# Patient Record
Sex: MALE | Race: WHITE | HISPANIC OR LATINO | Employment: FULL TIME | URBAN - METROPOLITAN AREA
[De-identification: names, ages, dates, MRNs, and addresses within clinical notes are randomized per-mention and may not be internally consistent; named-entity substitution may affect disease eponyms.]

---

## 2018-08-30 ENCOUNTER — PATIENT MESSAGE (OUTPATIENT)
Dept: INTERNAL MEDICINE | Facility: CLINIC | Age: 63
End: 2018-08-30

## 2018-08-30 ENCOUNTER — OFFICE VISIT (OUTPATIENT)
Dept: INTERNAL MEDICINE | Facility: CLINIC | Age: 63
End: 2018-08-30
Payer: MEDICARE

## 2018-08-30 VITALS
DIASTOLIC BLOOD PRESSURE: 72 MMHG | HEART RATE: 81 BPM | RESPIRATION RATE: 16 BRPM | SYSTOLIC BLOOD PRESSURE: 118 MMHG | BODY MASS INDEX: 28.61 KG/M2 | HEIGHT: 67 IN | TEMPERATURE: 98 F | WEIGHT: 182.31 LBS

## 2018-08-30 DIAGNOSIS — Z11.59 NEED FOR HEPATITIS C SCREENING TEST: ICD-10-CM

## 2018-08-30 DIAGNOSIS — Z76.89 ENCOUNTER TO ESTABLISH CARE: Primary | ICD-10-CM

## 2018-08-30 DIAGNOSIS — I10 ESSENTIAL HYPERTENSION: ICD-10-CM

## 2018-08-30 DIAGNOSIS — Z23 NEED FOR VACCINATION WITH 13-POLYVALENT PNEUMOCOCCAL CONJUGATE VACCINE: ICD-10-CM

## 2018-08-30 DIAGNOSIS — N40.0 BENIGN PROSTATIC HYPERPLASIA, UNSPECIFIED WHETHER LOWER URINARY TRACT SYMPTOMS PRESENT: ICD-10-CM

## 2018-08-30 DIAGNOSIS — Z13.6 SCREENING FOR CARDIOVASCULAR CONDITION: ICD-10-CM

## 2018-08-30 DIAGNOSIS — A60.01 HERPES SIMPLEX INFECTION OF PENIS: ICD-10-CM

## 2018-08-30 DIAGNOSIS — Z79.899 OTHER LONG TERM (CURRENT) DRUG THERAPY: ICD-10-CM

## 2018-08-30 DIAGNOSIS — Z23 NEED FOR SHINGLES VACCINE: ICD-10-CM

## 2018-08-30 DIAGNOSIS — Z12.5 SCREENING FOR MALIGNANT NEOPLASM OF PROSTATE: ICD-10-CM

## 2018-08-30 PROCEDURE — 90670 PCV13 VACCINE IM: CPT | Mod: S$GLB,,, | Performed by: INTERNAL MEDICINE

## 2018-08-30 PROCEDURE — 99205 OFFICE O/P NEW HI 60 MIN: CPT | Mod: 25,S$GLB,, | Performed by: INTERNAL MEDICINE

## 2018-08-30 PROCEDURE — G0009 ADMIN PNEUMOCOCCAL VACCINE: HCPCS | Mod: S$GLB,,, | Performed by: INTERNAL MEDICINE

## 2018-08-30 PROCEDURE — 3008F BODY MASS INDEX DOCD: CPT | Mod: CPTII,S$GLB,, | Performed by: INTERNAL MEDICINE

## 2018-08-30 PROCEDURE — 99999 PR PBB SHADOW E&M-NEW PATIENT-LVL III: CPT | Mod: PBBFAC,,, | Performed by: INTERNAL MEDICINE

## 2018-08-30 RX ORDER — BISOPROLOL FUMARATE AND HYDROCHLOROTHIAZIDE 5; 6.25 MG/1; MG/1
TABLET ORAL
COMMUNITY
Start: 2018-08-22 | End: 2018-08-30 | Stop reason: SDUPTHER

## 2018-08-30 RX ORDER — TAMSULOSIN HYDROCHLORIDE 0.4 MG/1
0.4 CAPSULE ORAL DAILY
COMMUNITY
End: 2018-08-30 | Stop reason: DRUGHIGH

## 2018-08-30 RX ORDER — TAMSULOSIN HYDROCHLORIDE 0.4 MG/1
0.8 CAPSULE ORAL DAILY
Qty: 180 CAPSULE | Refills: 3 | Status: SHIPPED | OUTPATIENT
Start: 2018-08-30 | End: 2019-10-29 | Stop reason: SDUPTHER

## 2018-08-30 RX ORDER — BISOPROLOL FUMARATE AND HYDROCHLOROTHIAZIDE 5; 6.25 MG/1; MG/1
1 TABLET ORAL DAILY
Qty: 90 TABLET | Refills: 3 | Status: SHIPPED | OUTPATIENT
Start: 2018-08-30 | End: 2019-10-29 | Stop reason: SDUPTHER

## 2018-08-30 RX ORDER — AMLODIPINE BESYLATE 5 MG/1
5 TABLET ORAL DAILY
COMMUNITY
End: 2018-08-31 | Stop reason: DRUGHIGH

## 2018-08-30 NOTE — PATIENT INSTRUCTIONS
Shingrix is a two part vaccines series to prevent shingles. You should receive the second part of the vaccine anytime between 2 to 6 months after the initial dose.   Https://www.cdc.gov/vaccines/vpd/shingles/public/shingrix/index.html

## 2018-08-30 NOTE — PROGRESS NOTES
Subjective:       Patient ID: Babatunde Oconnor is a 63 y.o. male.    Chief Complaint: Annual Exam (new patient)    Hypertension   This is a chronic problem. The current episode started more than 1 year ago. The problem has been resolved since onset. The problem is controlled. Pertinent negatives include no anxiety, blurred vision, chest pain, headaches, malaise/fatigue, neck pain, orthopnea, palpitations, peripheral edema, PND, shortness of breath or sweats. There are no associated agents to hypertension. There are no known risk factors for coronary artery disease. Past treatments include ACE inhibitors, alpha 1 blockers and beta blockers. The current treatment provides significant improvement. There are no compliance problems.      63 y.o. male here to discuss healthcare maintenance and f/u of chronic medical conditions. Previous PCP was Dr. German Angeles with [facility].       Health Maintenance:   Lipid disorders/ASCVD risk: due    DM: FBG due  Hepatitis C (9317-2575): due   Colorectal Cancer (50-75yr): Colonoscopy 3 months ago, repeat 5 years   Prostate (per discussion with patient starting at 50y or 45y if high risk): PSA due      Vaccines:   Influenza (yearly)    Tetanus (every 10 yrs - 1st tdap) defer, review records    PPSV23(>64yo or <65 w/ lung dz, smoking, DM) next year   PCV13 (> 65 or <65 w/ immunocompromised) due   Zoster (>61yo) due       Medical Problems:  1. HTN:  He is taking bisoprolol hydrochlorothiazide 5-6.25 mg and amlodipine but he is unsure of the dose  2.  BPH:  Flomax 0.4 mg daily but he reports that he still having some symptoms    Back pain: He is seeing Dr. Eb Dietrich- [facility]. He is currently participating in physical therapy.     Past Medical History:   Diagnosis Date    BPH (benign prostatic hyperplasia)     Hypertension      Past Surgical History:   Procedure Laterality Date    CERVICAL SPINE SURGERY       No family history on  file.  Social History     Socioeconomic History    Marital status:      Spouse name: Not on file    Number of children: Not on file    Years of education: Not on file    Highest education level: Not on file   Social Needs    Financial resource strain: Not on file    Food insecurity - worry: Not on file    Food insecurity - inability: Not on file    Transportation needs - medical: Not on file    Transportation needs - non-medical: Not on file   Occupational History    Not on file   Tobacco Use    Smoking status: Never Smoker    Smokeless tobacco: Never Used   Substance and Sexual Activity    Alcohol use: Yes     Frequency: 4 or more times a week     Drinks per session: 1 or 2     Binge frequency: Less than monthly    Drug use: No    Sexual activity: Yes     Partners: Female   Other Topics Concern    Not on file   Social History Narrative    Not on file     Review of patient's allergies indicates:  No Known Allergies    Current Outpatient Medications:     amLODIPine (NORVASC) 5 MG tablet, Take 5 mg by mouth once daily., Disp: , Rfl:     bisoprolol-hydrochlorothiazide 5-6.25 mg (ZIAC) 5-6.25 mg Tab, Take 1 tablet by mouth once daily., Disp: 90 tablet, Rfl: 3    tamsulosin (FLOMAX) 0.4 mg Cap, Take 2 capsules (0.8 mg total) by mouth once daily., Disp: 180 capsule, Rfl: 3    varicella-zoster gE-AS01B, PF, (SHINGRIX, PF,) 50 mcg/0.5 mL injection, Inject 0.5 mLs into the muscle once. for 1 dose, Disp: 0.5 mL, Rfl: 1      Review of Systems   Constitutional: Negative for appetite change, fever and malaise/fatigue.   HENT: Negative for dental problem, sinus pain and trouble swallowing.    Eyes: Negative for blurred vision, discharge and redness.   Respiratory: Negative for cough and shortness of breath.    Cardiovascular: Negative for chest pain, palpitations, orthopnea, leg swelling and PND.   Gastrointestinal: Negative for abdominal pain, blood in stool, constipation and diarrhea.  "  Genitourinary: Positive for difficulty urinating and frequency. Negative for dysuria.   Musculoskeletal: Positive for back pain. Negative for gait problem, joint swelling and neck pain.   Skin: Negative for pallor, rash and wound.   Neurological: Negative for numbness and headaches.       Objective:        Vitals:    08/30/18 1505   BP: 118/72   BP Location: Right arm   Patient Position: Sitting   BP Method: Large (Manual)   Pulse: 81   Resp: 16   Temp: 98.3 °F (36.8 °C)   TempSrc: Oral   Weight: 82.7 kg (182 lb 5.1 oz)   Height: 5' 6.5" (1.689 m)       Body mass index is 28.99 kg/m².    Physical Exam   Constitutional: He is oriented to person, place, and time. He appears well-developed. No distress.   HENT:   Head: Normocephalic and atraumatic.   Right Ear: Tympanic membrane normal.   Left Ear: Tympanic membrane normal.   Nose: Nose normal.   Mouth/Throat: Oropharynx is clear and moist.   Eyes: Conjunctivae and EOM are normal. Pupils are equal, round, and reactive to light.   Neck: Normal range of motion. Neck supple. No tracheal deviation present. No thyromegaly present.   Cardiovascular: Normal rate, regular rhythm, normal heart sounds and intact distal pulses.   Pulmonary/Chest: Effort normal and breath sounds normal.   Abdominal: Soft. Bowel sounds are normal. He exhibits no distension and no mass. There is no tenderness.   Musculoskeletal: Normal range of motion. He exhibits no edema.   Lymphadenopathy:     He has no cervical adenopathy.   Neurological: He is alert and oriented to person, place, and time. No sensory deficit.   Skin: Skin is warm and dry. He is not diaphoretic. No cyanosis. Nails show no clubbing.   Psychiatric: He has a normal mood and affect. His behavior is normal. Judgment normal.       Assessment:     1. Encounter to establish care    2. Essential hypertension    3. Benign prostatic hyperplasia, unspecified whether lower urinary tract symptoms present    4. Need for hepatitis C screening " test    5. Screening for cardiovascular condition    6. Screening for malignant neoplasm of prostate    7. Other long term (current) drug therapy     8. Need for vaccination with 13-polyvalent pneumococcal conjugate vaccine    9. Need for shingles vaccine           Plan:         1. Encounter to establish care  - reviewed healthcare maintenance and pt's chronic medical problems.   - CBC auto differential; Future  - Comprehensive metabolic panel; Future  - Hemoglobin A1c; Future  - Lipid panel; Future  - TSH; Future  - Urinalysis; Future  - Hepatitis C antibody; Future  - Ambulatory Referral to Optometry    2. Essential hypertension  He will notify me of the dose of amlodipine he is currently taking and I will send refills so his pharmacy  - Comprehensive metabolic panel; Future  - TSH; Future  - Urinalysis; Future  - Ambulatory Referral to Optometry  - bisoprolol-hydrochlorothiazide 5-6.25 mg (ZIAC) 5-6.25 mg Tab; Take 1 tablet by mouth once daily.  Dispense: 90 tablet; Refill: 3    3. Benign prostatic hyperplasia, unspecified whether lower urinary tract symptoms present  - PSA, Screening; Future  - tamsulosin (FLOMAX) 0.4 mg Cap; Take 2 capsules (0.8 mg total) by mouth once daily.  Dispense: 180 capsule; Refill: 3    4. Need for hepatitis C screening test  - Hepatitis C antibody; Future    5. Screening for cardiovascular condition  - Lipid panel; Future    6. Screening for malignant neoplasm of prostate  - PSA, Screening; Future    7. Other long term (current) drug therapy   - Hemoglobin A1c; Future    8. Need for vaccination with 13-polyvalent pneumococcal conjugate vaccine  - Pneumococcal Conjugate Vaccine (13 Valent) (IM)    9. Need for shingles vaccine  - varicella-zoster gE-AS01B, PF, (SHINGRIX, PF,) 50 mcg/0.5 mL injection; Inject 0.5 mLs into the muscle once. for 1 dose  Dispense: 0.5 mL; Refill: 1    Records requested from previous physicians

## 2018-08-31 PROBLEM — A60.01 HERPES SIMPLEX INFECTION OF PENIS: Status: ACTIVE | Noted: 2018-08-31

## 2018-08-31 RX ORDER — ASPIRIN 81 MG/1
81 TABLET ORAL DAILY
COMMUNITY
End: 2022-06-28 | Stop reason: HOSPADM

## 2018-08-31 RX ORDER — ATORVASTATIN CALCIUM 10 MG/1
10 TABLET, FILM COATED ORAL DAILY
COMMUNITY
End: 2018-08-31 | Stop reason: SDUPTHER

## 2018-08-31 RX ORDER — VALACYCLOVIR HYDROCHLORIDE 500 MG/1
500 TABLET, FILM COATED ORAL DAILY
Qty: 90 TABLET | Refills: 3 | Status: SHIPPED | OUTPATIENT
Start: 2018-08-31 | End: 2018-10-15 | Stop reason: SDUPTHER

## 2018-08-31 RX ORDER — ACYCLOVIR 400 MG/1
1 TABLET ORAL 3 TIMES DAILY PRN
COMMUNITY
End: 2018-08-31 | Stop reason: DRUGHIGH

## 2018-08-31 RX ORDER — AMLODIPINE BESYLATE 10 MG/1
10 TABLET ORAL DAILY
Qty: 90 TABLET | Refills: 3 | Status: SHIPPED | OUTPATIENT
Start: 2018-08-31 | End: 2018-10-15 | Stop reason: SDUPTHER

## 2018-08-31 RX ORDER — ATORVASTATIN CALCIUM 10 MG/1
10 TABLET, FILM COATED ORAL DAILY
Qty: 90 TABLET | Refills: 3 | Status: SHIPPED | OUTPATIENT
Start: 2018-08-31 | End: 2018-10-15 | Stop reason: SDUPTHER

## 2018-09-04 NOTE — TELEPHONE ENCOUNTER
Patient wanting you to recommend an optometrist at  [facility]. Please advise when you return to clinic.

## 2018-09-07 ENCOUNTER — LAB VISIT (OUTPATIENT)
Dept: LAB | Facility: HOSPITAL | Age: 63
End: 2018-09-07
Attending: INTERNAL MEDICINE
Payer: MEDICARE

## 2018-09-07 DIAGNOSIS — N40.0 BENIGN PROSTATIC HYPERPLASIA, UNSPECIFIED WHETHER LOWER URINARY TRACT SYMPTOMS PRESENT: ICD-10-CM

## 2018-09-07 DIAGNOSIS — I10 ESSENTIAL HYPERTENSION: ICD-10-CM

## 2018-09-07 DIAGNOSIS — Z12.5 SCREENING FOR MALIGNANT NEOPLASM OF PROSTATE: ICD-10-CM

## 2018-09-07 DIAGNOSIS — Z76.89 ENCOUNTER TO ESTABLISH CARE: ICD-10-CM

## 2018-09-07 DIAGNOSIS — Z11.59 NEED FOR HEPATITIS C SCREENING TEST: ICD-10-CM

## 2018-09-07 DIAGNOSIS — Z13.6 SCREENING FOR CARDIOVASCULAR CONDITION: ICD-10-CM

## 2018-09-07 DIAGNOSIS — Z79.899 OTHER LONG TERM (CURRENT) DRUG THERAPY: ICD-10-CM

## 2018-09-07 LAB
ALBUMIN SERPL BCP-MCNC: 4.5 G/DL
ALP SERPL-CCNC: 53 U/L
ALT SERPL W/O P-5'-P-CCNC: 43 U/L
ANION GAP SERPL CALC-SCNC: 9 MMOL/L
AST SERPL-CCNC: 32 U/L
BASOPHILS # BLD AUTO: 0.02 K/UL
BASOPHILS NFR BLD: 0.3 %
BILIRUB SERPL-MCNC: 0.6 MG/DL
BUN SERPL-MCNC: 10 MG/DL
CALCIUM SERPL-MCNC: 9.7 MG/DL
CHLORIDE SERPL-SCNC: 102 MMOL/L
CHOLEST SERPL-MCNC: 212 MG/DL
CHOLEST/HDLC SERPL: 3.1 {RATIO}
CO2 SERPL-SCNC: 27 MMOL/L
COMPLEXED PSA SERPL-MCNC: 0.53 NG/ML
CREAT SERPL-MCNC: 0.9 MG/DL
DIFFERENTIAL METHOD: ABNORMAL
EOSINOPHIL # BLD AUTO: 0.1 K/UL
EOSINOPHIL NFR BLD: 0.9 %
ERYTHROCYTE [DISTWIDTH] IN BLOOD BY AUTOMATED COUNT: 13.2 %
EST. GFR  (AFRICAN AMERICAN): >60 ML/MIN/1.73 M^2
EST. GFR  (NON AFRICAN AMERICAN): >60 ML/MIN/1.73 M^2
ESTIMATED AVG GLUCOSE: 111 MG/DL
GLUCOSE SERPL-MCNC: 103 MG/DL
HBA1C MFR BLD HPLC: 5.5 %
HCT VFR BLD AUTO: 40.9 %
HDLC SERPL-MCNC: 68 MG/DL
HDLC SERPL: 32.1 %
HGB BLD-MCNC: 13.2 G/DL
LDLC SERPL CALC-MCNC: 116.4 MG/DL
LYMPHOCYTES # BLD AUTO: 1.5 K/UL
LYMPHOCYTES NFR BLD: 25.2 %
MCH RBC QN AUTO: 29 PG
MCHC RBC AUTO-ENTMCNC: 32.3 G/DL
MCV RBC AUTO: 90 FL
MONOCYTES # BLD AUTO: 0.6 K/UL
MONOCYTES NFR BLD: 10.2 %
NEUTROPHILS # BLD AUTO: 3.7 K/UL
NEUTROPHILS NFR BLD: 63.4 %
NONHDLC SERPL-MCNC: 144 MG/DL
PLATELET # BLD AUTO: 241 K/UL
PMV BLD AUTO: 10 FL
POTASSIUM SERPL-SCNC: 4 MMOL/L
PROT SERPL-MCNC: 7.5 G/DL
RBC # BLD AUTO: 4.55 M/UL
SODIUM SERPL-SCNC: 138 MMOL/L
TRIGL SERPL-MCNC: 138 MG/DL
TSH SERPL DL<=0.005 MIU/L-ACNC: 1.55 UIU/ML
WBC # BLD AUTO: 5.76 K/UL

## 2018-09-07 PROCEDURE — 80061 LIPID PANEL: CPT

## 2018-09-07 PROCEDURE — 84443 ASSAY THYROID STIM HORMONE: CPT

## 2018-09-07 PROCEDURE — 86803 HEPATITIS C AB TEST: CPT

## 2018-09-07 PROCEDURE — 80053 COMPREHEN METABOLIC PANEL: CPT

## 2018-09-07 PROCEDURE — 85025 COMPLETE CBC W/AUTO DIFF WBC: CPT

## 2018-09-07 PROCEDURE — 36415 COLL VENOUS BLD VENIPUNCTURE: CPT

## 2018-09-07 PROCEDURE — 84153 ASSAY OF PSA TOTAL: CPT

## 2018-09-07 PROCEDURE — 83036 HEMOGLOBIN GLYCOSYLATED A1C: CPT

## 2018-09-09 ENCOUNTER — PATIENT MESSAGE (OUTPATIENT)
Dept: INTERNAL MEDICINE | Facility: CLINIC | Age: 63
End: 2018-09-09

## 2018-09-10 LAB — HCV AB SERPL QL IA: NEGATIVE

## 2018-09-13 ENCOUNTER — PATIENT MESSAGE (OUTPATIENT)
Dept: OPTOMETRY | Facility: CLINIC | Age: 63
End: 2018-09-13

## 2018-09-14 DIAGNOSIS — Z12.11 COLON CANCER SCREENING: ICD-10-CM

## 2018-10-11 ENCOUNTER — PATIENT MESSAGE (OUTPATIENT)
Dept: INTERNAL MEDICINE | Facility: CLINIC | Age: 63
End: 2018-10-11

## 2018-10-15 DIAGNOSIS — A60.01 HERPES SIMPLEX INFECTION OF PENIS: ICD-10-CM

## 2018-10-15 RX ORDER — ATORVASTATIN CALCIUM 10 MG/1
10 TABLET, FILM COATED ORAL DAILY
Qty: 90 TABLET | Refills: 3 | Status: SHIPPED | OUTPATIENT
Start: 2018-10-15 | End: 2019-12-03

## 2018-10-15 RX ORDER — VALACYCLOVIR HYDROCHLORIDE 500 MG/1
500 TABLET, FILM COATED ORAL DAILY
Qty: 90 TABLET | Refills: 3 | Status: SHIPPED | OUTPATIENT
Start: 2018-10-15 | End: 2020-11-24

## 2018-10-15 RX ORDER — AMLODIPINE BESYLATE 10 MG/1
10 TABLET ORAL DAILY
Qty: 90 TABLET | Refills: 3 | Status: SHIPPED | OUTPATIENT
Start: 2018-10-15 | End: 2019-12-03

## 2018-10-30 ENCOUNTER — DOCUMENTATION ONLY (OUTPATIENT)
Dept: INTERNAL MEDICINE | Facility: CLINIC | Age: 63
End: 2018-10-30

## 2018-10-30 ENCOUNTER — LAB VISIT (OUTPATIENT)
Dept: LAB | Facility: HOSPITAL | Age: 63
End: 2018-10-30
Attending: INTERNAL MEDICINE
Payer: MEDICARE

## 2018-10-30 ENCOUNTER — OFFICE VISIT (OUTPATIENT)
Dept: OPTOMETRY | Facility: CLINIC | Age: 63
End: 2018-10-30
Payer: MEDICARE

## 2018-10-30 DIAGNOSIS — H04.123 BILATERAL DRY EYES: ICD-10-CM

## 2018-10-30 DIAGNOSIS — H25.13 NUCLEAR SCLEROSIS OF BOTH EYES: Primary | ICD-10-CM

## 2018-10-30 DIAGNOSIS — Z12.11 COLON CANCER SCREENING: ICD-10-CM

## 2018-10-30 DIAGNOSIS — H52.7 REFRACTIVE ERROR: ICD-10-CM

## 2018-10-30 DIAGNOSIS — B58.00 OCULAR TOXOPLASMOSIS: ICD-10-CM

## 2018-10-30 PROCEDURE — 92004 COMPRE OPH EXAM NEW PT 1/>: CPT | Mod: S$PBB,,, | Performed by: OPTOMETRIST

## 2018-10-30 PROCEDURE — 99212 OFFICE O/P EST SF 10 MIN: CPT | Mod: PBBFAC,PO | Performed by: OPTOMETRIST

## 2018-10-30 PROCEDURE — 82274 ASSAY TEST FOR BLOOD FECAL: CPT

## 2018-10-30 PROCEDURE — 99999 PR PBB SHADOW E&M-EST. PATIENT-LVL II: CPT | Mod: PBBFAC,,, | Performed by: OPTOMETRIST

## 2018-10-30 PROCEDURE — 92015 DETERMINE REFRACTIVE STATE: CPT | Mod: ,,, | Performed by: OPTOMETRIST

## 2018-10-30 RX ORDER — IBUPROFEN 800 MG/1
800 TABLET ORAL EVERY 6 HOURS PRN
Refills: 0 | COMMUNITY
Start: 2018-08-06 | End: 2020-11-24

## 2018-10-30 RX ORDER — TADALAFIL 20 MG/1
TABLET, FILM COATED ORAL
Refills: 12 | COMMUNITY
Start: 2018-08-01 | End: 2019-12-03

## 2018-10-30 RX ORDER — AMLODIPINE BESYLATE AND ATORVASTATIN CALCIUM 10; 10 MG/1; MG/1
1 TABLET, FILM COATED ORAL DAILY
Refills: 1 | COMMUNITY
Start: 2018-10-17 | End: 2019-10-29 | Stop reason: SDUPTHER

## 2018-10-30 RX ORDER — GABAPENTIN 100 MG/1
CAPSULE ORAL
Refills: 3 | COMMUNITY
Start: 2018-07-25 | End: 2019-12-03

## 2018-10-30 NOTE — LETTER
October 31, 2018      Negar Pandey MD  [address] [facility]  [place] [address]           [place] [facility]  [address] [facility] [facility]  [place] [address]  Phone: [phone]  Fax: [phone]          Patient: Babatunde Oconnor   MR Number: [id number]   YOB: 1955   Date of Visit: 10/30/2018       Dear Dr. Negar Pandey:    Thank you for referring Babatunde Oconnor to me for evaluation. Attached you will find relevant portions of my assessment and plan of care.    If you have questions, please do not hesitate to call me. I look forward to following Babatunde Oconnor along with you.    Sincerely,    Perfecto Wheat, OD    Enclosure  CC:  No Recipients    If you would like to receive this communication electronically, please contact [email or web address] or [phone] to request more information on [employer] access.    For providers and/or their staff who would like to refer a patient to [facility], please contact us through our one-stop-shop provider referral line, [facility] , at [phone].    If you feel you have received this communication in error or would no longer like to receive these types of communications, please e-mail [email or web address]

## 2018-10-31 LAB — HEMOCCULT STL QL IA: NEGATIVE

## 2018-10-31 NOTE — PROGRESS NOTES
CROW SEPULVEDA about 3 yrs. Ago.  Glasses about 3 yrs. Old, distance and near not as   clear.  Eyes red and water a lot, using visine up to 6 times a day.    Patient is spending more time driving and is very bothered by the sun,   would like prescription sunglasses. 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    Last edited by Montse Castaneda on 10/30/2018  3:50 PM. (History)            Assessment /Plan     For exam results, see Encounter Report.    Nuclear sclerosis of both eyes    Bilateral dry eyes    Ocular toxoplasmosis    Refractive error      1. Educated pt on presence of cataracts and effects on vision. No surgery at this time. Recheck in one year.  2. Recommend artificial tears. 1 drop 3-4x per day. Chronicity of disease and treatment discussed. Drives a lot, air vents in car, blink rate decreased.  3. Inactive. Monitor condition. Patient to report any changes. RTC 1 year recheck.  4. Spec Rx given. Different lens options discussed with patient. RTC 1 year full exam.

## 2019-02-21 ENCOUNTER — OFFICE VISIT (OUTPATIENT)
Dept: URGENT CARE | Facility: CLINIC | Age: 64
End: 2019-02-21
Payer: MEDICARE

## 2019-02-21 VITALS
OXYGEN SATURATION: 97 % | BODY MASS INDEX: 28.56 KG/M2 | SYSTOLIC BLOOD PRESSURE: 129 MMHG | HEIGHT: 67 IN | TEMPERATURE: 98 F | WEIGHT: 182 LBS | HEART RATE: 85 BPM | RESPIRATION RATE: 16 BRPM | DIASTOLIC BLOOD PRESSURE: 82 MMHG

## 2019-02-21 DIAGNOSIS — M62.830 BACK MUSCLE SPASM: ICD-10-CM

## 2019-02-21 DIAGNOSIS — S39.012A STRAIN OF LUMBAR REGION, INITIAL ENCOUNTER: Primary | ICD-10-CM

## 2019-02-21 PROCEDURE — 3079F DIAST BP 80-89 MM HG: CPT | Mod: CPTII,S$GLB,, | Performed by: NURSE PRACTITIONER

## 2019-02-21 PROCEDURE — 99214 PR OFFICE/OUTPT VISIT, EST, LEVL IV, 30-39 MIN: ICD-10-PCS | Mod: 25,S$GLB,, | Performed by: NURSE PRACTITIONER

## 2019-02-21 PROCEDURE — 3079F PR MOST RECENT DIASTOLIC BLOOD PRESSURE 80-89 MM HG: ICD-10-PCS | Mod: CPTII,S$GLB,, | Performed by: NURSE PRACTITIONER

## 2019-02-21 PROCEDURE — 1101F PT FALLS ASSESS-DOCD LE1/YR: CPT | Mod: CPTII,S$GLB,, | Performed by: NURSE PRACTITIONER

## 2019-02-21 PROCEDURE — 3008F BODY MASS INDEX DOCD: CPT | Mod: CPTII,S$GLB,, | Performed by: NURSE PRACTITIONER

## 2019-02-21 PROCEDURE — 3074F SYST BP LT 130 MM HG: CPT | Mod: CPTII,S$GLB,, | Performed by: NURSE PRACTITIONER

## 2019-02-21 PROCEDURE — 96372 THER/PROPH/DIAG INJ SC/IM: CPT | Mod: S$GLB,,, | Performed by: NURSE PRACTITIONER

## 2019-02-21 PROCEDURE — 96372 PR INJECTION,THERAP/PROPH/DIAG2ST, IM OR SUBCUT: ICD-10-PCS | Mod: S$GLB,,, | Performed by: NURSE PRACTITIONER

## 2019-02-21 PROCEDURE — 3074F PR MOST RECENT SYSTOLIC BLOOD PRESSURE < 130 MM HG: ICD-10-PCS | Mod: CPTII,S$GLB,, | Performed by: NURSE PRACTITIONER

## 2019-02-21 PROCEDURE — 3008F PR BODY MASS INDEX (BMI) DOCUMENTED: ICD-10-PCS | Mod: CPTII,S$GLB,, | Performed by: NURSE PRACTITIONER

## 2019-02-21 PROCEDURE — 99214 OFFICE O/P EST MOD 30 MIN: CPT | Mod: 25,S$GLB,, | Performed by: NURSE PRACTITIONER

## 2019-02-21 PROCEDURE — 1101F PR PT FALLS ASSESS DOC 0-1 FALLS W/OUT INJ PAST YR: ICD-10-PCS | Mod: CPTII,S$GLB,, | Performed by: NURSE PRACTITIONER

## 2019-02-21 RX ORDER — NAPROXEN 500 MG/1
500 TABLET ORAL 2 TIMES DAILY WITH MEALS
Qty: 20 TABLET | Refills: 0 | Status: SHIPPED | OUTPATIENT
Start: 2019-02-21 | End: 2019-03-03

## 2019-02-21 RX ORDER — KETOROLAC TROMETHAMINE 30 MG/ML
30 INJECTION, SOLUTION INTRAMUSCULAR; INTRAVENOUS
Status: COMPLETED | OUTPATIENT
Start: 2019-02-21 | End: 2019-02-21

## 2019-02-21 RX ORDER — CYCLOBENZAPRINE HCL 5 MG
5 TABLET ORAL NIGHTLY
Qty: 7 TABLET | Refills: 0 | Status: SHIPPED | OUTPATIENT
Start: 2019-02-21 | End: 2019-02-28

## 2019-02-21 RX ADMIN — KETOROLAC TROMETHAMINE 30 MG: 30 INJECTION, SOLUTION INTRAMUSCULAR; INTRAVENOUS at 10:02

## 2019-02-21 NOTE — PATIENT INSTRUCTIONS
Start anti-inflammatory prescription tomorrow.  Muscle relaxer may make you drowsy.      Alternate heat and ice for first 48 hours then  apply heat. You may do gently stretching if tolerable.    Moist warm compresss to area several times daily.  May use a heating pad on LOW to provide heat over a towel which was dampended with warm water. DO NOT FALL ASLEEP WITH HEATING PAD ON.  Do not stay in one position to long.  When sleeping on your back place a pillow under knees to reduce tension on back.  If sleeping on your side, place pillow between knees to keep spine in better alinement.  Wear supportive shoes such as tennis shoes for support of the lower back.  Take any medication as directed.    If you were not prescribed an anti-inflammatory medication, and if you do not have any history of stomach/intestinal ulcers, or kidney disease, or are not taking a blood thinner such as Coumadin, Plavix, Pradaxa, Eloquis, or Xaralta for example, it is OK to take over the counter Ibuprofen or Advil or Motrin or Aleve as directed.  Do not take these medications on an empty stomach.    If you lose control of your bowel and/or bladder, please go to the nearest Emergency Department immediately.  If you lose sensation in between your legs by your genitalia and/or rectum, please go to the nearest Emergency Department immediately.  If you lose control or sensation of any extremity, please go to the nearest Emergency Department immediately.            Understanding Lumbosacral Strain    Lumbosacral strain is a medical term for an injury that causes low back pain. The lumbosacral area (low back) is between the bottom of the ribcage and the top of the buttocks. A strain is tearing of muscles and tendons. These tears can be very small but still cause pain.  How a lumbosacral strain happens  Muscles and tendons connected to the spine can be strained in a number of ways:  · Sitting or standing in the same position for long periods of time.  This can harm the low back over time. Poor posture can make low back pain more likely.  · Moving the muscles and tendons past their usual range of motion. This can cause a sudden injury. This can happen when you twist, bend over, or lift something heavy. Not using correct technique for sports or tasks like lifting can make back injury more likely.  · Accidents or falls  Lumbosacral strain can be caused by other problems, but these are less common.  Symptoms of lumbosacral strain  Symptoms may include:  · Pain in the back, often on one side  · Pain that gets worse with movement and gets better with rest  · Inability to move as freely as usual  · Swelling, slight redness, and skin warmth in the painful area  Treatment for lumbosacral strain  Low back pain often goes away by itself within several weeks. But it often comes back. Treatment focuses on reducing pain and avoiding further injury. Bed rest is usually not recommended for low back pain. Treatments may include:  · Avoiding or changing the action that caused the problem. This helps prevent injuring the tissues again.  · Prescription or over-the-counter pain medicines. These help reduce inflammation, swelling, and pain.  · Cold or heat packs. These help reduce pain and swelling.  · Stretching and other exercises. These improve flexibility and strength.  · Physical therapy. This usually includes exercises and other treatments.  · Injections of medicine. This may relieve symptoms.  If these treatments do not relieve symptoms, your healthcare provider may order imaging tests to learn more about the problem. Sometimes you may need surgery.  Possible complications of lumbosacral strain  If the cause of the pain is not addressed, symptoms may return or get worse. Follow your healthcare providers instructions on lifestyle changes and treating your back.     When to call your healthcare provider  Call your healthcare provider right away if you have any of these:  · Fever  of 100.4°F (38°C) or higher, or as directed  · Numbness, tingling, or weakness  · Problems with bowel or bladder control, or problems having sex  · Pain that does not go away, or gets worse  · New symptoms   Date Last Reviewed: 3/10/2016  © 5516-1793 [employer]. [address] [place] [address], [place] [address]. All rights reserved. This information is not intended as a substitute for professional medical care. Always follow your healthcare professional's instructions.        Muscle Spasm  A muscle spasm (also called a cramp) is an involuntary muscle contraction. The muscle tightens quickly and strongly. A hard lump may form in the muscle. Muscle spasms are very painful. Read on to learn more about muscle spasms and how to treat and prevent them.    What causes muscles to spasm?  Often, the cause of a muscle spasm is not known. Muscle spasm is due to irritation of muscle fibers. Some things can make a muscle spasm more likely. These include:  · Injury  · Heavy exercise  · Overtired muscles  · A muscle held in one position for a long time  · Dehydration  · Low levels of certain minerals in the body  · Taking certain medications, such as diuretics or water pills  · Certain medical conditions, such as kidney failure or diabetes  · Being pregnant  Stopping a muscle spasm  Muscle spasms often come and go quickly. When a muscle goes into spasm, very gently stretch and massage the muscle. This may help calm the muscle fibers. Then rest the muscle.  Preventing muscle spasms  Although there is little or no evidence that staying hydrated, taking certain vitamins or minerals or stretching works to prevent cramps, these measures may help and have other benefits. Talk to your health care provider about steps to take to avoid muscle spasms. These may include:  · Drinking enough fluids to avoid dehydration, especially when you exercise.  · Taking vitamin or mineral supplements.  · Getting regular exercise.  · Stretching  regularly, especially before exercise.  · Limit caffeine and smoking.  · Taking a prescription muscle relaxant.  When to call your doctor  Call your doctor if you have any of the following:  · Severe cramping  · Cramping that lasts a long time, does not go away with stretching, or keeps coming back  · Pain, tingling, or weakness in the arms or legs  · Pain that wakes you up at night   Date Last Reviewed: 9/1/2015 © 2000-2017 [employer]. [address] [place] [address]. All rights reserved. This information is not intended as a substitute for professional medical care. Always follow your healthcare professional's instructions.        Muscle Spasm  A muscle spasm is a sudden tightening of the muscle you cant control. This may be caused by strain, overworking the muscle, or injury. It can also be caused by dehydration, electrolyte imbalance, diabetes, alcohol use, and certain medicines. If it goes on long enough the muscle spasm causes pain. Common areas for muscle spasm are the legs, neck, and back.  Home care  · Heat, massage, and stretching will help relax muscle spasm.  · When the spasm is in your arm or leg, stretch the muscle passively. To do this, have someone bend or straighten the joint above or below the muscle until you feel the stretch on the sore muscle. You can stretch the muscle actively by moving the affected body part. This will stretch the muscle that is in spasm. For example, if the spasm is in your calf, bend the ankle so your toes point upward toward your knee. This will stretch your calf muscle.  · You may use over-the-counter pain medicine to control pain, unless another medicine was prescribed. If you have chronic liver or kidney disease or ever had a stomach ulcer or GI bleeding, talk with your healthcare provider before using these medicines.  Follow-up care  Follow up with your healthcare provider, or as advised.    When to seek medical advice  Call your healthcare  provider right away if any of the following occur:  · Fingers or toes become swollen, cold, blue, numb, or tingly  · You develop weakness in the affected arm or leg  · Pain increases and is not controlled by the above measures  Date Last Reviewed: 11/21/2015  © 6620-7243 [employer]. [address] [place] [address]. All rights reserved. This information is not intended as a substitute for professional medical care. Always follow your healthcare professional's instructions.

## 2019-02-21 NOTE — PROGRESS NOTES
"Subjective:       Patient ID: Babatunde Oconnor is a 63 y.o. male.    Vitals:  height is 5' 6.5" (1.689 m) and weight is 82.6 kg (182 lb). His temperature is 98.2 °F (36.8 °C). His blood pressure is 129/82 and his pulse is 85. His respiration is 16 and oxygen saturation is 97%.     Chief Complaint: Muscle Pain    Pt thinks he may he pulled a muscle in his lower left back while at the gym on Saturday, pt also does [employer] and may have done something while lifting a heavy luggage. He states this morning he developed a throbbing pain at 0200. He denies any numbness, tingling or weakness.       Muscle Pain   This is a new problem. The current episode started in the past 7 days. The problem occurs constantly. The problem has been unchanged. Pertinent negatives include no arthralgias, chest pain, chills, congestion, coughing, fatigue, fever, headaches, joint swelling, myalgias, nausea, rash, sore throat, vertigo or vomiting. The symptoms are aggravated by twisting. He has tried acetaminophen for the symptoms. The treatment provided mild relief.       Constitution: Negative for chills, fatigue and fever.   HENT: Negative for congestion and sore throat.    Neck: Negative for painful lymph nodes.   Cardiovascular: Negative for chest pain and leg swelling.   Eyes: Negative for double vision and blurred vision.   Respiratory: Negative for cough and shortness of breath.    Gastrointestinal: Negative for nausea, vomiting and diarrhea.   Genitourinary: Negative for dysuria, frequency and urgency.   Musculoskeletal: Negative for joint pain, joint swelling, muscle cramps and muscle ache.   Skin: Negative for color change, pale and rash.   Allergic/Immunologic: Negative for seasonal allergies.   Neurological: Negative for dizziness, history of vertigo, light-headedness, passing out and headaches.   Hematologic/Lymphatic: Negative for swollen lymph nodes, easy bruising/bleeding and history of blood clots. Does not bruise/bleed " easily.   Psychiatric/Behavioral: Negative for nervous/anxious, sleep disturbance and depression. The patient is not nervous/anxious.        Objective:      Physical Exam   Constitutional: He is oriented to person, place, and time. Vital signs are normal. He appears well-developed and well-nourished. He is active and cooperative. No distress.   HENT:   Head: Normocephalic and atraumatic.   Nose: Nose normal.   Mouth/Throat: Oropharynx is clear and moist and mucous membranes are normal.   Eyes: Conjunctivae and lids are normal.   Neck: Trachea normal, normal range of motion, full passive range of motion without pain and phonation normal. Neck supple.   Cardiovascular: Normal rate, regular rhythm, normal heart sounds, intact distal pulses and normal pulses.   Pulmonary/Chest: Effort normal and breath sounds normal.   Abdominal: Soft. Normal appearance and bowel sounds are normal. He exhibits no abdominal bruit, no pulsatile midline mass and no mass.   Musculoskeletal: He exhibits no edema or deformity.        Lumbar back: He exhibits tenderness, pain and spasm. He exhibits normal range of motion, no bony tenderness, no swelling, no edema, no deformity, no laceration and normal pulse.   NEG ST LEG RAISE  FULL ROM B LE WITH 5/5 STRENGTH  2+DTR PATELLA AND ACHILLES  NVIT DISTALLY WITH SILT AND 2+BCR  ABLE TO AMBULATE WITH SMOOTH RHYTHMIC GAIT   Neurological: He is alert and oriented to person, place, and time. He has normal strength and normal reflexes. No sensory deficit.   Skin: Skin is warm, dry and intact. He is not diaphoretic.   Psychiatric: He has a normal mood and affect. His speech is normal and behavior is normal. Judgment and thought content normal. Cognition and memory are normal.   Nursing note and vitals reviewed.      Assessment:       1. Strain of lumbar region, initial encounter    2. Back muscle spasm        Plan:         Strain of lumbar region, initial encounter  -     ketorolac injection 30 mg  -      naproxen (NAPROSYN) 500 MG tablet; Take 1 tablet (500 mg total) by mouth 2 (two) times daily with meals. for 10 days  Dispense: 20 tablet; Refill: 0    Back muscle spasm  -     cyclobenzaprine (FLEXERIL) 5 MG tablet; Take 1 tablet (5 mg total) by mouth nightly. for 7 doses  Dispense: 7 tablet; Refill: 0            Patient Instructions       Start anti-inflammatory prescription tomorrow.  Muscle relaxer may make you drowsy.      Alternate heat and ice for first 48 hours then  apply heat. You may do gently stretching if tolerable.    Moist warm compresss to area several times daily.  May use a heating pad on LOW to provide heat over a towel which was dampended with warm water. DO NOT FALL ASLEEP WITH HEATING PAD ON.  Do not stay in one position to long.  When sleeping on your back place a pillow under knees to reduce tension on back.  If sleeping on your side, place pillow between knees to keep spine in better alinement.  Wear supportive shoes such as tennis shoes for support of the lower back.  Take any medication as directed.    If you were not prescribed an anti-inflammatory medication, and if you do not have any history of stomach/intestinal ulcers, or kidney disease, or are not taking a blood thinner such as Coumadin, Plavix, Pradaxa, Eloquis, or Xaralta for example, it is OK to take over the counter Ibuprofen or Advil or Motrin or Aleve as directed.  Do not take these medications on an empty stomach.    If you lose control of your bowel and/or bladder, please go to the nearest Emergency Department immediately.  If you lose sensation in between your legs by your genitalia and/or rectum, please go to the nearest Emergency Department immediately.  If you lose control or sensation of any extremity, please go to the nearest Emergency Department immediately.            Understanding Lumbosacral Strain    Lumbosacral strain is a medical term for an injury that causes low back pain. The lumbosacral area (low back) is  between the bottom of the ribcage and the top of the buttocks. A strain is tearing of muscles and tendons. These tears can be very small but still cause pain.  How a lumbosacral strain happens  Muscles and tendons connected to the spine can be strained in a number of ways:  · Sitting or standing in the same position for long periods of time. This can harm the low back over time. Poor posture can make low back pain more likely.  · Moving the muscles and tendons past their usual range of motion. This can cause a sudden injury. This can happen when you twist, bend over, or lift something heavy. Not using correct technique for sports or tasks like lifting can make back injury more likely.  · Accidents or falls  Lumbosacral strain can be caused by other problems, but these are less common.  Symptoms of lumbosacral strain  Symptoms may include:  · Pain in the back, often on one side  · Pain that gets worse with movement and gets better with rest  · Inability to move as freely as usual  · Swelling, slight redness, and skin warmth in the painful area  Treatment for lumbosacral strain  Low back pain often goes away by itself within several weeks. But it often comes back. Treatment focuses on reducing pain and avoiding further injury. Bed rest is usually not recommended for low back pain. Treatments may include:  · Avoiding or changing the action that caused the problem. This helps prevent injuring the tissues again.  · Prescription or over-the-counter pain medicines. These help reduce inflammation, swelling, and pain.  · Cold or heat packs. These help reduce pain and swelling.  · Stretching and other exercises. These improve flexibility and strength.  · Physical therapy. This usually includes exercises and other treatments.  · Injections of medicine. This may relieve symptoms.  If these treatments do not relieve symptoms, your healthcare provider may order imaging tests to learn more about the problem. Sometimes you may need  surgery.  Possible complications of lumbosacral strain  If the cause of the pain is not addressed, symptoms may return or get worse. Follow your healthcare providers instructions on lifestyle changes and treating your back.     When to call your healthcare provider  Call your healthcare provider right away if you have any of these:  · Fever of 100.4°F (38°C) or higher, or as directed  · Numbness, tingling, or weakness  · Problems with bowel or bladder control, or problems having sex  · Pain that does not go away, or gets worse  · New symptoms   Date Last Reviewed: 3/10/2016  © 8525-0478 [employer]. [address] [place] [address], [place] [address]. All rights reserved. This information is not intended as a substitute for professional medical care. Always follow your healthcare professional's instructions.        Muscle Spasm  A muscle spasm (also called a cramp) is an involuntary muscle contraction. The muscle tightens quickly and strongly. A hard lump may form in the muscle. Muscle spasms are very painful. Read on to learn more about muscle spasms and how to treat and prevent them.    What causes muscles to spasm?  Often, the cause of a muscle spasm is not known. Muscle spasm is due to irritation of muscle fibers. Some things can make a muscle spasm more likely. These include:  · Injury  · Heavy exercise  · Overtired muscles  · A muscle held in one position for a long time  · Dehydration  · Low levels of certain minerals in the body  · Taking certain medications, such as diuretics or water pills  · Certain medical conditions, such as kidney failure or diabetes  · Being pregnant  Stopping a muscle spasm  Muscle spasms often come and go quickly. When a muscle goes into spasm, very gently stretch and massage the muscle. This may help calm the muscle fibers. Then rest the muscle.  Preventing muscle spasms  Although there is little or no evidence that staying hydrated, taking certain vitamins or minerals or  stretching works to prevent cramps, these measures may help and have other benefits. Talk to your health care provider about steps to take to avoid muscle spasms. These may include:  · Drinking enough fluids to avoid dehydration, especially when you exercise.  · Taking vitamin or mineral supplements.  · Getting regular exercise.  · Stretching regularly, especially before exercise.  · Limit caffeine and smoking.  · Taking a prescription muscle relaxant.  When to call your doctor  Call your doctor if you have any of the following:  · Severe cramping  · Cramping that lasts a long time, does not go away with stretching, or keeps coming back  · Pain, tingling, or weakness in the arms or legs  · Pain that wakes you up at night   Date Last Reviewed: 9/1/2015  © 0152-1363 [employer]. [address] [place] [address]. All rights reserved. This information is not intended as a substitute for professional medical care. Always follow your healthcare professional's instructions.        Muscle Spasm  A muscle spasm is a sudden tightening of the muscle you cant control. This may be caused by strain, overworking the muscle, or injury. It can also be caused by dehydration, electrolyte imbalance, diabetes, alcohol use, and certain medicines. If it goes on long enough the muscle spasm causes pain. Common areas for muscle spasm are the legs, neck, and back.  Home care  · Heat, massage, and stretching will help relax muscle spasm.  · When the spasm is in your arm or leg, stretch the muscle passively. To do this, have someone bend or straighten the joint above or below the muscle until you feel the stretch on the sore muscle. You can stretch the muscle actively by moving the affected body part. This will stretch the muscle that is in spasm. For example, if the spasm is in your calf, bend the ankle so your toes point upward toward your knee. This will stretch your calf muscle.  · You may use over-the-counter pain  medicine to control pain, unless another medicine was prescribed. If you have chronic liver or kidney disease or ever had a stomach ulcer or GI bleeding, talk with your healthcare provider before using these medicines.  Follow-up care  Follow up with your healthcare provider, or as advised.    When to seek medical advice  Call your healthcare provider right away if any of the following occur:  · Fingers or toes become swollen, cold, blue, numb, or tingly  · You develop weakness in the affected arm or leg  · Pain increases and is not controlled by the above measures  Date Last Reviewed: 11/21/2015 © 2000-2017 [employer]. [address] [place] [address], [place] [address]. All rights reserved. This information is not intended as a substitute for professional medical care. Always follow your healthcare professional's instructions.

## 2019-10-08 ENCOUNTER — PATIENT MESSAGE (OUTPATIENT)
Dept: SURGERY | Facility: HOSPITAL | Age: 64
End: 2019-10-08

## 2019-10-29 DIAGNOSIS — N40.0 BENIGN PROSTATIC HYPERPLASIA, UNSPECIFIED WHETHER LOWER URINARY TRACT SYMPTOMS PRESENT: ICD-10-CM

## 2019-10-29 DIAGNOSIS — I10 ESSENTIAL HYPERTENSION: ICD-10-CM

## 2019-10-30 RX ORDER — TAMSULOSIN HYDROCHLORIDE 0.4 MG/1
CAPSULE ORAL
Qty: 90 CAPSULE | Refills: 0 | Status: SHIPPED | OUTPATIENT
Start: 2019-10-30 | End: 2019-12-03 | Stop reason: SDUPTHER

## 2019-10-30 RX ORDER — AMLODIPINE BESYLATE AND ATORVASTATIN CALCIUM 10; 10 MG/1; MG/1
TABLET, FILM COATED ORAL
Qty: 90 TABLET | Refills: 0 | Status: SHIPPED | OUTPATIENT
Start: 2019-10-30 | End: 2020-01-19

## 2019-10-30 RX ORDER — BISOPROLOL FUMARATE AND HYDROCHLOROTHIAZIDE 5; 6.25 MG/1; MG/1
TABLET ORAL
Qty: 90 TABLET | Refills: 0 | Status: SHIPPED | OUTPATIENT
Start: 2019-10-30 | End: 2020-01-19

## 2019-12-03 ENCOUNTER — DOCUMENTATION ONLY (OUTPATIENT)
Dept: INTERNAL MEDICINE | Facility: CLINIC | Age: 64
End: 2019-12-03

## 2019-12-03 ENCOUNTER — OFFICE VISIT (OUTPATIENT)
Dept: INTERNAL MEDICINE | Facility: CLINIC | Age: 64
End: 2019-12-03
Payer: MEDICARE

## 2019-12-03 VITALS
BODY MASS INDEX: 29.41 KG/M2 | OXYGEN SATURATION: 98 % | WEIGHT: 187.38 LBS | HEART RATE: 79 BPM | RESPIRATION RATE: 18 BRPM | DIASTOLIC BLOOD PRESSURE: 76 MMHG | TEMPERATURE: 99 F | HEIGHT: 67 IN | SYSTOLIC BLOOD PRESSURE: 128 MMHG

## 2019-12-03 DIAGNOSIS — H91.90 DECREASED HEARING, UNSPECIFIED LATERALITY: ICD-10-CM

## 2019-12-03 DIAGNOSIS — Z86.39 HISTORY OF ELEVATED GLUCOSE: ICD-10-CM

## 2019-12-03 DIAGNOSIS — G89.28 CHRONIC NECK PAIN WITH HISTORY OF CERVICAL SPINAL SURGERY: ICD-10-CM

## 2019-12-03 DIAGNOSIS — N40.0 BENIGN PROSTATIC HYPERPLASIA, UNSPECIFIED WHETHER LOWER URINARY TRACT SYMPTOMS PRESENT: ICD-10-CM

## 2019-12-03 DIAGNOSIS — M54.50 CHRONIC BILATERAL LOW BACK PAIN WITHOUT SCIATICA: ICD-10-CM

## 2019-12-03 DIAGNOSIS — Z91.89 RISK FACTORS FOR OBSTRUCTIVE SLEEP APNEA: ICD-10-CM

## 2019-12-03 DIAGNOSIS — M48.00 CENTRAL STENOSIS OF SPINAL CANAL: ICD-10-CM

## 2019-12-03 DIAGNOSIS — G47.8 OTHER SLEEP DISORDERS: ICD-10-CM

## 2019-12-03 DIAGNOSIS — R06.83 SNORING: ICD-10-CM

## 2019-12-03 DIAGNOSIS — Z12.11 COLON CANCER SCREENING: ICD-10-CM

## 2019-12-03 DIAGNOSIS — Z23 NEED FOR 23-POLYVALENT PNEUMOCOCCAL POLYSACCHARIDE VACCINE: ICD-10-CM

## 2019-12-03 DIAGNOSIS — Z12.5 SCREENING FOR MALIGNANT NEOPLASM OF PROSTATE: ICD-10-CM

## 2019-12-03 DIAGNOSIS — Z98.890 CHRONIC NECK PAIN WITH HISTORY OF CERVICAL SPINAL SURGERY: ICD-10-CM

## 2019-12-03 DIAGNOSIS — G89.29 CHRONIC BILATERAL LOW BACK PAIN WITHOUT SCIATICA: ICD-10-CM

## 2019-12-03 DIAGNOSIS — I10 ESSENTIAL HYPERTENSION: ICD-10-CM

## 2019-12-03 DIAGNOSIS — M54.2 CHRONIC NECK PAIN WITH HISTORY OF CERVICAL SPINAL SURGERY: ICD-10-CM

## 2019-12-03 DIAGNOSIS — Z13.6 SCREENING FOR CARDIOVASCULAR CONDITION: ICD-10-CM

## 2019-12-03 DIAGNOSIS — Z00.00 ANNUAL PHYSICAL EXAM: Primary | ICD-10-CM

## 2019-12-03 PROCEDURE — 1125F AMNT PAIN NOTED PAIN PRSNT: CPT | Mod: S$GLB,,, | Performed by: INTERNAL MEDICINE

## 2019-12-03 PROCEDURE — 1101F PR PT FALLS ASSESS DOC 0-1 FALLS W/OUT INJ PAST YR: ICD-10-PCS | Mod: CPTII,S$GLB,, | Performed by: INTERNAL MEDICINE

## 2019-12-03 PROCEDURE — 1159F PR MEDICATION LIST DOCUMENTED IN MEDICAL RECORD: ICD-10-PCS | Mod: S$GLB,,, | Performed by: INTERNAL MEDICINE

## 2019-12-03 PROCEDURE — 90732 PNEUMOCOCCAL POLYSACCHARIDE VACCINE 23-VALENT =>2YO SQ IM: ICD-10-PCS | Mod: S$GLB,,, | Performed by: INTERNAL MEDICINE

## 2019-12-03 PROCEDURE — 90732 PPSV23 VACC 2 YRS+ SUBQ/IM: CPT | Mod: S$GLB,,, | Performed by: INTERNAL MEDICINE

## 2019-12-03 PROCEDURE — 99999 PR PBB SHADOW E&M-EST. PATIENT-LVL IV: CPT | Mod: PBBFAC,,, | Performed by: INTERNAL MEDICINE

## 2019-12-03 PROCEDURE — 3074F PR MOST RECENT SYSTOLIC BLOOD PRESSURE < 130 MM HG: ICD-10-PCS | Mod: CPTII,S$GLB,, | Performed by: INTERNAL MEDICINE

## 2019-12-03 PROCEDURE — 99215 OFFICE O/P EST HI 40 MIN: CPT | Mod: 25,S$GLB,, | Performed by: INTERNAL MEDICINE

## 2019-12-03 PROCEDURE — 1101F PT FALLS ASSESS-DOCD LE1/YR: CPT | Mod: CPTII,S$GLB,, | Performed by: INTERNAL MEDICINE

## 2019-12-03 PROCEDURE — G0009 ADMIN PNEUMOCOCCAL VACCINE: HCPCS | Mod: S$GLB,,, | Performed by: INTERNAL MEDICINE

## 2019-12-03 PROCEDURE — 3074F SYST BP LT 130 MM HG: CPT | Mod: CPTII,S$GLB,, | Performed by: INTERNAL MEDICINE

## 2019-12-03 PROCEDURE — 1125F PR PAIN SEVERITY QUANTIFIED, PAIN PRESENT: ICD-10-PCS | Mod: S$GLB,,, | Performed by: INTERNAL MEDICINE

## 2019-12-03 PROCEDURE — 99499 RISK ADDL DX/OHS AUDIT: ICD-10-PCS | Mod: S$GLB,,, | Performed by: INTERNAL MEDICINE

## 2019-12-03 PROCEDURE — 3078F PR MOST RECENT DIASTOLIC BLOOD PRESSURE < 80 MM HG: ICD-10-PCS | Mod: CPTII,S$GLB,, | Performed by: INTERNAL MEDICINE

## 2019-12-03 PROCEDURE — G0009 PNEUMOCOCCAL POLYSACCHARIDE VACCINE 23-VALENT =>2YO SQ IM: ICD-10-PCS | Mod: S$GLB,,, | Performed by: INTERNAL MEDICINE

## 2019-12-03 PROCEDURE — 99499 UNLISTED E&M SERVICE: CPT | Mod: S$GLB,,, | Performed by: INTERNAL MEDICINE

## 2019-12-03 PROCEDURE — 99999 PR PBB SHADOW E&M-EST. PATIENT-LVL IV: ICD-10-PCS | Mod: PBBFAC,,, | Performed by: INTERNAL MEDICINE

## 2019-12-03 PROCEDURE — 1159F MED LIST DOCD IN RCRD: CPT | Mod: S$GLB,,, | Performed by: INTERNAL MEDICINE

## 2019-12-03 PROCEDURE — 99215 PR OFFICE/OUTPT VISIT, EST, LEVL V, 40-54 MIN: ICD-10-PCS | Mod: 25,S$GLB,, | Performed by: INTERNAL MEDICINE

## 2019-12-03 PROCEDURE — 3078F DIAST BP <80 MM HG: CPT | Mod: CPTII,S$GLB,, | Performed by: INTERNAL MEDICINE

## 2019-12-03 RX ORDER — TAMSULOSIN HYDROCHLORIDE 0.4 MG/1
0.8 CAPSULE ORAL DAILY
Qty: 180 CAPSULE | Refills: 1 | Status: SHIPPED | OUTPATIENT
Start: 2019-12-03 | End: 2020-01-21 | Stop reason: SDUPTHER

## 2019-12-03 NOTE — PROGRESS NOTES
Subjective:       Patient ID: Babatunde Oconnor is a 64 y.o. male.    Chief Complaint: Annual Exam; Back Pain; and Neck Pain    HPI    64 y.o. male here to discuss healthcare maintenance and f/u of chronic medical conditions.      Health Maintenance:   Lipid disorders/ASCVD risk: due    DM: FBG due  Hepatitis C (3934-6337): completed   Colorectal Cancer (50-75yr): iFOBT due   Prostate: PSA due        Vaccines:   Influenza: utd   Tetanus: defer    PPSV23: due   PCV13: 2018   Shingrix: completed       Medical Problems:  1. HTN: amlodipine 10mg, bisoprolol-hctz 5-6.25mg daily  2. HLD: atorvastatin 10mg daily      Past Medical History:   Diagnosis Date    BPH (benign prostatic hyperplasia)     Hypertension      Past Surgical History:   Procedure Laterality Date    APPENDECTOMY      CERVICAL DISCECTOMY  2008    CERVICAL SPINE SURGERY       History reviewed. No pertinent family history.  Social History     Socioeconomic History    Marital status:      Spouse name: Not on file    Number of children: Not on file    Years of education: Not on file    Highest education level: Not on file   Occupational History    Not on file   Social Needs    Financial resource strain: Somewhat hard    Food insecurity:     Worry: Never true     Inability: Never true    Transportation needs:     Medical: No     Non-medical: No   Tobacco Use    Smoking status: Never Smoker    Smokeless tobacco: Never Used   Substance and Sexual Activity    Alcohol use: Yes     Frequency: 4 or more times a week     Drinks per session: 1 or 2     Binge frequency: Never    Drug use: No    Sexual activity: Yes     Partners: Female   Lifestyle    Physical activity:     Days per week: 0 days     Minutes per session: Not on file    Stress: Not at all   Relationships    Social connections:     Talks on phone: More than three times a week     Gets together: Once a week     Attends [employer] witness service: Not on file     Active member of club or  organization: No     Attends meetings of clubs or organizations: Never     Relationship status:    Other Topics Concern    Not on file   Social History Narrative    Not on file     Review of patient's allergies indicates:  No Known Allergies    Current Outpatient Medications:     amlodipine-atorvastatin (CADUET) 10-10 mg per tablet, TAKE 1 TABLET BY MOUTH EVERY DAY, Disp: 90 tablet, Rfl: 0    aspirin (ECOTRIN) 81 MG EC tablet, Take 81 mg by mouth once daily., Disp: , Rfl:     bisoprolol-hydrochlorothiazide 5-6.25 mg (ZIAC) 5-6.25 mg Tab, TAKE 1 TABLET BY MOUTH EVERY DAY, Disp: 90 tablet, Rfl: 0     mg tablet, Take 800 mg by mouth every 6 (six) hours as needed., Disp: , Rfl: 0    tamsulosin (FLOMAX) 0.4 mg Cap, Take 2 capsules (0.8 mg total) by mouth once daily., Disp: 180 capsule, Rfl: 1    valACYclovir (VALTREX) 500 MG tablet, Take 1 tablet (500 mg total) by mouth once daily., Disp: 90 tablet, Rfl: 3      Review of Systems   Constitutional: Negative for activity change and unexpected weight change.   HENT: Positive for hearing loss and sore throat. Negative for rhinorrhea and trouble swallowing.    Eyes: Negative for discharge and visual disturbance.   Respiratory: Negative for chest tightness and wheezing.         + snoring   Cardiovascular: Negative for chest pain and palpitations.   Gastrointestinal: Negative for blood in stool, constipation, diarrhea and vomiting.   Endocrine: Negative for polydipsia and polyuria.   Genitourinary: Positive for urgency. Negative for difficulty urinating and hematuria.   Musculoskeletal: Positive for back pain and neck pain. Negative for arthralgias and joint swelling.   Neurological: Negative for weakness and headaches.   Psychiatric/Behavioral: Positive for sleep disturbance. Negative for confusion and dysphoric mood.       Objective:        Vitals:    12/03/19 1502   BP: 128/76   BP Location: Left arm   Patient Position: Sitting   BP Method: Medium  "(Manual)   Pulse: 79   Resp: 18   Temp: 99.2 °F (37.3 °C)   TempSrc: Oral   SpO2: 98%   Weight: 85 kg (187 lb 6.3 oz)   Height: 5' 6.5" (1.689 m)       Body mass index is 29.79 kg/m².    Physical Exam   Constitutional: He is oriented to person, place, and time. He appears well-developed. No distress.   HENT:   Head: Normocephalic and atraumatic.   Right Ear: Tympanic membrane normal.   Left Ear: Tympanic membrane normal.   Nose: Nose normal.   Mouth/Throat: Oropharynx is clear and moist. No oropharyngeal exudate, posterior oropharyngeal erythema or tonsillar abscesses.   Eyes: Conjunctivae and EOM are normal.   Neck: Neck supple. No spinous process tenderness and no muscular tenderness present. No neck rigidity. No tracheal deviation and normal range of motion present. No thyromegaly present.   Cardiovascular: Normal rate, regular rhythm, normal heart sounds and intact distal pulses.   Pulmonary/Chest: Effort normal and breath sounds normal. No respiratory distress. He has no wheezes.   Abdominal: Soft. Bowel sounds are normal. He exhibits no distension. There is no tenderness.   Musculoskeletal: Normal range of motion. He exhibits no edema.        Lumbar back: He exhibits bony tenderness and pain. He exhibits normal range of motion and no spasm.   Lymphadenopathy:     He has no cervical adenopathy.   Neurological: He is alert and oriented to person, place, and time. He has normal strength. No sensory deficit. Gait normal.   Skin: Skin is warm and dry. He is not diaphoretic. No cyanosis. Nails show no clubbing.   Psychiatric: He has a normal mood and affect. His behavior is normal. Judgment normal.         Neck circumference: 17.5 inches  Assessment:     1. Annual physical exam    2. Essential hypertension    3. Colon cancer screening    4. Screening for cardiovascular condition    5. Screening for malignant neoplasm of prostate    6. Need for 23-polyvalent pneumococcal polysaccharide vaccine    7. Benign prostatic " hyperplasia, unspecified whether lower urinary tract symptoms present    8. History of elevated glucose    9. Decreased hearing, unspecified laterality    10. Chronic bilateral low back pain without sciatica    11. Central stenosis of spinal canal    12. Chronic neck pain with history of cervical spinal surgery    13. Snoring    14. Risk factors for obstructive sleep apnea    15. Other sleep disorders            Plan:         1. Annual physical exam  - flu vaccine utd  - CBC auto differential; Future  - Comprehensive metabolic panel; Future  - Hemoglobin A1c; Future  - Lipid panel; Future  - TSH; Future  - PSA, Screening; Future    2. Essential hypertension  - stable, continue current meds  - CBC auto differential; Future  - Comprehensive metabolic panel; Future  - TSH; Future    3. Colon cancer screening  - Fecal Immunochemical Test (iFOBT); Future    4. Screening for cardiovascular condition  - Lipid panel; Future    5. Screening for malignant neoplasm of prostate  - PSA, Screening; Future    6. Need for 23-polyvalent pneumococcal polysaccharide vaccine  - (In Office Administered) Pneumococcal Polysaccharide Vaccine (23 Valent) (SQ/IM)    7. Benign prostatic hyperplasia, unspecified whether lower urinary tract symptoms present  - still having some nocturia along with urgency- improved with starting flomax so will increase dose to 0.8mg daily  - tamsulosin (FLOMAX) 0.4 mg Cap; Take 2 capsules (0.8 mg total) by mouth once daily.  Dispense: 180 capsule; Refill: 1    8. History of elevated glucose  - CBC auto differential; Future  - Hemoglobin A1c; Future    9. Decreased hearing, unspecified laterality  - Ambulatory Referral to Audiology    10. Chronic bilateral low back pain without sciatica  - MRI report available under care everywhere tab- reviewed.   - Ambulatory Referral to Pain Clinic    11. Central stenosis of spinal canal  - Ambulatory Referral to Pain Clinic    12. Chronic neck pain with history of cervical  spinal surgery  - X-Ray Cervical Spine Complete 5 view; Future    13. Snoring  - Polysomnogram (CPAP will be added if patient meets diagnostic criteria.); Future    14. Risk factors for obstructive sleep apnea  - Polysomnogram (CPAP will be added if patient meets diagnostic criteria.); Future    15. Other sleep disorders   - Polysomnogram (CPAP will be added if patient meets diagnostic criteria.); Future    rtc 6 mo f/u above or sooner prn       Patient note was created using M[employer] Dictation.  Any errors in syntax or even information may not have been identified and edited on initial review prior to signing this note.

## 2019-12-04 ENCOUNTER — CLINICAL SUPPORT (OUTPATIENT)
Dept: AUDIOLOGY | Facility: CLINIC | Age: 64
End: 2019-12-04
Payer: MEDICARE

## 2019-12-04 DIAGNOSIS — H93.293 ABNORMAL AUDITORY PERCEPTION OF BOTH EARS: ICD-10-CM

## 2019-12-04 DIAGNOSIS — H90.A21 SENSORINEURAL HEARING LOSS (SNHL) OF RIGHT EAR WITH RESTRICTED HEARING OF LEFT EAR: ICD-10-CM

## 2019-12-04 DIAGNOSIS — H90.5 HIGH FREQUENCY SENSORINEURAL HEARING LOSS OF LEFT EAR: Primary | ICD-10-CM

## 2019-12-04 PROCEDURE — 92557 COMPREHENSIVE HEARING TEST: CPT | Mod: S$GLB,,, | Performed by: AUDIOLOGIST

## 2019-12-04 PROCEDURE — 92567 TYMPANOMETRY: CPT | Mod: S$GLB,,, | Performed by: AUDIOLOGIST

## 2019-12-04 PROCEDURE — 92557 PR COMPREHENSIVE HEARING TEST: ICD-10-PCS | Mod: S$GLB,,, | Performed by: AUDIOLOGIST

## 2019-12-04 PROCEDURE — 99999 PR PBB SHADOW E&M-EST. PATIENT-LVL I: ICD-10-PCS | Mod: PBBFAC,,, | Performed by: AUDIOLOGIST

## 2019-12-04 PROCEDURE — 99999 PR PBB SHADOW E&M-EST. PATIENT-LVL I: CPT | Mod: PBBFAC,,, | Performed by: AUDIOLOGIST

## 2019-12-04 PROCEDURE — 92567 PR TYMPA2METRY: ICD-10-PCS | Mod: S$GLB,,, | Performed by: AUDIOLOGIST

## 2019-12-04 NOTE — Clinical Note
Hi Dr. Pandey,Please see the Epic encounter note for results and recommendations from today's audiological evaluation.  Please let me know if I can provide any additional information.Kind regards,Jamarcus Snider, [facility]3]-A

## 2019-12-04 NOTE — PROGRESS NOTES
Babatunde Oconnor was seen in the clinic today for an audiological evaluation.  Mr. Oconnor reported that his wife complains that he can't hear very well.    Audiological testing revealed a mild to moderate mid to high frequency sensorineural hearing loss for the right ear and a moderate high frequency sensorineural hearing loss for the left ear.  A speech reception threshold was obtained at 20 dBHL for the right ear and at 15 dBHL for the left ear.  Speech discrimination was 88% for the right ear and 92% for the left ear.      Tympanometry testing revealed a Type A tympanogram for the right ear and results could not be obtained for the left ear due to the inability to maintain a hermetic seal.      Recommendations:  1. Otologic evaluation  2. Annual audiological evaluation  3. Hearing protection when in noise   4. Hearing aid consultation (patient not interested at this time)         
patient

## 2019-12-10 ENCOUNTER — TELEPHONE (OUTPATIENT)
Dept: SLEEP MEDICINE | Facility: OTHER | Age: 64
End: 2019-12-10

## 2019-12-10 ENCOUNTER — LAB VISIT (OUTPATIENT)
Dept: LAB | Facility: HOSPITAL | Age: 64
End: 2019-12-10
Attending: INTERNAL MEDICINE
Payer: MEDICARE

## 2019-12-10 DIAGNOSIS — Z86.39 HISTORY OF ELEVATED GLUCOSE: ICD-10-CM

## 2019-12-10 DIAGNOSIS — I10 ESSENTIAL HYPERTENSION: ICD-10-CM

## 2019-12-10 DIAGNOSIS — Z12.5 SCREENING FOR MALIGNANT NEOPLASM OF PROSTATE: ICD-10-CM

## 2019-12-10 DIAGNOSIS — Z13.6 SCREENING FOR CARDIOVASCULAR CONDITION: ICD-10-CM

## 2019-12-10 DIAGNOSIS — Z00.00 ANNUAL PHYSICAL EXAM: ICD-10-CM

## 2019-12-10 LAB
ALBUMIN SERPL BCP-MCNC: 4.3 G/DL (ref 3.5–5.2)
ALP SERPL-CCNC: 52 U/L (ref 55–135)
ALT SERPL W/O P-5'-P-CCNC: 50 U/L (ref 10–44)
ANION GAP SERPL CALC-SCNC: 13 MMOL/L (ref 8–16)
AST SERPL-CCNC: 41 U/L (ref 10–40)
BASOPHILS # BLD AUTO: 0.05 K/UL (ref 0–0.2)
BASOPHILS NFR BLD: 0.9 % (ref 0–1.9)
BILIRUB SERPL-MCNC: 0.7 MG/DL (ref 0.1–1)
BUN SERPL-MCNC: 11 MG/DL (ref 8–23)
CALCIUM SERPL-MCNC: 9.3 MG/DL (ref 8.7–10.5)
CHLORIDE SERPL-SCNC: 102 MMOL/L (ref 95–110)
CHOLEST SERPL-MCNC: 180 MG/DL (ref 120–199)
CHOLEST/HDLC SERPL: 2.2 {RATIO} (ref 2–5)
CO2 SERPL-SCNC: 22 MMOL/L (ref 23–29)
COMPLEXED PSA SERPL-MCNC: 0.42 NG/ML (ref 0–4)
CREAT SERPL-MCNC: 1 MG/DL (ref 0.5–1.4)
DIFFERENTIAL METHOD: NORMAL
EOSINOPHIL # BLD AUTO: 0.1 K/UL (ref 0–0.5)
EOSINOPHIL NFR BLD: 1.1 % (ref 0–8)
ERYTHROCYTE [DISTWIDTH] IN BLOOD BY AUTOMATED COUNT: 13.1 % (ref 11.5–14.5)
EST. GFR  (AFRICAN AMERICAN): >60 ML/MIN/1.73 M^2
EST. GFR  (NON AFRICAN AMERICAN): >60 ML/MIN/1.73 M^2
ESTIMATED AVG GLUCOSE: 117 MG/DL (ref 68–131)
GLUCOSE SERPL-MCNC: 105 MG/DL (ref 70–110)
HBA1C MFR BLD HPLC: 5.7 % (ref 4–5.6)
HCT VFR BLD AUTO: 45.4 % (ref 40–54)
HDLC SERPL-MCNC: 81 MG/DL (ref 40–75)
HDLC SERPL: 45 % (ref 20–50)
HGB BLD-MCNC: 15.1 G/DL (ref 14–18)
IMM GRANULOCYTES # BLD AUTO: 0.01 K/UL (ref 0–0.04)
IMM GRANULOCYTES NFR BLD AUTO: 0.2 % (ref 0–0.5)
LDLC SERPL CALC-MCNC: 69.6 MG/DL (ref 63–159)
LYMPHOCYTES # BLD AUTO: 1.9 K/UL (ref 1–4.8)
LYMPHOCYTES NFR BLD: 33 % (ref 18–48)
MCH RBC QN AUTO: 30.7 PG (ref 27–31)
MCHC RBC AUTO-ENTMCNC: 33.3 G/DL (ref 32–36)
MCV RBC AUTO: 92 FL (ref 82–98)
MONOCYTES # BLD AUTO: 0.5 K/UL (ref 0.3–1)
MONOCYTES NFR BLD: 8.6 % (ref 4–15)
NEUTROPHILS # BLD AUTO: 3.2 K/UL (ref 1.8–7.7)
NEUTROPHILS NFR BLD: 56.2 % (ref 38–73)
NONHDLC SERPL-MCNC: 99 MG/DL
NRBC BLD-RTO: 0 /100 WBC
PLATELET # BLD AUTO: 273 K/UL (ref 150–350)
PMV BLD AUTO: 10.7 FL (ref 9.2–12.9)
POTASSIUM SERPL-SCNC: 3.7 MMOL/L (ref 3.5–5.1)
PROT SERPL-MCNC: 7.5 G/DL (ref 6–8.4)
RBC # BLD AUTO: 4.92 M/UL (ref 4.6–6.2)
SODIUM SERPL-SCNC: 137 MMOL/L (ref 136–145)
TRIGL SERPL-MCNC: 147 MG/DL (ref 30–150)
TSH SERPL DL<=0.005 MIU/L-ACNC: 1.89 UIU/ML (ref 0.4–4)
WBC # BLD AUTO: 5.67 K/UL (ref 3.9–12.7)

## 2019-12-10 PROCEDURE — 83036 HEMOGLOBIN GLYCOSYLATED A1C: CPT

## 2019-12-10 PROCEDURE — 80061 LIPID PANEL: CPT

## 2019-12-10 PROCEDURE — 85025 COMPLETE CBC W/AUTO DIFF WBC: CPT

## 2019-12-10 PROCEDURE — 84443 ASSAY THYROID STIM HORMONE: CPT

## 2019-12-10 PROCEDURE — 36415 COLL VENOUS BLD VENIPUNCTURE: CPT | Mod: PO

## 2019-12-10 PROCEDURE — 80053 COMPREHEN METABOLIC PANEL: CPT

## 2019-12-10 PROCEDURE — 84153 ASSAY OF PSA TOTAL: CPT

## 2019-12-11 ENCOUNTER — PATIENT MESSAGE (OUTPATIENT)
Dept: INTERNAL MEDICINE | Facility: CLINIC | Age: 64
End: 2019-12-11

## 2019-12-11 DIAGNOSIS — R73.03 PREDIABETES: ICD-10-CM

## 2019-12-11 DIAGNOSIS — R74.8 ELEVATED LIVER ENZYMES: Primary | ICD-10-CM

## 2019-12-12 NOTE — H&P (VIEW-ONLY)
[facility] New Patient Evaluation    Referred by: Dr. Negar Pandey  Reason for referral: CLBP    CC:   Chief Complaint   Patient presents with    Low-back Pain     Last 3 PDI Scores 12/13/2019   Pain Disability Index (PDI) 42       HPI:   Babatunde Oconnor is a 64 y.o. male who complains of chronic low back pain. He was evaluated by pain management physician last year and referred to physical therapy.  At that time he was experiencing low back pain radiating into both legs in the L5 dermatome.  He completed physical therapy with excellent improvement in both axial and radicular symptoms.  Since completion of physical therapy last year, he has lived a relatively sedentary lifestyle and is now experiencing increasing low back pain.  There is no radicular pain at this time.  He describes 2 types of pain in the low back.  The 1st is associated with heavy lifting or increased activity and is perceived has tightness and throbbing.  The 2nd is always present and felt is a band-like pain across the low back.    Location: low back   Onset: yrs  Current Pain Score: 6/10  Daily Pain of Range: 4-6/10  Quality: Burning and Throbbing  Radiation:  None at this time though he has experienced bilateral radicular symptoms in the past  Worsened by: flexion and lifting  Improved by: heat    Previous Therapies:  PT/OT:  Completed 12 weeks of physical therapy in 2018 with excellent improvement in symptoms.  HEP:  Patient denies compliance with home exercise.  He denies feeling educated on the importance of home exercise with his previous providers.  Interventions: Denies injections  Surgery: Denies surgery for low back.  ++ Hx of anterior cervical fusion for spinal stenosis with complete resolution of BUE symptoms.  Medications:   - NSAIDS:   - MSK Relaxants:   - TCAs:   - SNRIs:   - Topicals:   - Anticonvulsants:  - Opioids:     Current Pain Medications:  1. Ibuprofen      Review of Systems:  Review of Systems    Constitutional: Negative for chills and fever.   HENT: Negative for nosebleeds.    Eyes: Negative for blurred vision and pain.   Respiratory: Negative for hemoptysis.    Cardiovascular: Negative for chest pain and palpitations.   Gastrointestinal: Negative for heartburn, nausea and vomiting.   Genitourinary: Negative for dysuria and hematuria.   Musculoskeletal: Positive for back pain and myalgias.   Skin: Negative for rash.   Neurological: Negative for tingling, focal weakness, seizures and loss of consciousness.   Endo/Heme/Allergies: Does not bruise/bleed easily.   Psychiatric/Behavioral: Negative for hallucinations. The patient is not nervous/anxious.        History:    Current Outpatient Medications:     amlodipine-atorvastatin (CADUET) 10-10 mg per tablet, TAKE 1 TABLET BY MOUTH EVERY DAY, Disp: 90 tablet, Rfl: 0    aspirin (ECOTRIN) 81 MG EC tablet, Take 81 mg by mouth once daily., Disp: , Rfl:     bisoprolol-hydrochlorothiazide 5-6.25 mg (ZIAC) 5-6.25 mg Tab, TAKE 1 TABLET BY MOUTH EVERY DAY, Disp: 90 tablet, Rfl: 0     mg tablet, Take 800 mg by mouth every 6 (six) hours as needed., Disp: , Rfl: 0    tamsulosin (FLOMAX) 0.4 mg Cap, Take 2 capsules (0.8 mg total) by mouth once daily., Disp: 180 capsule, Rfl: 1    valACYclovir (VALTREX) 500 MG tablet, Take 1 tablet (500 mg total) by mouth once daily., Disp: 90 tablet, Rfl: 3    Past Medical History:   Diagnosis Date    BPH (benign prostatic hyperplasia)     Hypertension        Past Surgical History:   Procedure Laterality Date    APPENDECTOMY      CERVICAL DISCECTOMY  2008    CERVICAL SPINE SURGERY         No family history on file.    Social History     Socioeconomic History    Marital status:      Spouse name: Not on file    Number of children: Not on file    Years of education: Not on file    Highest education level: Not on file   Occupational History    Not on file   Social Needs    Financial resource strain: Somewhat hard     Food insecurity:     Worry: Never true     Inability: Never true    Transportation needs:     Medical: No     Non-medical: No   Tobacco Use    Smoking status: Never Smoker    Smokeless tobacco: Never Used   Substance and Sexual Activity    Alcohol use: Yes     Frequency: 4 or more times a week     Drinks per session: 1 or 2     Binge frequency: Never    Drug use: No    Sexual activity: Yes     Partners: Female   Lifestyle    Physical activity:     Days per week: 0 days     Minutes per session: Not on file    Stress: Not at all   Relationships    Social connections:     Talks on phone: More than three times a week     Gets together: Once a week     Attends [employer] service: Not on file     Active member of club or organization: No     Attends meetings of clubs or organizations: Never     Relationship status:    Other Topics Concern    Not on file   Social History Narrative    Not on file       Review of patient's allergies indicates:  No Known Allergies    Physical Exam:  Vitals:    12/13/19 0948   BP: 128/72   Pulse: 73   Weight: 84.8 kg (187 lb)   PainSc:   6     General    Nursing note and vitals reviewed.  Constitutional: He is oriented to person, place, and time. He appears well-developed and well-nourished. No distress.   HENT:   Head: Normocephalic and atraumatic.   Nose: Nose normal.   Eyes: Conjunctivae and EOM are normal. Pupils are equal, round, and reactive to light. Right eye exhibits no discharge. Left eye exhibits no discharge. No scleral icterus.   Neck: No JVD present.   Cardiovascular: Intact distal pulses.    Pulmonary/Chest: Effort normal. No respiratory distress.   Abdominal: He exhibits no distension.   Neurological: He is alert and oriented to person, place, and time. Coordination normal.   Psychiatric: He has a normal mood and affect. His behavior is normal. Judgment and thought content normal.     General Musculoskeletal Exam   Gait: normal     Back (L-Spine & T-Spine)  / Neck (C-Spine) Exam     Tenderness Right paramedian tenderness of the Lower L-Spine. Left paramedian tenderness of the Lower L-Spine.     Back (L-Spine & T-Spine) Range of Motion   Back extension: facet loading is positive and exacerabtes/reproduces the patient's typical low back pain    Back flexion: limited ROM but partial relief of low back pain noted.     Spinal Sensation   Right Side Sensation  L-Spine Level: normal  Left Side Sensation  L-Spine Level: normal    Other He has no scoliosis .      Muscle Strength   Right Lower Extremity   Hip Flexion: 5/5   Hip Extensors: 5/5  Quadriceps:  5/5   Hamstrin/5   Gastrocsoleus:  5/5/5  Left Lower Extremity   Hip Flexion: 5/5   Hip Extensors: 5/5  Quadriceps:  5/5   Hamstrin/5   Gastrocsoleus:  5/5/5    Reflexes     Left Side  Quadriceps:  2+  Achilles:  2+    Right Side   Quadriceps:  2+  Achilles:  2+      Imaging:  External MRI lumbar spine 2017  Images and official radiology report from the study will be uploaded to the electronic medical record.  My review of the images is significant for multilevel degenerative disc disease described as mild at L3-4 and L5-S1.  Degenerative disc disease is moderate to severe at L4-5 and is associated with anterolisthesis of L4 on L5 resulting in bilateral neural foraminal and central canal stenosis.  There is significant facet arthropathy noted at L3-4, L4-5, and L5-S1, bilaterally.    Labs:  BMP  Lab Results   Component Value Date     12/10/2019    K 3.7 12/10/2019     12/10/2019    CO2 22 (L) 12/10/2019    BUN 11 12/10/2019    CREATININE 1.0 12/10/2019    CALCIUM 9.3 12/10/2019    ANIONGAP 13 12/10/2019    ESTGFRAFRICA >60.0 12/10/2019    EGFRNONAA >60.0 12/10/2019     Lab Results   Component Value Date    ALT 50 (H) 12/10/2019    AST 41 (H) 12/10/2019    ALKPHOS 52 (L) 12/10/2019    BILITOT 0.7 12/10/2019       Assessment:  Problem List Items Addressed This Visit     Lumbar spondylosis    DDD  (degenerative disc disease), lumbar    Neuroforaminal stenosis of lumbar spine    Chronic bilateral low back pain without sciatica - Primary          12/13/19 - Babatunde Oconnor is a 64 y.o. male with multifactorial chronic low back pain.  I have advised the patient to restart his home exercise program toward minimizing painful muscle spasms in the low back associated with performing activities of daily living such as lifting moderately sized objects or assisting his wife with mobility.  The bandlike pain he experiences across the low back is most consistent with facet arthropathy which is likely exacerbated by forward shifting of the vertebral body at L4 relative to L5, there by placing increased pressure on the facet joints.  Toward alleviating that persistent pain, I have recommended a bilateral medial branch block targeting the L4-5 and L5-S1 facets.  If the diagnostic block is successful, we will follow with a bilateral medial branch radiofrequency ablation for semi permanent relief.  Questions about the procedure treatment were answered to the patient's satisfaction and he will be scheduled for the procedure stated above.    : Reviewed and consistent with medication use as prescribed.    Treatment Plan:   Procedures: Bilateral MBB #1 of 1 targeting L4-5 and L5-S1  PT/OT/HEP:  Patient is in possession of discharge documentation from physical therapy last year detailing an appropriate home exercise regimen for him.  He plans to restart home exercise as soon as possible.  Medications: No changes recommended at this time.  Labs: reviewed and medications are appropriately dosed for current hepatorenal function.  Imaging: No additional recommended at this time.    Follow Up: RTC 2 weeks p BURAK Anderson Jr, MD  Interventional Pain Medicine / Anesthesiology    Disclaimer: This note was partly generated using dictation software which may occasionally result in transcription errors.

## 2019-12-12 NOTE — PROGRESS NOTES
[facility] New Patient Evaluation    Referred by: Dr. Negar Pandey  Reason for referral: CLBP    CC:   Chief Complaint   Patient presents with    Low-back Pain     Last 3 PDI Scores 12/13/2019   Pain Disability Index (PDI) 42       HPI:   Babatunde Oconnor is a 64 y.o. male who complains of chronic low back pain. He was evaluated by pain management physician last year and referred to physical therapy.  At that time he was experiencing low back pain radiating into both legs in the L5 dermatome.  He completed physical therapy with excellent improvement in both axial and radicular symptoms.  Since completion of physical therapy last year, he has lived a relatively sedentary lifestyle and is now experiencing increasing low back pain.  There is no radicular pain at this time.  He describes 2 types of pain in the low back.  The 1st is associated with heavy lifting or increased activity and is perceived has tightness and throbbing.  The 2nd is always present and felt is a band-like pain across the low back.    Location: low back   Onset: yrs  Current Pain Score: 6/10  Daily Pain of Range: 4-6/10  Quality: Burning and Throbbing  Radiation:  None at this time though he has experienced bilateral radicular symptoms in the past  Worsened by: flexion and lifting  Improved by: heat    Previous Therapies:  PT/OT:  Completed 12 weeks of physical therapy in 2018 with excellent improvement in symptoms.  HEP:  Patient denies compliance with home exercise.  He denies feeling educated on the importance of home exercise with his previous providers.  Interventions: Denies injections  Surgery: Denies surgery for low back.  ++ Hx of anterior cervical fusion for spinal stenosis with complete resolution of BUE symptoms.  Medications:   - NSAIDS:   - MSK Relaxants:   - TCAs:   - SNRIs:   - Topicals:   - Anticonvulsants:  - Opioids:     Current Pain Medications:  1. Ibuprofen      Review of Systems:  Review of Systems    Constitutional: Negative for chills and fever.   HENT: Negative for nosebleeds.    Eyes: Negative for blurred vision and pain.   Respiratory: Negative for hemoptysis.    Cardiovascular: Negative for chest pain and palpitations.   Gastrointestinal: Negative for heartburn, nausea and vomiting.   Genitourinary: Negative for dysuria and hematuria.   Musculoskeletal: Positive for back pain and myalgias.   Skin: Negative for rash.   Neurological: Negative for tingling, focal weakness, seizures and loss of consciousness.   Endo/Heme/Allergies: Does not bruise/bleed easily.   Psychiatric/Behavioral: Negative for hallucinations. The patient is not nervous/anxious.        History:    Current Outpatient Medications:     amlodipine-atorvastatin (CADUET) 10-10 mg per tablet, TAKE 1 TABLET BY MOUTH EVERY DAY, Disp: 90 tablet, Rfl: 0    aspirin (ECOTRIN) 81 MG EC tablet, Take 81 mg by mouth once daily., Disp: , Rfl:     bisoprolol-hydrochlorothiazide 5-6.25 mg (ZIAC) 5-6.25 mg Tab, TAKE 1 TABLET BY MOUTH EVERY DAY, Disp: 90 tablet, Rfl: 0     mg tablet, Take 800 mg by mouth every 6 (six) hours as needed., Disp: , Rfl: 0    tamsulosin (FLOMAX) 0.4 mg Cap, Take 2 capsules (0.8 mg total) by mouth once daily., Disp: 180 capsule, Rfl: 1    valACYclovir (VALTREX) 500 MG tablet, Take 1 tablet (500 mg total) by mouth once daily., Disp: 90 tablet, Rfl: 3    Past Medical History:   Diagnosis Date    BPH (benign prostatic hyperplasia)     Hypertension        Past Surgical History:   Procedure Laterality Date    APPENDECTOMY      CERVICAL DISCECTOMY  2008    CERVICAL SPINE SURGERY         No family history on file.    Social History     Socioeconomic History    Marital status:      Spouse name: Not on file    Number of children: Not on file    Years of education: Not on file    Highest education level: Not on file   Occupational History    Not on file   Social Needs    Financial resource strain: Somewhat hard     Food insecurity:     Worry: Never true     Inability: Never true    Transportation needs:     Medical: No     Non-medical: No   Tobacco Use    Smoking status: Never Smoker    Smokeless tobacco: Never Used   Substance and Sexual Activity    Alcohol use: Yes     Frequency: 4 or more times a week     Drinks per session: 1 or 2     Binge frequency: Never    Drug use: No    Sexual activity: Yes     Partners: Female   Lifestyle    Physical activity:     Days per week: 0 days     Minutes per session: Not on file    Stress: Not at all   Relationships    Social connections:     Talks on phone: More than three times a week     Gets together: Once a week     Attends [employer] service: Not on file     Active member of club or organization: No     Attends meetings of clubs or organizations: Never     Relationship status:    Other Topics Concern    Not on file   Social History Narrative    Not on file       Review of patient's allergies indicates:  No Known Allergies    Physical Exam:  Vitals:    12/13/19 0948   BP: 128/72   Pulse: 73   Weight: 84.8 kg (187 lb)   PainSc:   6     General    Nursing note and vitals reviewed.  Constitutional: He is oriented to person, place, and time. He appears well-developed and well-nourished. No distress.   HENT:   Head: Normocephalic and atraumatic.   Nose: Nose normal.   Eyes: Conjunctivae and EOM are normal. Pupils are equal, round, and reactive to light. Right eye exhibits no discharge. Left eye exhibits no discharge. No scleral icterus.   Neck: No JVD present.   Cardiovascular: Intact distal pulses.    Pulmonary/Chest: Effort normal. No respiratory distress.   Abdominal: He exhibits no distension.   Neurological: He is alert and oriented to person, place, and time. Coordination normal.   Psychiatric: He has a normal mood and affect. His behavior is normal. Judgment and thought content normal.     General Musculoskeletal Exam   Gait: normal     Back (L-Spine & T-Spine)  / Neck (C-Spine) Exam     Tenderness Right paramedian tenderness of the Lower L-Spine. Left paramedian tenderness of the Lower L-Spine.     Back (L-Spine & T-Spine) Range of Motion   Back extension: facet loading is positive and exacerabtes/reproduces the patient's typical low back pain    Back flexion: limited ROM but partial relief of low back pain noted.     Spinal Sensation   Right Side Sensation  L-Spine Level: normal  Left Side Sensation  L-Spine Level: normal    Other He has no scoliosis .      Muscle Strength   Right Lower Extremity   Hip Flexion: 5/5   Hip Extensors: 5/5  Quadriceps:  5/5   Hamstrin/5   Gastrocsoleus:  5/5/5  Left Lower Extremity   Hip Flexion: 5/5   Hip Extensors: 5/5  Quadriceps:  5/5   Hamstrin/5   Gastrocsoleus:  5/5/5    Reflexes     Left Side  Quadriceps:  2+  Achilles:  2+    Right Side   Quadriceps:  2+  Achilles:  2+      Imaging:  External MRI lumbar spine 2017  Images and official radiology report from the study will be uploaded to the electronic medical record.  My review of the images is significant for multilevel degenerative disc disease described as mild at L3-4 and L5-S1.  Degenerative disc disease is moderate to severe at L4-5 and is associated with anterolisthesis of L4 on L5 resulting in bilateral neural foraminal and central canal stenosis.  There is significant facet arthropathy noted at L3-4, L4-5, and L5-S1, bilaterally.    Labs:  BMP  Lab Results   Component Value Date     12/10/2019    K 3.7 12/10/2019     12/10/2019    CO2 22 (L) 12/10/2019    BUN 11 12/10/2019    CREATININE 1.0 12/10/2019    CALCIUM 9.3 12/10/2019    ANIONGAP 13 12/10/2019    ESTGFRAFRICA >60.0 12/10/2019    EGFRNONAA >60.0 12/10/2019     Lab Results   Component Value Date    ALT 50 (H) 12/10/2019    AST 41 (H) 12/10/2019    ALKPHOS 52 (L) 12/10/2019    BILITOT 0.7 12/10/2019       Assessment:  Problem List Items Addressed This Visit     Lumbar spondylosis    DDD  (degenerative disc disease), lumbar    Neuroforaminal stenosis of lumbar spine    Chronic bilateral low back pain without sciatica - Primary          12/13/19 - Babatunde Oconnor is a 64 y.o. male with multifactorial chronic low back pain.  I have advised the patient to restart his home exercise program toward minimizing painful muscle spasms in the low back associated with performing activities of daily living such as lifting moderately sized objects or assisting his wife with mobility.  The bandlike pain he experiences across the low back is most consistent with facet arthropathy which is likely exacerbated by forward shifting of the vertebral body at L4 relative to L5, there by placing increased pressure on the facet joints.  Toward alleviating that persistent pain, I have recommended a bilateral medial branch block targeting the L4-5 and L5-S1 facets.  If the diagnostic block is successful, we will follow with a bilateral medial branch radiofrequency ablation for semi permanent relief.  Questions about the procedure treatment were answered to the patient's satisfaction and he will be scheduled for the procedure stated above.    : Reviewed and consistent with medication use as prescribed.    Treatment Plan:   Procedures: Bilateral MBB #1 of 1 targeting L4-5 and L5-S1  PT/OT/HEP:  Patient is in possession of discharge documentation from physical therapy last year detailing an appropriate home exercise regimen for him.  He plans to restart home exercise as soon as possible.  Medications: No changes recommended at this time.  Labs: reviewed and medications are appropriately dosed for current hepatorenal function.  Imaging: No additional recommended at this time.    Follow Up: RTC 2 weeks p BURAK Anderson Jr, MD  Interventional Pain Medicine / Anesthesiology    Disclaimer: This note was partly generated using dictation software which may occasionally result in transcription errors.

## 2019-12-13 ENCOUNTER — OFFICE VISIT (OUTPATIENT)
Dept: PAIN MEDICINE | Facility: CLINIC | Age: 64
End: 2019-12-13
Payer: MEDICARE

## 2019-12-13 ENCOUNTER — TELEPHONE (OUTPATIENT)
Dept: PAIN MEDICINE | Facility: CLINIC | Age: 64
End: 2019-12-13

## 2019-12-13 VITALS
SYSTOLIC BLOOD PRESSURE: 128 MMHG | DIASTOLIC BLOOD PRESSURE: 72 MMHG | BODY MASS INDEX: 29.73 KG/M2 | HEART RATE: 73 BPM | WEIGHT: 187 LBS

## 2019-12-13 DIAGNOSIS — M51.36 DDD (DEGENERATIVE DISC DISEASE), LUMBAR: ICD-10-CM

## 2019-12-13 DIAGNOSIS — M47.816 FACET ARTHRITIS OF LUMBAR REGION: Primary | ICD-10-CM

## 2019-12-13 DIAGNOSIS — M48.061 NEUROFORAMINAL STENOSIS OF LUMBAR SPINE: ICD-10-CM

## 2019-12-13 DIAGNOSIS — M47.816 LUMBAR SPONDYLOSIS: ICD-10-CM

## 2019-12-13 DIAGNOSIS — G89.29 CHRONIC BILATERAL LOW BACK PAIN WITHOUT SCIATICA: Primary | ICD-10-CM

## 2019-12-13 DIAGNOSIS — M54.50 CHRONIC BILATERAL LOW BACK PAIN WITHOUT SCIATICA: Primary | ICD-10-CM

## 2019-12-13 PROBLEM — M51.369 DDD (DEGENERATIVE DISC DISEASE), LUMBAR: Status: ACTIVE | Noted: 2019-12-13

## 2019-12-13 PROCEDURE — 1101F PR PT FALLS ASSESS DOC 0-1 FALLS W/OUT INJ PAST YR: ICD-10-PCS | Mod: CPTII,S$GLB,, | Performed by: PAIN MEDICINE

## 2019-12-13 PROCEDURE — 3078F PR MOST RECENT DIASTOLIC BLOOD PRESSURE < 80 MM HG: ICD-10-PCS | Mod: CPTII,S$GLB,, | Performed by: PAIN MEDICINE

## 2019-12-13 PROCEDURE — 1159F MED LIST DOCD IN RCRD: CPT | Mod: S$GLB,,, | Performed by: PAIN MEDICINE

## 2019-12-13 PROCEDURE — 99999 PR PBB SHADOW E&M-EST. PATIENT-LVL III: ICD-10-PCS | Mod: PBBFAC,,, | Performed by: PAIN MEDICINE

## 2019-12-13 PROCEDURE — 1159F PR MEDICATION LIST DOCUMENTED IN MEDICAL RECORD: ICD-10-PCS | Mod: S$GLB,,, | Performed by: PAIN MEDICINE

## 2019-12-13 PROCEDURE — 3074F SYST BP LT 130 MM HG: CPT | Mod: CPTII,S$GLB,, | Performed by: PAIN MEDICINE

## 2019-12-13 PROCEDURE — 1125F PR PAIN SEVERITY QUANTIFIED, PAIN PRESENT: ICD-10-PCS | Mod: S$GLB,,, | Performed by: PAIN MEDICINE

## 2019-12-13 PROCEDURE — 99999 PR PBB SHADOW E&M-EST. PATIENT-LVL III: CPT | Mod: PBBFAC,,, | Performed by: PAIN MEDICINE

## 2019-12-13 PROCEDURE — 99204 PR OFFICE/OUTPT VISIT, NEW, LEVL IV, 45-59 MIN: ICD-10-PCS | Mod: S$GLB,,, | Performed by: PAIN MEDICINE

## 2019-12-13 PROCEDURE — 3074F PR MOST RECENT SYSTOLIC BLOOD PRESSURE < 130 MM HG: ICD-10-PCS | Mod: CPTII,S$GLB,, | Performed by: PAIN MEDICINE

## 2019-12-13 PROCEDURE — 1125F AMNT PAIN NOTED PAIN PRSNT: CPT | Mod: S$GLB,,, | Performed by: PAIN MEDICINE

## 2019-12-13 PROCEDURE — 1101F PT FALLS ASSESS-DOCD LE1/YR: CPT | Mod: CPTII,S$GLB,, | Performed by: PAIN MEDICINE

## 2019-12-13 PROCEDURE — 3078F DIAST BP <80 MM HG: CPT | Mod: CPTII,S$GLB,, | Performed by: PAIN MEDICINE

## 2019-12-13 PROCEDURE — 99204 OFFICE O/P NEW MOD 45 MIN: CPT | Mod: S$GLB,,, | Performed by: PAIN MEDICINE

## 2019-12-13 NOTE — LETTER
December 13, 2019      Negar Pandey MD  [address] [address]           [facility] - Pain [facility]  [address]  [place] [address]  Phone: [phone]  Fax: [phone]          Patient: Babatunde Oconnor   MR Number: [id number]   YOB: 1955   Date of Visit: 12/13/2019       Dear Dr. Negar Pandey:    Thank you for referring Babatunde Oconnor to me for evaluation. Attached you will find relevant portions of my assessment and plan of care.    If you have questions, please do not hesitate to call me. I look forward to following Babatunde Oconnor along with you.    Sincerely,    Vamshi Anderson Jr., MD    Enclosure  CC:  No Recipients    If you would like to receive this communication electronically, please contact [email or web address] or [phone] to request more information on [employer] access.    For providers and/or their staff who would like to refer a patient to [facility], please contact us through our one-stop-shop provider referral line, [facility], at [phone].    If you feel you have received this communication in error or would no longer like to receive these types of communications, please e-mail [email or web address]

## 2019-12-18 ENCOUNTER — LAB VISIT (OUTPATIENT)
Dept: LAB | Facility: HOSPITAL | Age: 64
End: 2019-12-18
Attending: INTERNAL MEDICINE
Payer: MEDICARE

## 2019-12-18 DIAGNOSIS — R74.8 ELEVATED LIVER ENZYMES: ICD-10-CM

## 2019-12-18 LAB
ALBUMIN SERPL BCP-MCNC: 4.2 G/DL (ref 3.5–5.2)
ALP SERPL-CCNC: 51 U/L (ref 55–135)
ALT SERPL W/O P-5'-P-CCNC: 58 U/L (ref 10–44)
ANION GAP SERPL CALC-SCNC: 6 MMOL/L (ref 8–16)
AST SERPL-CCNC: 33 U/L (ref 10–40)
BILIRUB SERPL-MCNC: 0.6 MG/DL (ref 0.1–1)
BUN SERPL-MCNC: 8 MG/DL (ref 8–23)
CALCIUM SERPL-MCNC: 9.4 MG/DL (ref 8.7–10.5)
CHLORIDE SERPL-SCNC: 99 MMOL/L (ref 95–110)
CO2 SERPL-SCNC: 31 MMOL/L (ref 23–29)
CREAT SERPL-MCNC: 1 MG/DL (ref 0.5–1.4)
EST. GFR  (AFRICAN AMERICAN): >60 ML/MIN/1.73 M^2
EST. GFR  (NON AFRICAN AMERICAN): >60 ML/MIN/1.73 M^2
GLUCOSE SERPL-MCNC: 130 MG/DL (ref 70–110)
POTASSIUM SERPL-SCNC: 3.6 MMOL/L (ref 3.5–5.1)
PROT SERPL-MCNC: 7.5 G/DL (ref 6–8.4)
SODIUM SERPL-SCNC: 136 MMOL/L (ref 136–145)

## 2019-12-18 PROCEDURE — 80053 COMPREHEN METABOLIC PANEL: CPT

## 2019-12-18 PROCEDURE — 36415 COLL VENOUS BLD VENIPUNCTURE: CPT | Mod: PO

## 2019-12-19 ENCOUNTER — PATIENT MESSAGE (OUTPATIENT)
Dept: INTERNAL MEDICINE | Facility: CLINIC | Age: 64
End: 2019-12-19

## 2019-12-19 DIAGNOSIS — R74.8 ELEVATED LIVER ENZYMES: Primary | ICD-10-CM

## 2019-12-23 ENCOUNTER — PATIENT MESSAGE (OUTPATIENT)
Dept: INTERNAL MEDICINE | Facility: CLINIC | Age: 64
End: 2019-12-23

## 2020-01-07 ENCOUNTER — HOSPITAL ENCOUNTER (OUTPATIENT)
Facility: HOSPITAL | Age: 65
Discharge: HOME | End: 2020-01-07
Attending: PAIN MEDICINE | Admitting: PAIN MEDICINE
Payer: MEDICARE

## 2020-01-07 ENCOUNTER — TELEPHONE (OUTPATIENT)
Dept: SLEEP MEDICINE | Facility: OTHER | Age: 65
End: 2020-01-07

## 2020-01-07 VITALS
WEIGHT: 193 LBS | TEMPERATURE: 98 F | RESPIRATION RATE: 17 BRPM | SYSTOLIC BLOOD PRESSURE: 127 MMHG | HEART RATE: 70 BPM | OXYGEN SATURATION: 96 % | BODY MASS INDEX: 31.02 KG/M2 | HEIGHT: 66 IN | DIASTOLIC BLOOD PRESSURE: 78 MMHG

## 2020-01-07 DIAGNOSIS — G89.29 CHRONIC PAIN: ICD-10-CM

## 2020-01-07 DIAGNOSIS — M47.816 FACET ARTHROPATHY, LUMBAR: Primary | ICD-10-CM

## 2020-01-07 PROCEDURE — 64494 INJ PARAVERT F JNT L/S 2 LEV: CPT | Mod: RT

## 2020-01-07 PROCEDURE — 64493 INJ PARAVERT F JNT L/S 1 LEV: CPT | Mod: 50 | Performed by: PAIN MEDICINE

## 2020-01-07 PROCEDURE — 64494 PR INJ DX/THER AGNT PARAVERT FACET JOINT,IMG GUIDE,LUMBAR/SAC, 2ND LEVEL: ICD-10-PCS | Mod: 50,,, | Performed by: PAIN MEDICINE

## 2020-01-07 PROCEDURE — 63600175 PHARM REV CODE 636 W HCPCS: Performed by: PAIN MEDICINE

## 2020-01-07 PROCEDURE — 64493 INJ PARAVERT F JNT L/S 1 LEV: CPT | Mod: 50,,, | Performed by: PAIN MEDICINE

## 2020-01-07 PROCEDURE — 25500020 PHARM REV CODE 255: Performed by: PAIN MEDICINE

## 2020-01-07 PROCEDURE — 64494 INJ PARAVERT F JNT L/S 2 LEV: CPT | Mod: 50 | Performed by: PAIN MEDICINE

## 2020-01-07 PROCEDURE — 64493 PR INJ DX/THER AGNT PARAVERT FACET JOINT,IMG GUIDE,LUMBAR/SAC,1ST LVL: ICD-10-PCS | Mod: 50,,, | Performed by: PAIN MEDICINE

## 2020-01-07 PROCEDURE — 25000003 PHARM REV CODE 250: Performed by: PAIN MEDICINE

## 2020-01-07 PROCEDURE — 64494 INJ PARAVERT F JNT L/S 2 LEV: CPT | Mod: 50,,, | Performed by: PAIN MEDICINE

## 2020-01-07 RX ORDER — LIDOCAINE HYDROCHLORIDE 10 MG/ML
INJECTION, SOLUTION EPIDURAL; INFILTRATION; INTRACAUDAL; PERINEURAL
Status: DISCONTINUED | OUTPATIENT
Start: 2020-01-07 | End: 2020-01-07 | Stop reason: HOSPADM

## 2020-01-07 RX ORDER — BUPIVACAINE HYDROCHLORIDE 2.5 MG/ML
INJECTION, SOLUTION EPIDURAL; INFILTRATION; INTRACAUDAL
Status: DISCONTINUED | OUTPATIENT
Start: 2020-01-07 | End: 2020-01-07 | Stop reason: HOSPADM

## 2020-01-07 RX ORDER — INDOMETHACIN 25 MG/1
CAPSULE ORAL
Status: DISCONTINUED | OUTPATIENT
Start: 2020-01-07 | End: 2020-01-07 | Stop reason: HOSPADM

## 2020-01-07 RX ORDER — METHYLPREDNISOLONE ACETATE 40 MG/ML
INJECTION, SUSPENSION INTRA-ARTICULAR; INTRALESIONAL; INTRAMUSCULAR; SOFT TISSUE
Status: DISCONTINUED | OUTPATIENT
Start: 2020-01-07 | End: 2020-01-07 | Stop reason: HOSPADM

## 2020-01-07 NOTE — DISCHARGE INSTRUCTIONS
Home Care Instructions Pain Management:    1.  DIET:    You may resume your normal diet today.    2.  BATHING:    You may shower with luke warm water.    3.  DRESSING:    You may remove your bandage today.    4.  ACTIVITY LEVEL:      You may resume your normal activities 24 hours after your procedure.    5.  MEDICATIONS:    You may resume your normal medications today.    6.  SPECIAL INSTRUCTIONS:    No heat to the injection site for 24 hours including bath or shower, heating pad, moist heat or hot tubs.    Use an ice pack to the injection site for any pain or discomfort.  Apply ice packs for 20 minute intervals as needed.    If you have received any sedatives by mouth today, you can not drive for 12 hours.    If you have received sedation through an IV, you can not drive for 24 hours.    PLEASE CALL YOUR DOCTOR FOR THE FOLLOWIN.  Redness or swelling around the injection site.  2.  Fever of 101 degrees.  3.  Drainage (pus) from the injection site.  4.  For any continuous bleeding (some dried blood over the incision is normal.)    FOR EMERGENCIES:    If any unusual problems or difficulties occur during clinic hours, call [phone] or dial [phone].    Follow up with with your physician in 2-3 weeks.

## 2020-01-07 NOTE — PLAN OF CARE
Discharge instructions given and explained, pt voiced understanding. Transported to car via w/c, pt had no sedation and alone.  Safety maintained.

## 2020-01-07 NOTE — OP NOTE
"Procedure Note    Pre-operative diagnosis: Lumbar Spondylosis  Post-operative diagnosis: Lumbar Spondylosis  Procedure Date: 01/07/2020  Procedure:  (1) Bilateral L4-5 and L5-S1 Lumbar Medial Branch Block     (2) Fluoroscopy for needle guidance    Procedure in detail:  Informed consent obtained after explaining the procedure and potential complications including local discomfort, infection, headache, temporary or permanent weakness and/or numbness of one or both legs, temporary or permanent paraplegia, heart attack and stroke. All questions were answered.      Patient was taken to the procedure room and placed in prone position.  Time out was performed.  Patient's Lumbar area was prepped and draped in a sterile manner.  AP and oblique fluoroscopy were used to identify and oliverio the junctions between the superior articular processes and transverse processes at the L4 and L5 vertebral levels as well as the sacral ala on the Right side.  Skin and tissues overlying the targeted points were anesthetized with Lidocaine 1% (10 mL) + sodium bicarbonate (1 mL) using a 25G 1.25" needle.  Then, under fluoroscopic guidance, a 22 G 3.5 inch spinal needle, was advanced through the anesthetized tissues toward the targeted points corresponding to the locations of the L3,4,5 medial branch nerves.     After heme-negative aspiration, up to 1 mL of contrast was divided among the levels and administered through each needle demonstrating local accumulation.  Next, 1 mL of 0.25% bupivacaine was injected at each of the above targeted points: the locations of the L3,4,5 medial branch nerves.     The procedure was repeated on the Left side with similar findings.  Needle placement and contrast dispersion were consistent with successful medial branch nerve block.    Needle was removed with flush and bandaged placed over site of needle insertion.      Estimated Blood Loss: None    Disposition: The patient tolerated the procedure without " complaint and was transported to the recovery room in stable condition.    Follow-up: Return for second MBB or RFA if appropriate      Vamshi Anderson Jr, MD  Interventional Pain Medicine / Anesthesiology

## 2020-01-07 NOTE — DISCHARGE SUMMARY
[facility]  Discharge Note  Short Stay     Admit Date: 1/7/2020    Discharge Date: 1/7/2020     Attending Physician: Vamshi Anderson Jr, MD    Diagnoses:  Active Hospital Problems    Diagnosis  POA    Chronic pain [G89.29]  Yes      Resolved Hospital Problems   No resolved problems to display.     Discharged Condition: Good     Hospital Course: Patient was admitted for an outpatient interventional pain management procedure and tolerated the procedure well with no complications.     Final Diagnoses: Same as principal problem.     Disposition: Home or Self Care     Follow up/Patient Instructions:    Follow-up Information     Vamshi Anderson Jr, MD. Go in 1 week.    Specialty:  Pain Medicine  Why:  Post-procedural Follow Up As Scheduled, To Report Your Pain Scores from the Pain Diary  Contact information:  [address]  [address]  [place] [address] [phone]                   Reconciled Medications:     Medication List      CONTINUE taking these medications    amlodipine-atorvastatin 10-10 mg per tablet  Commonly known as:  CADUET  TAKE 1 TABLET BY MOUTH EVERY DAY     aspirin 81 MG EC tablet  Commonly known as:  ECOTRIN  Take 81 mg by mouth once daily.     bisoprolol-hydrochlorothiazide 5-6.25 mg 5-6.25 mg Tab  Commonly known as:  ZIAC  TAKE 1 TABLET BY MOUTH EVERY DAY      MG tablet  Generic drug:  ibuprofen  Take 800 mg by mouth every 6 (six) hours as needed.     tamsulosin 0.4 mg Cap  Commonly known as:  FLOMAX  Take 2 capsules (0.8 mg total) by mouth once daily.     valACYclovir 500 MG tablet  Commonly known as:  VALTREX  Take 1 tablet (500 mg total) by mouth once daily.           Discharge Procedure Orders (must include Diet, Follow-up, Activity)   Call MD for:  temperature >100.4     Call MD for:  severe uncontrolled pain     Call MD for:  redness, tenderness, or signs of infection (pain, swelling, redness, odor or green/yellow discharge around incision site)     Call MD for:   difficulty breathing or increased cough     Call MD for:  severe persistent headache     Call MD for:  worsening rash     Remove dressing in 24 hours       Vamshi Anderson Jr, MD  Interventional Pain Medicine / Anesthesiology

## 2020-01-10 ENCOUNTER — OFFICE VISIT (OUTPATIENT)
Dept: PAIN MEDICINE | Facility: CLINIC | Age: 65
End: 2020-01-10
Payer: MEDICARE

## 2020-01-10 VITALS — WEIGHT: 193 LBS | BODY MASS INDEX: 31.15 KG/M2

## 2020-01-10 DIAGNOSIS — M47.816 FACET ARTHROPATHY, LUMBAR: Primary | ICD-10-CM

## 2020-01-10 DIAGNOSIS — M47.816 FACET ARTHRITIS OF LUMBAR REGION: ICD-10-CM

## 2020-01-10 DIAGNOSIS — M47.816 LUMBAR SPONDYLOSIS: ICD-10-CM

## 2020-01-10 DIAGNOSIS — M54.50 CHRONIC BILATERAL LOW BACK PAIN WITHOUT SCIATICA: ICD-10-CM

## 2020-01-10 DIAGNOSIS — G89.29 CHRONIC BILATERAL LOW BACK PAIN WITHOUT SCIATICA: ICD-10-CM

## 2020-01-10 PROCEDURE — 1126F AMNT PAIN NOTED NONE PRSNT: CPT | Mod: S$GLB,,, | Performed by: PAIN MEDICINE

## 2020-01-10 PROCEDURE — 99999 PR PBB SHADOW E&M-EST. PATIENT-LVL III: ICD-10-PCS | Mod: PBBFAC,,, | Performed by: PAIN MEDICINE

## 2020-01-10 PROCEDURE — 99213 OFFICE O/P EST LOW 20 MIN: CPT | Mod: S$GLB,,, | Performed by: PAIN MEDICINE

## 2020-01-10 PROCEDURE — 1101F PT FALLS ASSESS-DOCD LE1/YR: CPT | Mod: CPTII,S$GLB,, | Performed by: PAIN MEDICINE

## 2020-01-10 PROCEDURE — 1126F PR PAIN SEVERITY QUANTIFIED, NO PAIN PRESENT: ICD-10-PCS | Mod: S$GLB,,, | Performed by: PAIN MEDICINE

## 2020-01-10 PROCEDURE — 1159F MED LIST DOCD IN RCRD: CPT | Mod: S$GLB,,, | Performed by: PAIN MEDICINE

## 2020-01-10 PROCEDURE — 1159F PR MEDICATION LIST DOCUMENTED IN MEDICAL RECORD: ICD-10-PCS | Mod: S$GLB,,, | Performed by: PAIN MEDICINE

## 2020-01-10 PROCEDURE — 99213 PR OFFICE/OUTPT VISIT, EST, LEVL III, 20-29 MIN: ICD-10-PCS | Mod: S$GLB,,, | Performed by: PAIN MEDICINE

## 2020-01-10 PROCEDURE — 99999 PR PBB SHADOW E&M-EST. PATIENT-LVL III: CPT | Mod: PBBFAC,,, | Performed by: PAIN MEDICINE

## 2020-01-10 PROCEDURE — 1101F PR PT FALLS ASSESS DOC 0-1 FALLS W/OUT INJ PAST YR: ICD-10-PCS | Mod: CPTII,S$GLB,, | Performed by: PAIN MEDICINE

## 2020-01-10 NOTE — PROGRESS NOTES
[facility] Established Patient Evaluation    Referred by: Dr. Negar Pandey  Reason for referral: CLBP    CC:   Chief Complaint   Patient presents with    Low-back Pain     Last 3 PDI Scores 12/13/2019   Pain Disability Index (PDI) 42     Interval Update:  01/10/2020 - Mr. Oconnor returns to clinic for follow up visit reporting improved back pain.  Patient is s/p Bilateral Lumbar MBB on 1/7/20 with 100% relief.  Pain intensity is currently 0/10.    HPI:   Babatunde Oconnor is a 64 y.o. male who complains of chronic low back pain. He was evaluated by pain management physician last year and referred to physical therapy.  At that time he was experiencing low back pain radiating into both legs in the L5 dermatome.  He completed physical therapy with excellent improvement in both axial and radicular symptoms.  Since completion of physical therapy last year, he has lived a relatively sedentary lifestyle and is now experiencing increasing low back pain.  There is no radicular pain at this time.  He describes 2 types of pain in the low back.  The 1st is associated with heavy lifting or increased activity and is perceived has tightness and throbbing.  The 2nd is always present and felt is a band-like pain across the low back.    Location: low back   Onset: yrs  Current Pain Score: 6/10  Daily Pain of Range: 4-6/10  Quality: Burning and Throbbing  Radiation:  None at this time though he has experienced bilateral radicular symptoms in the past  Worsened by: flexion and lifting  Improved by: heat    Previous Therapies:  PT/OT:  Completed 12 weeks of physical therapy in 2018 with excellent improvement in symptoms.  HEP:  Patient denies compliance with home exercise.  He denies feeling educated on the importance of home exercise with his previous providers.  Interventions: Denies injections  Surgery: Denies surgery for low back.  ++ Hx of anterior cervical fusion for spinal stenosis with complete resolution of BUE  symptoms.  Medications:   - NSAIDS:   - MSK Relaxants:   - TCAs:   - SNRIs:   - Topicals:   - Anticonvulsants:  - Opioids:     Current Pain Medications:  1. Ibuprofen      Review of Systems:  Review of Systems   Constitutional: Negative for chills and fever.   HENT: Negative for nosebleeds.    Eyes: Negative for blurred vision and pain.   Respiratory: Negative for hemoptysis.    Cardiovascular: Negative for chest pain and palpitations.   Gastrointestinal: Negative for heartburn, nausea and vomiting.   Genitourinary: Negative for dysuria and hematuria.   Musculoskeletal: Positive for back pain and myalgias.   Skin: Negative for rash.   Neurological: Negative for tingling, focal weakness, seizures and loss of consciousness.   Endo/Heme/Allergies: Does not bruise/bleed easily.   Psychiatric/Behavioral: Negative for hallucinations. The patient is not nervous/anxious.        History:    Current Outpatient Medications:     amlodipine-atorvastatin (CADUET) 10-10 mg per tablet, TAKE 1 TABLET BY MOUTH EVERY DAY, Disp: 90 tablet, Rfl: 0    aspirin (ECOTRIN) 81 MG EC tablet, Take 81 mg by mouth once daily., Disp: , Rfl:     bisoprolol-hydrochlorothiazide 5-6.25 mg (ZIAC) 5-6.25 mg Tab, TAKE 1 TABLET BY MOUTH EVERY DAY, Disp: 90 tablet, Rfl: 0     mg tablet, Take 800 mg by mouth every 6 (six) hours as needed., Disp: , Rfl: 0    tamsulosin (FLOMAX) 0.4 mg Cap, Take 2 capsules (0.8 mg total) by mouth once daily., Disp: 180 capsule, Rfl: 1    valACYclovir (VALTREX) 500 MG tablet, Take 1 tablet (500 mg total) by mouth once daily., Disp: 90 tablet, Rfl: 3    Past Medical History:   Diagnosis Date    BPH (benign prostatic hyperplasia)     Hypertension     Lumbar spondylosis 12/13/2019       Past Surgical History:   Procedure Laterality Date    APPENDECTOMY      CERVICAL DISCECTOMY  2008    CERVICAL SPINE SURGERY      INJECTION OF ANESTHETIC AGENT AROUND MEDIAL BRANCH NERVES INNERVATING LUMBAR FACET JOINT Bilateral  1/7/2020    Procedure: Block-nerve-medial branch-lumbar-- Bilateral (With Steroid);  Surgeon: Vamshi Anderson Jr., MD;  Location: [facility];  Service: Pain Management;  Laterality: Bilateral;  No Sedation       No family history on file.    Social History     Socioeconomic History    Marital status:      Spouse name: Not on file    Number of children: Not on file    Years of education: Not on file    Highest education level: Not on file   Occupational History    Not on file   Social Needs    Financial resource strain: Somewhat hard    Food insecurity:     Worry: Never true     Inability: Never true    Transportation needs:     Medical: No     Non-medical: No   Tobacco Use    Smoking status: Never Smoker    Smokeless tobacco: Never Used   Substance and Sexual Activity    Alcohol use: Yes     Frequency: 4 or more times a week     Drinks per session: 1 or 2     Binge frequency: Never    Drug use: No    Sexual activity: Yes     Partners: Female   Lifestyle    Physical activity:     Days per week: 0 days     Minutes per session: Not on file    Stress: Not at all   Relationships    Social connections:     Talks on phone: More than three times a week     Gets together: Once a week     Attends Scientologist service: Not on file     Active member of club or organization: No     Attends meetings of clubs or organizations: Never     Relationship status:    Other Topics Concern    Not on file   Social History Narrative    Not on file       Review of patient's allergies indicates:  No Known Allergies    Physical Exam:  Vitals:    01/10/20 1446   Weight: 87.5 kg (193 lb)   PainSc: 0-No pain     General    Nursing note and vitals reviewed.  Constitutional: He is oriented to person, place, and time. He appears well-developed and well-nourished. No distress.   HENT:   Head: Normocephalic and atraumatic.   Nose: Nose normal.   Eyes: Conjunctivae and EOM are normal. Pupils are equal, round, and  reactive to light. Right eye exhibits no discharge. Left eye exhibits no discharge. No scleral icterus.   Neck: No JVD present.   Cardiovascular: Intact distal pulses.    Pulmonary/Chest: Effort normal. No respiratory distress.   Abdominal: He exhibits no distension.   Neurological: He is alert and oriented to person, place, and time. Coordination normal.   Psychiatric: He has a normal mood and affect. His behavior is normal. Judgment and thought content normal.     General Musculoskeletal Exam   Gait: normal     Back (L-Spine & T-Spine) / Neck (C-Spine) Exam     Tenderness Right paramedian tenderness of the Lower L-Spine. Left paramedian tenderness of the Lower L-Spine.     Back (L-Spine & T-Spine) Range of Motion   Back extension: facet loading is positive and exacerabtes/reproduces the patient's typical low back pain    Back flexion: limited ROM but partial relief of low back pain noted.     Spinal Sensation   Right Side Sensation  L-Spine Level: normal  Left Side Sensation  L-Spine Level: normal    Other He has no scoliosis .      Muscle Strength   Right Lower Extremity   Hip Flexion: 5/5   Hip Extensors: 5/5  Quadriceps:  5/5   Hamstrin/5   Gastrocsoleus:  5/5/5  Left Lower Extremity   Hip Flexion: 5/5   Hip Extensors: 5/5  Quadriceps:  5/5   Hamstrin/5   Gastrocsoleus:  5/5/5    Reflexes     Left Side  Quadriceps:  2+  Achilles:  2+    Right Side   Quadriceps:  2+  Achilles:  2+      Imaging:  External MRI lumbar spine 2017  Images and official radiology report from the study will be uploaded to the electronic medical record.  My review of the images is significant for multilevel degenerative disc disease described as mild at L3-4 and L5-S1.  Degenerative disc disease is moderate to severe at L4-5 and is associated with anterolisthesis of L4 on L5 resulting in bilateral neural foraminal and central canal stenosis.  There is significant facet arthropathy noted at L3-4, L4-5, and L5-S1,  bilaterally.    Labs:  BMP  Lab Results   Component Value Date     12/18/2019    K 3.6 12/18/2019    CL 99 12/18/2019    CO2 31 (H) 12/18/2019    BUN 8 12/18/2019    CREATININE 1.0 12/18/2019    CALCIUM 9.4 12/18/2019    ANIONGAP 6 (L) 12/18/2019    ESTGFRAFRICA >60.0 12/18/2019    EGFRNONAA >60.0 12/18/2019     Lab Results   Component Value Date    ALT 58 (H) 12/18/2019    AST 33 12/18/2019    ALKPHOS 51 (L) 12/18/2019    BILITOT 0.6 12/18/2019       Assessment:  Problem List Items Addressed This Visit     Lumbar spondylosis    Chronic bilateral low back pain without sciatica    Facet arthropathy, lumbar - Primary          12/13/19 - Babatunde Oconnor is a 64 y.o. male with multifactorial chronic low back pain.  I have advised the patient to restart his home exercise program toward minimizing painful muscle spasms in the low back associated with performing activities of daily living such as lifting moderately sized objects or assisting his wife with mobility.  The bandlike pain he experiences across the low back is most consistent with facet arthropathy which is likely exacerbated by forward shifting of the vertebral body at L4 relative to L5, there by placing increased pressure on the facet joints.  Toward alleviating that persistent pain, I have recommended a bilateral medial branch block targeting the L4-5 and L5-S1 facets.  If the diagnostic block is successful, we will follow with a bilateral medial branch radiofrequency ablation for semi permanent relief.  Questions about the procedure treatment were answered to the patient's satisfaction and he will be scheduled for the procedure stated above.    1/10/2020 - a patient did extremely well with lumbar medial branch block targeting L4-5 and L5-S1. He reports a pain intensity today of 0/10 with 100% relief.  There was steroid in that injection and I would anticipate it providing him relief for 2-6 weeks.  Patient was advised to contact our office when  his low back pain returns at which point we can submit a request to perform the bilateral RFA.  If his insurance company requires a 2nd MBB, we will offer that as well.    : Reviewed and consistent with medication use as prescribed.    Treatment Plan:   Procedures:    - Bilateral MBB #1 of 1 targeting L4-5 and L5-S1   - plan for RFA right, then left to be scheduled when pain returns in low back.  PT/OT/HEP:  Patient is in possession of discharge documentation from physical therapy last year detailing an appropriate home exercise regimen for him.  He plans to restart home exercise as soon as possible.  Medications: No changes recommended at this time.  Labs: reviewed and medications are appropriately dosed for current hepatorenal function.  Imaging: No additional recommended at this time.    Follow Up: Patient to contact us when pain returns    Vamshi Anderson Jr, MD  Interventional Pain Medicine / Anesthesiology    Disclaimer: This note was partly generated using dictation software which may occasionally result in transcription errors.

## 2020-01-11 ENCOUNTER — HOSPITAL ENCOUNTER (OUTPATIENT)
Dept: RADIOLOGY | Facility: HOSPITAL | Age: 65
Discharge: HOME | End: 2020-01-11
Attending: INTERNAL MEDICINE
Payer: MEDICARE

## 2020-01-11 DIAGNOSIS — R74.8 ELEVATED LIVER ENZYMES: ICD-10-CM

## 2020-01-11 PROCEDURE — 76705 ECHO EXAM OF ABDOMEN: CPT | Mod: 26,,, | Performed by: RADIOLOGY

## 2020-01-11 PROCEDURE — 76705 US ABDOMEN LIMITED: ICD-10-PCS | Mod: 26,,, | Performed by: RADIOLOGY

## 2020-01-11 PROCEDURE — 76705 ECHO EXAM OF ABDOMEN: CPT | Mod: TC

## 2020-01-12 ENCOUNTER — PATIENT MESSAGE (OUTPATIENT)
Dept: INTERNAL MEDICINE | Facility: CLINIC | Age: 65
End: 2020-01-12

## 2020-01-12 DIAGNOSIS — K76.0 FATTY LIVER: Primary | ICD-10-CM

## 2020-01-12 DIAGNOSIS — K76.0 NAFLD (NONALCOHOLIC FATTY LIVER DISEASE): ICD-10-CM

## 2020-01-12 NOTE — PATIENT INSTRUCTIONS
Nonalcoholic Fatty Liver Disease (NAFLD)  Nonalcoholic fatty liver disease (NAFLD) is a common disease of the liver. It occurs when you have too much fat in the liver. If NAFLD is severe, it can cause liver damage that seems like the damage caused by drinking too much alcohol. But NAFLD is not caused by drinking alcohol. This sheet tells you more about NAFLD and how it can be managed.    How the liver works   The liver is an organ in the upper right side of the belly (abdomen). It has many important jobs. These include:  · Breaking down (metabolizing) proteins, carbohydrates, and fats  · Making a substance called bile that helps break down fats  · Storing and releasing sugar (glucose) into the blood to give the body energy  · Removing toxins from the blood  · Helping with blood clotting  Understanding NAFLD  A healthy liver may contain some fat. But if too much fat builds up in the liver, this causes NAFLD. NAFLD can be mild, causing fatty liver. Or it can be more severe and show inflammation, as well as the fat. This can cause non-alcoholic steatohepatitis (MCKNIGHT).  · Fatty liver. With fatty liver, the liver simply has more fat than normal. This extra fat usually does not harm the liver.  · MCKNIGHT. With MCKNIGHT, the fatty liver becomes inflamed over time. MCKNIGHT is serious because it can lead to scarring of the liver (fibrosis). Over time, the scarring may lead to cirrhosis of the liver. This can eventually cause liver failure or liver cancer.  Causes and risk factors of NAFLD  Doctors don't know what causes NAFLD. But certain things make the problem more likely to happen. These include:  · Obesity  · Prediabetes or diabetes  · High levels of fat found in the blood (cholesterol and triglycerides)  · Being exposed to certain medicines   Symptoms of NAFLD  Most people with NAFLD have no symptoms. If symptoms do occur, they can include:  · Tiredness  · Weakness  · Weight loss  · Loss of appetite  · Nausea and  vomiting  · Belly pain and cramping  · Yellowing of the skin and eyes (jaundice), as well as dark urine, or light-colored stools  · Swelling in the belly or legs  Diagnosing NAFLD  Your healthcare provider may think you have NAFLD if routine blood tests show high levels of liver enzymes. This may mean you have a liver problem. You may need one or more imaging tests, such as an ultrasound, CT, or MRI. You may need more blood tests to look for other causes of liver disease. You may also need a liver biopsy. During this test, a hollow needle is used to remove a tiny tissue sample from your liver. This tissue is then checked in a lab. This test can find signs of damage to liver tissue. It can also help figure out the cause of the damage and tell the difference between fatty liver and MCKNIGHT.  Treating NAFLD  Treatment for NAFLD varies for each person. The best early treatment is to treat any underlying conditions causing metabolic syndrome. This is the name for a group of conditions that includes:  · High blood pressure  · High levels of cholesterol and triglycerides  · Being overweight or obese  · Diabetes  Your healthcare provider will monitor your health and treat any symptoms or underlying health problems you have. Your provider will also work with you to control your risk factors. This will make liver damage less likely. In fact, treating those underlying conditions can often improve liver disease. You may need to take certain medicines, but no medicine will cure NAFLD. This is why treating the underlying conditions is most important. Your plan may include:  · Losing extra weight  · Getting regular exercise  · Controlling diabetes and high cholesterol or triglyceride levels  · Taking medicines and vitamins as prescribed by your provider  · Quitting smoking  · Not drinking alcohol  · Eating a healthy and balanced diet  Living with NAFLD  If NAFLD is caught early, it can be managed with treatment. Your healthcare  provider will discuss further treatment choices with you as needed.  Be sure to ask your provider about recommended vaccines. These include vaccines for viruses that can cause liver disease.  Date Last Reviewed: 12/1/2016  © 4450-6378 [employer]. [address] [place] [address], [place] [address]. All rights reserved. This information is not intended as a substitute for professional medical care. Always follow your healthcare professional's instructions.

## 2020-01-18 DIAGNOSIS — I10 ESSENTIAL HYPERTENSION: ICD-10-CM

## 2020-01-19 RX ORDER — BISOPROLOL FUMARATE AND HYDROCHLOROTHIAZIDE 5; 6.25 MG/1; MG/1
TABLET ORAL
Qty: 90 TABLET | Refills: 0 | Status: SHIPPED | OUTPATIENT
Start: 2020-01-19 | End: 2020-01-21 | Stop reason: SDUPTHER

## 2020-01-19 RX ORDER — AMLODIPINE BESYLATE AND ATORVASTATIN CALCIUM 10; 10 MG/1; MG/1
TABLET, FILM COATED ORAL
Qty: 90 TABLET | Refills: 0 | Status: SHIPPED | OUTPATIENT
Start: 2020-01-19 | End: 2020-01-21 | Stop reason: SDUPTHER

## 2020-01-21 DIAGNOSIS — N40.0 BENIGN PROSTATIC HYPERPLASIA, UNSPECIFIED WHETHER LOWER URINARY TRACT SYMPTOMS PRESENT: ICD-10-CM

## 2020-01-21 DIAGNOSIS — I10 ESSENTIAL HYPERTENSION: ICD-10-CM

## 2020-01-21 RX ORDER — AMLODIPINE BESYLATE AND ATORVASTATIN CALCIUM 10; 10 MG/1; MG/1
1 TABLET, FILM COATED ORAL DAILY
Qty: 90 TABLET | Refills: 0 | Status: SHIPPED | OUTPATIENT
Start: 2020-01-21 | End: 2020-03-17

## 2020-01-21 RX ORDER — BISOPROLOL FUMARATE AND HYDROCHLOROTHIAZIDE 5; 6.25 MG/1; MG/1
1 TABLET ORAL DAILY
Qty: 90 TABLET | Refills: 0 | Status: SHIPPED | OUTPATIENT
Start: 2020-01-21 | End: 2020-05-22

## 2020-01-21 RX ORDER — TAMSULOSIN HYDROCHLORIDE 0.4 MG/1
0.8 CAPSULE ORAL DAILY
Qty: 180 CAPSULE | Refills: 1 | Status: SHIPPED | OUTPATIENT
Start: 2020-01-21 | End: 2020-01-29

## 2020-01-21 NOTE — TELEPHONE ENCOUNTER
LOV 12/3/19  LRF 1/19/20  Pt wants Rx's sent to [facility] due to cost of them at [facility]. He is returning meds to [facility].

## 2020-01-29 DIAGNOSIS — N40.0 BENIGN PROSTATIC HYPERPLASIA, UNSPECIFIED WHETHER LOWER URINARY TRACT SYMPTOMS PRESENT: ICD-10-CM

## 2020-01-29 RX ORDER — TAMSULOSIN HYDROCHLORIDE 0.4 MG/1
CAPSULE ORAL
Qty: 90 CAPSULE | Refills: 0 | Status: SHIPPED | OUTPATIENT
Start: 2020-01-29 | End: 2020-07-10 | Stop reason: SDUPTHER

## 2020-03-06 ENCOUNTER — TELEPHONE (OUTPATIENT)
Dept: PAIN MEDICINE | Facility: CLINIC | Age: 65
End: 2020-03-06

## 2020-03-06 ENCOUNTER — PATIENT MESSAGE (OUTPATIENT)
Dept: PAIN MEDICINE | Facility: CLINIC | Age: 65
End: 2020-03-06

## 2020-03-06 DIAGNOSIS — M47.816 LUMBAR SPONDYLOSIS: Primary | ICD-10-CM

## 2020-03-06 NOTE — TELEPHONE ENCOUNTER
Pt scheduled for 3/17/20 at 1:00 pm for Bilateral Lumbar MBb. Pt aware to check in at registration desk on the first floor of the hospital for 12:00pm am. Pt is taking ASA and does not need to hold for this procedure.

## 2020-03-17 ENCOUNTER — TELEPHONE (OUTPATIENT)
Dept: PAIN MEDICINE | Facility: CLINIC | Age: 65
End: 2020-03-17

## 2020-03-17 ENCOUNTER — PATIENT MESSAGE (OUTPATIENT)
Dept: PAIN MEDICINE | Facility: CLINIC | Age: 65
End: 2020-03-17

## 2020-03-17 ENCOUNTER — PATIENT MESSAGE (OUTPATIENT)
Dept: PAIN MEDICINE | Facility: HOSPITAL | Age: 65
End: 2020-03-17

## 2020-03-17 DIAGNOSIS — M47.816 LUMBAR SPONDYLOSIS: Primary | ICD-10-CM

## 2020-03-17 RX ORDER — CYCLOBENZAPRINE HCL 5 MG
5 TABLET ORAL 3 TIMES DAILY PRN
Qty: 30 TABLET | Refills: 0 | Status: SHIPPED | OUTPATIENT
Start: 2020-03-17 | End: 2020-03-27

## 2020-03-17 RX ORDER — AMLODIPINE BESYLATE AND ATORVASTATIN CALCIUM 10; 10 MG/1; MG/1
1 TABLET, FILM COATED ORAL DAILY
Qty: 90 TABLET | Refills: 0 | Status: SHIPPED | OUTPATIENT
Start: 2020-03-17 | End: 2020-04-10 | Stop reason: SDUPTHER

## 2020-03-17 NOTE — TELEPHONE ENCOUNTER
My Chart message sent to patient stating that it is okay to take ibuprofen and I will send in a muscle relaxing medication to help with his pain.  I have also advised continuation conservative treatment measures.    Vamshi Anderson Jr, MD  Interventional Pain Medicine / Anesthesiology

## 2020-03-17 NOTE — TELEPHONE ENCOUNTER
----- Message from Karen Self MA sent at 3/17/2020 10:05 AM CDT -----  Pt stated he's having back pain right above buttock and wants to know can he take Ibuprofen and also a prescription for a muscle relaxer? Please advise.

## 2020-04-09 ENCOUNTER — PATIENT MESSAGE (OUTPATIENT)
Dept: PAIN MEDICINE | Facility: CLINIC | Age: 65
End: 2020-04-09

## 2020-04-10 RX ORDER — AMLODIPINE BESYLATE AND ATORVASTATIN CALCIUM 10; 10 MG/1; MG/1
1 TABLET, FILM COATED ORAL DAILY
Qty: 90 TABLET | Refills: 0 | Status: SHIPPED | OUTPATIENT
Start: 2020-04-10 | End: 2020-07-10

## 2020-04-29 ENCOUNTER — PATIENT MESSAGE (OUTPATIENT)
Dept: PAIN MEDICINE | Facility: CLINIC | Age: 65
End: 2020-04-29

## 2020-05-06 ENCOUNTER — TELEPHONE (OUTPATIENT)
Dept: PAIN MEDICINE | Facility: CLINIC | Age: 65
End: 2020-05-06

## 2020-05-06 DIAGNOSIS — Z01.818 PREOP TESTING: Primary | ICD-10-CM

## 2020-05-06 NOTE — TELEPHONE ENCOUNTER
Pt scheduled for 5/14/20 at 0830 am for Bilateral Lumbar MBb. Pt aware to check in at registration desk on the first floor of the hospital for 0730 am. Pt is taking ASA and does not need to hold for this procedure. Pt scheduled covid testing at [facility] for 5/12/20.

## 2020-05-12 ENCOUNTER — LAB VISIT (OUTPATIENT)
Dept: PRIMARY CARE CLINIC | Facility: CLINIC | Age: 65
End: 2020-05-12
Payer: MEDICARE

## 2020-05-12 DIAGNOSIS — Z01.818 PREOP TESTING: ICD-10-CM

## 2020-05-13 ENCOUNTER — PATIENT MESSAGE (OUTPATIENT)
Dept: PAIN MEDICINE | Facility: CLINIC | Age: 65
End: 2020-05-13

## 2020-05-14 ENCOUNTER — HOSPITAL ENCOUNTER (OUTPATIENT)
Facility: HOSPITAL | Age: 65
Discharge: HOME | End: 2020-05-14
Attending: PAIN MEDICINE | Admitting: PAIN MEDICINE
Payer: MEDICARE

## 2020-05-14 VITALS
BODY MASS INDEX: 28.88 KG/M2 | OXYGEN SATURATION: 96 % | DIASTOLIC BLOOD PRESSURE: 71 MMHG | WEIGHT: 184 LBS | SYSTOLIC BLOOD PRESSURE: 139 MMHG | HEART RATE: 75 BPM | HEIGHT: 67 IN | TEMPERATURE: 98 F | RESPIRATION RATE: 16 BRPM

## 2020-05-14 DIAGNOSIS — M47.816 LUMBAR SPONDYLOSIS: Primary | ICD-10-CM

## 2020-05-14 DIAGNOSIS — G89.29 CHRONIC PAIN: ICD-10-CM

## 2020-05-14 LAB — SARS-COV-2 RDRP RESP QL NAA+PROBE: NEGATIVE

## 2020-05-14 PROCEDURE — 64494 PR INJ DX/THER AGNT PARAVERT FACET JOINT,IMG GUIDE,LUMBAR/SAC, 2ND LEVEL: ICD-10-PCS | Mod: RT,,, | Performed by: PAIN MEDICINE

## 2020-05-14 PROCEDURE — 64493 INJ PARAVERT F JNT L/S 1 LEV: CPT | Mod: 50,,, | Performed by: PAIN MEDICINE

## 2020-05-14 PROCEDURE — U0002 COVID-19 LAB TEST NON-CDC: HCPCS

## 2020-05-14 PROCEDURE — 64493 PR INJ DX/THER AGNT PARAVERT FACET JOINT,IMG GUIDE,LUMBAR/SAC,1ST LVL: ICD-10-PCS | Mod: 50,,, | Performed by: PAIN MEDICINE

## 2020-05-14 PROCEDURE — 25500020 PHARM REV CODE 255: Performed by: PAIN MEDICINE

## 2020-05-14 PROCEDURE — 64494 INJ PARAVERT F JNT L/S 2 LEV: CPT | Mod: 50 | Performed by: PAIN MEDICINE

## 2020-05-14 PROCEDURE — 63600175 PHARM REV CODE 636 W HCPCS: Performed by: PAIN MEDICINE

## 2020-05-14 PROCEDURE — 64493 INJ PARAVERT F JNT L/S 1 LEV: CPT | Mod: 50 | Performed by: PAIN MEDICINE

## 2020-05-14 PROCEDURE — 25000003 PHARM REV CODE 250: Performed by: PAIN MEDICINE

## 2020-05-14 PROCEDURE — 64494 INJ PARAVERT F JNT L/S 2 LEV: CPT | Mod: LT,,, | Performed by: PAIN MEDICINE

## 2020-05-14 RX ORDER — ALPRAZOLAM 0.5 MG/1
0.5 TABLET, ORALLY DISINTEGRATING ORAL ONCE AS NEEDED
Status: DISCONTINUED | OUTPATIENT
Start: 2020-05-14 | End: 2020-05-14 | Stop reason: HOSPADM

## 2020-05-14 RX ORDER — LIDOCAINE HYDROCHLORIDE 10 MG/ML
INJECTION INFILTRATION; PERINEURAL
Status: DISCONTINUED | OUTPATIENT
Start: 2020-05-14 | End: 2020-05-14 | Stop reason: HOSPADM

## 2020-05-14 RX ORDER — METHYLPREDNISOLONE ACETATE 40 MG/ML
INJECTION, SUSPENSION INTRA-ARTICULAR; INTRALESIONAL; INTRAMUSCULAR; SOFT TISSUE
Status: DISCONTINUED | OUTPATIENT
Start: 2020-05-14 | End: 2020-05-14 | Stop reason: HOSPADM

## 2020-05-14 RX ORDER — SODIUM BICARBONATE 1 MEQ/ML
SYRINGE (ML) INTRAVENOUS
Status: DISCONTINUED | OUTPATIENT
Start: 2020-05-14 | End: 2020-05-14 | Stop reason: HOSPADM

## 2020-05-14 RX ORDER — BUPIVACAINE HYDROCHLORIDE 2.5 MG/ML
INJECTION, SOLUTION EPIDURAL; INFILTRATION; INTRACAUDAL
Status: DISCONTINUED | OUTPATIENT
Start: 2020-05-14 | End: 2020-05-14 | Stop reason: HOSPADM

## 2020-05-14 NOTE — H&P
[facility]  History & Physical - Short Stay  Pain Management           SUBJECTIVE:     Procedure: Procedure(s) (LRB):  Block-nerve-medial branch-lumbar--Bilateral L4-5, L5-S1 with steriods (Bilateral)    Chief Complaint/Reason for Admission:  Lumbar spondylosis [M47.816]    PTA Medications   Medication Sig    amlodipine-atorvastatin (CADUET) 10-10 mg per tablet Take 1 tablet by mouth once daily.    aspirin (ECOTRIN) 81 MG EC tablet Take 81 mg by mouth once daily.    bisoprolol-hydrochlorothiazide 5-6.25 mg (ZIAC) 5-6.25 mg Tab Take 1 tablet by mouth once daily.     mg tablet Take 800 mg by mouth every 6 (six) hours as needed.    tamsulosin (FLOMAX) 0.4 mg Cap TAKE 1 CAPSULE BY MOUTH EVERY DAY    valACYclovir (VALTREX) 500 MG tablet Take 1 tablet (500 mg total) by mouth once daily.       Review of patient's allergies indicates:  No Known Allergies    Past Medical History:   Diagnosis Date    BPH (benign prostatic hyperplasia)     Hypertension     Lumbar spondylosis 12/13/2019     Past Surgical History:   Procedure Laterality Date    APPENDECTOMY      CERVICAL DISCECTOMY  2008    CERVICAL SPINE SURGERY      INJECTION OF ANESTHETIC AGENT AROUND MEDIAL BRANCH NERVES INNERVATING LUMBAR FACET JOINT Bilateral 1/7/2020    Procedure: Block-nerve-medial branch-lumbar-- Bilateral (With Steroid);  Surgeon: Vamshi Anderson Jr., MD;  Location: [facility]IN [facility];  Service: Pain Management;  Laterality: Bilateral;  No Sedation     History reviewed. No pertinent family history.  Social History     Tobacco Use    Smoking status: Never Smoker    Smokeless tobacco: Never Used   Substance Use Topics    Alcohol use: Yes     Frequency: 4 or more times a week     Drinks per session: 1 or 2     Binge frequency: Never    Drug use: No        Review of Systems:  Review of Systems   Constitutional: Negative for chills and fever.   HENT: Negative for nosebleeds.    Eyes: Negative for blurred vision.    Respiratory: Negative for hemoptysis.    Cardiovascular: Negative for chest pain and palpitations.   Gastrointestinal: Negative for heartburn and vomiting.   Genitourinary: Negative for hematuria.   Musculoskeletal: Positive for back pain. Negative for myalgias.   Skin: Negative for rash.   Neurological: Negative for seizures and loss of consciousness.   Endo/Heme/Allergies: Does not bruise/bleed easily.   Psychiatric/Behavioral: Negative for hallucinations.         OBJECTIVE:     Vital Signs (Most Recent):  Temp: 99 °F (37.2 °C) (05/14/20 0816)  Pulse: 81 (05/14/20 0845)  Resp: 16 (05/14/20 0845)  BP: 133/87 (05/14/20 0845)  SpO2: 97 % (05/14/20 0845)    Physical Exam:  General appearance - alert, well appearing, and in no distress  Mental status - AOx3  Eyes - pupils equal and reactive, extraocular eye movements intact  Heart - normal rate, regular rhythm, normal S1, S2, no murmurs, rubs, clicks or gallops  Chest - clear to auscultation, no wheezes, rales or rhonchi, symmetric air entry  Abdomen - soft, nontender, nondistended, no masses or organomegaly  Neurological - alert, oriented, normal speech, no focal findings or movement disorder noted  Extremities - peripheral pulses normal, no pedal edema, no clubbing or cyanosis      ASSESSMENT/PLAN:     Active Hospital Problems    Diagnosis  POA    Chronic pain [G89.29]  Yes      Resolved Hospital Problems   No resolved problems to display.        The risks and benefits of this intervention, and alternative therapies were discussed with the patient.  The discussion of risks included infection, bleeding, need for additional procedures or surgery, nerve damage, paralysis, adverse medication reaction(s), stroke, and/or death.  Questions regarding the procedure, risks, expected outcome, and possible side effects were solicited and answered to the patient's satisfaction.  Babatunde wishes to proceed with the injection.  Verbal and written consent were verified.      Proceed  with intervention as scheduled.      Vamshi Anderson Jr, MD  Interventional Pain Medicine / Anesthesiology

## 2020-05-14 NOTE — OP NOTE
"Procedure Note    Pre-operative diagnosis: Lumbar Spondylosis  Post-operative diagnosis: Lumbar Spondylosis  Procedure Date: 05/14/2020  Procedure:  (1) Bilateral L4-5 and L5-S1 Lumbar Medial Branch Block     (2) Fluoroscopy for needle guidance    (3) IV Moderate Sedation (Midazolam 2 mg, Fentanyl 50 mcg)    Procedure in detail:  Informed consent obtained after explaining the procedure and potential complications including local discomfort, infection, headache, temporary or permanent weakness and/or numbness of one or both legs, temporary or permanent paraplegia, heart attack and stroke. All questions were answered.      Patient was taken to the procedure room and placed in prone position.  Time out was performed.  Patient's Lumbar area was prepped and draped in a sterile manner.  AP and oblique fluoroscopy were used to identify and oliverio the junctions between the superior articular processes and transverse processes at the L4 and L5 vertebral levels as well as the sacral ala on the Right side.  Skin and tissues overlying the targeted points were anesthetized with Lidocaine 1% (10 mL) + sodium bicarbonate (1 mL) using a 25G 1.25" needle.  Then, under fluoroscopic guidance, a 22 G 3.5 inch spinal needle, was advanced through the anesthetized tissues toward the targeted points corresponding to the locations of the L3,4,5 medial branch nerves.     After heme-negative aspiration, up to 1 mL of contrast was divided among the levels and administered through each needle demonstrating local accumulation.  Next, 1 mL from a 6 mL mixture of a 0.25% bupivacaine (5 mL) and Depomedrol 40 mg (1 mL) was injected at each of the above targeted points: the locations of the L3,4,5 medial branch nerves.     The procedure was repeated on the Left side with similar findings.  Needle placement and contrast dispersion were consistent with successful medial branch nerve block.    Needle was removed with flush and bandaged placed over site of " needle insertion.      Estimated Blood Loss: None    Disposition: The patient tolerated the procedure without complaint and was transported to the recovery room in stable condition.    Follow-up: Return for second MBB or RFA if appropriate      Vamshi Anderson Jr, MD  Interventional Pain Medicine / Anesthesiology

## 2020-05-14 NOTE — DISCHARGE INSTRUCTIONS
Home Care Instructions Pain Management:    1.  DIET:    You may resume your normal diet today.    2.  BATHING:    You may shower with luke warm water.    3.  DRESSING:    You may remove your bandage today.    4.  ACTIVITY LEVEL:      You may resume your normal activities 24 hours after your procedure.    5.  MEDICATIONS:    You may resume your normal medications today.    6.  SPECIAL INSTRUCTIONS:    No heat to the injection site for 24 hours including bath or shower, heating pad, moist heat or hot tubs.    Use an ice pack to the injection site for any pain or discomfort.  Apply ice packs for 20 minute intervals as needed.    If you have received any sedatives by mouth today, you can not drive for 12 hours.    If you have received sedation through an IV, you can not drive for 24 hours.    PLEASE CALL YOUR DOCTOR FOR THE FOLLOWIN.  Redness or swelling around the injection site.  2.  Fever of 101 degrees.  3.  Drainage (pus) from the injection site.  4.  For any continuous bleeding (some dried blood over the incision is normal.)    FOR EMERGENCIES:    If any unusual problems or difficulties occur during clinic hours, call [phone] or dial [phone].    Follow up with with your physician in 2-3 weeks.

## 2020-05-14 NOTE — DISCHARGE SUMMARY
[facility]  Discharge Note  Short Stay     Admit Date: 5/14/2020    Discharge Date: 5/14/2020     Attending Physician: Vamhsi Anderson Jr, MD    Diagnoses:  Active Hospital Problems    Diagnosis  POA    Chronic pain [G89.29]  Yes      Resolved Hospital Problems   No resolved problems to display.     Discharged Condition: Good     Hospital Course: Patient was admitted for an outpatient interventional pain management procedure and tolerated the procedure well with no complications.     Final Diagnoses: Same as principal problem.     Disposition: Home or Self Care     Follow up/Patient Instructions:    Follow-up Information     Vamshi Anderson Jr, MD. Go in 2 weeks.    Specialty:  Pain Medicine  Why:  Post-procedural Follow Up As Scheduled  Contact information:  [address]  [address]  [place] [address] [phone]                   Reconciled Medications:     Medication List      CONTINUE taking these medications    amlodipine-atorvastatin 10-10 mg per tablet  Commonly known as:  CADUET  Take 1 tablet by mouth once daily.     aspirin 81 MG EC tablet  Commonly known as:  ECOTRIN  Take 81 mg by mouth once daily.     bisoprolol-hydrochlorothiazide 5-6.25 mg 5-6.25 mg Tab  Commonly known as:  ZIAC  Take 1 tablet by mouth once daily.      MG tablet  Generic drug:  ibuprofen  Take 800 mg by mouth every 6 (six) hours as needed.     tamsulosin 0.4 mg Cap  Commonly known as:  FLOMAX  TAKE 1 CAPSULE BY MOUTH EVERY DAY     valACYclovir 500 MG tablet  Commonly known as:  VALTREX  Take 1 tablet (500 mg total) by mouth once daily.           Discharge Procedure Orders (must include Diet, Follow-up, Activity)   Call MD for:  temperature >100.4     Call MD for:  severe uncontrolled pain     Call MD for:  redness, tenderness, or signs of infection (pain, swelling, redness, odor or green/yellow discharge around incision site)     Call MD for:  difficulty breathing or increased cough     Call MD for:   severe persistent headache     Call MD for:  worsening rash     Remove dressing in 24 hours       Vamshi Anderson Jr, MD  Interventional Pain Medicine / Anesthesiology

## 2020-05-14 NOTE — PLAN OF CARE
Discharge instruction with pain diary given and explained, pt voiced understanding.  No sedation given, transported to car alone, safety maintained.

## 2020-05-22 ENCOUNTER — OFFICE VISIT (OUTPATIENT)
Dept: PAIN MEDICINE | Facility: CLINIC | Age: 65
End: 2020-05-22
Payer: MEDICARE

## 2020-05-22 VITALS — BODY MASS INDEX: 28.89 KG/M2 | WEIGHT: 184.06 LBS | HEIGHT: 67 IN

## 2020-05-22 DIAGNOSIS — M47.816 FACET ARTHROPATHY, LUMBAR: ICD-10-CM

## 2020-05-22 DIAGNOSIS — M54.50 CHRONIC BILATERAL LOW BACK PAIN WITHOUT SCIATICA: ICD-10-CM

## 2020-05-22 DIAGNOSIS — G89.29 CHRONIC BILATERAL LOW BACK PAIN WITHOUT SCIATICA: ICD-10-CM

## 2020-05-22 DIAGNOSIS — M47.816 LUMBAR SPONDYLOSIS: Primary | ICD-10-CM

## 2020-05-22 DIAGNOSIS — I10 ESSENTIAL HYPERTENSION: ICD-10-CM

## 2020-05-22 PROCEDURE — 99214 OFFICE O/P EST MOD 30 MIN: CPT | Mod: S$GLB,,, | Performed by: PAIN MEDICINE

## 2020-05-22 PROCEDURE — 1126F PR PAIN SEVERITY QUANTIFIED, NO PAIN PRESENT: ICD-10-PCS | Mod: S$GLB,,, | Performed by: PAIN MEDICINE

## 2020-05-22 PROCEDURE — 1159F PR MEDICATION LIST DOCUMENTED IN MEDICAL RECORD: ICD-10-PCS | Mod: S$GLB,,, | Performed by: PAIN MEDICINE

## 2020-05-22 PROCEDURE — 1159F MED LIST DOCD IN RCRD: CPT | Mod: S$GLB,,, | Performed by: PAIN MEDICINE

## 2020-05-22 PROCEDURE — 99214 PR OFFICE/OUTPT VISIT, EST, LEVL IV, 30-39 MIN: ICD-10-PCS | Mod: S$GLB,,, | Performed by: PAIN MEDICINE

## 2020-05-22 PROCEDURE — 1101F PT FALLS ASSESS-DOCD LE1/YR: CPT | Mod: CPTII,S$GLB,, | Performed by: PAIN MEDICINE

## 2020-05-22 PROCEDURE — 99999 PR PBB SHADOW E&M-EST. PATIENT-LVL III: CPT | Mod: PBBFAC,,, | Performed by: PAIN MEDICINE

## 2020-05-22 PROCEDURE — 1126F AMNT PAIN NOTED NONE PRSNT: CPT | Mod: S$GLB,,, | Performed by: PAIN MEDICINE

## 2020-05-22 PROCEDURE — 99999 PR PBB SHADOW E&M-EST. PATIENT-LVL III: ICD-10-PCS | Mod: PBBFAC,,, | Performed by: PAIN MEDICINE

## 2020-05-22 PROCEDURE — 1101F PR PT FALLS ASSESS DOC 0-1 FALLS W/OUT INJ PAST YR: ICD-10-PCS | Mod: CPTII,S$GLB,, | Performed by: PAIN MEDICINE

## 2020-05-22 RX ORDER — BISOPROLOL FUMARATE AND HYDROCHLOROTHIAZIDE 5; 6.25 MG/1; MG/1
1 TABLET ORAL DAILY
Qty: 90 TABLET | Refills: 0 | Status: SHIPPED | OUTPATIENT
Start: 2020-05-22 | End: 2020-07-30

## 2020-05-22 NOTE — PROGRESS NOTES
[facility] Established Patient Evaluation    Referred by: Dr. Negar Pandey  Reason for referral: CLBP    CC:   Chief Complaint   Patient presents with    Follow-up     Last 3 PDI Scores 12/13/2019   Pain Disability Index (PDI) 42     Interval Update:  5/22/20 - Mr. Oconnor returns to clinic for follow up visit reporting improved back pain.  Patient is s/p BILATERAL MBB targeting L4-5 and L5-S1 on 5/14/20 with 100% relief.  Pain intensity is currently 0/10 in the low back.    1/10/2020 - Mr. Oconnor returns to clinic for follow up visit reporting improved back pain.  Patient is s/p Bilateral Lumbar MBB on 1/7/20 with 100% relief.  Pain intensity is currently 0/10. Patient returns today for a follow up with 0/10 pain.     Background / HPI:   Babatunde Oconnor is a 65 y.o. male who complains of chronic low back pain. He was evaluated by pain management physician last year and referred to physical therapy.  At that time he was experiencing low back pain radiating into both legs in the L5 dermatome.  He completed physical therapy with excellent improvement in both axial and radicular symptoms.  Since completion of physical therapy last year, he has lived a relatively sedentary lifestyle and is now experiencing increasing low back pain.  There is no radicular pain at this time.  He describes 2 types of pain in the low back.  The 1st is associated with heavy lifting or increased activity and is perceived has tightness and throbbing.  The 2nd is always present and felt is a band-like pain across the low back.    Location: low back   Onset: yrs  Current Pain Score: 6/10 -0/10  Daily Pain of Range: 4-6/10  Quality: Burning and Throbbing  Radiation:  None at this time though he has experienced bilateral radicular symptoms in the past  Worsened by: flexion and lifting  Improved by: heat    Previous Therapies:  PT/OT:  Completed 12 weeks of physical therapy in 2018 with excellent improvement in symptoms.  HEP:   Patient denies compliance with home exercise.  He denies feeling educated on the importance of home exercise with his previous providers.  Interventions: Denies injections  Surgery: Denies surgery for low back.  ++ Hx of anterior cervical fusion for spinal stenosis with complete resolution of BUE symptoms.  Medications:   - NSAIDS:   - MSK Relaxants:   - TCAs:   - SNRIs:   - Topicals:   - Anticonvulsants:  - Opioids:     Current Pain Medications:  1. Ibuprofen      Review of Systems:  Review of Systems   Constitutional: Negative for chills and fever.   HENT: Negative for nosebleeds.    Eyes: Negative for blurred vision and pain.   Respiratory: Negative for hemoptysis.    Cardiovascular: Negative for chest pain and palpitations.   Gastrointestinal: Negative for heartburn, nausea and vomiting.   Genitourinary: Negative for dysuria and hematuria.   Musculoskeletal: Positive for back pain and myalgias.   Skin: Negative for rash.   Neurological: Negative for tingling, focal weakness, seizures and loss of consciousness.   Endo/Heme/Allergies: Does not bruise/bleed easily.   Psychiatric/Behavioral: Negative for hallucinations. The patient is not nervous/anxious.        History:    Current Outpatient Medications:     amlodipine-atorvastatin (CADUET) 10-10 mg per tablet, Take 1 tablet by mouth once daily., Disp: 90 tablet, Rfl: 0    aspirin (ECOTRIN) 81 MG EC tablet, Take 81 mg by mouth once daily., Disp: , Rfl:     bisoprolol-hydrochlorothiazide 5-6.25 mg (ZIAC) 5-6.25 mg Tab, TAKE 1 TABLET BY MOUTH ONCE DAILY, Disp: 90 tablet, Rfl: 0     mg tablet, Take 800 mg by mouth every 6 (six) hours as needed., Disp: , Rfl: 0    tamsulosin (FLOMAX) 0.4 mg Cap, TAKE 1 CAPSULE BY MOUTH EVERY DAY, Disp: 90 capsule, Rfl: 0    valACYclovir (VALTREX) 500 MG tablet, Take 1 tablet (500 mg total) by mouth once daily., Disp: 90 tablet, Rfl: 3    Past Medical History:   Diagnosis Date    BPH (benign prostatic hyperplasia)      Hypertension     Lumbar spondylosis 12/13/2019       Past Surgical History:   Procedure Laterality Date    APPENDECTOMY      CERVICAL DISCECTOMY  2008    CERVICAL SPINE SURGERY      INJECTION OF ANESTHETIC AGENT AROUND MEDIAL BRANCH NERVES INNERVATING LUMBAR FACET JOINT Bilateral 1/7/2020    Procedure: Block-nerve-medial branch-lumbar-- Bilateral (With Steroid);  Surgeon: Vamshi Anderson Jr., MD;  Location: [facility] PAIN [facility]GT;  Service: Pain Management;  Laterality: Bilateral;  No Sedation    INJECTION OF ANESTHETIC AGENT AROUND MEDIAL BRANCH NERVES INNERVATING LUMBAR FACET JOINT Bilateral 5/14/2020    Procedure: Block-nerve-medial branch-lumbar--Bilateral L4-5, L5-S1 with steriods;  Surgeon: Vamshi Anderson Jr., MD;  Location: [facility] PAIN [facility]GT;  Service: Pain Management;  Laterality: Bilateral;       No family history on file.    Social History     Socioeconomic History    Marital status:      Spouse name: Not on file    Number of children: Not on file    Years of education: Not on file    Highest education level: Not on file   Occupational History    Not on file   Social Needs    Financial resource strain: Somewhat hard    Food insecurity:     Worry: Never true     Inability: Never true    Transportation needs:     Medical: No     Non-medical: No   Tobacco Use    Smoking status: Never Smoker    Smokeless tobacco: Never Used   Substance and Sexual Activity    Alcohol use: Yes     Frequency: 4 or more times a week     Drinks per session: 1 or 2     Binge frequency: Never    Drug use: No    Sexual activity: Yes     Partners: Female   Lifestyle    Physical activity:     Days per week: 0 days     Minutes per session: Not on file    Stress: Not at all   Relationships    Social connections:     Talks on phone: More than three times a week     Gets together: Once a week     Attends Mandaeism service: Not on file     Active member of club or organization: No     Attends meetings of clubs or  "organizations: Never     Relationship status:    Other Topics Concern    Not on file   Social History Narrative    Not on file       Review of patient's allergies indicates:  No Known Allergies    Physical Exam:  Vitals:    20 1315   Weight: 83.5 kg (184 lb 1.4 oz)   Height: 5' 7" (1.702 m)   PainSc: 0-No pain     General    Nursing note and vitals reviewed.  Constitutional: He is oriented to person, place, and time. He appears well-developed and well-nourished. No distress.   HENT:   Head: Normocephalic and atraumatic.   Nose: Nose normal.   Eyes: Conjunctivae and EOM are normal. Pupils are equal, round, and reactive to light. Right eye exhibits no discharge. Left eye exhibits no discharge. No scleral icterus.   Neck: No JVD present.   Cardiovascular: Intact distal pulses.    Pulmonary/Chest: Effort normal. No respiratory distress.   Abdominal: He exhibits no distension.   Neurological: He is alert and oriented to person, place, and time. Coordination normal.   Psychiatric: He has a normal mood and affect. His behavior is normal. Judgment and thought content normal.     General Musculoskeletal Exam   Gait: normal     Back (L-Spine & T-Spine) / Neck (C-Spine) Exam     Tenderness Right paramedian tenderness of the Lower L-Spine. Left paramedian tenderness of the Lower L-Spine.     Back (L-Spine & T-Spine) Range of Motion   Back extension: facet loading is much better s/p MBB with steroid.   Back flexion: limited ROM but partial relief of low back pain noted.     Spinal Sensation   Right Side Sensation  L-Spine Level: normal  Left Side Sensation  L-Spine Level: normal    Other He has no scoliosis .      Muscle Strength   Right Lower Extremity   Hip Flexion: 5/5   Hip Extensors: 5/5  Quadriceps:  5/5   Hamstrin/5   Gastrocsoleus:  5/5/5  Left Lower Extremity   Hip Flexion: 5/5   Hip Extensors: 5/5  Quadriceps:  5/5   Hamstrin/5   Gastrocsoleus:  5/5/5    Reflexes     Left Side  Quadriceps:  " 2+  Achilles:  2+    Right Side   Quadriceps:  2+  Achilles:  2+       Imaging:  External MRI lumbar spine 2017  Images and official radiology report from the study will be uploaded to the electronic medical record.  My review of the images is significant for multilevel degenerative disc disease described as mild at L3-4 and L5-S1.  Degenerative disc disease is moderate to severe at L4-5 and is associated with anterolisthesis of L4 on L5 resulting in bilateral neural foraminal and central canal stenosis.  There is significant facet arthropathy noted at L3-4, L4-5, and L5-S1, bilaterally.    Labs:  BMP  Lab Results   Component Value Date     12/18/2019    K 3.6 12/18/2019    CL 99 12/18/2019    CO2 31 (H) 12/18/2019    BUN 8 12/18/2019    CREATININE 1.0 12/18/2019    CALCIUM 9.4 12/18/2019    ANIONGAP 6 (L) 12/18/2019    ESTGFRAFRICA >60.0 12/18/2019    EGFRNONAA >60.0 12/18/2019     Lab Results   Component Value Date    ALT 58 (H) 12/18/2019    AST 33 12/18/2019    ALKPHOS 51 (L) 12/18/2019    BILITOT 0.6 12/18/2019       Assessment:  Problem List Items Addressed This Visit     Lumbar spondylosis - Primary    Chronic bilateral low back pain without sciatica    Facet arthropathy, lumbar        12/13/19 - Babatunde Oconnor is a 65 y.o. male with multifactorial chronic low back pain.  I have advised the patient to restart his home exercise program toward minimizing painful muscle spasms in the low back associated with performing activities of daily living such as lifting moderately sized objects or assisting his wife with mobility.  The bandlike pain he experiences across the low back is most consistent with facet arthropathy which is likely exacerbated by forward shifting of the vertebral body at L4 relative to L5, there by placing increased pressure on the facet joints.  Toward alleviating that persistent pain, I have recommended a bilateral medial branch block targeting the L4-5 and L5-S1 facets.  If the  diagnostic block is successful, we will follow with a bilateral medial branch radiofrequency ablation for semi permanent relief.  Questions about the procedure treatment were answered to the patient's satisfaction and he will be scheduled for the procedure stated above.    1/10/2020 - a patient did extremely well with lumbar medial branch block targeting L4-5 and L5-S1. He reports a pain intensity today of 0/10 with 100% relief.  There was steroid in that injection and I would anticipate it providing him relief for 2-6 weeks.  Patient was advised to contact our office when his low back pain returns at which point we can submit a request to perform the bilateral RFA.  If his insurance company requires a 2nd MBB, we will offer that as well.    5/22/2020 - Patient responded extremely well to MBB with 100% relief.  Patient to call to schedule RFA when desired.      : Reviewed and consistent with medication use as prescribed.    Treatment Plan:   Procedures:    - MBB with 100% relief therefore plan for RFA (left, then right) when pain returns  PT/OT/HEP:  Patient is in possession of discharge documentation from physical therapy last year detailing an appropriate home exercise regimen for him.  He plans to restart home exercise as soon as possible.  Medications: No changes recommended at this time.  Labs: reviewed and medications are appropriately dosed for current hepatorenal function.  Imaging: No additional recommended at this time.    Follow Up: Patient to contact us when pain returns    Vamshi Anderson Jr, MD  Interventional Pain Medicine / Anesthesiology    Disclaimer: This note was partly generated using dictation software which may occasionally result in transcription errors.

## 2020-07-08 ENCOUNTER — PATIENT MESSAGE (OUTPATIENT)
Dept: INTERNAL MEDICINE | Facility: CLINIC | Age: 65
End: 2020-07-08

## 2020-07-09 ENCOUNTER — PATIENT MESSAGE (OUTPATIENT)
Dept: INTERNAL MEDICINE | Facility: CLINIC | Age: 65
End: 2020-07-09

## 2020-07-10 DIAGNOSIS — N40.0 BENIGN PROSTATIC HYPERPLASIA, UNSPECIFIED WHETHER LOWER URINARY TRACT SYMPTOMS PRESENT: ICD-10-CM

## 2020-07-10 RX ORDER — TAMSULOSIN HYDROCHLORIDE 0.4 MG/1
1 CAPSULE ORAL DAILY
Qty: 90 CAPSULE | Refills: 0 | Status: SHIPPED | OUTPATIENT
Start: 2020-07-10 | End: 2020-09-02

## 2020-07-10 RX ORDER — AMLODIPINE BESYLATE AND ATORVASTATIN CALCIUM 10; 10 MG/1; MG/1
1 TABLET, FILM COATED ORAL DAILY
Qty: 90 TABLET | Refills: 0 | Status: SHIPPED | OUTPATIENT
Start: 2020-07-10 | End: 2020-09-02

## 2020-07-10 NOTE — TELEPHONE ENCOUNTER
Pt sent msg through apt bessie to refill meds with [facility]    LOV 12/3/19  LRF 7/10/20, 1/29/20

## 2020-07-22 ENCOUNTER — TELEPHONE (OUTPATIENT)
Dept: INTERNAL MEDICINE | Facility: CLINIC | Age: 65
End: 2020-07-22

## 2020-07-22 NOTE — TELEPHONE ENCOUNTER
Recommend virtual apt to discuss medication options for insomnia and he can cancel apt on 9/21 if that was his only concern (EPP not due until December).

## 2020-07-22 NOTE — TELEPHONE ENCOUNTER
Pt sent a message through the apt scheduling     Good evening! Hope your feeling just fine. 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 I feel fine but sometimes I do not get proper sleep. Is it possible that you can recommend something to get a couple of hours of good sleep (in the past I had Xanax and worked ok)?   Thanks for your help!   Regards,   Babatunde     Please advise

## 2020-10-30 ENCOUNTER — IMMUNIZATION (OUTPATIENT)
Dept: PHARMACY | Facility: CLINIC | Age: 65
End: 2020-10-30
Payer: MEDICARE

## 2020-11-05 RX ORDER — AMLODIPINE BESYLATE AND ATORVASTATIN CALCIUM 10; 10 MG/1; MG/1
1 TABLET, FILM COATED ORAL DAILY
Qty: 90 TABLET | Refills: 0 | Status: SHIPPED | OUTPATIENT
Start: 2020-11-05 | End: 2020-11-24 | Stop reason: SDUPTHER

## 2020-11-09 ENCOUNTER — TELEPHONE (OUTPATIENT)
Dept: INTERNAL MEDICINE | Facility: CLINIC | Age: 65
End: 2020-11-09

## 2020-11-09 NOTE — TELEPHONE ENCOUNTER
Pt sent request for annual apt 11/24 or 11/25. I sent him a message that he isn't due until after 12/3/20 and that he should call to make an apt after that date to be sure his insurance will cover his visit

## 2020-11-20 DIAGNOSIS — I10 ESSENTIAL HYPERTENSION: ICD-10-CM

## 2020-11-20 RX ORDER — BISOPROLOL FUMARATE AND HYDROCHLOROTHIAZIDE 5; 6.25 MG/1; MG/1
1 TABLET ORAL DAILY
Qty: 90 TABLET | Refills: 0 | Status: SHIPPED | OUTPATIENT
Start: 2020-11-20 | End: 2020-11-24 | Stop reason: SDUPTHER

## 2020-11-24 ENCOUNTER — LAB VISIT (OUTPATIENT)
Dept: LAB | Facility: HOSPITAL | Age: 65
End: 2020-11-24
Attending: INTERNAL MEDICINE
Payer: MEDICARE

## 2020-11-24 ENCOUNTER — TELEPHONE (OUTPATIENT)
Dept: INTERNAL MEDICINE | Facility: CLINIC | Age: 65
End: 2020-11-24

## 2020-11-24 ENCOUNTER — OFFICE VISIT (OUTPATIENT)
Dept: INTERNAL MEDICINE | Facility: CLINIC | Age: 65
End: 2020-11-24
Payer: MEDICARE

## 2020-11-24 VITALS
BODY MASS INDEX: 28.58 KG/M2 | DIASTOLIC BLOOD PRESSURE: 72 MMHG | WEIGHT: 182.13 LBS | TEMPERATURE: 98 F | SYSTOLIC BLOOD PRESSURE: 120 MMHG | HEIGHT: 67 IN | HEART RATE: 77 BPM | RESPIRATION RATE: 18 BRPM | OXYGEN SATURATION: 96 %

## 2020-11-24 DIAGNOSIS — N40.0 BENIGN PROSTATIC HYPERPLASIA, UNSPECIFIED WHETHER LOWER URINARY TRACT SYMPTOMS PRESENT: ICD-10-CM

## 2020-11-24 DIAGNOSIS — R73.03 PREDIABETES: ICD-10-CM

## 2020-11-24 DIAGNOSIS — K76.0 NAFLD (NONALCOHOLIC FATTY LIVER DISEASE): ICD-10-CM

## 2020-11-24 DIAGNOSIS — Z12.11 COLON CANCER SCREENING: ICD-10-CM

## 2020-11-24 DIAGNOSIS — R74.8 ELEVATED LIVER ENZYMES: ICD-10-CM

## 2020-11-24 DIAGNOSIS — Z23 NEED FOR PROPHYLACTIC VACCINATION WITH TETANUS-DIPHTHERIA (TD): ICD-10-CM

## 2020-11-24 DIAGNOSIS — I10 ESSENTIAL HYPERTENSION: Primary | ICD-10-CM

## 2020-11-24 DIAGNOSIS — Z00.00 ANNUAL PHYSICAL EXAM: Primary | ICD-10-CM

## 2020-11-24 DIAGNOSIS — I10 ESSENTIAL HYPERTENSION: ICD-10-CM

## 2020-11-24 DIAGNOSIS — E78.5 HYPERLIPIDEMIA, UNSPECIFIED HYPERLIPIDEMIA TYPE: ICD-10-CM

## 2020-11-24 DIAGNOSIS — Z12.5 SCREENING FOR MALIGNANT NEOPLASM OF PROSTATE: ICD-10-CM

## 2020-11-24 LAB
ALBUMIN SERPL BCP-MCNC: 4.3 G/DL (ref 3.5–5.2)
ALP SERPL-CCNC: 47 U/L (ref 55–135)
ALT SERPL W/O P-5'-P-CCNC: 30 U/L (ref 10–44)
ANION GAP SERPL CALC-SCNC: 11 MMOL/L (ref 8–16)
AST SERPL-CCNC: 30 U/L (ref 10–40)
BILIRUB SERPL-MCNC: 0.5 MG/DL (ref 0.1–1)
BUN SERPL-MCNC: 16 MG/DL (ref 8–23)
CALCIUM SERPL-MCNC: 9.5 MG/DL (ref 8.7–10.5)
CHLORIDE SERPL-SCNC: 104 MMOL/L (ref 95–110)
CO2 SERPL-SCNC: 25 MMOL/L (ref 23–29)
CREAT SERPL-MCNC: 0.8 MG/DL (ref 0.5–1.4)
EST. GFR  (AFRICAN AMERICAN): >60 ML/MIN/1.73 M^2
EST. GFR  (NON AFRICAN AMERICAN): >60 ML/MIN/1.73 M^2
GLUCOSE SERPL-MCNC: 72 MG/DL (ref 70–110)
POTASSIUM SERPL-SCNC: 4.6 MMOL/L (ref 3.5–5.1)
PROT SERPL-MCNC: 7.4 G/DL (ref 6–8.4)
SODIUM SERPL-SCNC: 140 MMOL/L (ref 136–145)

## 2020-11-24 PROCEDURE — 3008F BODY MASS INDEX DOCD: CPT | Mod: CPTII,S$GLB,, | Performed by: INTERNAL MEDICINE

## 2020-11-24 PROCEDURE — 99999 PR PBB SHADOW E&M-EST. PATIENT-LVL III: ICD-10-PCS | Mod: PBBFAC,,, | Performed by: INTERNAL MEDICINE

## 2020-11-24 PROCEDURE — 3288F PR FALLS RISK ASSESSMENT DOCUMENTED: ICD-10-PCS | Mod: CPTII,S$GLB,, | Performed by: INTERNAL MEDICINE

## 2020-11-24 PROCEDURE — 99214 PR OFFICE/OUTPT VISIT, EST, LEVL IV, 30-39 MIN: ICD-10-PCS | Mod: S$GLB,,, | Performed by: INTERNAL MEDICINE

## 2020-11-24 PROCEDURE — 3288F FALL RISK ASSESSMENT DOCD: CPT | Mod: CPTII,S$GLB,, | Performed by: INTERNAL MEDICINE

## 2020-11-24 PROCEDURE — 3008F PR BODY MASS INDEX (BMI) DOCUMENTED: ICD-10-PCS | Mod: CPTII,S$GLB,, | Performed by: INTERNAL MEDICINE

## 2020-11-24 PROCEDURE — 99499 RISK ADDL DX/OHS AUDIT: ICD-10-PCS | Mod: S$GLB,,, | Performed by: INTERNAL MEDICINE

## 2020-11-24 PROCEDURE — 80053 COMPREHEN METABOLIC PANEL: CPT

## 2020-11-24 PROCEDURE — 36415 COLL VENOUS BLD VENIPUNCTURE: CPT | Mod: PO

## 2020-11-24 PROCEDURE — 99214 OFFICE O/P EST MOD 30 MIN: CPT | Mod: S$GLB,,, | Performed by: INTERNAL MEDICINE

## 2020-11-24 PROCEDURE — 1159F PR MEDICATION LIST DOCUMENTED IN MEDICAL RECORD: ICD-10-PCS | Mod: S$GLB,,, | Performed by: INTERNAL MEDICINE

## 2020-11-24 PROCEDURE — 99499 UNLISTED E&M SERVICE: CPT | Mod: S$GLB,,, | Performed by: INTERNAL MEDICINE

## 2020-11-24 PROCEDURE — 3074F SYST BP LT 130 MM HG: CPT | Mod: CPTII,S$GLB,, | Performed by: INTERNAL MEDICINE

## 2020-11-24 PROCEDURE — 1101F PR PT FALLS ASSESS DOC 0-1 FALLS W/OUT INJ PAST YR: ICD-10-PCS | Mod: CPTII,S$GLB,, | Performed by: INTERNAL MEDICINE

## 2020-11-24 PROCEDURE — 3074F PR MOST RECENT SYSTOLIC BLOOD PRESSURE < 130 MM HG: ICD-10-PCS | Mod: CPTII,S$GLB,, | Performed by: INTERNAL MEDICINE

## 2020-11-24 PROCEDURE — 1126F PR PAIN SEVERITY QUANTIFIED, NO PAIN PRESENT: ICD-10-PCS | Mod: S$GLB,,, | Performed by: INTERNAL MEDICINE

## 2020-11-24 PROCEDURE — 1101F PT FALLS ASSESS-DOCD LE1/YR: CPT | Mod: CPTII,S$GLB,, | Performed by: INTERNAL MEDICINE

## 2020-11-24 PROCEDURE — 3078F DIAST BP <80 MM HG: CPT | Mod: CPTII,S$GLB,, | Performed by: INTERNAL MEDICINE

## 2020-11-24 PROCEDURE — 3078F PR MOST RECENT DIASTOLIC BLOOD PRESSURE < 80 MM HG: ICD-10-PCS | Mod: CPTII,S$GLB,, | Performed by: INTERNAL MEDICINE

## 2020-11-24 PROCEDURE — 83036 HEMOGLOBIN GLYCOSYLATED A1C: CPT

## 2020-11-24 PROCEDURE — 1126F AMNT PAIN NOTED NONE PRSNT: CPT | Mod: S$GLB,,, | Performed by: INTERNAL MEDICINE

## 2020-11-24 PROCEDURE — 99999 PR PBB SHADOW E&M-EST. PATIENT-LVL III: CPT | Mod: PBBFAC,,, | Performed by: INTERNAL MEDICINE

## 2020-11-24 PROCEDURE — 1159F MED LIST DOCD IN RCRD: CPT | Mod: S$GLB,,, | Performed by: INTERNAL MEDICINE

## 2020-11-24 RX ORDER — BISOPROLOL FUMARATE AND HYDROCHLOROTHIAZIDE 5; 6.25 MG/1; MG/1
1 TABLET ORAL DAILY
Qty: 90 TABLET | Refills: 1 | Status: SHIPPED | OUTPATIENT
Start: 2020-11-24 | End: 2021-01-12 | Stop reason: SDUPTHER

## 2020-11-24 RX ORDER — AMLODIPINE BESYLATE AND ATORVASTATIN CALCIUM 10; 10 MG/1; MG/1
1 TABLET, FILM COATED ORAL DAILY
Qty: 90 TABLET | Refills: 1 | Status: SHIPPED | OUTPATIENT
Start: 2020-11-24 | End: 2021-02-15 | Stop reason: SDUPTHER

## 2020-11-24 NOTE — TELEPHONE ENCOUNTER
----- Message from Kasi Bond sent at 11/24/2020  3:29 PM CST -----  Good Afternoon,      is scheduled for his annual on 5.31.21 his labs are on 5.24.21

## 2020-11-24 NOTE — PROGRESS NOTES
"Subjective:       Patient ID: Babatunde Oconnor is a 65 y.o. male.    Chief Complaint: Follow-up    HPI    65 y.o. male presents for follow up of chronic medical problems:     1. HTN: amlodipine 10mg, bisoprolol-hctz 5-6.25mg daily  2. HLD: atorvastatin 10mg daily  3. Prediabetes: due for repeat labs  4. Elevated liver enzymes: due for repeat labs  5. BPH: on tamsulosin 0.4mg daily - works well, no issues  6. Insomnia: now resolved as he is exhausted after work, studying for exams and caring for his wife who has RA. If needed, he takes diphenhydramine otc prn- denies any urinary retention symptoms with medication use    HM:  - colon cancer screening: fit kit due  - tetanus vaccine: due    Review of Systems   Constitutional: Negative for diaphoresis and fever.   HENT: Negative for trouble swallowing.    Eyes: Negative for discharge and redness.   Respiratory: Negative for cough and shortness of breath.    Cardiovascular: Negative for chest pain and leg swelling.   Gastrointestinal: Negative for abdominal pain and blood in stool.   Genitourinary: Negative for difficulty urinating and dysuria.   Musculoskeletal: Negative for gait problem and joint swelling.   Skin: Negative for pallor and wound.   Neurological: Negative for weakness and numbness.   Psychiatric/Behavioral: Negative for sleep disturbance (improved now d/t busy work schedule and caring for his wife).       Objective:        Vitals:    11/24/20 1444   BP: 120/72   BP Location: Right arm   Patient Position: Sitting   BP Method: Medium (Manual)   Pulse: 77   Resp: 18   Temp: 97.7 °F (36.5 °C)   TempSrc: Other (see comments)   SpO2: 96%   Weight: 82.6 kg (182 lb 1.6 oz)   Height: 5' 7" (1.702 m)       Body mass index is 28.52 kg/m².    Physical Exam  Constitutional:       General: He is not in acute distress.     Appearance: He is well-developed. He is not diaphoretic.   HENT:      Head: Normocephalic and atraumatic.   Eyes:      General:         Right " eye: No discharge.         Left eye: No discharge.      Conjunctiva/sclera: Conjunctivae normal.   Neck:      Musculoskeletal: Neck supple.   Cardiovascular:      Rate and Rhythm: Normal rate and regular rhythm.      Heart sounds: Normal heart sounds.   Pulmonary:      Effort: Pulmonary effort is normal.      Breath sounds: Normal breath sounds.   Abdominal:      General: Bowel sounds are normal. There is no distension.      Palpations: Abdomen is soft.      Tenderness: There is no abdominal tenderness.   Musculoskeletal:      Right lower leg: No edema.      Left lower leg: No edema.   Skin:     General: Skin is warm and dry.      Findings: No erythema.   Neurological:      General: No focal deficit present.      Mental Status: He is alert.   Psychiatric:         Behavior: Behavior normal.         Thought Content: Thought content normal.         Assessment:     1. Essential hypertension    2. Hyperlipidemia, unspecified hyperlipidemia type    3. Prediabetes    4. Elevated liver enzymes    5. Colon cancer screening    6. Benign prostatic hyperplasia, unspecified whether lower urinary tract symptoms present    7. Need for prophylactic vaccination with tetanus-diphtheria (Td)           Plan:         1. Essential hypertension  - well controlled  - Comprehensive Metabolic Panel; Future  - bisoprolol-hydrochlorothiazide 5-6.25 mg (ZIAC) 5-6.25 mg Tab; Take 1 tablet by mouth once daily.  Dispense: 90 tablet; Refill: 1  - amlodipine-atorvastatin (CADUET) 10-10 mg per tablet; Take 1 tablet by mouth once daily.  Dispense: 90 tablet; Refill: 1    2. Hyperlipidemia, unspecified hyperlipidemia type  - amlodipine-atorvastatin (CADUET) 10-10 mg per tablet; Take 1 tablet by mouth once daily.  Dispense: 90 tablet; Refill: 1    3. Prediabetes  - Hemoglobin A1C; Future    4. Elevated liver enzymes  - hepatic steatosis on ultrasound- low fat diet, control HTN, HLD, pre=DM  - Comprehensive Metabolic Panel; Future    5. Colon cancer  screening  - Fecal Immunochemical Test (iFOBT); Future    6. Benign prostatic hyperplasia, unspecified whether lower urinary tract symptoms present  - cont flomax    7. Need for prophylactic vaccination with tetanus-diphtheria (Td)  - he will receive vaccine at pharmacy    rtc 6 mo EPP and f/u above or sooner prn       Patient note was created using MModal Dictation.  Any errors in syntax or even information may not have been identified and edited on initial review prior to signing this note.

## 2020-11-25 ENCOUNTER — TELEPHONE (OUTPATIENT)
Dept: INTERNAL MEDICINE | Facility: CLINIC | Age: 65
End: 2020-11-25

## 2020-11-25 LAB
ESTIMATED AVG GLUCOSE: 111 MG/DL (ref 68–131)
HBA1C MFR BLD HPLC: 5.5 % (ref 4–5.6)

## 2020-11-25 NOTE — TELEPHONE ENCOUNTER
----- Message from Negar Pandey MD sent at 11/25/2020  7:17 AM CST -----  Results released to patient portal.

## 2020-11-28 ENCOUNTER — LAB VISIT (OUTPATIENT)
Dept: LAB | Facility: HOSPITAL | Age: 65
End: 2020-11-28
Attending: INTERNAL MEDICINE
Payer: MEDICARE

## 2020-11-28 DIAGNOSIS — Z12.11 COLON CANCER SCREENING: ICD-10-CM

## 2020-11-28 PROCEDURE — 82274 ASSAY TEST FOR BLOOD FECAL: CPT

## 2020-12-02 ENCOUNTER — TELEPHONE (OUTPATIENT)
Dept: INTERNAL MEDICINE | Facility: CLINIC | Age: 65
End: 2020-12-02

## 2020-12-02 LAB — HEMOCCULT STL QL IA: NEGATIVE

## 2020-12-02 NOTE — TELEPHONE ENCOUNTER
----- Message from Negar Pandey MD sent at 12/2/2020  3:13 PM CST -----  Results released to patient portal.

## 2020-12-30 ENCOUNTER — CLINICAL SUPPORT (OUTPATIENT)
Dept: URGENT CARE | Facility: CLINIC | Age: 65
End: 2020-12-30
Payer: MEDICARE

## 2020-12-30 DIAGNOSIS — Z71.9 COUNSELED BY NURSE: Primary | ICD-10-CM

## 2020-12-30 LAB
CTP QC/QA: YES
SARS-COV-2 RDRP RESP QL NAA+PROBE: NEGATIVE

## 2020-12-30 PROCEDURE — U0002 COVID-19 LAB TEST NON-CDC: HCPCS | Mod: QW,S$GLB,, | Performed by: INTERNAL MEDICINE

## 2020-12-30 PROCEDURE — U0002: ICD-10-PCS | Mod: QW,S$GLB,, | Performed by: INTERNAL MEDICINE

## 2021-01-08 DIAGNOSIS — N40.0 BENIGN PROSTATIC HYPERPLASIA, UNSPECIFIED WHETHER LOWER URINARY TRACT SYMPTOMS PRESENT: ICD-10-CM

## 2021-01-08 RX ORDER — TAMSULOSIN HYDROCHLORIDE 0.4 MG/1
1 CAPSULE ORAL DAILY
Qty: 90 CAPSULE | Refills: 3 | Status: SHIPPED | OUTPATIENT
Start: 2021-01-08 | End: 2021-02-22 | Stop reason: SDUPTHER

## 2021-01-12 ENCOUNTER — PATIENT MESSAGE (OUTPATIENT)
Dept: INTERNAL MEDICINE | Facility: CLINIC | Age: 66
End: 2021-01-12

## 2021-01-12 DIAGNOSIS — I10 ESSENTIAL HYPERTENSION: ICD-10-CM

## 2021-01-13 ENCOUNTER — PATIENT MESSAGE (OUTPATIENT)
Dept: INTERNAL MEDICINE | Facility: CLINIC | Age: 66
End: 2021-01-13

## 2021-01-13 RX ORDER — BISOPROLOL FUMARATE AND HYDROCHLOROTHIAZIDE 5; 6.25 MG/1; MG/1
1 TABLET ORAL DAILY
Qty: 90 TABLET | Refills: 1 | Status: SHIPPED | OUTPATIENT
Start: 2021-01-13 | End: 2021-07-12

## 2021-02-15 DIAGNOSIS — E78.5 HYPERLIPIDEMIA, UNSPECIFIED HYPERLIPIDEMIA TYPE: ICD-10-CM

## 2021-02-15 DIAGNOSIS — I10 ESSENTIAL HYPERTENSION: ICD-10-CM

## 2021-02-15 RX ORDER — AMLODIPINE BESYLATE AND ATORVASTATIN CALCIUM 10; 10 MG/1; MG/1
1 TABLET, FILM COATED ORAL DAILY
Qty: 90 TABLET | Refills: 1 | Status: SHIPPED | OUTPATIENT
Start: 2021-02-15 | End: 2021-06-25

## 2021-02-21 ENCOUNTER — PATIENT MESSAGE (OUTPATIENT)
Dept: INTERNAL MEDICINE | Facility: CLINIC | Age: 66
End: 2021-02-21

## 2021-02-21 DIAGNOSIS — N40.0 BENIGN PROSTATIC HYPERPLASIA, UNSPECIFIED WHETHER LOWER URINARY TRACT SYMPTOMS PRESENT: ICD-10-CM

## 2021-02-22 RX ORDER — TAMSULOSIN HYDROCHLORIDE 0.4 MG/1
1 CAPSULE ORAL DAILY
Qty: 90 CAPSULE | Refills: 3 | Status: SHIPPED | OUTPATIENT
Start: 2021-02-22 | End: 2021-03-03 | Stop reason: SDUPTHER

## 2021-02-23 ENCOUNTER — PATIENT MESSAGE (OUTPATIENT)
Dept: INTERNAL MEDICINE | Facility: CLINIC | Age: 66
End: 2021-02-23

## 2021-03-03 ENCOUNTER — PATIENT MESSAGE (OUTPATIENT)
Dept: INTERNAL MEDICINE | Facility: CLINIC | Age: 66
End: 2021-03-03

## 2021-03-03 DIAGNOSIS — N40.0 BENIGN PROSTATIC HYPERPLASIA, UNSPECIFIED WHETHER LOWER URINARY TRACT SYMPTOMS PRESENT: ICD-10-CM

## 2021-03-03 RX ORDER — TAMSULOSIN HYDROCHLORIDE 0.4 MG/1
1 CAPSULE ORAL DAILY
Qty: 90 CAPSULE | Refills: 3 | Status: SHIPPED | OUTPATIENT
Start: 2021-03-03 | End: 2021-05-10 | Stop reason: SDUPTHER

## 2021-04-19 DIAGNOSIS — N40.0 BENIGN PROSTATIC HYPERPLASIA, UNSPECIFIED WHETHER LOWER URINARY TRACT SYMPTOMS PRESENT: ICD-10-CM

## 2021-04-19 RX ORDER — TAMSULOSIN HYDROCHLORIDE 0.4 MG/1
1 CAPSULE ORAL DAILY
Qty: 90 CAPSULE | Refills: 3 | Status: CANCELLED | OUTPATIENT
Start: 2021-04-19

## 2021-05-10 DIAGNOSIS — N40.0 BENIGN PROSTATIC HYPERPLASIA, UNSPECIFIED WHETHER LOWER URINARY TRACT SYMPTOMS PRESENT: ICD-10-CM

## 2021-05-10 RX ORDER — TAMSULOSIN HYDROCHLORIDE 0.4 MG/1
1 CAPSULE ORAL DAILY
Qty: 90 CAPSULE | Refills: 3 | Status: SHIPPED | OUTPATIENT
Start: 2021-05-10 | End: 2021-06-22

## 2021-06-16 ENCOUNTER — LAB VISIT (OUTPATIENT)
Dept: LAB | Facility: HOSPITAL | Age: 66
End: 2021-06-16
Attending: INTERNAL MEDICINE
Payer: MEDICARE

## 2021-06-16 ENCOUNTER — PATIENT MESSAGE (OUTPATIENT)
Dept: INTERNAL MEDICINE | Facility: CLINIC | Age: 66
End: 2021-06-16

## 2021-06-16 ENCOUNTER — TELEPHONE (OUTPATIENT)
Dept: INTERNAL MEDICINE | Facility: CLINIC | Age: 66
End: 2021-06-16

## 2021-06-16 DIAGNOSIS — K76.0 NAFLD (NONALCOHOLIC FATTY LIVER DISEASE): ICD-10-CM

## 2021-06-16 DIAGNOSIS — Z12.5 SCREENING FOR MALIGNANT NEOPLASM OF PROSTATE: ICD-10-CM

## 2021-06-16 DIAGNOSIS — R73.03 PREDIABETES: ICD-10-CM

## 2021-06-16 DIAGNOSIS — I10 ESSENTIAL HYPERTENSION: ICD-10-CM

## 2021-06-16 DIAGNOSIS — E78.5 HYPERLIPIDEMIA, UNSPECIFIED HYPERLIPIDEMIA TYPE: ICD-10-CM

## 2021-06-16 DIAGNOSIS — Z00.00 ANNUAL PHYSICAL EXAM: ICD-10-CM

## 2021-06-16 LAB
ALBUMIN SERPL BCP-MCNC: 4.5 G/DL (ref 3.5–5.2)
ALP SERPL-CCNC: 50 U/L (ref 55–135)
ALT SERPL W/O P-5'-P-CCNC: 40 U/L (ref 10–44)
ANION GAP SERPL CALC-SCNC: 11 MMOL/L (ref 8–16)
AST SERPL-CCNC: 35 U/L (ref 10–40)
BASOPHILS # BLD AUTO: 0.04 K/UL (ref 0–0.2)
BASOPHILS NFR BLD: 0.6 % (ref 0–1.9)
BILIRUB SERPL-MCNC: 0.4 MG/DL (ref 0.1–1)
BUN SERPL-MCNC: 12 MG/DL (ref 8–23)
CALCIUM SERPL-MCNC: 9.8 MG/DL (ref 8.7–10.5)
CHLORIDE SERPL-SCNC: 104 MMOL/L (ref 95–110)
CHOLEST SERPL-MCNC: 225 MG/DL (ref 120–199)
CHOLEST/HDLC SERPL: 2.8 {RATIO} (ref 2–5)
CO2 SERPL-SCNC: 25 MMOL/L (ref 23–29)
CREAT SERPL-MCNC: 0.9 MG/DL (ref 0.5–1.4)
DIFFERENTIAL METHOD: NORMAL
EOSINOPHIL # BLD AUTO: 0.1 K/UL (ref 0–0.5)
EOSINOPHIL NFR BLD: 0.9 % (ref 0–8)
ERYTHROCYTE [DISTWIDTH] IN BLOOD BY AUTOMATED COUNT: 13.1 % (ref 11.5–14.5)
EST. GFR  (AFRICAN AMERICAN): >60 ML/MIN/1.73 M^2
EST. GFR  (NON AFRICAN AMERICAN): >60 ML/MIN/1.73 M^2
ESTIMATED AVG GLUCOSE: 111 MG/DL (ref 68–131)
GLUCOSE SERPL-MCNC: 111 MG/DL (ref 70–110)
HBA1C MFR BLD: 5.5 % (ref 4–5.6)
HCT VFR BLD AUTO: 45.2 % (ref 40–54)
HDLC SERPL-MCNC: 81 MG/DL (ref 40–75)
HDLC SERPL: 36 % (ref 20–50)
HGB BLD-MCNC: 14.9 G/DL (ref 14–18)
IMM GRANULOCYTES # BLD AUTO: 0.03 K/UL (ref 0–0.04)
IMM GRANULOCYTES NFR BLD AUTO: 0.4 % (ref 0–0.5)
LDLC SERPL CALC-MCNC: 125.8 MG/DL (ref 63–159)
LYMPHOCYTES # BLD AUTO: 1.5 K/UL (ref 1–4.8)
LYMPHOCYTES NFR BLD: 22.2 % (ref 18–48)
MCH RBC QN AUTO: 30.8 PG (ref 27–31)
MCHC RBC AUTO-ENTMCNC: 33 G/DL (ref 32–36)
MCV RBC AUTO: 94 FL (ref 82–98)
MONOCYTES # BLD AUTO: 0.6 K/UL (ref 0.3–1)
MONOCYTES NFR BLD: 8.6 % (ref 4–15)
NEUTROPHILS # BLD AUTO: 4.6 K/UL (ref 1.8–7.7)
NEUTROPHILS NFR BLD: 67.3 % (ref 38–73)
NONHDLC SERPL-MCNC: 144 MG/DL
NRBC BLD-RTO: 0 /100 WBC
PLATELET # BLD AUTO: 258 K/UL (ref 150–450)
PMV BLD AUTO: 10.9 FL (ref 9.2–12.9)
POTASSIUM SERPL-SCNC: 4.3 MMOL/L (ref 3.5–5.1)
PROT SERPL-MCNC: 7.7 G/DL (ref 6–8.4)
RBC # BLD AUTO: 4.83 M/UL (ref 4.6–6.2)
SODIUM SERPL-SCNC: 140 MMOL/L (ref 136–145)
TRIGL SERPL-MCNC: 91 MG/DL (ref 30–150)
TSH SERPL DL<=0.005 MIU/L-ACNC: 2.06 UIU/ML (ref 0.4–4)
WBC # BLD AUTO: 6.77 K/UL (ref 3.9–12.7)

## 2021-06-16 PROCEDURE — 36415 COLL VENOUS BLD VENIPUNCTURE: CPT | Mod: PO | Performed by: INTERNAL MEDICINE

## 2021-06-16 PROCEDURE — 83036 HEMOGLOBIN GLYCOSYLATED A1C: CPT | Performed by: INTERNAL MEDICINE

## 2021-06-16 PROCEDURE — 80061 LIPID PANEL: CPT | Performed by: INTERNAL MEDICINE

## 2021-06-16 PROCEDURE — 84443 ASSAY THYROID STIM HORMONE: CPT | Performed by: INTERNAL MEDICINE

## 2021-06-16 PROCEDURE — 85025 COMPLETE CBC W/AUTO DIFF WBC: CPT | Performed by: INTERNAL MEDICINE

## 2021-06-16 PROCEDURE — 80053 COMPREHEN METABOLIC PANEL: CPT | Performed by: INTERNAL MEDICINE

## 2021-06-22 ENCOUNTER — OFFICE VISIT (OUTPATIENT)
Dept: INTERNAL MEDICINE | Facility: CLINIC | Age: 66
End: 2021-06-22
Payer: MEDICARE

## 2021-06-22 VITALS
WEIGHT: 189.63 LBS | HEIGHT: 67 IN | DIASTOLIC BLOOD PRESSURE: 72 MMHG | SYSTOLIC BLOOD PRESSURE: 124 MMHG | TEMPERATURE: 98 F | OXYGEN SATURATION: 98 % | RESPIRATION RATE: 16 BRPM | BODY MASS INDEX: 29.76 KG/M2 | HEART RATE: 81 BPM

## 2021-06-22 DIAGNOSIS — Z00.00 ANNUAL PHYSICAL EXAM: Primary | ICD-10-CM

## 2021-06-22 DIAGNOSIS — E78.5 HYPERLIPIDEMIA, UNSPECIFIED HYPERLIPIDEMIA TYPE: ICD-10-CM

## 2021-06-22 DIAGNOSIS — Z12.83 SKIN EXAM, SCREENING FOR CANCER: ICD-10-CM

## 2021-06-22 DIAGNOSIS — I10 ESSENTIAL HYPERTENSION: ICD-10-CM

## 2021-06-22 DIAGNOSIS — R73.01 IMPAIRED FASTING GLUCOSE: ICD-10-CM

## 2021-06-22 DIAGNOSIS — A60.01 HERPES SIMPLEX INFECTION OF PENIS: ICD-10-CM

## 2021-06-22 DIAGNOSIS — N40.0 BENIGN PROSTATIC HYPERPLASIA, UNSPECIFIED WHETHER LOWER URINARY TRACT SYMPTOMS PRESENT: ICD-10-CM

## 2021-06-22 PROCEDURE — 1159F MED LIST DOCD IN RCRD: CPT | Mod: S$GLB,,, | Performed by: INTERNAL MEDICINE

## 2021-06-22 PROCEDURE — 3288F PR FALLS RISK ASSESSMENT DOCUMENTED: ICD-10-PCS | Mod: CPTII,S$GLB,, | Performed by: INTERNAL MEDICINE

## 2021-06-22 PROCEDURE — 99214 PR OFFICE/OUTPT VISIT, EST, LEVL IV, 30-39 MIN: ICD-10-PCS | Mod: S$GLB,,, | Performed by: INTERNAL MEDICINE

## 2021-06-22 PROCEDURE — 1101F PT FALLS ASSESS-DOCD LE1/YR: CPT | Mod: CPTII,S$GLB,, | Performed by: INTERNAL MEDICINE

## 2021-06-22 PROCEDURE — 99214 OFFICE O/P EST MOD 30 MIN: CPT | Mod: S$GLB,,, | Performed by: INTERNAL MEDICINE

## 2021-06-22 PROCEDURE — 3008F BODY MASS INDEX DOCD: CPT | Mod: CPTII,S$GLB,, | Performed by: INTERNAL MEDICINE

## 2021-06-22 PROCEDURE — 1126F AMNT PAIN NOTED NONE PRSNT: CPT | Mod: S$GLB,,, | Performed by: INTERNAL MEDICINE

## 2021-06-22 PROCEDURE — 1159F PR MEDICATION LIST DOCUMENTED IN MEDICAL RECORD: ICD-10-PCS | Mod: S$GLB,,, | Performed by: INTERNAL MEDICINE

## 2021-06-22 PROCEDURE — 3288F FALL RISK ASSESSMENT DOCD: CPT | Mod: CPTII,S$GLB,, | Performed by: INTERNAL MEDICINE

## 2021-06-22 PROCEDURE — 1101F PR PT FALLS ASSESS DOC 0-1 FALLS W/OUT INJ PAST YR: ICD-10-PCS | Mod: CPTII,S$GLB,, | Performed by: INTERNAL MEDICINE

## 2021-06-22 PROCEDURE — 1126F PR PAIN SEVERITY QUANTIFIED, NO PAIN PRESENT: ICD-10-PCS | Mod: S$GLB,,, | Performed by: INTERNAL MEDICINE

## 2021-06-22 PROCEDURE — 3008F PR BODY MASS INDEX (BMI) DOCUMENTED: ICD-10-PCS | Mod: CPTII,S$GLB,, | Performed by: INTERNAL MEDICINE

## 2021-06-22 PROCEDURE — 99499 RISK ADDL DX/OHS AUDIT: ICD-10-PCS | Mod: S$GLB,,, | Performed by: INTERNAL MEDICINE

## 2021-06-22 PROCEDURE — 99999 PR PBB SHADOW E&M-EST. PATIENT-LVL IV: ICD-10-PCS | Mod: PBBFAC,,, | Performed by: INTERNAL MEDICINE

## 2021-06-22 PROCEDURE — 99999 PR PBB SHADOW E&M-EST. PATIENT-LVL IV: CPT | Mod: PBBFAC,,, | Performed by: INTERNAL MEDICINE

## 2021-06-22 PROCEDURE — 99499 UNLISTED E&M SERVICE: CPT | Mod: S$GLB,,, | Performed by: INTERNAL MEDICINE

## 2021-06-22 RX ORDER — VALACYCLOVIR HYDROCHLORIDE 500 MG/1
500 TABLET, FILM COATED ORAL DAILY
Qty: 90 TABLET | Refills: 3 | Status: SHIPPED | OUTPATIENT
Start: 2021-06-22 | End: 2021-10-16

## 2021-06-22 RX ORDER — TAMSULOSIN HYDROCHLORIDE 0.4 MG/1
0.8 CAPSULE ORAL DAILY
Qty: 180 CAPSULE | Refills: 3 | Status: SHIPPED | OUTPATIENT
Start: 2021-06-22 | End: 2022-04-29

## 2021-06-24 ENCOUNTER — OFFICE VISIT (OUTPATIENT)
Dept: URGENT CARE | Facility: CLINIC | Age: 66
End: 2021-06-24
Payer: MEDICARE

## 2021-06-24 VITALS
WEIGHT: 189 LBS | HEART RATE: 87 BPM | BODY MASS INDEX: 29.66 KG/M2 | RESPIRATION RATE: 16 BRPM | TEMPERATURE: 96 F | OXYGEN SATURATION: 96 % | SYSTOLIC BLOOD PRESSURE: 135 MMHG | DIASTOLIC BLOOD PRESSURE: 73 MMHG | HEIGHT: 67 IN

## 2021-06-24 DIAGNOSIS — Z11.59 SCREENING FOR VIRAL DISEASE: Primary | ICD-10-CM

## 2021-06-24 DIAGNOSIS — E78.5 HYPERLIPIDEMIA, UNSPECIFIED HYPERLIPIDEMIA TYPE: ICD-10-CM

## 2021-06-24 DIAGNOSIS — I10 ESSENTIAL HYPERTENSION: ICD-10-CM

## 2021-06-24 DIAGNOSIS — J06.9 UPPER RESPIRATORY TRACT INFECTION, UNSPECIFIED TYPE: ICD-10-CM

## 2021-06-24 LAB
CTP QC/QA: YES
CTP QC/QA: YES
MOLECULAR STREP A: NEGATIVE
SARS-COV-2 RDRP RESP QL NAA+PROBE: NEGATIVE

## 2021-06-24 PROCEDURE — 3008F BODY MASS INDEX DOCD: CPT | Mod: CPTII,S$GLB,, | Performed by: NURSE PRACTITIONER

## 2021-06-24 PROCEDURE — U0002 COVID-19 LAB TEST NON-CDC: HCPCS | Mod: QW,S$GLB,, | Performed by: NURSE PRACTITIONER

## 2021-06-24 PROCEDURE — 87651 POCT STREP A MOLECULAR: ICD-10-PCS | Mod: QW,S$GLB,, | Performed by: NURSE PRACTITIONER

## 2021-06-24 PROCEDURE — 99213 PR OFFICE/OUTPT VISIT, EST, LEVL III, 20-29 MIN: ICD-10-PCS | Mod: S$GLB,,, | Performed by: NURSE PRACTITIONER

## 2021-06-24 PROCEDURE — 99213 OFFICE O/P EST LOW 20 MIN: CPT | Mod: S$GLB,,, | Performed by: NURSE PRACTITIONER

## 2021-06-24 PROCEDURE — U0002: ICD-10-PCS | Mod: QW,S$GLB,, | Performed by: NURSE PRACTITIONER

## 2021-06-24 PROCEDURE — 87651 STREP A DNA AMP PROBE: CPT | Mod: QW,S$GLB,, | Performed by: NURSE PRACTITIONER

## 2021-06-24 PROCEDURE — 3008F PR BODY MASS INDEX (BMI) DOCUMENTED: ICD-10-PCS | Mod: CPTII,S$GLB,, | Performed by: NURSE PRACTITIONER

## 2021-06-25 ENCOUNTER — OFFICE VISIT (OUTPATIENT)
Dept: URGENT CARE | Facility: CLINIC | Age: 66
End: 2021-06-25
Payer: MEDICARE

## 2021-06-25 VITALS
SYSTOLIC BLOOD PRESSURE: 133 MMHG | OXYGEN SATURATION: 97 % | HEIGHT: 67 IN | RESPIRATION RATE: 18 BRPM | TEMPERATURE: 99 F | BODY MASS INDEX: 29.66 KG/M2 | HEART RATE: 92 BPM | WEIGHT: 189 LBS | DIASTOLIC BLOOD PRESSURE: 87 MMHG

## 2021-06-25 DIAGNOSIS — J36 PERITONSILLAR CELLULITIS: Primary | ICD-10-CM

## 2021-06-25 PROCEDURE — 3008F BODY MASS INDEX DOCD: CPT | Mod: CPTII,S$GLB,, | Performed by: STUDENT IN AN ORGANIZED HEALTH CARE EDUCATION/TRAINING PROGRAM

## 2021-06-25 PROCEDURE — 96372 PR INJECTION,THERAP/PROPH/DIAG2ST, IM OR SUBCUT: ICD-10-PCS | Mod: S$GLB,,, | Performed by: STUDENT IN AN ORGANIZED HEALTH CARE EDUCATION/TRAINING PROGRAM

## 2021-06-25 PROCEDURE — 3008F PR BODY MASS INDEX (BMI) DOCUMENTED: ICD-10-PCS | Mod: CPTII,S$GLB,, | Performed by: STUDENT IN AN ORGANIZED HEALTH CARE EDUCATION/TRAINING PROGRAM

## 2021-06-25 PROCEDURE — 96372 THER/PROPH/DIAG INJ SC/IM: CPT | Mod: S$GLB,,, | Performed by: STUDENT IN AN ORGANIZED HEALTH CARE EDUCATION/TRAINING PROGRAM

## 2021-06-25 PROCEDURE — 99214 PR OFFICE/OUTPT VISIT, EST, LEVL IV, 30-39 MIN: ICD-10-PCS | Mod: 25,S$GLB,, | Performed by: STUDENT IN AN ORGANIZED HEALTH CARE EDUCATION/TRAINING PROGRAM

## 2021-06-25 PROCEDURE — 99214 OFFICE O/P EST MOD 30 MIN: CPT | Mod: 25,S$GLB,, | Performed by: STUDENT IN AN ORGANIZED HEALTH CARE EDUCATION/TRAINING PROGRAM

## 2021-06-25 RX ORDER — DEXAMETHASONE SODIUM PHOSPHATE 4 MG/ML
8 INJECTION, SOLUTION INTRA-ARTICULAR; INTRALESIONAL; INTRAMUSCULAR; INTRAVENOUS; SOFT TISSUE
Status: COMPLETED | OUTPATIENT
Start: 2021-06-25 | End: 2021-06-25

## 2021-06-25 RX ORDER — CLINDAMYCIN HYDROCHLORIDE 300 MG/1
300 CAPSULE ORAL EVERY 8 HOURS
Qty: 30 CAPSULE | Refills: 0 | OUTPATIENT
Start: 2021-06-25 | End: 2021-06-27

## 2021-06-25 RX ORDER — CLINDAMYCIN PHOSPHATE 150 MG/ML
600 INJECTION, SOLUTION INTRAVENOUS
Status: COMPLETED | OUTPATIENT
Start: 2021-06-25 | End: 2021-06-25

## 2021-06-25 RX ORDER — AMLODIPINE BESYLATE AND ATORVASTATIN CALCIUM 10; 10 MG/1; MG/1
1 TABLET, FILM COATED ORAL DAILY
Qty: 90 TABLET | Refills: 1 | Status: SHIPPED | OUTPATIENT
Start: 2021-06-25 | End: 2021-12-13

## 2021-06-25 RX ADMIN — DEXAMETHASONE SODIUM PHOSPHATE 8 MG: 4 INJECTION, SOLUTION INTRA-ARTICULAR; INTRALESIONAL; INTRAMUSCULAR; INTRAVENOUS; SOFT TISSUE at 09:06

## 2021-06-25 RX ADMIN — CLINDAMYCIN PHOSPHATE 600 MG: 150 INJECTION, SOLUTION INTRAVENOUS at 09:06

## 2021-06-27 ENCOUNTER — HOSPITAL ENCOUNTER (EMERGENCY)
Facility: HOSPITAL | Age: 66
Discharge: HOME | End: 2021-06-27
Attending: EMERGENCY MEDICINE
Payer: MEDICARE

## 2021-06-27 VITALS
HEIGHT: 67 IN | BODY MASS INDEX: 29.35 KG/M2 | TEMPERATURE: 98 F | OXYGEN SATURATION: 97 % | DIASTOLIC BLOOD PRESSURE: 81 MMHG | SYSTOLIC BLOOD PRESSURE: 133 MMHG | WEIGHT: 187 LBS | RESPIRATION RATE: 20 BRPM | HEART RATE: 66 BPM

## 2021-06-27 DIAGNOSIS — J02.9 PHARYNGITIS, UNSPECIFIED ETIOLOGY: Primary | ICD-10-CM

## 2021-06-27 PROBLEM — J03.90 TONSILLITIS: Status: ACTIVE | Noted: 2021-06-27

## 2021-06-27 LAB
BASOPHILS # BLD AUTO: 0.06 K/UL (ref 0–0.2)
BASOPHILS NFR BLD: 0.6 % (ref 0–1.9)
BUN SERPL-MCNC: 14 MG/DL (ref 6–30)
CHLORIDE SERPL-SCNC: 104 MMOL/L (ref 95–110)
CREAT SERPL-MCNC: 0.7 MG/DL (ref 0.5–1.4)
DIFFERENTIAL METHOD: ABNORMAL
EOSINOPHIL # BLD AUTO: 0 K/UL (ref 0–0.5)
EOSINOPHIL NFR BLD: 0.2 % (ref 0–8)
ERYTHROCYTE [DISTWIDTH] IN BLOOD BY AUTOMATED COUNT: 13.1 % (ref 11.5–14.5)
GLUCOSE SERPL-MCNC: 139 MG/DL (ref 70–110)
HCT VFR BLD AUTO: 43 % (ref 40–54)
HCT VFR BLD CALC: 41 %PCV (ref 36–54)
HGB BLD-MCNC: 14.5 G/DL (ref 14–18)
IMM GRANULOCYTES # BLD AUTO: 0.04 K/UL (ref 0–0.04)
IMM GRANULOCYTES NFR BLD AUTO: 0.4 % (ref 0–0.5)
LYMPHOCYTES # BLD AUTO: 2 K/UL (ref 1–4.8)
LYMPHOCYTES NFR BLD: 18.9 % (ref 18–48)
MCH RBC QN AUTO: 30.8 PG (ref 27–31)
MCHC RBC AUTO-ENTMCNC: 33.7 G/DL (ref 32–36)
MCV RBC AUTO: 91 FL (ref 82–98)
MONOCYTES # BLD AUTO: 0.7 K/UL (ref 0.3–1)
MONOCYTES NFR BLD: 6.8 % (ref 4–15)
NEUTROPHILS # BLD AUTO: 7.8 K/UL (ref 1.8–7.7)
NEUTROPHILS NFR BLD: 73.1 % (ref 38–73)
NRBC BLD-RTO: 0 /100 WBC
PLATELET # BLD AUTO: 255 K/UL (ref 150–450)
PMV BLD AUTO: 10.2 FL (ref 9.2–12.9)
POC IONIZED CALCIUM: 1.13 MMOL/L (ref 1.06–1.42)
POC TCO2 (MEASURED): 23 MMOL/L (ref 23–29)
POTASSIUM BLD-SCNC: 3.9 MMOL/L (ref 3.5–5.1)
RBC # BLD AUTO: 4.71 M/UL (ref 4.6–6.2)
SAMPLE: ABNORMAL
SODIUM BLD-SCNC: 140 MMOL/L (ref 136–145)
WBC # BLD AUTO: 10.61 K/UL (ref 3.9–12.7)

## 2021-06-27 PROCEDURE — 86803 HEPATITIS C AB TEST: CPT | Performed by: EMERGENCY MEDICINE

## 2021-06-27 PROCEDURE — 80047 BASIC METABLC PNL IONIZED CA: CPT

## 2021-06-27 PROCEDURE — 96375 TX/PRO/DX INJ NEW DRUG ADDON: CPT

## 2021-06-27 PROCEDURE — 25500020 PHARM REV CODE 255: Performed by: EMERGENCY MEDICINE

## 2021-06-27 PROCEDURE — 99283 PR EMERGENCY DEPT VISIT,LEVEL III: ICD-10-PCS | Mod: GC,,, | Performed by: OTOLARYNGOLOGY

## 2021-06-27 PROCEDURE — 99285 PR EMERGENCY DEPT VISIT,LEVEL V: ICD-10-PCS | Mod: ,,, | Performed by: EMERGENCY MEDICINE

## 2021-06-27 PROCEDURE — 85025 COMPLETE CBC W/AUTO DIFF WBC: CPT | Performed by: EMERGENCY MEDICINE

## 2021-06-27 PROCEDURE — 96361 HYDRATE IV INFUSION ADD-ON: CPT

## 2021-06-27 PROCEDURE — 25000003 PHARM REV CODE 250: Performed by: EMERGENCY MEDICINE

## 2021-06-27 PROCEDURE — 99283 EMERGENCY DEPT VISIT LOW MDM: CPT | Mod: GC,,, | Performed by: OTOLARYNGOLOGY

## 2021-06-27 PROCEDURE — 99285 EMERGENCY DEPT VISIT HI MDM: CPT | Mod: 25

## 2021-06-27 PROCEDURE — 99285 EMERGENCY DEPT VISIT HI MDM: CPT | Mod: ,,, | Performed by: EMERGENCY MEDICINE

## 2021-06-27 PROCEDURE — 63600175 PHARM REV CODE 636 W HCPCS: Performed by: EMERGENCY MEDICINE

## 2021-06-27 PROCEDURE — 86703 HIV-1/HIV-2 1 RESULT ANTBDY: CPT | Performed by: EMERGENCY MEDICINE

## 2021-06-27 PROCEDURE — 96365 THER/PROPH/DIAG IV INF INIT: CPT

## 2021-06-27 RX ORDER — AMOXICILLIN AND CLAVULANATE POTASSIUM 875; 125 MG/1; MG/1
1 TABLET, FILM COATED ORAL 2 TIMES DAILY
Qty: 14 TABLET | Refills: 0 | Status: SHIPPED | OUTPATIENT
Start: 2021-06-27 | End: 2021-10-16

## 2021-06-27 RX ORDER — KETOROLAC TROMETHAMINE 30 MG/ML
10 INJECTION, SOLUTION INTRAMUSCULAR; INTRAVENOUS
Status: COMPLETED | OUTPATIENT
Start: 2021-06-27 | End: 2021-06-27

## 2021-06-27 RX ORDER — METHYLPREDNISOLONE 4 MG/1
TABLET ORAL
Qty: 1 PACKAGE | Refills: 0 | Status: SHIPPED | OUTPATIENT
Start: 2021-06-27 | End: 2021-07-18

## 2021-06-27 RX ORDER — DEXAMETHASONE SODIUM PHOSPHATE 4 MG/ML
10 INJECTION, SOLUTION INTRA-ARTICULAR; INTRALESIONAL; INTRAMUSCULAR; INTRAVENOUS; SOFT TISSUE
Status: COMPLETED | OUTPATIENT
Start: 2021-06-27 | End: 2021-06-27

## 2021-06-27 RX ADMIN — SODIUM CHLORIDE, SODIUM LACTATE, POTASSIUM CHLORIDE, AND CALCIUM CHLORIDE 1000 ML: .6; .31; .03; .02 INJECTION, SOLUTION INTRAVENOUS at 10:06

## 2021-06-27 RX ADMIN — DEXAMETHASONE SODIUM PHOSPHATE 10 MG: 4 INJECTION INTRA-ARTICULAR; INTRALESIONAL; INTRAMUSCULAR; INTRAVENOUS; SOFT TISSUE at 09:06

## 2021-06-27 RX ADMIN — AMPICILLIN SODIUM AND SULBACTAM SODIUM 3 G: 2; 1 INJECTION, POWDER, FOR SOLUTION INTRAMUSCULAR; INTRAVENOUS at 11:06

## 2021-06-27 RX ADMIN — KETOROLAC TROMETHAMINE 10 MG: 30 INJECTION, SOLUTION INTRAMUSCULAR; INTRAVENOUS at 09:06

## 2021-06-27 RX ADMIN — IOHEXOL 75 ML: 350 INJECTION, SOLUTION INTRAVENOUS at 09:06

## 2021-06-28 LAB
HCV AB SERPL QL IA: NEGATIVE
HIV 1+2 AB+HIV1 P24 AG SERPL QL IA: NEGATIVE

## 2021-07-14 DIAGNOSIS — I10 ESSENTIAL HYPERTENSION: ICD-10-CM

## 2021-07-15 RX ORDER — BISOPROLOL FUMARATE AND HYDROCHLOROTHIAZIDE 5; 6.25 MG/1; MG/1
TABLET ORAL
Qty: 90 TABLET | Refills: 1 | Status: SHIPPED | OUTPATIENT
Start: 2021-07-15 | End: 2022-01-02

## 2021-08-12 ENCOUNTER — PATIENT OUTREACH (OUTPATIENT)
Dept: ADMINISTRATIVE | Facility: OTHER | Age: 66
End: 2021-08-12

## 2021-08-13 ENCOUNTER — OFFICE VISIT (OUTPATIENT)
Dept: DERMATOLOGY | Facility: CLINIC | Age: 66
End: 2021-08-13
Payer: MEDICARE

## 2021-08-13 VITALS — WEIGHT: 187 LBS | BODY MASS INDEX: 29.29 KG/M2

## 2021-08-13 DIAGNOSIS — D22.9 NEVUS OF MULTIPLE SITES: ICD-10-CM

## 2021-08-13 DIAGNOSIS — D18.01 CHERRY ANGIOMA: ICD-10-CM

## 2021-08-13 DIAGNOSIS — Z12.83 SKIN EXAM, SCREENING FOR CANCER: ICD-10-CM

## 2021-08-13 DIAGNOSIS — L82.1 SEBORRHEIC KERATOSES: Primary | ICD-10-CM

## 2021-08-13 DIAGNOSIS — L81.4 LENTIGINES: ICD-10-CM

## 2021-08-13 DIAGNOSIS — L91.8 SKIN TAG: ICD-10-CM

## 2021-08-13 PROCEDURE — 3008F PR BODY MASS INDEX (BMI) DOCUMENTED: ICD-10-PCS | Mod: CPTII,S$GLB,, | Performed by: DERMATOLOGY

## 2021-08-13 PROCEDURE — 1160F RVW MEDS BY RX/DR IN RCRD: CPT | Mod: CPTII,S$GLB,, | Performed by: DERMATOLOGY

## 2021-08-13 PROCEDURE — 3288F FALL RISK ASSESSMENT DOCD: CPT | Mod: CPTII,S$GLB,, | Performed by: DERMATOLOGY

## 2021-08-13 PROCEDURE — 3044F PR MOST RECENT HEMOGLOBIN A1C LEVEL <7.0%: ICD-10-PCS | Mod: CPTII,S$GLB,, | Performed by: DERMATOLOGY

## 2021-08-13 PROCEDURE — 1101F PR PT FALLS ASSESS DOC 0-1 FALLS W/OUT INJ PAST YR: ICD-10-PCS | Mod: CPTII,S$GLB,, | Performed by: DERMATOLOGY

## 2021-08-13 PROCEDURE — 1126F AMNT PAIN NOTED NONE PRSNT: CPT | Mod: CPTII,S$GLB,, | Performed by: DERMATOLOGY

## 2021-08-13 PROCEDURE — 3044F HG A1C LEVEL LT 7.0%: CPT | Mod: CPTII,S$GLB,, | Performed by: DERMATOLOGY

## 2021-08-13 PROCEDURE — 1159F PR MEDICATION LIST DOCUMENTED IN MEDICAL RECORD: ICD-10-PCS | Mod: CPTII,S$GLB,, | Performed by: DERMATOLOGY

## 2021-08-13 PROCEDURE — 1101F PT FALLS ASSESS-DOCD LE1/YR: CPT | Mod: CPTII,S$GLB,, | Performed by: DERMATOLOGY

## 2021-08-13 PROCEDURE — 3008F BODY MASS INDEX DOCD: CPT | Mod: CPTII,S$GLB,, | Performed by: DERMATOLOGY

## 2021-08-13 PROCEDURE — 99202 PR OFFICE/OUTPT VISIT, NEW, LEVL II, 15-29 MIN: ICD-10-PCS | Mod: S$GLB,,, | Performed by: DERMATOLOGY

## 2021-08-13 PROCEDURE — 1159F MED LIST DOCD IN RCRD: CPT | Mod: CPTII,S$GLB,, | Performed by: DERMATOLOGY

## 2021-08-13 PROCEDURE — 99999 PR PBB SHADOW E&M-EST. PATIENT-LVL III: ICD-10-PCS | Mod: PBBFAC,,, | Performed by: DERMATOLOGY

## 2021-08-13 PROCEDURE — 99202 OFFICE O/P NEW SF 15 MIN: CPT | Mod: S$GLB,,, | Performed by: DERMATOLOGY

## 2021-08-13 PROCEDURE — 3288F PR FALLS RISK ASSESSMENT DOCUMENTED: ICD-10-PCS | Mod: CPTII,S$GLB,, | Performed by: DERMATOLOGY

## 2021-08-13 PROCEDURE — 1126F PR PAIN SEVERITY QUANTIFIED, NO PAIN PRESENT: ICD-10-PCS | Mod: CPTII,S$GLB,, | Performed by: DERMATOLOGY

## 2021-08-13 PROCEDURE — 99999 PR PBB SHADOW E&M-EST. PATIENT-LVL III: CPT | Mod: PBBFAC,,, | Performed by: DERMATOLOGY

## 2021-08-13 PROCEDURE — 1160F PR REVIEW ALL MEDS BY PRESCRIBER/CLIN PHARMACIST DOCUMENTED: ICD-10-PCS | Mod: CPTII,S$GLB,, | Performed by: DERMATOLOGY

## 2021-10-13 ENCOUNTER — PATIENT MESSAGE (OUTPATIENT)
Dept: INTERNAL MEDICINE | Facility: CLINIC | Age: 66
End: 2021-10-13

## 2021-10-13 DIAGNOSIS — M54.40 CHRONIC BILATERAL LOW BACK PAIN WITH SCIATICA, SCIATICA LATERALITY UNSPECIFIED: Primary | ICD-10-CM

## 2021-10-13 DIAGNOSIS — G89.29 CHRONIC BILATERAL LOW BACK PAIN WITH SCIATICA, SCIATICA LATERALITY UNSPECIFIED: Primary | ICD-10-CM

## 2021-10-14 ENCOUNTER — PATIENT MESSAGE (OUTPATIENT)
Dept: INTERNAL MEDICINE | Facility: CLINIC | Age: 66
End: 2021-10-14

## 2021-10-14 DIAGNOSIS — G89.29 CHRONIC BILATERAL LOW BACK PAIN WITH SCIATICA, SCIATICA LATERALITY UNSPECIFIED: ICD-10-CM

## 2021-10-14 DIAGNOSIS — M21.70 LEG LENGTH DISCREPANCY: Primary | ICD-10-CM

## 2021-10-14 DIAGNOSIS — M54.40 CHRONIC BILATERAL LOW BACK PAIN WITH SCIATICA, SCIATICA LATERALITY UNSPECIFIED: ICD-10-CM

## 2021-10-14 RX ORDER — NAPROXEN 500 MG/1
500 TABLET ORAL 2 TIMES DAILY PRN
Qty: 60 TABLET | Refills: 1 | Status: SHIPPED | OUTPATIENT
Start: 2021-10-14 | End: 2021-10-16

## 2021-10-16 ENCOUNTER — OFFICE VISIT (OUTPATIENT)
Dept: INTERNAL MEDICINE | Facility: CLINIC | Age: 66
End: 2021-10-16
Payer: COMMERCIAL

## 2021-10-16 VITALS
BODY MASS INDEX: 29.34 KG/M2 | HEART RATE: 79 BPM | TEMPERATURE: 99 F | HEIGHT: 67 IN | DIASTOLIC BLOOD PRESSURE: 60 MMHG | OXYGEN SATURATION: 96 % | WEIGHT: 186.94 LBS | SYSTOLIC BLOOD PRESSURE: 116 MMHG

## 2021-10-16 DIAGNOSIS — M21.70 LEG LENGTH DISCREPANCY: ICD-10-CM

## 2021-10-16 DIAGNOSIS — I10 ESSENTIAL HYPERTENSION: ICD-10-CM

## 2021-10-16 DIAGNOSIS — M54.41 ACUTE RIGHT-SIDED LOW BACK PAIN WITH RIGHT-SIDED SCIATICA: Primary | ICD-10-CM

## 2021-10-16 DIAGNOSIS — M51.36 DDD (DEGENERATIVE DISC DISEASE), LUMBAR: ICD-10-CM

## 2021-10-16 PROBLEM — J03.90 TONSILLITIS: Status: RESOLVED | Noted: 2021-06-27 | Resolved: 2021-10-16

## 2021-10-16 PROCEDURE — 3066F PR DOCUMENTATION OF TREATMENT FOR NEPHROPATHY: ICD-10-PCS | Mod: CPTII,S$GLB,, | Performed by: FAMILY MEDICINE

## 2021-10-16 PROCEDURE — 3061F NEG MICROALBUMINURIA REV: CPT | Mod: CPTII,S$GLB,, | Performed by: FAMILY MEDICINE

## 2021-10-16 PROCEDURE — 1160F PR REVIEW ALL MEDS BY PRESCRIBER/CLIN PHARMACIST DOCUMENTED: ICD-10-PCS | Mod: CPTII,S$GLB,, | Performed by: FAMILY MEDICINE

## 2021-10-16 PROCEDURE — 1101F PR PT FALLS ASSESS DOC 0-1 FALLS W/OUT INJ PAST YR: ICD-10-PCS | Mod: CPTII,S$GLB,, | Performed by: FAMILY MEDICINE

## 2021-10-16 PROCEDURE — 3044F HG A1C LEVEL LT 7.0%: CPT | Mod: CPTII,S$GLB,, | Performed by: FAMILY MEDICINE

## 2021-10-16 PROCEDURE — 99999 PR PBB SHADOW E&M-EST. PATIENT-LVL III: ICD-10-PCS | Mod: PBBFAC,,, | Performed by: FAMILY MEDICINE

## 2021-10-16 PROCEDURE — 99499 RISK ADDL DX/OHS AUDIT: ICD-10-PCS | Mod: S$GLB,,, | Performed by: FAMILY MEDICINE

## 2021-10-16 PROCEDURE — 3061F PR NEG MICROALBUMINURIA RESULT DOCUMENTED/REVIEW: ICD-10-PCS | Mod: CPTII,S$GLB,, | Performed by: FAMILY MEDICINE

## 2021-10-16 PROCEDURE — 1125F PR PAIN SEVERITY QUANTIFIED, PAIN PRESENT: ICD-10-PCS | Mod: CPTII,S$GLB,, | Performed by: FAMILY MEDICINE

## 2021-10-16 PROCEDURE — 3066F NEPHROPATHY DOC TX: CPT | Mod: CPTII,S$GLB,, | Performed by: FAMILY MEDICINE

## 2021-10-16 PROCEDURE — 99499 UNLISTED E&M SERVICE: CPT | Mod: S$GLB,,, | Performed by: FAMILY MEDICINE

## 2021-10-16 PROCEDURE — 3074F PR MOST RECENT SYSTOLIC BLOOD PRESSURE < 130 MM HG: ICD-10-PCS | Mod: CPTII,S$GLB,, | Performed by: FAMILY MEDICINE

## 2021-10-16 PROCEDURE — 99999 PR PBB SHADOW E&M-EST. PATIENT-LVL III: CPT | Mod: PBBFAC,,, | Performed by: FAMILY MEDICINE

## 2021-10-16 PROCEDURE — 3008F PR BODY MASS INDEX (BMI) DOCUMENTED: ICD-10-PCS | Mod: CPTII,S$GLB,, | Performed by: FAMILY MEDICINE

## 2021-10-16 PROCEDURE — 99214 PR OFFICE/OUTPT VISIT, EST, LEVL IV, 30-39 MIN: ICD-10-PCS | Mod: S$GLB,,, | Performed by: FAMILY MEDICINE

## 2021-10-16 PROCEDURE — 3288F FALL RISK ASSESSMENT DOCD: CPT | Mod: CPTII,S$GLB,, | Performed by: FAMILY MEDICINE

## 2021-10-16 PROCEDURE — 3078F PR MOST RECENT DIASTOLIC BLOOD PRESSURE < 80 MM HG: ICD-10-PCS | Mod: CPTII,S$GLB,, | Performed by: FAMILY MEDICINE

## 2021-10-16 PROCEDURE — 1101F PT FALLS ASSESS-DOCD LE1/YR: CPT | Mod: CPTII,S$GLB,, | Performed by: FAMILY MEDICINE

## 2021-10-16 PROCEDURE — 3074F SYST BP LT 130 MM HG: CPT | Mod: CPTII,S$GLB,, | Performed by: FAMILY MEDICINE

## 2021-10-16 PROCEDURE — 3008F BODY MASS INDEX DOCD: CPT | Mod: CPTII,S$GLB,, | Performed by: FAMILY MEDICINE

## 2021-10-16 PROCEDURE — 3078F DIAST BP <80 MM HG: CPT | Mod: CPTII,S$GLB,, | Performed by: FAMILY MEDICINE

## 2021-10-16 PROCEDURE — 1125F AMNT PAIN NOTED PAIN PRSNT: CPT | Mod: CPTII,S$GLB,, | Performed by: FAMILY MEDICINE

## 2021-10-16 PROCEDURE — 1159F MED LIST DOCD IN RCRD: CPT | Mod: CPTII,S$GLB,, | Performed by: FAMILY MEDICINE

## 2021-10-16 PROCEDURE — 99214 OFFICE O/P EST MOD 30 MIN: CPT | Mod: S$GLB,,, | Performed by: FAMILY MEDICINE

## 2021-10-16 PROCEDURE — 1159F PR MEDICATION LIST DOCUMENTED IN MEDICAL RECORD: ICD-10-PCS | Mod: CPTII,S$GLB,, | Performed by: FAMILY MEDICINE

## 2021-10-16 PROCEDURE — 3044F PR MOST RECENT HEMOGLOBIN A1C LEVEL <7.0%: ICD-10-PCS | Mod: CPTII,S$GLB,, | Performed by: FAMILY MEDICINE

## 2021-10-16 PROCEDURE — 3288F PR FALLS RISK ASSESSMENT DOCUMENTED: ICD-10-PCS | Mod: CPTII,S$GLB,, | Performed by: FAMILY MEDICINE

## 2021-10-16 PROCEDURE — 1160F RVW MEDS BY RX/DR IN RCRD: CPT | Mod: CPTII,S$GLB,, | Performed by: FAMILY MEDICINE

## 2021-10-16 RX ORDER — METHYLPREDNISOLONE 4 MG/1
TABLET ORAL
Qty: 21 PACKAGE | Refills: 0 | Status: SHIPPED | OUTPATIENT
Start: 2021-10-16 | End: 2021-10-23

## 2021-10-16 RX ORDER — CORTISONE ACETATE 25 MG/1
25 TABLET ORAL DAILY
COMMUNITY
End: 2021-10-16

## 2021-10-20 ENCOUNTER — PATIENT MESSAGE (OUTPATIENT)
Dept: INTERNAL MEDICINE | Facility: CLINIC | Age: 66
End: 2021-10-20

## 2021-10-26 ENCOUNTER — OFFICE VISIT (OUTPATIENT)
Dept: SPINE | Facility: CLINIC | Age: 66
End: 2021-10-26
Payer: COMMERCIAL

## 2021-10-26 ENCOUNTER — HOSPITAL ENCOUNTER (OUTPATIENT)
Dept: RADIOLOGY | Facility: HOSPITAL | Age: 66
Discharge: HOME | End: 2021-10-26
Attending: ORTHOPAEDIC SURGERY
Payer: MEDICARE

## 2021-10-26 VITALS — HEIGHT: 67 IN | WEIGHT: 176.81 LBS | BODY MASS INDEX: 27.75 KG/M2

## 2021-10-26 DIAGNOSIS — G89.29 CHRONIC BILATERAL LOW BACK PAIN WITH SCIATICA, SCIATICA LATERALITY UNSPECIFIED: ICD-10-CM

## 2021-10-26 DIAGNOSIS — M51.36 DDD (DEGENERATIVE DISC DISEASE), LUMBAR: ICD-10-CM

## 2021-10-26 DIAGNOSIS — M54.40 CHRONIC BILATERAL LOW BACK PAIN WITH SCIATICA, SCIATICA LATERALITY UNSPECIFIED: ICD-10-CM

## 2021-10-26 PROCEDURE — 3288F PR FALLS RISK ASSESSMENT DOCUMENTED: ICD-10-PCS | Mod: CPTII,S$GLB,, | Performed by: ORTHOPAEDIC SURGERY

## 2021-10-26 PROCEDURE — 72110 XR LUMBAR SPINE AP AND LAT WITH FLEX/EXT: ICD-10-PCS | Mod: 26,,, | Performed by: RADIOLOGY

## 2021-10-26 PROCEDURE — 3288F FALL RISK ASSESSMENT DOCD: CPT | Mod: CPTII,S$GLB,, | Performed by: ORTHOPAEDIC SURGERY

## 2021-10-26 PROCEDURE — 72110 X-RAY EXAM L-2 SPINE 4/>VWS: CPT | Mod: TC,PN

## 2021-10-26 PROCEDURE — 99499 NO LOS: ICD-10-PCS | Mod: S$GLB,,, | Performed by: ORTHOPAEDIC SURGERY

## 2021-10-26 PROCEDURE — 3008F BODY MASS INDEX DOCD: CPT | Mod: CPTII,S$GLB,, | Performed by: ORTHOPAEDIC SURGERY

## 2021-10-26 PROCEDURE — 1101F PT FALLS ASSESS-DOCD LE1/YR: CPT | Mod: CPTII,S$GLB,, | Performed by: ORTHOPAEDIC SURGERY

## 2021-10-26 PROCEDURE — 1125F AMNT PAIN NOTED PAIN PRSNT: CPT | Mod: CPTII,S$GLB,, | Performed by: ORTHOPAEDIC SURGERY

## 2021-10-26 PROCEDURE — 99499 UNLISTED E&M SERVICE: CPT | Mod: S$GLB,,, | Performed by: ORTHOPAEDIC SURGERY

## 2021-10-26 PROCEDURE — 72110 X-RAY EXAM L-2 SPINE 4/>VWS: CPT | Mod: 26,,, | Performed by: RADIOLOGY

## 2021-10-26 PROCEDURE — 1125F PR PAIN SEVERITY QUANTIFIED, PAIN PRESENT: ICD-10-PCS | Mod: CPTII,S$GLB,, | Performed by: ORTHOPAEDIC SURGERY

## 2021-10-26 PROCEDURE — 99999 PR PBB SHADOW E&M-EST. PATIENT-LVL III: ICD-10-PCS | Mod: PBBFAC,,, | Performed by: ORTHOPAEDIC SURGERY

## 2021-10-26 PROCEDURE — 3008F PR BODY MASS INDEX (BMI) DOCUMENTED: ICD-10-PCS | Mod: CPTII,S$GLB,, | Performed by: ORTHOPAEDIC SURGERY

## 2021-10-26 PROCEDURE — 1101F PR PT FALLS ASSESS DOC 0-1 FALLS W/OUT INJ PAST YR: ICD-10-PCS | Mod: CPTII,S$GLB,, | Performed by: ORTHOPAEDIC SURGERY

## 2021-10-26 PROCEDURE — 99999 PR PBB SHADOW E&M-EST. PATIENT-LVL III: CPT | Mod: PBBFAC,,, | Performed by: ORTHOPAEDIC SURGERY

## 2021-10-26 NOTE — LETTER
October 28, 2021      Negar Pandey MD  [address] [place] [address]           [facility]  [address] [address]          Patient: Babatunde Oconnor   MR Number: [id number]   YOB: 1955   Date of Visit: 10/26/2021       Dear Dr. Negar Pandey:    Thank you for referring Babatunde Oconnor to me for evaluation. Attached you will find relevant portions of my assessment and plan of care.    If you have questions, please do not hesitate to call me. I look forward to following Babatunde Oconnor along with you.    Sincerely,    Arelis Morley PA-C    Enclosure  CC:  No Recipients    If you would like to receive this communication electronically, please contact [email or web address] or [phone] to request more information on [facility] access.    For providers and/or their staff who would like to refer a patient to [facility], please contact us through our one-stop-shop provider referral line, Kelby Banuelos, at [phone].    If you feel you have received this communication in error or would no longer like to receive these types of communications, please e-mail [email or web address]

## 2021-11-09 ENCOUNTER — PES CALL (OUTPATIENT)
Dept: ADMINISTRATIVE | Facility: CLINIC | Age: 66
End: 2021-11-09
Payer: COMMERCIAL

## 2021-11-17 ENCOUNTER — HOSPITAL ENCOUNTER (EMERGENCY)
Facility: HOSPITAL | Age: 66
Discharge: HOME | End: 2021-11-17
Attending: EMERGENCY MEDICINE
Payer: COMMERCIAL

## 2021-11-17 VITALS
BODY MASS INDEX: 27.94 KG/M2 | SYSTOLIC BLOOD PRESSURE: 125 MMHG | WEIGHT: 178 LBS | HEIGHT: 67 IN | OXYGEN SATURATION: 98 % | TEMPERATURE: 99 F | RESPIRATION RATE: 18 BRPM | DIASTOLIC BLOOD PRESSURE: 66 MMHG | HEART RATE: 95 BPM

## 2021-11-17 DIAGNOSIS — J06.9 URI WITH COUGH AND CONGESTION: Primary | ICD-10-CM

## 2021-11-17 LAB
CTP QC/QA: YES
SARS-COV-2 RDRP RESP QL NAA+PROBE: NEGATIVE

## 2021-11-17 PROCEDURE — 63600175 PHARM REV CODE 636 W HCPCS: Performed by: PHYSICIAN ASSISTANT

## 2021-11-17 PROCEDURE — 99284 EMERGENCY DEPT VISIT MOD MDM: CPT | Mod: 25

## 2021-11-17 PROCEDURE — 99284 EMERGENCY DEPT VISIT MOD MDM: CPT | Mod: CR,CS,, | Performed by: PHYSICIAN ASSISTANT

## 2021-11-17 PROCEDURE — U0002 COVID-19 LAB TEST NON-CDC: HCPCS | Performed by: EMERGENCY MEDICINE

## 2021-11-17 PROCEDURE — 99284 PR EMERGENCY DEPT VISIT,LEVEL IV: ICD-10-PCS | Mod: CR,CS,, | Performed by: PHYSICIAN ASSISTANT

## 2021-11-17 RX ORDER — BENZONATATE 100 MG/1
100 CAPSULE ORAL 3 TIMES DAILY PRN
Qty: 20 CAPSULE | Refills: 0 | Status: SHIPPED | OUTPATIENT
Start: 2021-11-17 | End: 2021-11-27

## 2021-11-17 RX ORDER — GUAIFENESIN 1200 MG/1
1 TABLET, EXTENDED RELEASE ORAL 2 TIMES DAILY PRN
Qty: 20 TABLET | Refills: 0 | Status: SHIPPED | OUTPATIENT
Start: 2021-11-17 | End: 2021-11-27

## 2021-11-17 RX ORDER — PREDNISONE 20 MG/1
60 TABLET ORAL
Status: COMPLETED | OUTPATIENT
Start: 2021-11-17 | End: 2021-11-17

## 2021-11-17 RX ORDER — AZITHROMYCIN 250 MG/1
250 TABLET, FILM COATED ORAL DAILY
Qty: 6 TABLET | Refills: 0 | Status: SHIPPED | OUTPATIENT
Start: 2021-11-17 | End: 2022-06-14 | Stop reason: CLARIF

## 2021-11-17 RX ADMIN — PREDNISONE 60 MG: 20 TABLET ORAL at 08:11

## 2021-11-20 ENCOUNTER — PATIENT MESSAGE (OUTPATIENT)
Dept: INTERNAL MEDICINE | Facility: CLINIC | Age: 66
End: 2021-11-20
Payer: COMMERCIAL

## 2021-11-22 ENCOUNTER — PATIENT MESSAGE (OUTPATIENT)
Dept: INTERNAL MEDICINE | Facility: CLINIC | Age: 66
End: 2021-11-22
Payer: COMMERCIAL

## 2021-11-26 ENCOUNTER — IMMUNIZATION (OUTPATIENT)
Dept: PRIMARY CARE CLINIC | Facility: CLINIC | Age: 66
End: 2021-11-26
Payer: MEDICARE

## 2021-11-26 DIAGNOSIS — Z23 NEED FOR VACCINATION: Primary | ICD-10-CM

## 2021-11-26 PROCEDURE — 0004A COVID-19, MRNA, LNP-S, PF, 30 MCG/0.3 ML DOSE VACCINE: CPT | Mod: CV19,PBBFAC | Performed by: INTERNAL MEDICINE

## 2021-12-09 DIAGNOSIS — E78.5 HYPERLIPIDEMIA, UNSPECIFIED HYPERLIPIDEMIA TYPE: ICD-10-CM

## 2021-12-09 DIAGNOSIS — I10 ESSENTIAL HYPERTENSION: ICD-10-CM

## 2021-12-12 DIAGNOSIS — M54.40 CHRONIC BILATERAL LOW BACK PAIN WITH SCIATICA, SCIATICA LATERALITY UNSPECIFIED: ICD-10-CM

## 2021-12-12 DIAGNOSIS — G89.29 CHRONIC BILATERAL LOW BACK PAIN WITH SCIATICA, SCIATICA LATERALITY UNSPECIFIED: ICD-10-CM

## 2021-12-13 RX ORDER — NAPROXEN 500 MG/1
TABLET ORAL
Qty: 60 TABLET | Refills: 1 | Status: SHIPPED | OUTPATIENT
Start: 2021-12-13 | End: 2022-02-21

## 2021-12-13 RX ORDER — AMLODIPINE BESYLATE AND ATORVASTATIN CALCIUM 10; 10 MG/1; MG/1
1 TABLET, FILM COATED ORAL DAILY
Qty: 90 TABLET | Refills: 1 | Status: SHIPPED | OUTPATIENT
Start: 2021-12-13 | End: 2022-05-30

## 2021-12-23 ENCOUNTER — PES CALL (OUTPATIENT)
Dept: ADMINISTRATIVE | Facility: CLINIC | Age: 66
End: 2021-12-23
Payer: COMMERCIAL

## 2022-01-07 ENCOUNTER — OFFICE VISIT (OUTPATIENT)
Dept: URGENT CARE | Facility: CLINIC | Age: 67
End: 2022-01-07
Payer: COMMERCIAL

## 2022-01-07 VITALS
HEIGHT: 67 IN | TEMPERATURE: 98 F | WEIGHT: 178 LBS | SYSTOLIC BLOOD PRESSURE: 160 MMHG | HEART RATE: 94 BPM | BODY MASS INDEX: 27.94 KG/M2 | DIASTOLIC BLOOD PRESSURE: 86 MMHG | RESPIRATION RATE: 18 BRPM | OXYGEN SATURATION: 96 %

## 2022-01-07 DIAGNOSIS — R05.9 COUGH: ICD-10-CM

## 2022-01-07 DIAGNOSIS — U07.1 COVID-19: Primary | ICD-10-CM

## 2022-01-07 LAB
CTP QC/QA: YES
SARS-COV-2 RDRP RESP QL NAA+PROBE: POSITIVE

## 2022-01-07 PROCEDURE — 1159F PR MEDICATION LIST DOCUMENTED IN MEDICAL RECORD: ICD-10-PCS | Mod: CPTII,S$GLB,, | Performed by: EMERGENCY MEDICINE

## 2022-01-07 PROCEDURE — 1160F RVW MEDS BY RX/DR IN RCRD: CPT | Mod: CPTII,S$GLB,, | Performed by: EMERGENCY MEDICINE

## 2022-01-07 PROCEDURE — 99214 PR OFFICE/OUTPT VISIT, EST, LEVL IV, 30-39 MIN: ICD-10-PCS | Mod: S$GLB,,, | Performed by: EMERGENCY MEDICINE

## 2022-01-07 PROCEDURE — 3008F BODY MASS INDEX DOCD: CPT | Mod: CPTII,S$GLB,, | Performed by: EMERGENCY MEDICINE

## 2022-01-07 PROCEDURE — 3079F PR MOST RECENT DIASTOLIC BLOOD PRESSURE 80-89 MM HG: ICD-10-PCS | Mod: CPTII,S$GLB,, | Performed by: EMERGENCY MEDICINE

## 2022-01-07 PROCEDURE — 3008F PR BODY MASS INDEX (BMI) DOCUMENTED: ICD-10-PCS | Mod: CPTII,S$GLB,, | Performed by: EMERGENCY MEDICINE

## 2022-01-07 PROCEDURE — U0002 COVID-19 LAB TEST NON-CDC: HCPCS | Mod: QW,S$GLB,, | Performed by: EMERGENCY MEDICINE

## 2022-01-07 PROCEDURE — 99214 OFFICE O/P EST MOD 30 MIN: CPT | Mod: S$GLB,,, | Performed by: EMERGENCY MEDICINE

## 2022-01-07 PROCEDURE — 3079F DIAST BP 80-89 MM HG: CPT | Mod: CPTII,S$GLB,, | Performed by: EMERGENCY MEDICINE

## 2022-01-07 PROCEDURE — 1160F PR REVIEW ALL MEDS BY PRESCRIBER/CLIN PHARMACIST DOCUMENTED: ICD-10-PCS | Mod: CPTII,S$GLB,, | Performed by: EMERGENCY MEDICINE

## 2022-01-07 PROCEDURE — 3077F SYST BP >= 140 MM HG: CPT | Mod: CPTII,S$GLB,, | Performed by: EMERGENCY MEDICINE

## 2022-01-07 PROCEDURE — 1159F MED LIST DOCD IN RCRD: CPT | Mod: CPTII,S$GLB,, | Performed by: EMERGENCY MEDICINE

## 2022-01-07 PROCEDURE — 3077F PR MOST RECENT SYSTOLIC BLOOD PRESSURE >= 140 MM HG: ICD-10-PCS | Mod: CPTII,S$GLB,, | Performed by: EMERGENCY MEDICINE

## 2022-01-07 PROCEDURE — U0002: ICD-10-PCS | Mod: QW,S$GLB,, | Performed by: EMERGENCY MEDICINE

## 2022-01-07 NOTE — LETTER
January 7, 2022  Babatunde Oconnor  [address] [facility] [place] [address]     [facility] [place]  [address]  [place] [address]  Phone: [phone]  Fax: [phone] Return to Work/School Status          [facility] has adopted CDCs time-based return to work/school strategy for persons with confirmed or suspected COVID19. [facility] does not recommend using a test-based strategy for returning to work/school after COVID-19 infection. [facility] has reported prolonged positive test results without evidence of infectiousness. At this time, positive specimens capable of producing disease have not been isolated more than 9 days after onset of illness.   Symptomatic persons with confirmed COVID-19 or suspected COVID-19 can return to work/school after:    At least 1 days (24hours) have passed since recovery defined as resolution of fever without the use of fever-reducing medications AND improvement in respiratory symptoms (e.g., cough, shortness of breath)    AND, at least 10 days have passed since first positive test   If you have no symptoms or your symptoms are resolving after 10 days, you can leave your house. 1/17/22   Continue to wear a mask around others for 5 additional days.  Asymptomatic persons with confirmed COVID-19 can return to work after:   At least 5 days have passed since the positive laboratory test and the person remains asymptomatic.   Continue to wear a mask around others for 5 additional days.  More information about the science behind the symptom-based return to work/school can be found at: https://www.cdc.gov/coronavirus/2019-ncov/community/wmuokqaz-igsqbdyayxw-zdzefixjl.html    Sincerely,      Lane Pham MD

## 2022-01-08 NOTE — PATIENT INSTRUCTIONS
Go to the Emergency Room if symptoms or condition worsens in any way    Zyrtec, Claritin, or Allegra OTC as directed for the next 7 days    Tylenol 500mg 2 tabs by mouth every 6 hours    Mucinex or Robitussin OTC as directed for cough    Coricidin as directed for runny nose and congestion          Instructions for Patients with Confirmed or Suspected COVID-19    If you are awaiting your test result, you will either be called or it will be released to the patient portal.  If you have any questions about your test, please visit [email or web address] or call our COVID-19 information line at [phone].       Stay home and stay away from family members and friends. The CDC says, you can leave home after these three things have happened: 1) You have had no fever for at least 24 hours (that is 1 full day of no fever without the use of medicine that reduces fevers) 2) AND other symptoms have improved (for example, when your cough or shortness of breath have improved) 3) AND at least 5 days have passed since your symptoms first appeared.   Separate yourself from other people and animals in your home.   Call ahead before visiting your doctor.   Wear a facemask.   Cover your coughs and sneezes.   Wash your hands often with soap and water; hand  can be used, too.   Avoid sharing personal household items.   Wipe down surfaces used daily.   Monitor your symptoms. Seek prompt medical attention if your illness is worsening (e.g., difficulty breathing).    Before seeking care, call your healthcare provider.   If you have a medical emergency and need to call 911, notify the dispatch personnel that you have, or are being evaluated for COVID-19. If possible, put on a facemask before emergency medical services arrive.        Recommended precautions for household members, intimate partners, and caregivers in a home setting of a patient with symptomatic laboratory-confirmed COVID-19 or a patient under  investigation.  Household members, intimate partners, and caregivers in the home setting awaiting tests results have close contact with a person with symptomatic, laboratory-confirmed COVID-19 or a person under investigation. Close contacts should monitor their health; they should call their provider right away if they develop symptoms suggestive of COVID-19 (e.g., fever, cough, shortness of breath).    Close contacts should also follow these recommendations:   Make sure that you understand and can help the patient follow their provider's instructions for medication(s) and care. You should help the patient with basic needs in the home and provide support for getting groceries, prescriptions, and other personal needs.   Monitor the patient's symptoms. If the patient is getting sicker, call his or her healthcare provider and tell them that the patient has laboratory-confirmed COVID-19. If the patient has a medical emergency and you need to call 911, notify the dispatch personnel that the patient has, or is being evaluated for COVID-19.   Household members should stay in another room or be  from the patient. Household members should use a separate bedroom and bathroom, if available.   Prohibit visitors.   Household members should care for any pets in the home.   Make sure that shared spaces in the home have good air flow, such as by an air conditioner or an opened window, weather permitting.   Perform hand hygiene frequently. Wash your hands often with soap and water for at least 20 seconds or use an alcohol-based hand  (that contains > 60% alcohol) covering all surfaces of your hands and rubbing them together until they feel dry. Soap and water should be used preferentially.   Avoid touching your eyes, nose, and mouth.   The patient should wear a facemask. If the patient is not able to wear a facemask (for example, because it causes trouble breathing), caregivers should wear a mask when they  are in the same room as the patient.   Wear a disposable facemask and gloves when you touch or have contact with the patient's blood, stool, or body fluids, such as saliva, sputum, nasal mucus, vomit, urine.  o Throw out disposable facemasks and gloves after using them. Do not reuse.  o When removing personal protective equipment, first remove and dispose of gloves. Then, immediately clean your hands with soap and water or alcohol-based hand . Next, remove and dispose of facemask, and immediately clean your hands again with soap and water or alcohol-based hand .   You should not share dishes, drinking glasses, cups, eating utensils, towels, bedding, or other items with the patient. After the patient uses these items, you should wash them thoroughly (see below Wash laundry thoroughly).   Clean all high-touch surfaces, such as counters, tabletops, doorknobs, bathroom fixtures, toilets, phones, keyboards, tablets, and bedside tables, every day. Also, clean any surfaces that may have blood, stool, or body fluids on them.   Use a household cleaning spray or wipe, according to the label instructions. Labels contain instructions for safe and effective use of the cleaning product including precautions you should take when applying the product, such as wearing gloves and making sure you have good ventilation during use of the product.   Wash laundry thoroughly.  o Immediately remove and wash clothes or bedding that have blood, stool, or body fluids on them.  o Wear disposable gloves while handling soiled items and keep soiled items away from your body. Clean your hands (with soap and water or an alcohol-based hand ) immediately after removing your gloves.  o Read and follow directions on labels of laundry or clothing items and detergent. In general, using a normal laundry detergent according to washing machine instructions and dry thoroughly using the warmest temperatures recommended on  the clothing label.   Place all used disposable gloves, facemasks, and other contaminated items in a lined container before disposing of them with other household waste. Clean your hands (with soap and water or an alcohol-based hand ) immediately after handling these items. Soap and water should be used preferentially if hands are visibly dirty.   Discuss any additional questions with your state or local health department or healthcare provider. Check available hours when contacting your local health department.    For more information see CDC link below.      https://www.cdc.gov/coronavirus/2019-ncov/hcp/guidance-prevent-spread.html#precautions        Sources:  CDC, [facility] and [facility]

## 2022-01-08 NOTE — PROGRESS NOTES
"Subjective:       Patient ID: Babatunde Oconnor is a 66 y.o. male.    Vitals:  height is 5' 7" (1.702 m) and weight is 80.7 kg (178 lb). His temperature is 97.5 °F (36.4 °C). His blood pressure is 160/86 (abnormal) and his pulse is 94. His respiration is 18 and oxygen saturation is 96%.     Chief Complaint: Cough    Patient reports cough and sore throat.     Cough  This is a new problem. The current episode started in the past 7 days. The cough is non-productive. Associated symptoms include headaches, nasal congestion, postnasal drip and a sore throat. Pertinent negatives include no chills, ear pain, fever or rash.       Constitution: Negative for chills, fatigue and fever.   HENT: Positive for postnasal drip and sore throat. Negative for ear pain and congestion.    Respiratory: Positive for cough. Negative for asthma.    Gastrointestinal: Negative for abdominal pain, nausea, vomiting and diarrhea.   Genitourinary: Negative for dysuria and frequency.   Skin: Negative for rash, wound and erythema.   Allergic/Immunologic: Negative for asthma.   Neurological: Positive for headaches.       Objective:      Physical Exam   Constitutional: He is oriented to person, place, and time. He appears well-developed.   HENT:   Head: Normocephalic and atraumatic. Head is without abrasion, without contusion and without laceration.   Ears:   Right Ear: External ear normal.   Left Ear: External ear normal.   Oropharyngeal exam not performed due to risk of viral transmission during global pandemic-- risks outweigh benefits of exam          Comments: Oropharyngeal exam not performed due to risk of viral transmission during global pandemic-- risks outweigh benefits of exam      Eyes: Conjunctivae, EOM and lids are normal. Pupils are equal, round, and reactive to light.   Neck: Trachea normal and phonation normal. Neck supple.   Cardiovascular: Normal rate, regular rhythm and normal heart sounds.   Pulmonary/Chest: Effort normal and " breath sounds normal. No stridor. No respiratory distress.   Musculoskeletal: Normal range of motion.         General: Normal range of motion.   Neurological: He is alert and oriented to person, place, and time.   Skin: Skin is warm, dry, intact and no rash. Capillary refill takes less than 2 seconds. No abrasion, No burn, No bruising, No erythema and No ecchymosis   Psychiatric: His speech is normal and behavior is normal. Judgment and thought content normal.   Nursing note and vitals reviewed.        Assessment:       1. COVID-19    2. Cough          Plan:         COVID-19  -     Ambulatory referral/consult to [facility]    Cough  -     POCT COVID-19 Rapid Screening    Other orders  -     COVID-19 Home Symptom Monitoring  - Duration (days): 14                 3  Patient Instructions   Go to the Emergency Room if symptoms or condition worsens in any way    Zyrtec, Claritin, or Allegra OTC as directed for the next 7 days    Tylenol 500mg 2 tabs by mouth every 6 hours    Mucinex or Robitussin OTC as directed for cough    Coricidin as directed for runny nose and congestion          Instructions for Patients with Confirmed or Suspected COVID-19    If you are awaiting your test result, you will either be called or it will be released to the patient portal.  If you have any questions about your test, please visit [email or web address] or call our COVID-19 information line at [phone].       Stay home and stay away from family members and friends. The CDC says, you can leave home after these three things have happened: 1) You have had no fever for at least 24 hours (that is 1 full day of no fever without the use of medicine that reduces fevers) 2) AND other symptoms have improved (for example, when your cough or shortness of breath have improved) 3) AND at least 5 days have passed since your symptoms first appeared.   Separate yourself from other people and animals in your home.   Call ahead before  visiting your doctor.   Wear a facemask.   Cover your coughs and sneezes.   Wash your hands often with soap and water; hand  can be used, too.   Avoid sharing personal household items.   Wipe down surfaces used daily.   Monitor your symptoms. Seek prompt medical attention if your illness is worsening (e.g., difficulty breathing).    Before seeking care, call your healthcare provider.   If you have a medical emergency and need to call 911, notify the dispatch personnel that you have, or are being evaluated for COVID-19. If possible, put on a facemask before emergency medical services arrive.        Recommended precautions for household members, intimate partners, and caregivers in a home setting of a patient with symptomatic laboratory-confirmed COVID-19 or a patient under investigation.  Household members, intimate partners, and caregivers in the home setting awaiting tests results have close contact with a person with symptomatic, laboratory-confirmed COVID-19 or a person under investigation. Close contacts should monitor their health; they should call their provider right away if they develop symptoms suggestive of COVID-19 (e.g., fever, cough, shortness of breath).    Close contacts should also follow these recommendations:   Make sure that you understand and can help the patient follow their provider's instructions for medication(s) and care. You should help the patient with basic needs in the home and provide support for getting groceries, prescriptions, and other personal needs.   Monitor the patient's symptoms. If the patient is getting sicker, call his or her healthcare provider and tell them that the patient has laboratory-confirmed COVID-19. If the patient has a medical emergency and you need to call 911, notify the dispatch personnel that the patient has, or is being evaluated for COVID-19.   Household members should stay in another room or be  from the patient. Household  members should use a separate bedroom and bathroom, if available.   Prohibit visitors.   Household members should care for any pets in the home.   Make sure that shared spaces in the home have good air flow, such as by an air conditioner or an opened window, weather permitting.   Perform hand hygiene frequently. Wash your hands often with soap and water for at least 20 seconds or use an alcohol-based hand  (that contains > 60% alcohol) covering all surfaces of your hands and rubbing them together until they feel dry. Soap and water should be used preferentially.   Avoid touching your eyes, nose, and mouth.   The patient should wear a facemask. If the patient is not able to wear a facemask (for example, because it causes trouble breathing), caregivers should wear a mask when they are in the same room as the patient.   Wear a disposable facemask and gloves when you touch or have contact with the patient's blood, stool, or body fluids, such as saliva, sputum, nasal mucus, vomit, urine.  o Throw out disposable facemasks and gloves after using them. Do not reuse.  o When removing personal protective equipment, first remove and dispose of gloves. Then, immediately clean your hands with soap and water or alcohol-based hand . Next, remove and dispose of facemask, and immediately clean your hands again with soap and water or alcohol-based hand .   You should not share dishes, drinking glasses, cups, eating utensils, towels, bedding, or other items with the patient. After the patient uses these items, you should wash them thoroughly (see below Wash laundry thoroughly).   Clean all high-touch surfaces, such as counters, tabletops, doorknobs, bathroom fixtures, toilets, phones, keyboards, tablets, and bedside tables, every day. Also, clean any surfaces that may have blood, stool, or body fluids on them.   Use a household cleaning spray or wipe, according to the label instructions. Labels  contain instructions for safe and effective use of the cleaning product including precautions you should take when applying the product, such as wearing gloves and making sure you have good ventilation during use of the product.   Wash laundry thoroughly.  o Immediately remove and wash clothes or bedding that have blood, stool, or body fluids on them.  o Wear disposable gloves while handling soiled items and keep soiled items away from your body. Clean your hands (with soap and water or an alcohol-based hand ) immediately after removing your gloves.  o Read and follow directions on labels of laundry or clothing items and detergent. In general, using a normal laundry detergent according to washing machine instructions and dry thoroughly using the warmest temperatures recommended on the clothing label.   Place all used disposable gloves, facemasks, and other contaminated items in a lined container before disposing of them with other household waste. Clean your hands (with soap and water or an alcohol-based hand ) immediately after handling these items. Soap and water should be used preferentially if hands are visibly dirty.   Discuss any additional questions with your state or local health department or healthcare provider. Check available hours when contacting your local health department.    For more information see CDC link below.      https://www.cdc.gov/coronavirus/2019-ncov/hcp/guidance-prevent-spread.html#precautions        Sources:  [facility] [place] [facility] and [facility]

## 2022-01-09 ENCOUNTER — INFUSION (OUTPATIENT)
Dept: INFECTIOUS DISEASES | Facility: HOSPITAL | Age: 67
End: 2022-01-09
Attending: EMERGENCY MEDICINE
Payer: COMMERCIAL

## 2022-01-09 VITALS
OXYGEN SATURATION: 96 % | BODY MASS INDEX: 29.03 KG/M2 | WEIGHT: 185 LBS | SYSTOLIC BLOOD PRESSURE: 129 MMHG | DIASTOLIC BLOOD PRESSURE: 74 MMHG | RESPIRATION RATE: 20 BRPM | HEIGHT: 67 IN | TEMPERATURE: 99 F | HEART RATE: 71 BPM

## 2022-01-09 DIAGNOSIS — U07.1 COVID-19: Primary | ICD-10-CM

## 2022-01-09 PROCEDURE — 25000003 PHARM REV CODE 250: Performed by: INTERNAL MEDICINE

## 2022-01-09 PROCEDURE — 63600175 PHARM REV CODE 636 W HCPCS: Performed by: INTERNAL MEDICINE

## 2022-01-09 PROCEDURE — M0243 CASIRIVI AND IMDEVI INFUSION: HCPCS | Performed by: INTERNAL MEDICINE

## 2022-01-09 RX ORDER — ALBUTEROL SULFATE 90 UG/1
2 AEROSOL, METERED RESPIRATORY (INHALATION)
Status: DISCONTINUED | OUTPATIENT
Start: 2022-01-09 | End: 2022-06-14 | Stop reason: CLARIF

## 2022-01-09 RX ORDER — EPINEPHRINE 0.3 MG/.3ML
0.3 INJECTION SUBCUTANEOUS
Status: DISCONTINUED | OUTPATIENT
Start: 2022-01-09 | End: 2022-06-14 | Stop reason: CLARIF

## 2022-01-09 RX ORDER — ONDANSETRON 4 MG/1
4 TABLET, ORALLY DISINTEGRATING ORAL ONCE AS NEEDED
Status: DISCONTINUED | OUTPATIENT
Start: 2022-01-09 | End: 2022-06-14 | Stop reason: CLARIF

## 2022-01-09 RX ORDER — ACETAMINOPHEN 325 MG/1
650 TABLET ORAL ONCE AS NEEDED
Status: DISCONTINUED | OUTPATIENT
Start: 2022-01-09 | End: 2022-06-16

## 2022-01-09 RX ORDER — SODIUM CHLORIDE 0.9 % (FLUSH) 0.9 %
10 SYRINGE (ML) INJECTION
Status: DISCONTINUED | OUTPATIENT
Start: 2022-01-09 | End: 2022-06-14 | Stop reason: CLARIF

## 2022-01-09 RX ORDER — DIPHENHYDRAMINE HYDROCHLORIDE 50 MG/ML
25 INJECTION INTRAMUSCULAR; INTRAVENOUS ONCE AS NEEDED
Status: DISCONTINUED | OUTPATIENT
Start: 2022-01-09 | End: 2022-06-14 | Stop reason: CLARIF

## 2022-01-09 RX ADMIN — CASIRIVIMAB AND IMDEVIMAB 600 MG: 600; 600 INJECTION, SOLUTION, CONCENTRATE INTRAVENOUS at 09:01

## 2022-01-09 NOTE — PROGRESS NOTES
0904  Patient arrives for casirivimab/ imdevimab infusion. Ambulatory. Pt AAox3. No distress noted. RR even and unlabored.     Symptoms and onset date 1/3/2022 with cough, cold symptoms. Wife tested positive on Friday at urgent care so he tested as well. Low grade temp.     Tested COVID + on 1/7/2022.

## 2022-01-09 NOTE — PROGRESS NOTES
Patient remains with no signs of complications noted. Patient received casirivimab/ imdevimab infusion with filtered tubing according to FDA recommendations and [facility] SOP without complications noted and left with mask in place. Drug fact sheet provided. Pt discharged home. Ambulatory. Remains AAox3. No distress noted. RR even and unlabore

## 2022-01-09 NOTE — PROGRESS NOTES
1015  Infusion complete. Pt tolerated infusion well. No S/S of infusion reaction noted at present time. One hour observation started.

## 2022-02-21 DIAGNOSIS — G89.29 CHRONIC BILATERAL LOW BACK PAIN WITH SCIATICA, SCIATICA LATERALITY UNSPECIFIED: ICD-10-CM

## 2022-02-21 DIAGNOSIS — M54.40 CHRONIC BILATERAL LOW BACK PAIN WITH SCIATICA, SCIATICA LATERALITY UNSPECIFIED: ICD-10-CM

## 2022-02-21 RX ORDER — NAPROXEN 500 MG/1
TABLET ORAL
Qty: 60 TABLET | Refills: 1 | Status: SHIPPED | OUTPATIENT
Start: 2022-02-21 | End: 2022-06-07 | Stop reason: SDUPTHER

## 2022-03-02 ENCOUNTER — PATIENT MESSAGE (OUTPATIENT)
Dept: INTERNAL MEDICINE | Facility: CLINIC | Age: 67
End: 2022-03-02
Payer: COMMERCIAL

## 2022-03-07 ENCOUNTER — TELEPHONE (OUTPATIENT)
Dept: INTERNAL MEDICINE | Facility: CLINIC | Age: 67
End: 2022-03-07
Payer: COMMERCIAL

## 2022-03-07 NOTE — TELEPHONE ENCOUNTER
----- Message from Deisy Claros sent at 3/5/2022 11:38 AM CST -----  Contact: [phone]  Pt states he had a missed call from the office please advise

## 2022-03-14 ENCOUNTER — PATIENT MESSAGE (OUTPATIENT)
Dept: INTERNAL MEDICINE | Facility: CLINIC | Age: 67
End: 2022-03-14
Payer: COMMERCIAL

## 2022-04-28 DIAGNOSIS — N40.0 BENIGN PROSTATIC HYPERPLASIA, UNSPECIFIED WHETHER LOWER URINARY TRACT SYMPTOMS PRESENT: ICD-10-CM

## 2022-04-29 ENCOUNTER — OFFICE VISIT (OUTPATIENT)
Dept: ORTHOPEDICS | Facility: CLINIC | Age: 67
End: 2022-04-29
Payer: MEDICARE

## 2022-04-29 ENCOUNTER — PATIENT OUTREACH (OUTPATIENT)
Dept: ADMINISTRATIVE | Facility: OTHER | Age: 67
End: 2022-04-29
Payer: COMMERCIAL

## 2022-04-29 VITALS — BODY MASS INDEX: 27.01 KG/M2 | HEIGHT: 67 IN | WEIGHT: 172.06 LBS

## 2022-04-29 DIAGNOSIS — M25.551 RIGHT HIP PAIN: ICD-10-CM

## 2022-04-29 DIAGNOSIS — G89.29 CHRONIC BILATERAL LOW BACK PAIN WITH RIGHT-SIDED SCIATICA: Primary | ICD-10-CM

## 2022-04-29 DIAGNOSIS — Z12.11 ENCOUNTER FOR FIT (FECAL IMMUNOCHEMICAL TEST) SCREENING: Primary | ICD-10-CM

## 2022-04-29 DIAGNOSIS — M54.41 CHRONIC BILATERAL LOW BACK PAIN WITH RIGHT-SIDED SCIATICA: Primary | ICD-10-CM

## 2022-04-29 PROCEDURE — 1159F PR MEDICATION LIST DOCUMENTED IN MEDICAL RECORD: ICD-10-PCS | Mod: CPTII,S$GLB,, | Performed by: ORTHOPAEDIC SURGERY

## 2022-04-29 PROCEDURE — 3288F PR FALLS RISK ASSESSMENT DOCUMENTED: ICD-10-PCS | Mod: CPTII,S$GLB,, | Performed by: ORTHOPAEDIC SURGERY

## 2022-04-29 PROCEDURE — 99213 OFFICE O/P EST LOW 20 MIN: CPT | Mod: S$GLB,,, | Performed by: ORTHOPAEDIC SURGERY

## 2022-04-29 PROCEDURE — 1125F PR PAIN SEVERITY QUANTIFIED, PAIN PRESENT: ICD-10-PCS | Mod: CPTII,S$GLB,, | Performed by: ORTHOPAEDIC SURGERY

## 2022-04-29 PROCEDURE — 1125F AMNT PAIN NOTED PAIN PRSNT: CPT | Mod: CPTII,S$GLB,, | Performed by: ORTHOPAEDIC SURGERY

## 2022-04-29 PROCEDURE — 3008F PR BODY MASS INDEX (BMI) DOCUMENTED: ICD-10-PCS | Mod: CPTII,S$GLB,, | Performed by: ORTHOPAEDIC SURGERY

## 2022-04-29 PROCEDURE — 3288F FALL RISK ASSESSMENT DOCD: CPT | Mod: CPTII,S$GLB,, | Performed by: ORTHOPAEDIC SURGERY

## 2022-04-29 PROCEDURE — 99999 PR PBB SHADOW E&M-EST. PATIENT-LVL III: ICD-10-PCS | Mod: PBBFAC,,, | Performed by: ORTHOPAEDIC SURGERY

## 2022-04-29 PROCEDURE — 99213 PR OFFICE/OUTPT VISIT, EST, LEVL III, 20-29 MIN: ICD-10-PCS | Mod: S$GLB,,, | Performed by: ORTHOPAEDIC SURGERY

## 2022-04-29 PROCEDURE — 1101F PT FALLS ASSESS-DOCD LE1/YR: CPT | Mod: CPTII,S$GLB,, | Performed by: ORTHOPAEDIC SURGERY

## 2022-04-29 PROCEDURE — 1101F PR PT FALLS ASSESS DOC 0-1 FALLS W/OUT INJ PAST YR: ICD-10-PCS | Mod: CPTII,S$GLB,, | Performed by: ORTHOPAEDIC SURGERY

## 2022-04-29 PROCEDURE — 1159F MED LIST DOCD IN RCRD: CPT | Mod: CPTII,S$GLB,, | Performed by: ORTHOPAEDIC SURGERY

## 2022-04-29 PROCEDURE — 3008F BODY MASS INDEX DOCD: CPT | Mod: CPTII,S$GLB,, | Performed by: ORTHOPAEDIC SURGERY

## 2022-04-29 PROCEDURE — 99999 PR PBB SHADOW E&M-EST. PATIENT-LVL III: CPT | Mod: PBBFAC,,, | Performed by: ORTHOPAEDIC SURGERY

## 2022-04-29 RX ORDER — TAMSULOSIN HYDROCHLORIDE 0.4 MG/1
CAPSULE ORAL
Qty: 90 CAPSULE | Refills: 0 | Status: SHIPPED | OUTPATIENT
Start: 2022-04-29 | End: 2022-07-05

## 2022-04-29 RX ORDER — METHYLPREDNISOLONE 4 MG/1
TABLET ORAL
Qty: 1 EACH | Refills: 0 | Status: SHIPPED | OUTPATIENT
Start: 2022-04-29 | End: 2022-05-20

## 2022-04-29 NOTE — TELEPHONE ENCOUNTER
Refill Routing Note   Medication(s) are not appropriate for processing by [facility] for the following reason(s):      - Patient has been seen in the ED/Hospital since the last PCP visit    [facility] action(s):  Defer Medication-related problems identified: Requires labs        Medication reconciliation completed: No     Appointments  past 12m or future 3m with PCP    Date Provider   Last Visit   6/22/2021 Negar Pandey MD   Next Visit   Visit date not found Negar Pandey MD   ED visits in past 90 days: 0        Note composed:2:38 AM 04/29/2022

## 2022-04-29 NOTE — PROGRESS NOTES
Requested updates within Care Everywhere.  Patient's chart was reviewed for overdue KARINA topics.  Health maintenance:updated  Immunizations:reconciled   Legacy:   Media:  Orders placed:Fitkit  Tasked appts:  Labs Linked:  Upcoming appt:

## 2022-04-29 NOTE — TELEPHONE ENCOUNTER
Care Due:                  Date            Visit Type   Department     Provider  --------------------------------------------------------------------------------                                SAME DAY -                              ESTABLISHED   [facility] INTERNAL  Last Visit: 10-      PATIENT      MEDICINE       Tabatha Pham  Next Visit: None Scheduled  None         None Found                                                            Last  Test          Frequency    Reason                     Performed    Due Date  --------------------------------------------------------------------------------    CMP.........  12 months..  amlodipine-atorvastatin,   06- 06-                             bisoprolol-hydrochlorothi                             azide....................    Lipid Panel.  12 months..  amlodipine-atorvastatin..  06- 06-    Powered by [employer]. Reference number: [id number].   4/28/2022 10:04:06 PM CDT

## 2022-04-29 NOTE — PROGRESS NOTES
"DATE: 4/29/2022  PATIENT: Babatunde Oconnor    Attending Physician: Tramaine Lorenz MD    HISTORY:  Babatunde Oconnor is a 67 y.o. male who returns to me today for follow up.  He was last seen by me 10/26/2021.  Today he is doing well but notes he continues to have low back, right leg, and right groin pain. He says the groin pain bothers him the most.    The Patient denies myelopathic symptoms such as handwriting changes or difficulty with buttons/coins/keys. Denies perineal paresthesias, bowel/bladder dysfunction.    PMH/PSH/FamHx/SocHx:  Unchanged from prior visit    ROS:  REVIEW OF SYSTEMS:  Constitution: Negative. Negative for chills, fever and night sweats.   HENT: Negative for congestion and headaches.    Eyes: Negative for blurred vision, left vision loss and right vision loss.   Cardiovascular: Negative for chest pain and syncope.   Respiratory: Negative for cough and shortness of breath.    Endocrine: Negative for polydipsia, polyphagia and polyuria.   Hematologic/Lymphatic: Negative for bleeding problem. Does not bruise/bleed easily.   Skin: Negative for dry skin, itching and rash.   Musculoskeletal: Negative for falls and muscle weakness.   Gastrointestinal: Negative for abdominal pain and bowel incontinence.   Allergic/Immunologic: Negative for hives and persistent infections.  Genitourinary: Negative for urinary retention/incontinence and nocturia.   Neurological: negative for disturbances in coordination, no myelopathic symptoms such as handwriting changes or difficulty with buttons, coins, keys or small objects. No loss of balance and seizures.   Psychiatric/Behavioral: Negative for depression. The patient does not have insomnia.   Denies perineal paresthesias, bowel or bladder incontinence    EXAM:  Ht 5' 7" (1.702 m)   Wt 78 kg (172 lb 1.1 oz)   BMI 26.95 kg/m²     My physical examination was notable for the following findings:     Pain with internal and external rotation of right " hip      IMAGING:  No new imaging today.    Today I personally re- reviewed AP, Lat and Flex/Ex  upright L-spine that demonstrate grade I anterolisthesis of L4 on L5. Significant joint space narrowing of right hip.    Body mass index is 26.95 kg/m².    Hemoglobin A1C   Date Value Ref Range Status   06/16/2021 5.5 4.0 - 5.6 % Final     Comment:     ADA Screening Guidelines:  5.7-6.4%  Consistent with prediabetes  >or=6.5%  Consistent with diabetes    High levels of fetal hemoglobin interfere with the HbA1C  assay. Heterozygous hemoglobin variants (HbS, HgC, etc)do  not significantly interfere with this assay.   However, presence of multiple variants may affect accuracy.     11/24/2020 5.5 4.0 - 5.6 % Final     Comment:     ADA Screening Guidelines:  5.7-6.4%  Consistent with prediabetes  >or=6.5%  Consistent with diabetes  High levels of fetal hemoglobin interfere with the HbA1C  assay. Heterozygous hemoglobin variants (HbS, HgC, etc)do  not significantly interfere with this assay.   However, presence of multiple variants may affect accuracy.     12/10/2019 5.7 (H) 4.0 - 5.6 % Final     Comment:     ADA Screening Guidelines:  5.7-6.4%  Consistent with prediabetes  >or=6.5%  Consistent with diabetes  High levels of fetal hemoglobin interfere with the HbA1C  assay. Heterozygous hemoglobin variants (HbS, HgC, etc)do  not significantly interfere with this assay.   However, presence of multiple variants may affect accuracy.           ASSESSMENT/PLAN:    There are no diagnoses linked to this encounter.    Today we discussed at length all of the different treatment options including anti-inflammatories, acetaminophen, rest, ice, heat, physical therapy including strengthening and stretching exercises, home exercises, ROM, aerobic conditioning, aqua therapy, other modalities including ultrasound, massage, and dry needling, epidural steroid injections and finally surgical intervention.      Pt presents with chronic low back  pain with radiculopathy. Also presents with chronic right hip pain, which is his pain complaint. Will schedule with Dr. Negron for further evaluation. Will send medrol dose pack to pharmacy.

## 2022-05-03 ENCOUNTER — PATIENT MESSAGE (OUTPATIENT)
Dept: RESEARCH | Facility: HOSPITAL | Age: 67
End: 2022-05-03
Payer: COMMERCIAL

## 2022-05-03 ENCOUNTER — HOSPITAL ENCOUNTER (OUTPATIENT)
Dept: RADIOLOGY | Facility: HOSPITAL | Age: 67
Discharge: HOME | End: 2022-05-03
Attending: ORTHOPAEDIC SURGERY
Payer: MEDICARE

## 2022-05-03 ENCOUNTER — OFFICE VISIT (OUTPATIENT)
Dept: ORTHOPEDICS | Facility: CLINIC | Age: 67
End: 2022-05-03
Payer: COMMERCIAL

## 2022-05-03 VITALS — BODY MASS INDEX: 28.63 KG/M2 | HEIGHT: 67 IN | WEIGHT: 182.44 LBS

## 2022-05-03 DIAGNOSIS — M25.551 HIP PAIN, RIGHT: ICD-10-CM

## 2022-05-03 DIAGNOSIS — M16.11 PRIMARY OSTEOARTHRITIS OF RIGHT HIP: Primary | ICD-10-CM

## 2022-05-03 DIAGNOSIS — M25.551 HIP PAIN, RIGHT: Primary | ICD-10-CM

## 2022-05-03 PROCEDURE — 73502 X-RAY EXAM HIP UNI 2-3 VIEWS: CPT | Mod: 26,RT,, | Performed by: RADIOLOGY

## 2022-05-03 PROCEDURE — 99213 OFFICE O/P EST LOW 20 MIN: CPT | Mod: S$GLB,,, | Performed by: ORTHOPAEDIC SURGERY

## 2022-05-03 PROCEDURE — 99999 PR PBB SHADOW E&M-EST. PATIENT-LVL III: ICD-10-PCS | Mod: PBBFAC,,, | Performed by: ORTHOPAEDIC SURGERY

## 2022-05-03 PROCEDURE — 3008F BODY MASS INDEX DOCD: CPT | Mod: CPTII,S$GLB,, | Performed by: ORTHOPAEDIC SURGERY

## 2022-05-03 PROCEDURE — 1101F PT FALLS ASSESS-DOCD LE1/YR: CPT | Mod: CPTII,S$GLB,, | Performed by: ORTHOPAEDIC SURGERY

## 2022-05-03 PROCEDURE — 3008F PR BODY MASS INDEX (BMI) DOCUMENTED: ICD-10-PCS | Mod: CPTII,S$GLB,, | Performed by: ORTHOPAEDIC SURGERY

## 2022-05-03 PROCEDURE — 1125F AMNT PAIN NOTED PAIN PRSNT: CPT | Mod: CPTII,S$GLB,, | Performed by: ORTHOPAEDIC SURGERY

## 2022-05-03 PROCEDURE — 99999 PR PBB SHADOW E&M-EST. PATIENT-LVL III: CPT | Mod: PBBFAC,,, | Performed by: ORTHOPAEDIC SURGERY

## 2022-05-03 PROCEDURE — 3288F PR FALLS RISK ASSESSMENT DOCUMENTED: ICD-10-PCS | Mod: CPTII,S$GLB,, | Performed by: ORTHOPAEDIC SURGERY

## 2022-05-03 PROCEDURE — 1159F MED LIST DOCD IN RCRD: CPT | Mod: CPTII,S$GLB,, | Performed by: ORTHOPAEDIC SURGERY

## 2022-05-03 PROCEDURE — 99213 PR OFFICE/OUTPT VISIT, EST, LEVL III, 20-29 MIN: ICD-10-PCS | Mod: S$GLB,,, | Performed by: ORTHOPAEDIC SURGERY

## 2022-05-03 PROCEDURE — 1125F PR PAIN SEVERITY QUANTIFIED, PAIN PRESENT: ICD-10-PCS | Mod: CPTII,S$GLB,, | Performed by: ORTHOPAEDIC SURGERY

## 2022-05-03 PROCEDURE — 73502 X-RAY EXAM HIP UNI 2-3 VIEWS: CPT | Mod: TC,RT

## 2022-05-03 PROCEDURE — 1101F PR PT FALLS ASSESS DOC 0-1 FALLS W/OUT INJ PAST YR: ICD-10-PCS | Mod: CPTII,S$GLB,, | Performed by: ORTHOPAEDIC SURGERY

## 2022-05-03 PROCEDURE — 3288F FALL RISK ASSESSMENT DOCD: CPT | Mod: CPTII,S$GLB,, | Performed by: ORTHOPAEDIC SURGERY

## 2022-05-03 PROCEDURE — 73502 XR HIP WITH PELVIS WHEN PERFORMED, 2 OR 3  VIEWS RIGHT: ICD-10-PCS | Mod: 26,RT,, | Performed by: RADIOLOGY

## 2022-05-03 PROCEDURE — 1160F PR REVIEW ALL MEDS BY PRESCRIBER/CLIN PHARMACIST DOCUMENTED: ICD-10-PCS | Mod: CPTII,S$GLB,, | Performed by: ORTHOPAEDIC SURGERY

## 2022-05-03 PROCEDURE — 1159F PR MEDICATION LIST DOCUMENTED IN MEDICAL RECORD: ICD-10-PCS | Mod: CPTII,S$GLB,, | Performed by: ORTHOPAEDIC SURGERY

## 2022-05-03 PROCEDURE — 1160F RVW MEDS BY RX/DR IN RCRD: CPT | Mod: CPTII,S$GLB,, | Performed by: ORTHOPAEDIC SURGERY

## 2022-05-04 ENCOUNTER — PATIENT MESSAGE (OUTPATIENT)
Dept: ORTHOPEDICS | Facility: CLINIC | Age: 67
End: 2022-05-04
Payer: COMMERCIAL

## 2022-05-04 PROBLEM — M16.11 PRIMARY OSTEOARTHRITIS OF RIGHT HIP: Status: ACTIVE | Noted: 2022-05-04

## 2022-05-04 NOTE — PROGRESS NOTES
Subjective:      Patient ID: Babatunde Oconnor is a 67 y.o. male.    Chief Complaint:   Pain of the Right Hip    HPI  He comes in for chronic pain in the right hip area.  He has also had some spine problems, and sciatica, which led to some confusion about his pain generator.  He has noted that he has always had the right leg longer by about a quarter of an inch.  He did not have a specific fall, accident, injury.  He was not getting any relief of his pain from naproxen, so a Dosepak was tried which gave him only a little relief.  He has daily activity limiting pain with ADLs, and night pain interfering with sleep.  His pain is gotten much worse over the last month or so without any known cause.  No distal numbness or tingling.  No significant knee pain.      Objective:        Ortho/SPM Exam    Positive C sign, positive Stinchfield sign.  No tenderness at the greater trochanter or iliotibial band.  No tenderness at the SI joint.  The patient has pain in the groin with passive flexion and internal rotation of the hip which is very limited.  Knee benign without tenderness or effusion, with normal range of motion.  Skin intact.  Distal neurovascular intact.  Flip test negative.        IMAGING:  X-rays show bone-on-bone arthritis in the right hip with a concentrically reduced joint and no acute findings.  There are some subchondral cysts.  The left hip is mildly involved.  Assessment:   Advanced DJD, right hip      Plan:   We discussed treatment options fully, and he would like to proceed with hip replacement.  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 Amaral because of his sooner OR availability.    The patient's pathophysiology was explained in detail with reference to x-rays, models, other visual aids as appropriate.  Treatment options were discussed in detail.  Questions were invited and answered to the patient's satisfaction.      Roddy MONTE  MD [facility]  Orthopedic Surgery

## 2022-05-11 ENCOUNTER — PES CALL (OUTPATIENT)
Dept: ADMINISTRATIVE | Facility: CLINIC | Age: 67
End: 2022-05-11
Payer: COMMERCIAL

## 2022-05-12 ENCOUNTER — PATIENT MESSAGE (OUTPATIENT)
Dept: ORTHOPEDICS | Facility: CLINIC | Age: 67
End: 2022-05-12
Payer: COMMERCIAL

## 2022-05-12 ENCOUNTER — OFFICE VISIT (OUTPATIENT)
Dept: ORTHOPEDICS | Facility: CLINIC | Age: 67
End: 2022-05-12
Payer: MEDICARE

## 2022-05-12 VITALS — BODY MASS INDEX: 29.3 KG/M2 | HEIGHT: 66 IN | WEIGHT: 182.31 LBS

## 2022-05-12 DIAGNOSIS — M16.11 PRIMARY OSTEOARTHRITIS OF RIGHT HIP: Primary | ICD-10-CM

## 2022-05-12 PROCEDURE — 99215 PR OFFICE/OUTPT VISIT, EST, LEVL V, 40-54 MIN: ICD-10-PCS | Mod: S$GLB,,, | Performed by: ORTHOPAEDIC SURGERY

## 2022-05-12 PROCEDURE — 99999 PR PBB SHADOW E&M-EST. PATIENT-LVL II: CPT | Mod: PBBFAC,,, | Performed by: ORTHOPAEDIC SURGERY

## 2022-05-12 PROCEDURE — 3008F PR BODY MASS INDEX (BMI) DOCUMENTED: ICD-10-PCS | Mod: CPTII,S$GLB,, | Performed by: ORTHOPAEDIC SURGERY

## 2022-05-12 PROCEDURE — 1125F AMNT PAIN NOTED PAIN PRSNT: CPT | Mod: CPTII,S$GLB,, | Performed by: ORTHOPAEDIC SURGERY

## 2022-05-12 PROCEDURE — 99999 PR PBB SHADOW E&M-EST. PATIENT-LVL II: ICD-10-PCS | Mod: PBBFAC,,, | Performed by: ORTHOPAEDIC SURGERY

## 2022-05-12 PROCEDURE — 1125F PR PAIN SEVERITY QUANTIFIED, PAIN PRESENT: ICD-10-PCS | Mod: CPTII,S$GLB,, | Performed by: ORTHOPAEDIC SURGERY

## 2022-05-12 PROCEDURE — 99215 OFFICE O/P EST HI 40 MIN: CPT | Mod: S$GLB,,, | Performed by: ORTHOPAEDIC SURGERY

## 2022-05-12 PROCEDURE — 3008F BODY MASS INDEX DOCD: CPT | Mod: CPTII,S$GLB,, | Performed by: ORTHOPAEDIC SURGERY

## 2022-05-12 NOTE — PROGRESS NOTES
Subjective:     HPI:   Babatunde Oconnor is a 67 y.o. male who presents for eval R hip, referred by Dr Negron for an earlier surgery date    He recently saw Tracy Morley in the back and spine clinic in April who referred him to Dr Negron for R hip arthritis.     He has had anterior lateral hip pain for many years along with some low back pain has been worse over the past 6 months no radicular pain or paresthesias.  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 The patient stated that their wife would be able to help take care of them if they were to have surgery.   Wife has RA, s/p B TKA     ROS:  The updated medical history is in the chart and has been reviewed. A review of systems is updated and there is no reported vision changes, ear/nose/mouth/throat complaints,  chest pain, shortness of breath, abdominal pain, urological complaints, fevers or chills, psychiatric complaints. Musculoskeletal and neurologcial symptoms are as documented. All other systems are negative.      Objective:   Exam:  There were no vitals filed for this visit.  Body mass index is 29.43 kg/m².    Physical examination included assessment of the patient's general appearance with particular attention to development, nutrition, body habitus, attention to grooming, and any evidence of distress.  Constitutional: The patient is a well-developed, well-nourished patient in no acute distress.   Cardiovascular: Vascular  examination included warmth and capillary refill as well inspection for edema and assessment of pedal pulses. Pulses are palpable and regular.  Musculoskeletal: Gait was assessed as to whether it was steady, non-antalgic, and/or required the use of an assist device. The patient was also asked to walk independently and get onto the examination table.  Skin: The skin was examined for any obvious rashes or lesions in the extremity.  Neurologic: Sensation is intact to light touch in the extremity. The patient has good coordination without hyperreflexia and is alert and oriented to person, place and time and has normal mood and affect.     All of the above were examined and found to be within normal limits except for the following pertinent clinical findings:    Slight limp and antalgic gait favoring the right hip negative Trendelenburg.  Mild tenderness over the greater trochanter he has good abductor strength groin pain with active straight leg raise 0 90 hip flexion 30 abduction 20 abduction 20 external 10 internal rotation which recreates his symptoms.  No significant limb discrepancy supine patient thinks the right side is about 1/4 inch long      Imaging:    HIP R ARTHRITIS        Indication:  Right hip pain  Exam Ordered: Radiographs include an anteroposterior pelvis, an anteroposterior and lateral view of the proximal femur including the hip joint.  Details of Examination: Exam shows evidence of joint space narrowing, osteophyte formation, and subchondral sclerosis, all consistent with degenerative arthritis of the hip.  No other significant findings are noted.  Impression:  Degenerative Arthritis, Right Hip    Severe grade 3 right hip arthritis      Assessment:       ICD-10-CM ICD-9-CM   1. Primary osteoarthritis of right hip  M16.11 715.15      ACDF  L spine stenosis s/p WING  Chronic pain  BPH on flomax  Pre-DM 5.5 6/16/21  NAFLD, AST/ALT normal  On ASA     Plan:       This patient has significant symptoms in  their hip that are affecting their quality of life and daily activities.  They have tried non-operative treatment including analgesics, an exercise program, and activity modification, but the symptoms have persisted. I believe they make a good candidate for hip arthroplasty.     The risks and benefits of hip arthroplasty have been discussed with the patient which include, but are not limited to infections (including severe sequelae), potential component failure, fracture, DVT, pulmonary embolus, nerve palsy, dislocation, leg length inequality, persistent pain, wound healing complications, transfusions, medical complications and death.     We discussed surgical options including implant type and why I believe the implant selected is a good match for the patient's needs. The patient expressed understanding and agrees to proceed with the planned surgery.     Pre-operative planning will include the following:  A pre-surgical evaluation will be arranged.  Pre-op orders will be placed.  We will make arrangements with the operating room for proper time and staffing.  We will make Social Service arrangements for the patient.    Approach: Anterior    Implants:   Company: [employer]  Cup: [place]  Stem: [employer]    DVT prophylaxis: ASA 81mg BID x1 month  Dispo: home health PT    Admission status: Outpatient/23hr OBS                 Location: [facility] ant IVAN  IVAN info  Try not to lengthen  POUR risk    No orders of the defined types were placed in this encounter.            Past Medical History:   Diagnosis Date    BPH (benign prostatic hyperplasia)     Hypertension     Leg length discrepancy 10/16/2021    R longer, Consider foot eval & may need custom insert in shoe    Lumbar spondylosis 12/13/2019       Past Surgical History:   Procedure Laterality Date    APPENDECTOMY      CERVICAL DISCECTOMY  2008    CERVICAL SPINE SURGERY      INJECTION OF ANESTHETIC AGENT AROUND MEDIAL BRANCH NERVES  INNERVATING LUMBAR FACET JOINT Bilateral 1/7/2020    Procedure: Block-nerve-medial branch-lumbar-- Bilateral (With Steroid);  Surgeon: Vamshi Anderson Jr., MD;  [place] [facility] [place] [facility] [place]  Service: Pain Management;  Laterality: Bilateral;  No Sedation    INJECTION OF ANESTHETIC AGENT AROUND MEDIAL BRANCH NERVES INNERVATING LUMBAR FACET JOINT Bilateral 5/14/2020    Procedure: Block-nerve-medial branch-lumbar--Bilateral L4-5, L5-S1 with steriods;  Surgeon: Vamshi Anderson Jr., MD;  Location: [facility] T;  Service: Pain Management;  Laterality: Bilateral;       No family history on file.    Social History     Socioeconomic History    Marital status:    Tobacco Use    Smoking status: Never Smoker    Smokeless tobacco: Never Used   Substance and Sexual Activity    Alcohol use: Yes    Drug use: No    Sexual activity: Yes     Partners: Female     Social Determinants of Health     Financial Resource Strain: Medium Risk    Difficulty of Paying Living Expenses: Somewhat hard   Food Insecurity: No Food Insecurity    Worried About Running Out of Food in the Last Year: Never true    Ran Out of Food in the Last Year: Never true   Transportation Needs: No Transportation Needs    Lack of Transportation (Medical): No    Lack of Transportation (Non-Medical): No   Physical Activity: Insufficiently Active    Days of Exercise per Week: 2 days    Minutes of Exercise per Session: 40 min   Social Connections: Unknown    Frequency of Communication with Friends and Family: Once a week    Frequency of Social Gatherings with Friends and Family: Once a week    Active Member of Clubs or Organizations: No    Attends Club or Organization Meetings: Never    Marital Status:

## 2022-05-13 DIAGNOSIS — M54.40 CHRONIC BILATERAL LOW BACK PAIN WITH SCIATICA, SCIATICA LATERALITY UNSPECIFIED: ICD-10-CM

## 2022-05-13 DIAGNOSIS — G89.29 CHRONIC BILATERAL LOW BACK PAIN WITH SCIATICA, SCIATICA LATERALITY UNSPECIFIED: ICD-10-CM

## 2022-05-13 RX ORDER — NAPROXEN 500 MG/1
500 TABLET ORAL 2 TIMES DAILY WITH MEALS
Qty: 60 TABLET | Refills: 0 | Status: SHIPPED | OUTPATIENT
Start: 2022-05-13 | End: 2022-06-28 | Stop reason: HOSPADM

## 2022-05-16 ENCOUNTER — TELEPHONE (OUTPATIENT)
Dept: PREADMISSION TESTING | Facility: HOSPITAL | Age: 67
End: 2022-05-16
Payer: COMMERCIAL

## 2022-05-16 NOTE — TELEPHONE ENCOUNTER
----- Message from Benjie Perdomo sent at 2022  4:14 PM CDT -----  Regardin/6 Surgery  Hello,    Please that this patient is scheduled for surgey:    Surgery Date: 2022  Surgery: right IVAN   Surgeon: Dr. Amaral  Out of State/Out of Town:  No  NP vs. Joya: NP  Orthopedics Apt. Date: 2022    Comments:     Please schedule the patient at least 2 weeks out from their surgery.     Thank you!      Sincerely,   Jered Perdomo MS, OTC  OR & Clinical Assistant to Dr. Talon Amaral III  Phone: [phone]  Fax: [phone]

## 2022-05-27 DIAGNOSIS — I10 ESSENTIAL HYPERTENSION: ICD-10-CM

## 2022-05-27 DIAGNOSIS — E78.5 HYPERLIPIDEMIA, UNSPECIFIED HYPERLIPIDEMIA TYPE: ICD-10-CM

## 2022-05-28 NOTE — TELEPHONE ENCOUNTER
No new care gaps identified.  [facility] Embedded Care Gaps. Reference number: [id number]. 5/27/2022   10:37:57 PM CDT

## 2022-05-30 ENCOUNTER — PATIENT MESSAGE (OUTPATIENT)
Dept: ADMINISTRATIVE | Facility: HOSPITAL | Age: 67
End: 2022-05-30
Payer: COMMERCIAL

## 2022-05-30 RX ORDER — AMLODIPINE BESYLATE AND ATORVASTATIN CALCIUM 10; 10 MG/1; MG/1
1 TABLET, FILM COATED ORAL DAILY
Qty: 90 TABLET | Refills: 0 | Status: SHIPPED | OUTPATIENT
Start: 2022-05-30 | End: 2022-09-05 | Stop reason: SDUPTHER

## 2022-06-07 DIAGNOSIS — M54.40 CHRONIC BILATERAL LOW BACK PAIN WITH SCIATICA, SCIATICA LATERALITY UNSPECIFIED: ICD-10-CM

## 2022-06-07 DIAGNOSIS — G89.29 CHRONIC BILATERAL LOW BACK PAIN WITH SCIATICA, SCIATICA LATERALITY UNSPECIFIED: ICD-10-CM

## 2022-06-07 RX ORDER — NAPROXEN 500 MG/1
TABLET ORAL
Qty: 60 TABLET | Refills: 1 | Status: SHIPPED | OUTPATIENT
Start: 2022-06-07 | End: 2022-06-14 | Stop reason: CLARIF

## 2022-06-09 ENCOUNTER — TELEPHONE (OUTPATIENT)
Dept: ORTHOPEDICS | Facility: CLINIC | Age: 67
End: 2022-06-09
Payer: COMMERCIAL

## 2022-06-09 ENCOUNTER — PATIENT MESSAGE (OUTPATIENT)
Dept: ORTHOPEDICS | Facility: CLINIC | Age: 67
End: 2022-06-09
Payer: COMMERCIAL

## 2022-06-09 NOTE — TELEPHONE ENCOUNTER
I called the patient today regarding his surgery. I left a message for the patient to call me back. I left my name and phone number.

## 2022-06-11 ENCOUNTER — PATIENT MESSAGE (OUTPATIENT)
Dept: ADMINISTRATIVE | Facility: OTHER | Age: 67
End: 2022-06-11
Payer: COMMERCIAL

## 2022-06-14 ENCOUNTER — PATIENT MESSAGE (OUTPATIENT)
Dept: ADMINISTRATIVE | Facility: HOSPITAL | Age: 67
End: 2022-06-14
Payer: COMMERCIAL

## 2022-06-14 DIAGNOSIS — Z12.11 SCREENING FOR COLON CANCER: ICD-10-CM

## 2022-06-14 DIAGNOSIS — Z01.818 PRE-OP TESTING: Primary | ICD-10-CM

## 2022-06-14 PROBLEM — A60.02 HERPES GENITALIS IN MEN: Status: ACTIVE | Noted: 2018-05-03

## 2022-06-14 PROBLEM — I25.10 ASHD (ARTERIOSCLEROTIC HEART DISEASE): Status: ACTIVE | Noted: 2018-05-03

## 2022-06-14 PROBLEM — M51.26 LUMBAR DISC HERNIATION: Status: ACTIVE | Noted: 2018-05-03

## 2022-06-14 PROBLEM — A60.01 HERPES SIMPLEX INFECTION OF PENIS: Status: RESOLVED | Noted: 2018-08-31 | Resolved: 2022-06-14

## 2022-06-14 PROBLEM — M15.9 PRIMARY OSTEOARTHRITIS INVOLVING MULTIPLE JOINTS: Status: ACTIVE | Noted: 2018-05-03

## 2022-06-14 PROBLEM — M15.0 PRIMARY OSTEOARTHRITIS INVOLVING MULTIPLE JOINTS: Status: ACTIVE | Noted: 2018-05-03

## 2022-06-14 NOTE — ANESTHESIA PAT ROS NOTE
06/14/2022  Babatunde Oconnor is a 67 y.o., male.      Pre-op Assessment          Review of Systems         Anesthesia Assessment: Preoperative EQUATION    Planned Procedure: Procedure(s) (LRB):  ARTHROPLASTY, HIP, TOTAL, ANTERIOR APPROACH: RIGHT: DEPUY-ACTIS+PINNACLE (Right)  Requested Anesthesia Type:Spinal/Epidural  Surgeon: Talon Amaral III, MD  Service: Orthopedics  Known or anticipated Date of Surgery:6/29/2022    Surgeon notes: reviewed    Electronic QUestionnaire Assessment completed via nurse interview with patient.        Triage considerations:     The patient has no apparent active cardiac condition (No unstable coronary Syndrome such as severe unstable angina or recent [<1 month] myocardial infarction, decompensated CHF, severe valvular   disease or significant arrhythmia)    Previous anesthesia records:Not available and Patient reports no personal history of anesthesia complications     Last PCP note: 6-12 months ago , within [facility] Dr. Pandey    Subspecialty notes: Ortho    Other important co-morbidities: HLD, HTN, HERNANDO and no CPAP, ETOH Use ( 2-3 shots q pm), Lumbar DDD, Leg Length Discrepancy, NAFLD, Pre DM, BPH, Chronic Pain, ACDF (10 yrs ago os [facility]), h/o Covid 1/7/22      Tests already available:  Available tests,  within [facility] .     6/21/21 CBC    6/16/21 A1C, CMP    10/26/21 Lumbar XR             Instructions given. (See in Nurse's note)    Optimization:  Anesthesia Preop Clinic Assessment  Indicated Will Schedule POC    Medical Opinion Indicated       Sub-specialist consult indicated:   [facility] NP      Plan:    Testing:  CMP, EKG, Hematology Profile and PT/INR   Pre-anesthesia  visit       Visit focus: possible regional anesthesia and/or nerve block      Consultation:PCC NP for medical and anesthesia optimization     Patient  has previously scheduled Medical  Appointment: 6/16    Navigation: Tests Scheduled.              Consults scheduled.             Results will be tracked by Preop Clinic.     6/16/22  Patient is optimized     Osvaldo Piper NP  Perioperative Medicine  [facility]   Pager [phone]

## 2022-06-14 NOTE — ASSESSMENT & PLAN NOTE
Current /77  today.  Taking: Caduet  Ziac  Patient reports home BP readings of: 125/70's  Encouraged keeping a healthy weight and BMI  Lifestyle changes to reduce systolic BP: exercise 30 minutes per day,  5 days per week or 150 minutes weekly; sodium reduction and avoidance of high salt foods such as processed meats, frozen meals and  fast foods.     BP acceptable for surgery. I recommend monitoring BP during perioperative period as well as uncontrolled pain which can elevated blood pressure.

## 2022-06-16 ENCOUNTER — HOSPITAL ENCOUNTER (OUTPATIENT)
Dept: RADIOLOGY | Facility: HOSPITAL | Age: 67
Discharge: HOME | End: 2022-06-16
Attending: PHYSICIAN ASSISTANT
Payer: MEDICARE

## 2022-06-16 ENCOUNTER — OFFICE VISIT (OUTPATIENT)
Dept: ORTHOPEDICS | Facility: CLINIC | Age: 67
End: 2022-06-16
Payer: MEDICARE

## 2022-06-16 ENCOUNTER — PATIENT MESSAGE (OUTPATIENT)
Dept: ADMINISTRATIVE | Facility: OTHER | Age: 67
End: 2022-06-16
Payer: COMMERCIAL

## 2022-06-16 ENCOUNTER — PATIENT MESSAGE (OUTPATIENT)
Dept: PREADMISSION TESTING | Facility: HOSPITAL | Age: 67
End: 2022-06-16
Payer: COMMERCIAL

## 2022-06-16 ENCOUNTER — OFFICE VISIT (OUTPATIENT)
Dept: INTERNAL MEDICINE | Facility: CLINIC | Age: 67
End: 2022-06-16
Payer: MEDICARE

## 2022-06-16 ENCOUNTER — HOSPITAL ENCOUNTER (OUTPATIENT)
Dept: CARDIOLOGY | Facility: CLINIC | Age: 67
Discharge: HOME | End: 2022-06-16
Payer: MEDICARE

## 2022-06-16 VITALS
HEART RATE: 73 BPM | WEIGHT: 178.88 LBS | DIASTOLIC BLOOD PRESSURE: 77 MMHG | TEMPERATURE: 99 F | SYSTOLIC BLOOD PRESSURE: 133 MMHG | HEIGHT: 67 IN | OXYGEN SATURATION: 97 % | BODY MASS INDEX: 28.08 KG/M2

## 2022-06-16 VITALS — WEIGHT: 178.63 LBS | BODY MASS INDEX: 28.04 KG/M2 | HEIGHT: 67 IN

## 2022-06-16 DIAGNOSIS — M16.11 PRIMARY OSTEOARTHRITIS OF RIGHT HIP: Primary | ICD-10-CM

## 2022-06-16 DIAGNOSIS — I10 ESSENTIAL HYPERTENSION: ICD-10-CM

## 2022-06-16 DIAGNOSIS — K76.0 NAFLD (NONALCOHOLIC FATTY LIVER DISEASE): ICD-10-CM

## 2022-06-16 DIAGNOSIS — G47.33 OSA (OBSTRUCTIVE SLEEP APNEA): ICD-10-CM

## 2022-06-16 DIAGNOSIS — M16.11 PRIMARY OSTEOARTHRITIS OF RIGHT HIP: ICD-10-CM

## 2022-06-16 DIAGNOSIS — N40.0 BENIGN PROSTATIC HYPERPLASIA, UNSPECIFIED WHETHER LOWER URINARY TRACT SYMPTOMS PRESENT: ICD-10-CM

## 2022-06-16 DIAGNOSIS — E78.5 HYPERLIPIDEMIA, UNSPECIFIED HYPERLIPIDEMIA TYPE: ICD-10-CM

## 2022-06-16 DIAGNOSIS — Z01.818 PREOPERATIVE TESTING: ICD-10-CM

## 2022-06-16 DIAGNOSIS — Z78.9 ALCOHOL USE: ICD-10-CM

## 2022-06-16 DIAGNOSIS — R73.03 PREDIABETES: ICD-10-CM

## 2022-06-16 PROBLEM — R74.8 ELEVATED LIVER ENZYMES: Status: RESOLVED | Noted: 2019-12-11 | Resolved: 2022-06-16

## 2022-06-16 PROBLEM — F10.90 ALCOHOL USE: Status: ACTIVE | Noted: 2022-06-16

## 2022-06-16 PROCEDURE — 1160F RVW MEDS BY RX/DR IN RCRD: CPT | Mod: CPTII,S$GLB,, | Performed by: PHYSICIAN ASSISTANT

## 2022-06-16 PROCEDURE — 3008F PR BODY MASS INDEX (BMI) DOCUMENTED: ICD-10-PCS | Mod: CPTII,S$GLB,, | Performed by: PHYSICIAN ASSISTANT

## 2022-06-16 PROCEDURE — 1159F MED LIST DOCD IN RCRD: CPT | Mod: CPTII,S$GLB,, | Performed by: PHYSICIAN ASSISTANT

## 2022-06-16 PROCEDURE — 93010 EKG 12-LEAD: ICD-10-PCS | Mod: S$GLB,,, | Performed by: INTERNAL MEDICINE

## 2022-06-16 PROCEDURE — 3078F DIAST BP <80 MM HG: CPT | Mod: CPTII,S$GLB,, | Performed by: NURSE PRACTITIONER

## 2022-06-16 PROCEDURE — 3075F SYST BP GE 130 - 139MM HG: CPT | Mod: CPTII,S$GLB,, | Performed by: NURSE PRACTITIONER

## 2022-06-16 PROCEDURE — 3008F PR BODY MASS INDEX (BMI) DOCUMENTED: ICD-10-PCS | Mod: CPTII,S$GLB,, | Performed by: NURSE PRACTITIONER

## 2022-06-16 PROCEDURE — 3078F PR MOST RECENT DIASTOLIC BLOOD PRESSURE < 80 MM HG: ICD-10-PCS | Mod: CPTII,S$GLB,, | Performed by: NURSE PRACTITIONER

## 2022-06-16 PROCEDURE — 1159F MED LIST DOCD IN RCRD: CPT | Mod: CPTII,S$GLB,, | Performed by: NURSE PRACTITIONER

## 2022-06-16 PROCEDURE — 1125F AMNT PAIN NOTED PAIN PRSNT: CPT | Mod: CPTII,S$GLB,, | Performed by: PHYSICIAN ASSISTANT

## 2022-06-16 PROCEDURE — 99999 PR PBB SHADOW E&M-EST. PATIENT-LVL III: CPT | Mod: PBBFAC,,, | Performed by: PHYSICIAN ASSISTANT

## 2022-06-16 PROCEDURE — 99499 UNLISTED E&M SERVICE: CPT | Mod: S$GLB,,, | Performed by: NURSE PRACTITIONER

## 2022-06-16 PROCEDURE — 99214 OFFICE O/P EST MOD 30 MIN: CPT | Mod: S$GLB,,, | Performed by: NURSE PRACTITIONER

## 2022-06-16 PROCEDURE — 3288F PR FALLS RISK ASSESSMENT DOCUMENTED: ICD-10-PCS | Mod: CPTII,S$GLB,, | Performed by: PHYSICIAN ASSISTANT

## 2022-06-16 PROCEDURE — 99213 OFFICE O/P EST LOW 20 MIN: CPT | Mod: S$GLB,,, | Performed by: PHYSICIAN ASSISTANT

## 2022-06-16 PROCEDURE — 1160F RVW MEDS BY RX/DR IN RCRD: CPT | Mod: CPTII,S$GLB,, | Performed by: NURSE PRACTITIONER

## 2022-06-16 PROCEDURE — 99499 RISK ADDL DX/OHS AUDIT: ICD-10-PCS | Mod: S$GLB,,, | Performed by: NURSE PRACTITIONER

## 2022-06-16 PROCEDURE — 93005 ELECTROCARDIOGRAM TRACING: CPT | Mod: S$GLB,,, | Performed by: ANESTHESIOLOGY

## 2022-06-16 PROCEDURE — 99999 PR PBB SHADOW E&M-EST. PATIENT-LVL III: ICD-10-PCS | Mod: PBBFAC,,, | Performed by: PHYSICIAN ASSISTANT

## 2022-06-16 PROCEDURE — 3008F BODY MASS INDEX DOCD: CPT | Mod: CPTII,S$GLB,, | Performed by: PHYSICIAN ASSISTANT

## 2022-06-16 PROCEDURE — 1101F PR PT FALLS ASSESS DOC 0-1 FALLS W/OUT INJ PAST YR: ICD-10-PCS | Mod: CPTII,S$GLB,, | Performed by: PHYSICIAN ASSISTANT

## 2022-06-16 PROCEDURE — 1125F PR PAIN SEVERITY QUANTIFIED, PAIN PRESENT: ICD-10-PCS | Mod: CPTII,S$GLB,, | Performed by: PHYSICIAN ASSISTANT

## 2022-06-16 PROCEDURE — 3008F BODY MASS INDEX DOCD: CPT | Mod: CPTII,S$GLB,, | Performed by: NURSE PRACTITIONER

## 2022-06-16 PROCEDURE — 99213 PR OFFICE/OUTPT VISIT, EST, LEVL III, 20-29 MIN: ICD-10-PCS | Mod: S$GLB,,, | Performed by: PHYSICIAN ASSISTANT

## 2022-06-16 PROCEDURE — 93005 EKG 12-LEAD: ICD-10-PCS | Mod: S$GLB,,, | Performed by: ANESTHESIOLOGY

## 2022-06-16 PROCEDURE — 1159F PR MEDICATION LIST DOCUMENTED IN MEDICAL RECORD: ICD-10-PCS | Mod: CPTII,S$GLB,, | Performed by: PHYSICIAN ASSISTANT

## 2022-06-16 PROCEDURE — 1160F PR REVIEW ALL MEDS BY PRESCRIBER/CLIN PHARMACIST DOCUMENTED: ICD-10-PCS | Mod: CPTII,S$GLB,, | Performed by: NURSE PRACTITIONER

## 2022-06-16 PROCEDURE — 99999 PR PBB SHADOW E&M-EST. PATIENT-LVL IV: CPT | Mod: PBBFAC,,, | Performed by: NURSE PRACTITIONER

## 2022-06-16 PROCEDURE — 72170 X-RAY EXAM OF PELVIS: CPT | Mod: 26,,, | Performed by: RADIOLOGY

## 2022-06-16 PROCEDURE — 1159F PR MEDICATION LIST DOCUMENTED IN MEDICAL RECORD: ICD-10-PCS | Mod: CPTII,S$GLB,, | Performed by: NURSE PRACTITIONER

## 2022-06-16 PROCEDURE — 1160F PR REVIEW ALL MEDS BY PRESCRIBER/CLIN PHARMACIST DOCUMENTED: ICD-10-PCS | Mod: CPTII,S$GLB,, | Performed by: PHYSICIAN ASSISTANT

## 2022-06-16 PROCEDURE — 93010 ELECTROCARDIOGRAM REPORT: CPT | Mod: S$GLB,,, | Performed by: INTERNAL MEDICINE

## 2022-06-16 PROCEDURE — 72170 X-RAY EXAM OF PELVIS: CPT | Mod: TC

## 2022-06-16 PROCEDURE — 3075F PR MOST RECENT SYSTOLIC BLOOD PRESS GE 130-139MM HG: ICD-10-PCS | Mod: CPTII,S$GLB,, | Performed by: NURSE PRACTITIONER

## 2022-06-16 PROCEDURE — 3288F FALL RISK ASSESSMENT DOCD: CPT | Mod: CPTII,S$GLB,, | Performed by: PHYSICIAN ASSISTANT

## 2022-06-16 PROCEDURE — 72170 XR PELVIS ROUTINE AP: ICD-10-PCS | Mod: 26,,, | Performed by: RADIOLOGY

## 2022-06-16 PROCEDURE — 99999 PR PBB SHADOW E&M-EST. PATIENT-LVL IV: ICD-10-PCS | Mod: PBBFAC,,, | Performed by: NURSE PRACTITIONER

## 2022-06-16 PROCEDURE — 99214 PR OFFICE/OUTPT VISIT, EST, LEVL IV, 30-39 MIN: ICD-10-PCS | Mod: S$GLB,,, | Performed by: NURSE PRACTITIONER

## 2022-06-16 PROCEDURE — 1101F PT FALLS ASSESS-DOCD LE1/YR: CPT | Mod: CPTII,S$GLB,, | Performed by: PHYSICIAN ASSISTANT

## 2022-06-16 RX ORDER — NALOXONE HCL 0.4 MG/ML
0.02 VIAL (ML) INJECTION
Status: CANCELLED | OUTPATIENT
Start: 2022-06-16

## 2022-06-16 RX ORDER — ACETAMINOPHEN 500 MG
1000 TABLET ORAL
Status: CANCELLED | OUTPATIENT
Start: 2022-06-16

## 2022-06-16 RX ORDER — MORPHINE SULFATE 2 MG/ML
2 INJECTION, SOLUTION INTRAMUSCULAR; INTRAVENOUS
Status: CANCELLED | OUTPATIENT
Start: 2022-06-16

## 2022-06-16 RX ORDER — ACETAMINOPHEN 500 MG
1000 TABLET ORAL EVERY 6 HOURS
Status: CANCELLED | OUTPATIENT
Start: 2022-06-16

## 2022-06-16 RX ORDER — SODIUM CHLORIDE 9 MG/ML
INJECTION, SOLUTION INTRAVENOUS
Status: CANCELLED | OUTPATIENT
Start: 2022-06-16

## 2022-06-16 RX ORDER — OXYCODONE HYDROCHLORIDE 5 MG/1
10 TABLET ORAL
Status: CANCELLED | OUTPATIENT
Start: 2022-06-16

## 2022-06-16 RX ORDER — SODIUM CHLORIDE 9 MG/ML
INJECTION, SOLUTION INTRAVENOUS CONTINUOUS
Status: CANCELLED | OUTPATIENT
Start: 2022-06-16 | End: 2022-06-17

## 2022-06-16 RX ORDER — AMOXICILLIN 250 MG
1 CAPSULE ORAL 2 TIMES DAILY
Status: CANCELLED | OUTPATIENT
Start: 2022-06-16

## 2022-06-16 RX ORDER — POLYETHYLENE GLYCOL 3350 17 G/17G
17 POWDER, FOR SOLUTION ORAL DAILY
Status: CANCELLED | OUTPATIENT
Start: 2022-06-16

## 2022-06-16 RX ORDER — METHOCARBAMOL 750 MG/1
750 TABLET, FILM COATED ORAL 3 TIMES DAILY
Status: CANCELLED | OUTPATIENT
Start: 2022-06-16

## 2022-06-16 RX ORDER — ASPIRIN 81 MG/1
81 TABLET ORAL 2 TIMES DAILY
Status: CANCELLED | OUTPATIENT
Start: 2022-06-16

## 2022-06-16 RX ORDER — POLYETHYLENE GLYCOL 3350 17 G/17G
17 POWDER, FOR SOLUTION ORAL DAILY PRN
Status: CANCELLED | OUTPATIENT
Start: 2022-06-16

## 2022-06-16 RX ORDER — CELECOXIB 200 MG/1
200 CAPSULE ORAL DAILY
Status: CANCELLED | OUTPATIENT
Start: 2022-06-16

## 2022-06-16 RX ORDER — CELECOXIB 200 MG/1
400 CAPSULE ORAL
Status: CANCELLED | OUTPATIENT
Start: 2022-06-16

## 2022-06-16 RX ORDER — ONDANSETRON 2 MG/ML
4 INJECTION INTRAMUSCULAR; INTRAVENOUS EVERY 8 HOURS PRN
Status: CANCELLED | OUTPATIENT
Start: 2022-06-16

## 2022-06-16 RX ORDER — MUPIROCIN 20 MG/G
1 OINTMENT TOPICAL 2 TIMES DAILY
Status: CANCELLED | OUTPATIENT
Start: 2022-06-16 | End: 2022-06-21

## 2022-06-16 RX ORDER — MIDAZOLAM HYDROCHLORIDE 1 MG/ML
4 INJECTION INTRAMUSCULAR; INTRAVENOUS
Status: CANCELLED | OUTPATIENT
Start: 2022-06-16 | End: 2022-06-17

## 2022-06-16 RX ORDER — OXYCODONE HYDROCHLORIDE 5 MG/1
5 TABLET ORAL
Status: CANCELLED | OUTPATIENT
Start: 2022-06-16

## 2022-06-16 RX ORDER — PREGABALIN 150 MG/1
150 CAPSULE ORAL NIGHTLY
Status: CANCELLED | OUTPATIENT
Start: 2022-06-16

## 2022-06-16 RX ORDER — MUPIROCIN 20 MG/G
1 OINTMENT TOPICAL
Status: CANCELLED | OUTPATIENT
Start: 2022-06-16

## 2022-06-16 RX ORDER — PROCHLORPERAZINE EDISYLATE 5 MG/ML
5 INJECTION INTRAMUSCULAR; INTRAVENOUS EVERY 6 HOURS PRN
Status: CANCELLED | OUTPATIENT
Start: 2022-06-16

## 2022-06-16 RX ORDER — FENTANYL CITRATE 50 UG/ML
25 INJECTION, SOLUTION INTRAMUSCULAR; INTRAVENOUS EVERY 5 MIN PRN
Status: CANCELLED | OUTPATIENT
Start: 2022-06-16

## 2022-06-16 RX ORDER — FENTANYL CITRATE 50 UG/ML
100 INJECTION, SOLUTION INTRAMUSCULAR; INTRAVENOUS
Status: CANCELLED | OUTPATIENT
Start: 2022-06-16 | End: 2022-06-17

## 2022-06-16 RX ORDER — PREGABALIN 150 MG/1
150 CAPSULE ORAL
Status: CANCELLED | OUTPATIENT
Start: 2022-06-16

## 2022-06-16 RX ORDER — FAMOTIDINE 20 MG/1
20 TABLET, FILM COATED ORAL 2 TIMES DAILY
Status: CANCELLED | OUTPATIENT
Start: 2022-06-16

## 2022-06-16 RX ORDER — LIDOCAINE HYDROCHLORIDE 10 MG/ML
1 INJECTION, SOLUTION EPIDURAL; INFILTRATION; INTRACAUDAL; PERINEURAL
Status: CANCELLED | OUTPATIENT
Start: 2022-06-16

## 2022-06-16 NOTE — H&P
CC:  Right hip pain    Babatunde Oconnor is a 67 y.o. male with Right hip pain.  Pain is worse with activity and weight bearing.  Patient has experienced interference of activities of daily living due to increased pain and decreased range of motion. Patient has failed non-operative treatment including NSAIDs, as well as greater than 3 months of activity modification. Babatunde Oconnor ambulates independently.     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    Hypertension     Leg length discrepancy 10/16/2021    R longer, Consider foot eval & may need custom insert in shoe    Lumbar spondylosis 12/13/2019       Past Surgical History:   Procedure Laterality Date    APPENDECTOMY      CERVICAL DISCECTOMY  2008    CERVICAL SPINE SURGERY      INJECTION OF ANESTHETIC AGENT AROUND MEDIAL BRANCH NERVES INNERVATING LUMBAR FACET JOINT Bilateral 1/7/2020    Procedure: Block-nerve-medial branch-lumbar-- Bilateral (With Steroid);  Surgeon: Vamshi Anderson Jr., MD;  Location: [facility];  Service: Pain Management;  Laterality: Bilateral;  No Sedation    INJECTION OF ANESTHETIC AGENT AROUND MEDIAL BRANCH NERVES INNERVATING LUMBAR FACET JOINT Bilateral 5/14/2020    Procedure: Block-nerve-medial branch-lumbar--Bilateral L4-5, L5-S1 with steriods;  Surgeon: Vamshi Anderson Jr., MD;  Location: [facility];  Service: Pain Management;  Laterality: Bilateral;       No family history on file.    Review of patient's allergies indicates:  No Known Allergies      Current Outpatient Medications:     amlodipine-atorvastatin (CADUET) 10-10 mg per tablet, TAKE 1 TABLET BY MOUTH ONCE DAILY, Disp: 90 tablet, Rfl: 0    aspirin (ECOTRIN) 81 MG EC tablet, Take 81 mg  by mouth once daily., Disp: , Rfl:     bisoprolol-hydrochlorothiazide 5-6.25 mg (ZIAC) 5-6.25 mg Tab, TAKE 1 TABLET BY MOUTH ONCE DAILY, Disp: 90 tablet, Rfl: 0    naproxen (NAPROSYN) 500 MG tablet, Take 1 tablet (500 mg total) by mouth 2 (two) times daily with meals., Disp: 60 tablet, Rfl: 0    tamsulosin (FLOMAX) 0.4 mg Cap, TAKE 1 CAPSULE BY MOUTH  ONCE DAILY, Disp: 90 capsule, Rfl: 0    Current Facility-Administered Medications:     acetaminophen tablet 650 mg, 650 mg, Oral, Once PRN, Ramirez Cheng MD    Review of Systems:   Constitutional: no fever or chills  Eyes: no visual changes  ENT: no nasal congestion or sore throat  Respiratory: no cough or shortness of breath  Cardiovascular: no chest pain or palpitations  Gastrointestinal: no nausea or vomiting, tolerating diet  Genitourinary: no hematuria or dysuria  Integument/Breast: no rash or pruritis  Hematologic/Lymphatic: no easy bruising or lymphadenopathy  Musculoskeletal: positive for hip pain  Neurological: no seizures or tremors  Behavioral/Psych: no auditory or visual hallucinations  Endocrine: no heat or cold intolerance    PE:  There were no vitals taken for this visit.  General: Pleasant, cooperative, NAD   Gait: antalgic  HEENT: NCAT, sclera nonicteric   Lungs: Respirations are clear, equal and unlabored.   CV: S1S2; 2+ bilateral upper and lower extremity pulses.   Skin: Intact throughout LE with no rashes, erythema, or lesions  Extremities: No LE edema, NVI lower extremities    Right Hip Exam:  90 degrees flexion  0 degrees extension   10 degrees internal rotation  20 degrees external rotation  30 degrees abduction  20 degrees adduction  There is pain with passive range of motion.     Radiographs: Radiographs reveal advanced degenerative changes including subchondral cyst formation, subchondral sclerosis, osteophyte formation, joint space narrowing.     Diagnosis: Primary osteoarthritis Right hip    Plan: Right anterior total hip  arthroplasty     Due to the serious nature of total joint infection and the high prevalence of community acquired MRSA, vancomycin will be used perioperatively.

## 2022-06-16 NOTE — HPI
This is a 67 y.o. male  who presents today for a preoperative evaluation in preparation for a Orthopedic  procedure.  Scheduled for  6/29/22  Surgery is indicated for right hip.   Patient is new to me.  Details of current problem: The duration of problem has been for about 1 year   Reports symptoms of  pain, limping,   Aggravating factors include:  standing, first steps   Relieving factors are  rest  Treated with   Naprosyn  Reports pain: 0/10 at rest  The history has been obtained by speaking with the patient and reviewing the electronic medical record and/or outside health information. Significant health conditions for the perioperative period are discussed below in assessment and plan.     Patient reports current health status to be Good.  Denies any new symptoms before surgery.

## 2022-06-16 NOTE — PROGRESS NOTES
[facility] [address]  Progress Note    Patient Name: Babatunde Oconnor  MRN: [id number]  Date of Evaluation- 06/17/2022  PCP- Negar Pandey MD    Future cases for Babatunde Oconnor [[id number]]     Case ID Status Date Time Faustino Procedure Provider Location    [id number] [facility] 6/29/2022 10:02  ARTHROPLASTY, HIP, TOTAL, ANTERIOR APPROACH: RIGHT: DEPUY-ACTIS+PINNACLE Talon Amaral III, MD [[id number]] [facility] OR          HPI:  This is a 67 y.o. male  who presents today for a preoperative evaluation in preparation for a Orthopedic  procedure.  Scheduled for  6/29/22  Surgery is indicated for right hip.   Patient is new to me.  Details of current problem: The duration of problem has been for about 1 year   Reports symptoms of  pain, limping,   Aggravating factors include:  standing, first steps   Relieving factors are  rest  Treated with   Naprosyn  Reports pain: 0/10 at rest  The history has been obtained by speaking with the patient and reviewing the electronic medical record and/or outside health information. Significant health conditions for the perioperative period are discussed below in assessment and plan.     Patient reports current health status to be Good.  Denies any new symptoms before surgery.         Subjective/ Objective:     Chief Complaint: Preoperative evaulation, perioperative medical management, and complication reduction plan.     Functional Capacity: Able to climb a flight of stairs with out CP SOB or Syncope.  ABle to meet 4 METS      Anesthesia issues: None    Difficulty mouth opening: none    Steroid use in the last 12 months: none     Dental Issues: none    Family anesthesia difficulty: None       Family Hx of Thrombosis Brother had a blood clot- unsure of why    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    Hyperlipidemia     Hypertension     Leg length discrepancy  10/16/2021    R longer, Consider foot eval & may need custom insert in shoe    Lumbar spondylosis 12/13/2019     Past Surgical History:   Procedure Laterality Date    APPENDECTOMY      CERVICAL DISCECTOMY  2008    CERVICAL SPINE SURGERY      INJECTION OF ANESTHETIC AGENT AROUND MEDIAL BRANCH NERVES INNERVATING LUMBAR FACET JOINT Bilateral 1/7/2020    Procedure: Block-nerve-medial branch-lumbar-- Bilateral (With Steroid);  Surgeon: Vamshi Anderson Jr., MD;  [place] [facility]  Service: Pain Management;  Laterality: Bilateral;  No Sedation    INJECTION OF ANESTHETIC AGENT AROUND MEDIAL BRANCH NERVES INNERVATING LUMBAR FACET JOINT Bilateral 5/14/2020    Procedure: Block-nerve-medial branch-lumbar--Bilateral L4-5, L5-S1 with steriods;  Surgeon: Vamshi Anderson Jr., MD;  [place]: [facility];  Service: Pain Management;  Laterality: Bilateral;       Review of Systems   Constitutional: Negative for activity change, appetite change, chills, diaphoresis, fatigue, fever and unexpected weight change.   HENT: Negative for congestion, dental problem, drooling, trouble swallowing and voice change.    Eyes: Negative for photophobia and visual disturbance.        No acute visual changes   Respiratory: Negative for apnea, cough, choking, chest tightness, shortness of breath, wheezing and stridor.         STOP bang  Score 4    High risk HERNANDO    Cardiovascular: Negative for chest pain, palpitations and leg swelling.   Gastrointestinal: Negative for abdominal pain, blood in stool, constipation, diarrhea, nausea and vomiting.        No  Hepatitis, Cirrhosis  No BRB or black tarry stool    FLD   Genitourinary: Negative for difficulty urinating, dysuria, frequency, hematuria and urgency.        Nocturia-1   Musculoskeletal: Positive for arthralgias, back pain and gait problem. Negative for myalgias, neck pain and neck stiffness.    Neurological: Negative for dizziness, seizures, syncope, light-headedness, numbness and  "headaches.   Psychiatric/Behavioral: Negative for suicidal ideas. The patient is not nervous/anxious.       VITALS  Visit Vitals  /77   Pulse 73   Temp 98.5 °F (36.9 °C)   Ht 5' 6.5" (1.689 m)   Wt 81.1 kg (178 lb 14.4 oz)   SpO2 97%   BMI 28.44 kg/m²        Physical Exam  Constitutional:       General: He is not in acute distress.     Appearance: He is well-developed. He is not diaphoretic.   HENT:      Head: Normocephalic.      Right Ear: Hearing normal.      Left Ear: Hearing normal.      Nose: Nose normal.      Mouth/Throat:      Lips: Pink.   Eyes:      General: Lids are normal.      Conjunctiva/sclera: Conjunctivae normal.      Pupils: Pupils are equal, round, and reactive to light.   Neck:      Vascular: No carotid bruit.      Trachea: Trachea and phonation normal.   Cardiovascular:      Rate and Rhythm: Normal rate and regular rhythm.      Pulses:           Carotid pulses are 2+ on the right side and 2+ on the left side.       Radial pulses are 2+ on the right side and 2+ on the left side.        Posterior tibial pulses are 2+ on the right side and 2+ on the left side.      Heart sounds: Normal heart sounds. No murmur heard.    No friction rub. No gallop.   Pulmonary:      Effort: Pulmonary effort is normal.      Breath sounds: Normal breath sounds.   Abdominal:      General: Bowel sounds are normal. There is no distension.      Palpations: Abdomen is soft.      Tenderness: There is no abdominal tenderness.   Musculoskeletal:         General: No tenderness or deformity. Normal range of motion.      Cervical back: Normal range of motion.      Right lower leg: No edema.      Left lower leg: No edema.   Lymphadenopathy:      Head:      Right side of head: No submental, submandibular, tonsillar, preauricular, posterior auricular or occipital adenopathy.      Left side of head: No submental, submandibular, tonsillar, preauricular, posterior auricular or occipital adenopathy.      Cervical:      Right " cervical: No superficial cervical adenopathy.     Left cervical: No superficial cervical adenopathy.   Skin:     General: Skin is warm and dry.      Capillary Refill: Capillary refill takes 2 to 3 seconds.      Coloration: Skin is not pale.      Findings: No erythema or rash.   Neurological:      Mental Status: He is alert and oriented to person, place, and time.      GCS: GCS eye subscore is 4. GCS verbal subscore is 5. GCS motor subscore is 6.      Motor: No abnormal muscle tone.      Coordination: Coordination normal.   Psychiatric:         Attention and Perception: Attention and perception normal.         Mood and Affect: Mood and affect normal.         Speech: Speech normal.         Behavior: Behavior normal. Behavior is cooperative.         Thought Content: Thought content normal.         Cognition and Memory: Cognition and memory normal.         Judgment: Judgment normal.          Significant Labs:  Lab Results   Component Value Date    WBC 6.55 06/16/2022    HGB 14.7 06/16/2022    HCT 44.1 06/16/2022     06/16/2022    CHOL 225 (H) 06/16/2021    TRIG 91 06/16/2021    HDL 81 (H) 06/16/2021    ALT 31 06/16/2022    AST 32 06/16/2022     06/16/2022    K 4.4 06/16/2022     06/16/2022    CREATININE 0.8 06/16/2022    BUN 14 06/16/2022    CO2 27 06/16/2022    TSH 2.058 06/16/2021    PSA 0.42 12/10/2019    INR 1.0 06/16/2022    HGBA1C 5.5 06/16/2021       Diagnostic Studies: No relevant studies.    EKG:   Results for orders placed or performed during the hospital encounter of 06/16/22   EKG 12-lead    Collection Time: 06/16/22  9:43 AM    Narrative    Test Reason : Z01.818,    Vent. Rate : 065 BPM     Atrial Rate : 065 BPM     P-R Int : 146 ms          QRS Dur : 086 ms      QT Int : 408 ms       P-R-T Axes : 063 -34 016 degrees     QTc Int : 424 ms    Normal sinus rhythm  Left axis deviation  Abnormal ECG  No previous ECGs available  Confirmed by Aston Alford MD ([id number]) on 6/16/2022 11:05:23  AM    Referred By: RADHA FRY           Confirmed By:Aston Alford MD       2D ECHO:  TTE:  No results found for this or any previous visit.    SUZI:  No results found for this or any previous visit.     Imaging   Active Cardiac Conditions: None      Revised Cardiac Risk Index   High -Risk Surgery  Intraperitoneal; Intrathoracic; suprainguinal vascular Yes- + 1 No- 0   History of Ischemic Heart Disease   (Hx of MI/positive exercise test/current chest pain due to ischemia/use of nitrate therapy/EKG with pathological Q waves) Yes- + 1 No- 0   History of CHF  (Pulmonary edema/bilateral rales or S3 gallop/PND/CXR showing pulmonary vascular redistribution) Yes- + 1 No- 0   History of CVA   (Prior stroke or TIA) Yes- + 1 No- 0   Pre-operative treatment with insulin Yes- + 1 No- 0   Pre-operative creatinine > 2mg/dl Yes- + 1 No- 0   Total:      Risk Status:  Estimated risk of cardiac complications after non-cardiac surgery using the Revised Cardiac Risk Index for Preoperative risk is 3.9 %      ARISCAT (Canet) risk index: Low: 1.6% risk of post-op pulmonary complications.    American Society of Anesthesiologists Physical Status classification (ASA): 3         No further cardiac workup needed prior to surgery.        Preoperative cardiac risk assessment-  The patient does not have any active cardiac conditions . Revised cardiac risk index predictors- ---.Functional capacity is more than 4 Mets. He will be undergoing a Orthopedic procedure that carries a Moderate Risk risk     The estimated risk of the rate of adverse cardiac outcomes  3.9    No further cardiac work up is indicated prior to proceeding with the surgery     Orders Placed This Encounter    Ambulatory referral/consult to Sleep Disorders       American Society of Anesthesiologists Physical status classification ( ASA ) class: 3     Postoperative pulmonary complication risk assessment: 1.6      ARISCAT ( Canet) risk index- risk class -  Low, if duration of  surgery is under 3 hours, intermediate, if duration of surgery is over 3 hours       Assessment/Plan:     Essential hypertension  Current /77  today.  Taking: Caduet  Ziac  Patient reports home BP readings of: 125/70's  Encouraged keeping a healthy weight and BMI  Lifestyle changes to reduce systolic BP: exercise 30 minutes per day,  5 days per week or 150 minutes weekly; sodium reduction and avoidance of high salt foods such as processed meats, frozen meals and  fast foods.     BP acceptable for surgery. I recommend monitoring BP during perioperative period as well as uncontrolled pain which can elevated blood pressure.           Hyperlipidemia  Statin? Atorvastatin    Benign prostatic hyperplasia  Flomax 0.4 mg    Prediabetes  Last A1c 5.5    HERNANDO (obstructive sleep apnea)  This client has a possible diagnosis of obstructive sleep apnea (HERNANDO)    STOPBANG score: 4    Instructions were given to avoid the following: sleeping supine, weight gain ,alcoholic beverages , sedative medications, and CNS depressant use as these can all worsen HERNANDO.    Untreated sleep apnea has been shown to increase daytime sleepiness, hypertension, heart disease and stroke which were discussed with the patient at this time    An order for a sleep study has been placed    Please use caution with medications that induce respiratory depression in the perioperative period      Primary osteoarthritis of right hip  See HPI    Alcohol use  Reported alcohol use 2-3 shots per night  Patient instructed to wean self off of alcohol prior to surgery  Risks for alcohol use prior to surgery discussed and related to his h/o NAFLD- included in discussion is the effects of alcohol on liver and s/s of acute liver decompensation/cirrhosis/ascites/liver cancer      NAFLD (nonalcoholic fatty liver disease)  Risks for alcohol use prior to surgery discussed and related to his h/o NAFLD- included in discussion is the effects of alcohol on liver and s/s of acute  liver decompensation/cirrhosis/ascites/liver cancer    No evidence of hepatic decompensation at this visit        Preventive perioperative care    Thromboembolic prophylaxis:  His risk factors for thrombosis include surgical procedure, age and reduced mobility.I suggest  thromboembolic prophylaxis ( mechanical/pharmacological, weighing the risk benefits of pharmacological agent use considering luis procedural bleeding )  during the perioperative period.I suggested being active in the post operative period.      Postoperative pulmonary complication prophylaxis-Risk factors for post operative pulmonary complications include age over 65 years and ASA class >2- I suggest incentive spirometry use, early ambulation and pain control so as to avoid diaphragmatic splinting  Brush teeth twice per day, oral rinses, sleep with the head of the bed up 30 degrees     Renal complication prophylaxis-Risk factors for renal complications include age and hypertension . I suggest keeping him well hydrated and avoidance/ minimizing the use of  NSAID's,HORN 2 Inhibitors ,IV contrast if possible in the perioperative period.I suggested drinking 2 litre's of water a day      Surgical site Infection Prophylaxis-I  suggest appropriate antibiotic for Prophylaxis against Surgical site infections Shower with Hibiclens in the night before surgery and the morning of surgery       In view of BPH the patient  is at risk of postoperative urinary retention.  I suggest avoidance / minimizing the of  Benzodiazepines,Anticholinergic medication,antihistamines ( Benadryl) , if possible in the perioperative period. I suggest using the minimum possible use of opioids for the minimum period of time in the perioperative period. Benadryl avoidance suggested      This visit was focused on Preoperative evaluation, Perioperative Medical management, complication reduction plans. I suggest that the patient follows up with primary care or relevant sub specialists for  ongoing health care.    I appreciate the opportunity to be involved in this patients care. Please feel free to contact me if there were any questions about this consultation.    Patient is optimized    Osvaldo Piper NP  Perioperative Medicine  [facility]   Pager [phone]

## 2022-06-16 NOTE — H&P (VIEW-ONLY)
CC:  Right hip pain    Babatunde Oconnor is a 67 y.o. male with Right hip pain.  Pain is worse with activity and weight bearing.  Patient has experienced interference of activities of daily living due to increased pain and decreased range of motion. Patient has failed non-operative treatment including NSAIDs, as well as greater than 3 months of activity modification. Babatunde Oconnor ambulates independently.     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    Hypertension     Leg length discrepancy 10/16/2021    R longer, Consider foot eval & may need custom insert in shoe    Lumbar spondylosis 12/13/2019       Past Surgical History:   Procedure Laterality Date    APPENDECTOMY      CERVICAL DISCECTOMY  2008    CERVICAL SPINE SURGERY      INJECTION OF ANESTHETIC AGENT AROUND MEDIAL BRANCH NERVES INNERVATING LUMBAR FACET JOINT Bilateral 1/7/2020    Procedure: Block-nerve-medial branch-lumbar-- Bilateral (With Steroid);  Surgeon: Vamshi Anderson Jr., MD;  Location: [facility];  Service: Pain Management;  Laterality: Bilateral;  No Sedation    INJECTION OF ANESTHETIC AGENT AROUND MEDIAL BRANCH NERVES INNERVATING LUMBAR FACET JOINT Bilateral 5/14/2020    Procedure: Block-nerve-medial branch-lumbar--Bilateral L4-5, L5-S1 with steriods;  Surgeon: Vamshi Anderson Jr., MD;  Location: [facility];  Service: Pain Management;  Laterality: Bilateral;       No family history on file.    Review of patient's allergies indicates:  No Known Allergies      Current Outpatient Medications:     amlodipine-atorvastatin (CADUET) 10-10 mg per tablet, TAKE 1 TABLET BY MOUTH ONCE DAILY, Disp: 90 tablet, Rfl: 0    aspirin (ECOTRIN) 81 MG EC tablet, Take 81 mg  by mouth once daily., Disp: , Rfl:     bisoprolol-hydrochlorothiazide 5-6.25 mg (ZIAC) 5-6.25 mg Tab, TAKE 1 TABLET BY MOUTH ONCE DAILY, Disp: 90 tablet, Rfl: 0    naproxen (NAPROSYN) 500 MG tablet, Take 1 tablet (500 mg total) by mouth 2 (two) times daily with meals., Disp: 60 tablet, Rfl: 0    tamsulosin (FLOMAX) 0.4 mg Cap, TAKE 1 CAPSULE BY MOUTH  ONCE DAILY, Disp: 90 capsule, Rfl: 0    Current Facility-Administered Medications:     acetaminophen tablet 650 mg, 650 mg, Oral, Once PRN, Ramirez Cheng MD    Review of Systems:   Constitutional: no fever or chills  Eyes: no visual changes  ENT: no nasal congestion or sore throat  Respiratory: no cough or shortness of breath  Cardiovascular: no chest pain or palpitations  Gastrointestinal: no nausea or vomiting, tolerating diet  Genitourinary: no hematuria or dysuria  Integument/Breast: no rash or pruritis  Hematologic/Lymphatic: no easy bruising or lymphadenopathy  Musculoskeletal: positive for hip pain  Neurological: no seizures or tremors  Behavioral/Psych: no auditory or visual hallucinations  Endocrine: no heat or cold intolerance    PE:  There were no vitals taken for this visit.  General: Pleasant, cooperative, NAD   Gait: antalgic  HEENT: NCAT, sclera nonicteric   Lungs: Respirations are clear, equal and unlabored.   CV: S1S2; 2+ bilateral upper and lower extremity pulses.   Skin: Intact throughout LE with no rashes, erythema, or lesions  Extremities: No LE edema, NVI lower extremities    Right Hip Exam:  90 degrees flexion  0 degrees extension   10 degrees internal rotation  20 degrees external rotation  30 degrees abduction  20 degrees adduction  There is pain with passive range of motion.     Radiographs: Radiographs reveal advanced degenerative changes including subchondral cyst formation, subchondral sclerosis, osteophyte formation, joint space narrowing.     Diagnosis: Primary osteoarthritis Right hip    Plan: Right anterior total hip  arthroplasty     Due to the serious nature of total joint infection and the high prevalence of community acquired MRSA, vancomycin will be used perioperatively.

## 2022-06-16 NOTE — PROGRESS NOTES
Babatunde Oconnor is a 67 y.o. year old here today for pre surgery optimization visit  in preparation for a Right total hip arthroplasty to be performed by Dr. Amaral  on 6/29/2022.  he was last seen and treated in the clinic on 5/12/2022. he will be medically optimized by the pre op center. There has been no significant change in medical status since last visit. No fever, chills, malaise, or unexplained weight change.      Allergies, Medications, past medical and surgical history reviewed.    Focused examination performed.    Patient declined to see surgeon today. All questions answered. Patient encouraged to call with questions. Contact information given.     Pre, luis, and post operative procedures and expectations discussed. Goals of successful surgery reviewed and include manageable pain levels, surgical site free of infection, medication management, and ambulation with PT/OT assistance. Healthy weight management discussed with patient and caregiver who were receptive to eduction of healthy diet and activity. No other necessary lifestyle changes identified. Educated patient about signs and symptoms of infection, medication management, anticoagulation therapy, risk of tobacco and alcohol use, and self-care to promote healing. Surgical guide given for future reference. Hibiclens given to patient with instructions. All questions were answered.     Babatunde Oconnor verbalized an understanding to the education and goals. Patient has displayed readiness to engage in care and is ready to proceed with surgery.  Patient reports his wife is able and ready to provide assistance at home after discharge.    Surgical and blood consents signed.    Babatunde Oconnor will contact us if there are any questions, concerns, or changes in medical status prior to surgery.       Joint class: 6/13    COVID-19 test date: vaccinated     Patient has discussed discharge planning with surgeon. Patient will be discharged to home  following surgery.   patient will be scheduled with Home Health during hospitalization.    30 minutes of time was spent on patient education, review of records, templating, H&P, , appointment scheduling and optimizing patient for surgery.

## 2022-06-16 NOTE — PRE-PROCEDURE INSTRUCTIONS
PreOp Instructions given:    >>Medication information (what to hold and what to take)    NPO guidelines as follows: (or as per your Surgeon)  -- Stop ALL solid food, gum, candy (including vitamins) past midnight  -- If you are told to take medication on the morning of surgery, it may be taken with a sip of water.   -- Arrival place and directions given; time to be given the day before procedure by the Surgeon's Office   -- Bathing with antibacterial soap   -- Don't wear any jewelry or bring any valuables AM of surgery   -- No makeup or moisturizer to face   -- No perfume/cologne, powder, lotions or aftershave

## 2022-06-16 NOTE — ASSESSMENT & PLAN NOTE
This client has a possible diagnosis of obstructive sleep apnea (HERNANDO)    STOPBANG score: 4    Instructions were given to avoid the following: sleeping supine, weight gain ,alcoholic beverages , sedative medications, and CNS depressant use as these can all worsen HERNANDO.    Untreated sleep apnea has been shown to increase daytime sleepiness, hypertension, heart disease and stroke which were discussed with the patient at this time    An order for a sleep study has been placed    Please use caution with medications that induce respiratory depression in the perioperative period

## 2022-06-16 NOTE — ASSESSMENT & PLAN NOTE
Reported alcohol use 2-3 shots per night  Patient instructed to wean self off of alcohol prior to surgery  Risks for alcohol use prior to surgery discussed and related to his h/o NAFLD- included in discussion is the effects of alcohol on liver and s/s of acute liver decompensation/cirrhosis/ascites/liver cancer

## 2022-06-16 NOTE — ASSESSMENT & PLAN NOTE
Risks for alcohol use prior to surgery discussed and related to his h/o NAFLD- included in discussion is the effects of alcohol on liver and s/s of acute liver decompensation/cirrhosis/ascites/liver cancer    No evidence of hepatic decompensation at this visit

## 2022-06-16 NOTE — OUTPATIENT SUBJECTIVE & OBJECTIVE
Outpatient Subjective & Objective      Chief Complaint: Preoperative evaulation, perioperative medical management, and complication reduction plan.     Functional Capacity: Able to climb a flight of stairs with out CP SOB or Syncope.  ABle to meet 4 METS      Anesthesia issues: None    Difficulty mouth opening: none    Steroid use in the last 12 months: none     Dental Issues: none    Family anesthesia difficulty: None       Family Hx of Thrombosis Brother had a blood clot- unsure of why    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    Hyperlipidemia     Hypertension     Leg length discrepancy 10/16/2021    R longer, Consider foot eval & may need custom insert in shoe    Lumbar spondylosis 12/13/2019     Past Surgical History:   Procedure Laterality Date    APPENDECTOMY      CERVICAL DISCECTOMY  2008    CERVICAL SPINE SURGERY      INJECTION OF ANESTHETIC AGENT AROUND MEDIAL BRANCH NERVES INNERVATING LUMBAR FACET JOINT Bilateral 1/7/2020    Procedure: Block-nerve-medial branch-lumbar-- Bilateral (With Steroid);  Surgeon: Vamshi Anderson Jr., MD;  Location: [facility];  Service: Pain Management;  Laterality: Bilateral;  No Sedation    INJECTION OF ANESTHETIC AGENT AROUND MEDIAL BRANCH NERVES INNERVATING LUMBAR FACET JOINT Bilateral 5/14/2020    Procedure: Block-nerve-medial branch-lumbar--Bilateral L4-5, L5-S1 with steriods;  Surgeon: Vamshi Anderson Jr., MD;  Location: [facility];  Service: Pain Management;  Laterality: Bilateral;       Review of Systems   Constitutional: Negative for activity change, appetite change, chills, diaphoresis, fatigue, fever and unexpected weight change.   HENT: Negative for congestion, dental problem, drooling, trouble swallowing and voice change.    Eyes: Negative for photophobia and visual disturbance.        No acute visual changes   Respiratory:  "Negative for apnea, cough, choking, chest tightness, shortness of breath, wheezing and stridor.         STOP bang  Score 4    High risk HERNANDO    Cardiovascular: Negative for chest pain, palpitations and leg swelling.   Gastrointestinal: Negative for abdominal pain, blood in stool, constipation, diarrhea, nausea and vomiting.        No  Hepatitis, Cirrhosis  No BRB or black tarry stool    FLD   Genitourinary: Negative for difficulty urinating, dysuria, frequency, hematuria and urgency.        Nocturia-1   Musculoskeletal: Positive for arthralgias, back pain and gait problem. Negative for myalgias, neck pain and neck stiffness.   Neurological: Negative for dizziness, seizures, syncope, light-headedness, numbness and headaches.   Psychiatric/Behavioral: Negative for suicidal ideas. The patient is not nervous/anxious.       VITALS  Visit Vitals  /77   Pulse 73   Temp 98.5 °F (36.9 °C)   Ht 5' 6.5" (1.689 m)   Wt 81.1 kg (178 lb 14.4 oz)   SpO2 97%   BMI 28.44 kg/m²        Physical Exam  Constitutional:       General: He is not in acute distress.     Appearance: He is well-developed. He is not diaphoretic.   HENT:      Head: Normocephalic.      Right Ear: Hearing normal.      Left Ear: Hearing normal.      Nose: Nose normal.      Mouth/Throat:      Lips: Pink.   Eyes:      General: Lids are normal.      Conjunctiva/sclera: Conjunctivae normal.      Pupils: Pupils are equal, round, and reactive to light.   Neck:      Vascular: No carotid bruit.      Trachea: Trachea and phonation normal.   Cardiovascular:      Rate and Rhythm: Normal rate and regular rhythm.      Pulses:           Carotid pulses are 2+ on the right side and 2+ on the left side.       Radial pulses are 2+ on the right side and 2+ on the left side.        Posterior tibial pulses are 2+ on the right side and 2+ on the left side.      Heart sounds: Normal heart sounds. No murmur heard.    No friction rub. No gallop.   Pulmonary:      Effort: Pulmonary " effort is normal.      Breath sounds: Normal breath sounds.   Abdominal:      General: Bowel sounds are normal. There is no distension.      Palpations: Abdomen is soft.      Tenderness: There is no abdominal tenderness.   Musculoskeletal:         General: No tenderness or deformity. Normal range of motion.      Cervical back: Normal range of motion.      Right lower leg: No edema.      Left lower leg: No edema.   Lymphadenopathy:      Head:      Right side of head: No submental, submandibular, tonsillar, preauricular, posterior auricular or occipital adenopathy.      Left side of head: No submental, submandibular, tonsillar, preauricular, posterior auricular or occipital adenopathy.      Cervical:      Right cervical: No superficial cervical adenopathy.     Left cervical: No superficial cervical adenopathy.   Skin:     General: Skin is warm and dry.      Capillary Refill: Capillary refill takes 2 to 3 seconds.      Coloration: Skin is not pale.      Findings: No erythema or rash.   Neurological:      Mental Status: He is alert and oriented to person, place, and time.      GCS: GCS eye subscore is 4. GCS verbal subscore is 5. GCS motor subscore is 6.      Motor: No abnormal muscle tone.      Coordination: Coordination normal.   Psychiatric:         Attention and Perception: Attention and perception normal.         Mood and Affect: Mood and affect normal.         Speech: Speech normal.         Behavior: Behavior normal. Behavior is cooperative.         Thought Content: Thought content normal.         Cognition and Memory: Cognition and memory normal.         Judgment: Judgment normal.          Significant Labs:  Lab Results   Component Value Date    WBC 6.55 06/16/2022    HGB 14.7 06/16/2022    HCT 44.1 06/16/2022     06/16/2022    CHOL 225 (H) 06/16/2021    TRIG 91 06/16/2021    HDL 81 (H) 06/16/2021    ALT 31 06/16/2022    AST 32 06/16/2022     06/16/2022    K 4.4 06/16/2022     06/16/2022     CREATININE 0.8 06/16/2022    BUN 14 06/16/2022    CO2 27 06/16/2022    TSH 2.058 06/16/2021    PSA 0.42 12/10/2019    INR 1.0 06/16/2022    HGBA1C 5.5 06/16/2021       Diagnostic Studies: No relevant studies.    EKG:   Results for orders placed or performed during the hospital encounter of 06/16/22   EKG 12-lead    Collection Time: 06/16/22  9:43 AM    Narrative    Test Reason : Z01.818,    Vent. Rate : 065 BPM     Atrial Rate : 065 BPM     P-R Int : 146 ms          QRS Dur : 086 ms      QT Int : 408 ms       P-R-T Axes : 063 -34 016 degrees     QTc Int : 424 ms    Normal sinus rhythm  Left axis deviation  Abnormal ECG  No previous ECGs available  Confirmed by Aston Alford MD ([id number]) on 6/16/2022 11:05:23 AM    Referred By: RADHA FRY           Confirmed By:Aston Alford MD       2D ECHO:  TTE:  No results found for this or any previous visit.    SUZI:  No results found for this or any previous visit.     Imaging   Active Cardiac Conditions: None      Revised Cardiac Risk Index   High -Risk Surgery  Intraperitoneal; Intrathoracic; suprainguinal vascular Yes- + 1 No- 0   History of Ischemic Heart Disease   (Hx of MI/positive exercise test/current chest pain due to ischemia/use of nitrate therapy/EKG with pathological Q waves) Yes- + 1 No- 0   History of CHF  (Pulmonary edema/bilateral rales or S3 gallop/PND/CXR showing pulmonary vascular redistribution) Yes- + 1 No- 0   History of CVA   (Prior stroke or TIA) Yes- + 1 No- 0   Pre-operative treatment with insulin Yes- + 1 No- 0   Pre-operative creatinine > 2mg/dl Yes- + 1 No- 0   Total:      Risk Status:  Estimated risk of cardiac complications after non-cardiac surgery using the Revised Cardiac Risk Index for Preoperative risk is 3.9 %      ARISCAT (Canet) risk index: Low: 1.6% risk of post-op pulmonary complications.    American Society of Anesthesiologists Physical Status classification (ASA): 3         No further cardiac workup needed prior to  surgery.    Outpatient Subjective & Objective

## 2022-06-16 NOTE — PATIENT INSTRUCTIONS
Preventive perioperative care    Thromboembolic prophylaxis:  His risk factors for thrombosis include surgical procedure, age and reduced mobility.I suggest  thromboembolic prophylaxis ( mechanical/pharmacological, weighing the risk benefits of pharmacological agent use considering luis procedural bleeding )  during the perioperative period.I suggested being active in the post operative period.      Postoperative pulmonary complication prophylaxis-Risk factors for post operative pulmonary complications include age over 65 years and ASA class >2- I suggest incentive spirometry use, early ambulation and pain control so as to avoid diaphragmatic splinting  Brush teeth twice per day, oral rinses, sleep with the head of the bed up 30 degrees     Renal complication prophylaxis-Risk factors for renal complications include age and hypertension . I suggest keeping him well hydrated and avoidance/ minimizing the use of  NSAID's,HORN 2 Inhibitors ,IV contrast if possible in the perioperative period.I suggested drinking 2 litre's of water a day      Surgical site Infection Prophylaxis-I  suggest appropriate antibiotic for Prophylaxis against Surgical site infections Shower with Hibiclens in the night before surgery and the morning of surgery       In view of BPH the patient  is at risk of postoperative urinary retention.  I suggest avoidance / minimizing the of  Benzodiazepines,Anticholinergic medication,antihistamines ( Benadryl) , if possible in the perioperative period. I suggest using the minimum possible use of opioids for the minimum period of time in the perioperative period. Benadryl avoidance suggested      This visit was focused on Preoperative evaluation, Perioperative Medical management, complication reduction plans. I suggest that the patient follows up with primary care or relevant sub specialists for ongoing health care.    I appreciate the opportunity to be involved in this patients care. Please feel free to  contact me if there were any questions about this consultation.    Patient is pending optimization

## 2022-06-17 RX ORDER — BISOPROLOL FUMARATE AND HYDROCHLOROTHIAZIDE 5; 6.25 MG/1; MG/1
TABLET ORAL
Qty: 90 TABLET | Refills: 0 | Status: SHIPPED | OUTPATIENT
Start: 2022-06-17 | End: 2022-09-07

## 2022-06-17 NOTE — TELEPHONE ENCOUNTER
No new care gaps identified.  [facility] Embedded Care Gaps. Reference number: [id number]. 6/16/2022   11:29:14 PM CDT

## 2022-06-17 NOTE — TELEPHONE ENCOUNTER
Refill Routing Note   Medication(s) are not appropriate for processing by [facility] for the following reason(s):      - Patient has been seen in the ED/Hospital since the last PCP visit    [facility] action(s):  Defer          Medication reconciliation completed: No     Appointments  past 12m or future 3m with PCP    Date Provider   Last Visit   6/22/2021 Negar Pandey MD   Next Visit   Visit date not found Negar Pandey MD   ED visits in past 90 days: 0        Note composed:11:46 PM 06/16/2022

## 2022-06-28 ENCOUNTER — TELEPHONE (OUTPATIENT)
Dept: ORTHOPEDICS | Facility: CLINIC | Age: 67
End: 2022-06-28
Payer: COMMERCIAL

## 2022-06-28 LAB — HEMOCCULT STL QL IA: NEGATIVE

## 2022-06-28 RX ORDER — DOCUSATE SODIUM 100 MG/1
100 CAPSULE, LIQUID FILLED ORAL 2 TIMES DAILY PRN
Qty: 60 CAPSULE | Refills: 0 | Status: SHIPPED | OUTPATIENT
Start: 2022-06-28 | End: 2022-12-19

## 2022-06-28 RX ORDER — DEXTROMETHORPHAN HYDROBROMIDE, GUAIFENESIN 5; 100 MG/5ML; MG/5ML
650 LIQUID ORAL
Qty: 120 TABLET | Refills: 0 | Status: SHIPPED | OUTPATIENT
Start: 2022-06-28 | End: 2022-08-01 | Stop reason: SDUPTHER

## 2022-06-28 RX ORDER — CELECOXIB 200 MG/1
200 CAPSULE ORAL DAILY
Qty: 30 CAPSULE | Refills: 0 | Status: SHIPPED | OUTPATIENT
Start: 2022-06-28 | End: 2022-07-30

## 2022-06-28 RX ORDER — CEFADROXIL 500 MG/1
500 CAPSULE ORAL EVERY 12 HOURS
Qty: 14 CAPSULE | Refills: 0 | Status: SHIPPED | OUTPATIENT
Start: 2022-06-28 | End: 2022-07-07

## 2022-06-28 RX ORDER — ASPIRIN 81 MG/1
81 TABLET ORAL 2 TIMES DAILY
Qty: 60 TABLET | Refills: 0 | Status: SHIPPED | OUTPATIENT
Start: 2022-06-28 | End: 2024-03-13

## 2022-06-28 RX ORDER — METHOCARBAMOL 750 MG/1
750 TABLET, FILM COATED ORAL 3 TIMES DAILY PRN
Qty: 30 TABLET | Refills: 0 | Status: SHIPPED | OUTPATIENT
Start: 2022-06-28 | End: 2022-10-27 | Stop reason: CLARIF

## 2022-06-28 RX ORDER — OXYCODONE HYDROCHLORIDE 5 MG/1
TABLET ORAL
Qty: 30 TABLET | Refills: 0 | Status: SHIPPED | OUTPATIENT
Start: 2022-06-28 | End: 2022-10-27 | Stop reason: CLARIF

## 2022-06-28 NOTE — TELEPHONE ENCOUNTER
I called the patient today regarding surgery on 6/29/2022 with Dr. Talon Amaral. I informed the patient that his surgery will be at  [facility] ([facility] [place] [address]). I informed the patient they must arrive at 6:00am and their surgery will start at 8:00.     I reminded the patient about his COVID-19 swab test. The patient has received the COVID-19 vaccine and records of the vaccine are in the patient's chart. Therefore this patient does not need to complete a pre-procedural Covid-19 test..    I reminded the patient that they cannot eat or drink after midnight, the night before surgery.     I reminded the patient to be careful of their skin over the next few days to make sure they do not get any cuts, scratches or scrapes.    The patient verbalized that they have received their walker, bedside commode and shower chair from [facility].     I informed the patient that he will do a myChart Virtual Visit - Video  with our Orthopedic Nurse Navigator on 7/1/2022. I informed the patient to log into their virtual appointment 15 minutes before their scheduled time.    The patient verbalized understanding and has no further questions.

## 2022-06-29 ENCOUNTER — HOSPITAL ENCOUNTER (OUTPATIENT)
Facility: HOSPITAL | Age: 67
Discharge: HOME | End: 2022-06-30
Attending: ORTHOPAEDIC SURGERY | Admitting: ORTHOPAEDIC SURGERY
Payer: COMMERCIAL

## 2022-06-29 ENCOUNTER — ANESTHESIA EVENT (OUTPATIENT)
Dept: SURGERY | Facility: HOSPITAL | Age: 67
End: 2022-06-29
Payer: COMMERCIAL

## 2022-06-29 ENCOUNTER — ANESTHESIA (OUTPATIENT)
Dept: SURGERY | Facility: HOSPITAL | Age: 67
End: 2022-06-29
Payer: COMMERCIAL

## 2022-06-29 DIAGNOSIS — M16.11 PRIMARY OSTEOARTHRITIS OF RIGHT HIP: ICD-10-CM

## 2022-06-29 PROCEDURE — 63600175 PHARM REV CODE 636 W HCPCS: Performed by: NURSE ANESTHETIST, CERTIFIED REGISTERED

## 2022-06-29 PROCEDURE — 25000003 PHARM REV CODE 250: Performed by: ORTHOPAEDIC SURGERY

## 2022-06-29 PROCEDURE — 36000710: Performed by: ORTHOPAEDIC SURGERY

## 2022-06-29 PROCEDURE — C1713 ANCHOR/SCREW BN/BN,TIS/BN: HCPCS | Performed by: ORTHOPAEDIC SURGERY

## 2022-06-29 PROCEDURE — 25000003 PHARM REV CODE 250: Performed by: PHYSICIAN ASSISTANT

## 2022-06-29 PROCEDURE — 97535 SELF CARE MNGMENT TRAINING: CPT

## 2022-06-29 PROCEDURE — 27201423 OPTIME MED/SURG SUP & DEVICES STERILE SUPPLY: Performed by: ORTHOPAEDIC SURGERY

## 2022-06-29 PROCEDURE — 71000033 HC RECOVERY, INTIAL HOUR: Performed by: ORTHOPAEDIC SURGERY

## 2022-06-29 PROCEDURE — 63600175 PHARM REV CODE 636 W HCPCS: Performed by: ANESTHESIOLOGY

## 2022-06-29 PROCEDURE — D9220A PRA ANESTHESIA: ICD-10-PCS | Mod: ANES,,, | Performed by: ANESTHESIOLOGY

## 2022-06-29 PROCEDURE — 37000008 HC ANESTHESIA 1ST 15 MINUTES: Performed by: ORTHOPAEDIC SURGERY

## 2022-06-29 PROCEDURE — D9220A PRA ANESTHESIA: ICD-10-PCS | Mod: CRNA,,, | Performed by: NURSE ANESTHETIST, CERTIFIED REGISTERED

## 2022-06-29 PROCEDURE — 27130 TOTAL HIP ARTHROPLASTY: CPT | Mod: RT,,, | Performed by: ORTHOPAEDIC SURGERY

## 2022-06-29 PROCEDURE — 97116 GAIT TRAINING THERAPY: CPT

## 2022-06-29 PROCEDURE — 99900035 HC TECH TIME PER 15 MIN (STAT)

## 2022-06-29 PROCEDURE — 27130 PR TOTAL HIP ARTHROPLASTY: ICD-10-PCS | Mod: RT,,, | Performed by: ORTHOPAEDIC SURGERY

## 2022-06-29 PROCEDURE — 36000711: Performed by: ORTHOPAEDIC SURGERY

## 2022-06-29 PROCEDURE — 94761 N-INVAS EAR/PLS OXIMETRY MLT: CPT

## 2022-06-29 PROCEDURE — 27100025 HC TUBING, SET FLUID WARMER: Performed by: ANESTHESIOLOGY

## 2022-06-29 PROCEDURE — 63600175 PHARM REV CODE 636 W HCPCS: Performed by: ORTHOPAEDIC SURGERY

## 2022-06-29 PROCEDURE — 97161 PT EVAL LOW COMPLEX 20 MIN: CPT

## 2022-06-29 PROCEDURE — 63600175 PHARM REV CODE 636 W HCPCS: Performed by: PHYSICIAN ASSISTANT

## 2022-06-29 PROCEDURE — C1776 JOINT DEVICE (IMPLANTABLE): HCPCS | Performed by: ORTHOPAEDIC SURGERY

## 2022-06-29 PROCEDURE — 97165 OT EVAL LOW COMPLEX 30 MIN: CPT

## 2022-06-29 PROCEDURE — D9220A PRA ANESTHESIA: Mod: ANES,,, | Performed by: ANESTHESIOLOGY

## 2022-06-29 PROCEDURE — 25000003 PHARM REV CODE 250: Performed by: STUDENT IN AN ORGANIZED HEALTH CARE EDUCATION/TRAINING PROGRAM

## 2022-06-29 PROCEDURE — 37000009 HC ANESTHESIA EA ADD 15 MINS: Performed by: ORTHOPAEDIC SURGERY

## 2022-06-29 PROCEDURE — 25000003 PHARM REV CODE 250: Performed by: NURSE ANESTHETIST, CERTIFIED REGISTERED

## 2022-06-29 PROCEDURE — D9220A PRA ANESTHESIA: Mod: CRNA,,, | Performed by: NURSE ANESTHETIST, CERTIFIED REGISTERED

## 2022-06-29 PROCEDURE — 25000003 PHARM REV CODE 250: Performed by: ANESTHESIOLOGY

## 2022-06-29 DEVICE — IMPLANTABLE DEVICE: Type: IMPLANTABLE DEVICE | Site: HIP | Status: FUNCTIONAL

## 2022-06-29 DEVICE — STEM ACTIS FEM COLLARED HIGH 4: Type: IMPLANTABLE DEVICE | Site: HIP | Status: FUNCTIONAL

## 2022-06-29 DEVICE — SCREW PINACLE 6.5MM X 30MM: Type: IMPLANTABLE DEVICE | Site: HIP | Status: FUNCTIONAL

## 2022-06-29 DEVICE — LINE ACET PINN 36X54 ALTRX: Type: IMPLANTABLE DEVICE | Site: HIP | Status: FUNCTIONAL

## 2022-06-29 DEVICE — SCREW PINNACLE 6.5 X 25: Type: IMPLANTABLE DEVICE | Site: HIP | Status: FUNCTIONAL

## 2022-06-29 DEVICE — HEAD BIOLOX CERAMC FEM +5 36MM: Type: IMPLANTABLE DEVICE | Site: HIP | Status: FUNCTIONAL

## 2022-06-29 RX ORDER — TRANEXAMIC ACID 100 MG/ML
1000 INJECTION, SOLUTION INTRAVENOUS
Status: COMPLETED | OUTPATIENT
Start: 2022-06-29 | End: 2022-06-29

## 2022-06-29 RX ORDER — ASPIRIN 81 MG/1
81 TABLET ORAL 2 TIMES DAILY
Status: DISCONTINUED | OUTPATIENT
Start: 2022-06-29 | End: 2022-06-30 | Stop reason: HOSPADM

## 2022-06-29 RX ORDER — TAMSULOSIN HYDROCHLORIDE 0.4 MG/1
1 CAPSULE ORAL DAILY
Status: DISCONTINUED | OUTPATIENT
Start: 2022-06-29 | End: 2022-06-30 | Stop reason: HOSPADM

## 2022-06-29 RX ORDER — POLYETHYLENE GLYCOL 3350 17 G/17G
17 POWDER, FOR SOLUTION ORAL DAILY PRN
Status: DISCONTINUED | OUTPATIENT
Start: 2022-06-29 | End: 2022-06-30 | Stop reason: HOSPADM

## 2022-06-29 RX ORDER — ACYCLOVIR 200 MG/1
200 CAPSULE ORAL
Status: DISCONTINUED | OUTPATIENT
Start: 2022-06-29 | End: 2022-06-30 | Stop reason: HOSPADM

## 2022-06-29 RX ORDER — ONDANSETRON 2 MG/ML
INJECTION INTRAMUSCULAR; INTRAVENOUS
Status: DISCONTINUED | OUTPATIENT
Start: 2022-06-29 | End: 2022-06-29

## 2022-06-29 RX ORDER — PREGABALIN 75 MG/1
150 CAPSULE ORAL
Status: COMPLETED | OUTPATIENT
Start: 2022-06-29 | End: 2022-06-29

## 2022-06-29 RX ORDER — MIDAZOLAM HYDROCHLORIDE 1 MG/ML
INJECTION, SOLUTION INTRAMUSCULAR; INTRAVENOUS
Status: DISCONTINUED | OUTPATIENT
Start: 2022-06-29 | End: 2022-06-29

## 2022-06-29 RX ORDER — MIDAZOLAM HYDROCHLORIDE 1 MG/ML
4 INJECTION INTRAMUSCULAR; INTRAVENOUS
Status: DISCONTINUED | OUTPATIENT
Start: 2022-06-29 | End: 2022-06-29 | Stop reason: HOSPADM

## 2022-06-29 RX ORDER — KETAMINE HCL IN 0.9 % NACL 50 MG/5 ML
SYRINGE (ML) INTRAVENOUS
Status: DISCONTINUED | OUTPATIENT
Start: 2022-06-29 | End: 2022-06-29

## 2022-06-29 RX ORDER — AMLODIPINE BESYLATE 10 MG/1
10 TABLET ORAL DAILY
Status: DISCONTINUED | OUTPATIENT
Start: 2022-06-30 | End: 2022-06-30 | Stop reason: HOSPADM

## 2022-06-29 RX ORDER — ACETAMINOPHEN 500 MG
1000 TABLET ORAL EVERY 6 HOURS
Status: DISCONTINUED | OUTPATIENT
Start: 2022-06-29 | End: 2022-06-30 | Stop reason: HOSPADM

## 2022-06-29 RX ORDER — CELECOXIB 200 MG/1
400 CAPSULE ORAL
Status: COMPLETED | OUTPATIENT
Start: 2022-06-29 | End: 2022-06-29

## 2022-06-29 RX ORDER — MUPIROCIN 20 MG/G
1 OINTMENT TOPICAL
Status: COMPLETED | OUTPATIENT
Start: 2022-06-29 | End: 2022-06-29

## 2022-06-29 RX ORDER — HYDROMORPHONE HYDROCHLORIDE 1 MG/ML
0.2 INJECTION, SOLUTION INTRAMUSCULAR; INTRAVENOUS; SUBCUTANEOUS EVERY 5 MIN PRN
Status: DISCONTINUED | OUTPATIENT
Start: 2022-06-29 | End: 2022-06-29 | Stop reason: HOSPADM

## 2022-06-29 RX ORDER — VANCOMYCIN HYDROCHLORIDE 1 G/20ML
INJECTION, POWDER, LYOPHILIZED, FOR SOLUTION INTRAVENOUS
Status: DISCONTINUED | OUTPATIENT
Start: 2022-06-29 | End: 2022-06-29 | Stop reason: HOSPADM

## 2022-06-29 RX ORDER — LIDOCAINE HYDROCHLORIDE 10 MG/ML
1 INJECTION, SOLUTION EPIDURAL; INFILTRATION; INTRACAUDAL; PERINEURAL
Status: DISCONTINUED | OUTPATIENT
Start: 2022-06-29 | End: 2022-06-29 | Stop reason: HOSPADM

## 2022-06-29 RX ORDER — METHOCARBAMOL 750 MG/1
750 TABLET, FILM COATED ORAL 3 TIMES DAILY
Status: DISCONTINUED | OUTPATIENT
Start: 2022-06-29 | End: 2022-06-30 | Stop reason: HOSPADM

## 2022-06-29 RX ORDER — SODIUM CHLORIDE 9 MG/ML
INJECTION, SOLUTION INTRAVENOUS CONTINUOUS
Status: DISCONTINUED | OUTPATIENT
Start: 2022-06-29 | End: 2022-06-30 | Stop reason: HOSPADM

## 2022-06-29 RX ORDER — ACETAMINOPHEN 500 MG
1000 TABLET ORAL
Status: COMPLETED | OUTPATIENT
Start: 2022-06-29 | End: 2022-06-29

## 2022-06-29 RX ORDER — FAMOTIDINE 10 MG/ML
INJECTION INTRAVENOUS
Status: DISCONTINUED | OUTPATIENT
Start: 2022-06-29 | End: 2022-06-29

## 2022-06-29 RX ORDER — MORPHINE SULFATE 2 MG/ML
2 INJECTION, SOLUTION INTRAMUSCULAR; INTRAVENOUS
Status: DISCONTINUED | OUTPATIENT
Start: 2022-06-29 | End: 2022-06-30 | Stop reason: HOSPADM

## 2022-06-29 RX ORDER — FENTANYL CITRATE 50 UG/ML
100 INJECTION, SOLUTION INTRAMUSCULAR; INTRAVENOUS
Status: DISCONTINUED | OUTPATIENT
Start: 2022-06-29 | End: 2022-06-29 | Stop reason: HOSPADM

## 2022-06-29 RX ORDER — AMOXICILLIN 250 MG
1 CAPSULE ORAL 2 TIMES DAILY
Status: DISCONTINUED | OUTPATIENT
Start: 2022-06-29 | End: 2022-06-30 | Stop reason: HOSPADM

## 2022-06-29 RX ORDER — ASPIRIN 81 MG/1
81 TABLET ORAL DAILY
Status: ON HOLD | COMMUNITY
End: 2022-06-29 | Stop reason: HOSPADM

## 2022-06-29 RX ORDER — PROCHLORPERAZINE EDISYLATE 5 MG/ML
5 INJECTION INTRAMUSCULAR; INTRAVENOUS EVERY 6 HOURS PRN
Status: DISCONTINUED | OUTPATIENT
Start: 2022-06-29 | End: 2022-06-30 | Stop reason: HOSPADM

## 2022-06-29 RX ORDER — POLYETHYLENE GLYCOL 3350 17 G/17G
17 POWDER, FOR SOLUTION ORAL DAILY
Status: DISCONTINUED | OUTPATIENT
Start: 2022-06-29 | End: 2022-06-30 | Stop reason: HOSPADM

## 2022-06-29 RX ORDER — OXYCODONE HYDROCHLORIDE 10 MG/1
10 TABLET ORAL
Status: DISCONTINUED | OUTPATIENT
Start: 2022-06-29 | End: 2022-06-30 | Stop reason: HOSPADM

## 2022-06-29 RX ORDER — ROPIVACAINE/EPI/CLONIDINE/KET 2.46-0.005
SYRINGE (ML) INJECTION
Status: DISCONTINUED | OUTPATIENT
Start: 2022-06-29 | End: 2022-06-29 | Stop reason: HOSPADM

## 2022-06-29 RX ORDER — METOPROLOL SUCCINATE 50 MG/1
100 TABLET, EXTENDED RELEASE ORAL DAILY
Status: DISCONTINUED | OUTPATIENT
Start: 2022-06-30 | End: 2022-06-30 | Stop reason: HOSPADM

## 2022-06-29 RX ORDER — PROPOFOL 10 MG/ML
VIAL (ML) INTRAVENOUS CONTINUOUS PRN
Status: DISCONTINUED | OUTPATIENT
Start: 2022-06-29 | End: 2022-06-29

## 2022-06-29 RX ORDER — ACYCLOVIR 400 MG/1
200 TABLET ORAL
Qty: 18 TABLET | Refills: 0 | Status: SHIPPED | OUTPATIENT
Start: 2022-06-29 | End: 2022-06-29 | Stop reason: SDUPTHER

## 2022-06-29 RX ORDER — FENTANYL CITRATE 50 UG/ML
25 INJECTION, SOLUTION INTRAMUSCULAR; INTRAVENOUS EVERY 5 MIN PRN
Status: DISCONTINUED | OUTPATIENT
Start: 2022-06-29 | End: 2022-06-29 | Stop reason: HOSPADM

## 2022-06-29 RX ORDER — LIDOCAINE HCL/EPINEPHRINE/PF 2%-1:200K
VIAL (ML) INJECTION
Status: DISCONTINUED | OUTPATIENT
Start: 2022-06-29 | End: 2022-06-29

## 2022-06-29 RX ORDER — ONDANSETRON 2 MG/ML
4 INJECTION INTRAMUSCULAR; INTRAVENOUS DAILY PRN
Status: DISCONTINUED | OUTPATIENT
Start: 2022-06-29 | End: 2022-06-29 | Stop reason: HOSPADM

## 2022-06-29 RX ORDER — PHENYLEPHRINE HYDROCHLORIDE 10 MG/ML
INJECTION INTRAVENOUS
Status: DISCONTINUED | OUTPATIENT
Start: 2022-06-29 | End: 2022-06-29

## 2022-06-29 RX ORDER — FAMOTIDINE 20 MG/1
20 TABLET, FILM COATED ORAL 2 TIMES DAILY
Status: DISCONTINUED | OUTPATIENT
Start: 2022-06-29 | End: 2022-06-30 | Stop reason: HOSPADM

## 2022-06-29 RX ORDER — LIDOCAINE HCL/PF 100 MG/5ML
SYRINGE (ML) INTRAVENOUS
Status: DISCONTINUED | OUTPATIENT
Start: 2022-06-29 | End: 2022-06-29

## 2022-06-29 RX ORDER — CEFAZOLIN SODIUM/WATER 2 G/20 ML
2 SYRINGE (ML) INTRAVENOUS
Status: COMPLETED | OUTPATIENT
Start: 2022-06-29 | End: 2022-06-29

## 2022-06-29 RX ORDER — ACYCLOVIR 800 MG/1
800 TABLET ORAL
Qty: 35 TABLET | Refills: 0 | Status: SHIPPED | OUTPATIENT
Start: 2022-06-29 | End: 2022-06-29 | Stop reason: SDUPTHER

## 2022-06-29 RX ORDER — TRANEXAMIC ACID 100 MG/ML
1000 INJECTION, SOLUTION INTRAVENOUS
Status: DISCONTINUED | OUTPATIENT
Start: 2022-06-29 | End: 2022-06-29 | Stop reason: HOSPADM

## 2022-06-29 RX ORDER — SODIUM CHLORIDE 9 MG/ML
INJECTION, SOLUTION INTRAVENOUS
Status: COMPLETED | OUTPATIENT
Start: 2022-06-29 | End: 2022-06-29

## 2022-06-29 RX ORDER — ACYCLOVIR 400 MG/1
200 TABLET ORAL
Qty: 18 TABLET | Refills: 0 | Status: SHIPPED | OUTPATIENT
Start: 2022-06-29 | End: 2022-10-27 | Stop reason: CLARIF

## 2022-06-29 RX ORDER — PREGABALIN 150 MG/1
150 CAPSULE ORAL NIGHTLY
Status: DISCONTINUED | OUTPATIENT
Start: 2022-06-29 | End: 2022-06-30 | Stop reason: HOSPADM

## 2022-06-29 RX ORDER — OXYCODONE HYDROCHLORIDE 5 MG/1
5 TABLET ORAL
Status: DISCONTINUED | OUTPATIENT
Start: 2022-06-29 | End: 2022-06-30 | Stop reason: HOSPADM

## 2022-06-29 RX ORDER — DEXAMETHASONE SODIUM PHOSPHATE 4 MG/ML
INJECTION, SOLUTION INTRA-ARTICULAR; INTRALESIONAL; INTRAMUSCULAR; INTRAVENOUS; SOFT TISSUE
Status: DISCONTINUED | OUTPATIENT
Start: 2022-06-29 | End: 2022-06-29

## 2022-06-29 RX ORDER — CEFAZOLIN SODIUM/D5W 2 G/100 ML
2 PLASTIC BAG, INJECTION (ML) INTRAVENOUS
Status: COMPLETED | OUTPATIENT
Start: 2022-06-29 | End: 2022-06-30

## 2022-06-29 RX ORDER — ONDANSETRON 2 MG/ML
4 INJECTION INTRAMUSCULAR; INTRAVENOUS EVERY 8 HOURS PRN
Status: DISCONTINUED | OUTPATIENT
Start: 2022-06-29 | End: 2022-06-30 | Stop reason: HOSPADM

## 2022-06-29 RX ORDER — ATORVASTATIN CALCIUM 10 MG/1
10 TABLET, FILM COATED ORAL DAILY
Status: DISCONTINUED | OUTPATIENT
Start: 2022-06-30 | End: 2022-06-30 | Stop reason: HOSPADM

## 2022-06-29 RX ORDER — NALOXONE HCL 0.4 MG/ML
0.02 VIAL (ML) INJECTION
Status: DISCONTINUED | OUTPATIENT
Start: 2022-06-29 | End: 2022-06-30 | Stop reason: HOSPADM

## 2022-06-29 RX ORDER — MUPIROCIN 20 MG/G
1 OINTMENT TOPICAL 2 TIMES DAILY
Status: DISCONTINUED | OUTPATIENT
Start: 2022-06-29 | End: 2022-06-30 | Stop reason: HOSPADM

## 2022-06-29 RX ADMIN — SODIUM CHLORIDE: 0.9 INJECTION, SOLUTION INTRAVENOUS at 07:06

## 2022-06-29 RX ADMIN — OXYCODONE 5 MG: 5 TABLET ORAL at 04:06

## 2022-06-29 RX ADMIN — MEPIVACAINE HYDROCHLORIDE 3 ML: 15 INJECTION, SOLUTION EPIDURAL; INFILTRATION at 08:06

## 2022-06-29 RX ADMIN — METHOCARBAMOL 750 MG: 750 TABLET ORAL at 08:06

## 2022-06-29 RX ADMIN — DEXAMETHASONE SODIUM PHOSPHATE 8 MG: 4 INJECTION, SOLUTION INTRAMUSCULAR; INTRAVENOUS at 08:06

## 2022-06-29 RX ADMIN — ACETAMINOPHEN 1000 MG: 500 TABLET ORAL at 06:06

## 2022-06-29 RX ADMIN — Medication 2 G: at 04:06

## 2022-06-29 RX ADMIN — TAMSULOSIN HYDROCHLORIDE 0.4 MG: 0.4 CAPSULE ORAL at 11:06

## 2022-06-29 RX ADMIN — TRANEXAMIC ACID 1000 MG: 100 INJECTION, SOLUTION INTRAVENOUS at 09:06

## 2022-06-29 RX ADMIN — LIDOCAINE HYDROCHLORIDE AND EPINEPHRINE 3 ML: 20; 5 INJECTION, SOLUTION EPIDURAL; INFILTRATION; INTRACAUDAL; PERINEURAL at 10:06

## 2022-06-29 RX ADMIN — FAMOTIDINE 20 MG: 20 TABLET, FILM COATED ORAL at 08:06

## 2022-06-29 RX ADMIN — OXYCODONE 5 MG: 5 TABLET ORAL at 11:06

## 2022-06-29 RX ADMIN — SODIUM CHLORIDE, SODIUM GLUCONATE, SODIUM ACETATE, POTASSIUM CHLORIDE, MAGNESIUM CHLORIDE, SODIUM PHOSPHATE, DIBASIC, AND POTASSIUM PHOSPHATE: .53; .5; .37; .037; .03; .012; .00082 INJECTION, SOLUTION INTRAVENOUS at 09:06

## 2022-06-29 RX ADMIN — PREGABALIN 150 MG: 75 CAPSULE ORAL at 06:06

## 2022-06-29 RX ADMIN — PHENYLEPHRINE HYDROCHLORIDE 50 MCG: 10 INJECTION INTRAVENOUS at 09:06

## 2022-06-29 RX ADMIN — LIDOCAINE HYDROCHLORIDE 100 MG: 20 INJECTION, SOLUTION INTRAVENOUS at 08:06

## 2022-06-29 RX ADMIN — FAMOTIDINE 20 MG: 10 INJECTION, SOLUTION INTRAVENOUS at 08:06

## 2022-06-29 RX ADMIN — SODIUM CHLORIDE: 0.9 INJECTION, SOLUTION INTRAVENOUS at 04:06

## 2022-06-29 RX ADMIN — MUPIROCIN 1 G: 20 OINTMENT TOPICAL at 06:06

## 2022-06-29 RX ADMIN — SENNOSIDES AND DOCUSATE SODIUM 1 TABLET: 50; 8.6 TABLET ORAL at 08:06

## 2022-06-29 RX ADMIN — PREGABALIN 150 MG: 150 CAPSULE ORAL at 08:06

## 2022-06-29 RX ADMIN — CELECOXIB 400 MG: 200 CAPSULE ORAL at 06:06

## 2022-06-29 RX ADMIN — PROPOFOL 100 MCG/KG/MIN: 10 INJECTION, EMULSION INTRAVENOUS at 08:06

## 2022-06-29 RX ADMIN — ACYCLOVIR 200 MG: 200 CAPSULE ORAL at 03:06

## 2022-06-29 RX ADMIN — MUPIROCIN 1 G: 20 OINTMENT TOPICAL at 08:06

## 2022-06-29 RX ADMIN — TRANEXAMIC ACID 1000 MG: 100 INJECTION, SOLUTION INTRAVENOUS at 10:06

## 2022-06-29 RX ADMIN — ONDANSETRON 4 MG: 2 INJECTION INTRAMUSCULAR; INTRAVENOUS at 08:06

## 2022-06-29 RX ADMIN — Medication 10 MG: at 08:06

## 2022-06-29 RX ADMIN — ACYCLOVIR 200 MG: 200 CAPSULE ORAL at 09:06

## 2022-06-29 RX ADMIN — MIDAZOLAM HYDROCHLORIDE 2 MG: 1 INJECTION, SOLUTION INTRAMUSCULAR; INTRAVENOUS at 08:06

## 2022-06-29 RX ADMIN — METHOCARBAMOL 750 MG: 750 TABLET ORAL at 03:06

## 2022-06-29 RX ADMIN — SODIUM CHLORIDE: 0.9 INJECTION, SOLUTION INTRAVENOUS at 11:06

## 2022-06-29 RX ADMIN — SENNOSIDES AND DOCUSATE SODIUM 1 TABLET: 50; 8.6 TABLET ORAL at 01:06

## 2022-06-29 RX ADMIN — POLYETHYLENE GLYCOL 3350 17 G: 17 POWDER, FOR SOLUTION ORAL at 01:06

## 2022-06-29 RX ADMIN — ASPIRIN 81 MG: 81 TABLET, COATED ORAL at 08:06

## 2022-06-29 RX ADMIN — ACETAMINOPHEN 1000 MG: 500 TABLET ORAL at 01:06

## 2022-06-29 RX ADMIN — Medication 2 G: at 09:06

## 2022-06-29 RX ADMIN — VANCOMYCIN HYDROCHLORIDE 1500 MG: 1.5 INJECTION, POWDER, LYOPHILIZED, FOR SOLUTION INTRAVENOUS at 06:06

## 2022-06-29 NOTE — PLAN OF CARE
[place] - Surgery ([facility])    HOME HEALTH ORDERS  FACE TO FACE ENCOUNTER    Patient Name: Babatunde Oconnor  YOB: 1955    PCP: Negar Pandey MD   PCP Address: [address] [facility] [address]  PCP Phone Number: [phone]  PCP Fax: [phone]    Encounter Date: 06/29/2022    Admit to Home Health    Diagnoses:  Active Hospital Problems    Diagnosis  POA    *Primary osteoarthritis of right hip [M16.11]  Yes      Resolved Hospital Problems   No resolved problems to display.       Future Appointments   Date Time Provider Department Center   7/1/2022  1:30 PM TELEMED NURSE, [facility]   7/14/2022  9:45 AM Negar Miguel PA-C [facility]           I have seen and examined this patient face to face today. My clinical findings that support the need for the home health skilled services and home bound status are the following:  Weakness/numbness causing balance and gait disturbance due to Joint Replacement making it taxing to leave home.    Allergies:Review of patient's allergies indicates:  No Known Allergies    Diet: regular diet    Activities: WBAT RLE with walker. Anterior hip precautions. Hip flexion 0-90deg. No extremes of motion.     Nursing:   SN to complete comprehensive assessment including routine vital signs. Instruct on disease process and s/s of complications to report to MD. Follow specific home health arthoplasty protocol. Review/verify medication list sent home with the patient at time of discharge  and instruct patient/caregiver as needed. If coumadin ordered, coumadin clinic to manage INR with INR draws 2x per week with a goal to maintain INR between 1.8 and 2.2. Frequency may be adjusted depending on start of care date.    Notify MD if SBP > 160 or < 90; DBP > 90 or < 50; HR > 120 or < 50; Temp > 101    Home Medical Equipment:  Walker, 3-1 bedside commode, transfer tub bench    CONSULTS:    Physical Therapy to evaluate and treat. Evaluate for  home safety and equipment needs; Establish/upgrade home exercise program. Perform / instruct on therapeutic exercises, gait training, transfer training, and Range of Motion.    OTHER:  Occupational Therapy to evaluate and treat. Evaluate home environment for safety and equipment needs. Perform/Instruct on transfers, ADL training, ROM, and therapeutic exercises.   to evaluate for community resources/long-range planning.  Aide to provide assistance with personal care, ADLs, and vital signs.    MISCELLANEOUS CARE:  Routine Skin for Bedridden Patients: Instruct patient/caregiver to apply moisture barrier cream to all skin folds and wet areas in perineal area daily and after baths and all bowel movements.    WOUND CARE ORDERS  Do not remove surgical dressing for 2 weeks post-op. This will be done only by MD at initial post-op visit. If dressing is completely saturated, replace with identical dressing - silver-impregnated hydrocolloid dressing.     Do not get dressings wet. Do not shower.     If dressing continues to be saturated or there are signs of infection, please call [facility] [phone] for further instructions and to make appt to be seen.     Medications: Review discharge medications with patient and family and provide education.      Current Discharge Medication List      START taking these medications    Details   acetaminophen (TYLENOL) 650 MG TbSR Take 1 tablet (650 mg total) by mouth every 6 to 8 hours as needed (pain).  Qty: 120 tablet, Refills: 0      aspirin (ECOTRIN) 81 MG EC tablet Take 1 tablet (81 mg total) by mouth 2 (two) times daily.  Qty: 60 tablet, Refills: 0      cefadroxil (DURICEF) 500 MG Cap Take 1 capsule (500 mg total) by mouth every 12 (twelve) hours. for 7 days  Qty: 14 capsule, Refills: 0      celecoxib (CELEBREX) 200 MG capsule Take 1 capsule (200 mg total) by mouth once daily.  Qty: 30 capsule, Refills: 0      docusate sodium (COLACE) 100 MG capsule Take 1 capsule  (100 mg total) by mouth 2 (two) times daily as needed for Constipation.  Qty: 60 capsule, Refills: 0      methocarbamoL (ROBAXIN) 750 MG Tab Take 1 tablet (750 mg total) by mouth 3 (three) times daily as needed (muscle spasms).  Qty: 30 tablet, Refills: 0      oxyCODONE (ROXICODONE) 5 MG immediate release tablet Take 1 tab by mouth every 4-6 hours as needed for pain  Qty: 30 tablet, Refills: 0    Comments: Greater than 7 day supply is medically necessary. Deliver to [place] for surgery 6/29/2022.         CONTINUE these medications which have NOT CHANGED    Details   amlodipine-atorvastatin (CADUET) 10-10 mg per tablet TAKE 1 TABLET BY MOUTH ONCE DAILY  Qty: 90 tablet, Refills: 0    Comments: Requesting 1 year supply  Associated Diagnoses: Essential hypertension; Hyperlipidemia, unspecified hyperlipidemia type      bisoprolol-hydrochlorothiazide 5-6.25 mg (ZIAC) 5-6.25 mg Tab TAKE 1 TABLET BY MOUTH ONCE DAILY  Qty: 90 tablet, Refills: 0    Comments: Requesting 1 year supply  Associated Diagnoses: Essential hypertension      tamsulosin (FLOMAX) 0.4 mg Cap TAKE 1 CAPSULE BY MOUTH  ONCE DAILY  Qty: 90 capsule, Refills: 0    Comments: Requesting 1 year supply  Associated Diagnoses: Benign prostatic hyperplasia, unspecified whether lower urinary tract symptoms present             I certify that this patient is confined to his home and needs physical therapy.

## 2022-06-29 NOTE — ADDENDUM NOTE
Addendum  created 06/29/22 1428 by Heidy Alcocer RN    LDA removed, Order list changed, Order sets accessed, Pharmacy for encounter modified

## 2022-06-29 NOTE — PT/OT/SLP EVAL
Physical Therapy Evaluation and Treatment     Patient Name:  Babatunde Oconnor   MRN:  [id number]    Recommendations:     Discharge Recommendations:  home health PT   Discharge Equipment Recommendations: bedside commode   Barriers to discharge: Inaccessible home - 15 steps with right handrail to get to 2nd floor [facility]    Assessment:     Babatunde Oconnor is a 67 y.o. male admitted with a medical diagnosis of Primary osteoarthritis of right hip.  He presents with the following impairments/functional limitations:  weakness, impaired functional mobilty, gait instability, impaired balance, decreased lower extremity function, pain, decreased ROM, impaired skin, orthopedic precautions. Patient tolerated PT session well. Patient ambulated ~60ft with RW and contact guard assistance. No LOB or SOB noted. Patient educated in anterior hip precautions. Patient will have HHPT.    Rehab Prognosis: Good; patient would benefit from acute skilled PT services to address these deficits and reach maximum level of function.    Recent Surgery: Procedure(s) (LRB):  ARTHROPLASTY, HIP, TOTAL, ANTERIOR APPROACH: RIGHT: DEPUY-ACTIS+PINNACLE (Right) Day of Surgery    Plan:     During this hospitalization, patient to be seen daily to address the identified rehab impairments via gait training, therapeutic activities, therapeutic exercises and progress toward the following goals:    · Plan of Care Expires:  07/01/22    Subjective     Chief Complaint: Right hip pain.   Patient/Family Comments/goals: To walk without pain.   Pain/Comfort:  · Pain Rating 1: 3/10  · Location - Side 1: Right  · Location 1: hip  · Pain Addressed 1: Pre-medicate for activity, Reposition, Distraction    Patients cultural, spiritual, Sabianist conflicts given the current situation:  n/a    Living Environment:  Patient lives in a 2nd floor [facility] with 15 steps and right handrail to enter. Prior to admission, patients level of function was modified independent with  straight cane as needed. Equipment at home: walker, rolling, cane, straight. Upon discharge, patient will have assistance from wife.    Objective:     Communicated with RN prior to session.  Patient found supine with peripheral IV, FCD, cryotherapy upon PT entry to room. Wife present in room.     General Precautions: Standard, fall   Orthopedic Precautions:RLE weight bearing as tolerated, RLE anterior precautions (no extremes of motion, hip flexion 0-90 degrees)  Braces: N/A  Respiratory Status: Room air    Exams:  · Cognitive Exam:  Patient is oriented to Person, Place, Time and Situation  · Gross Motor Coordination:  WFL  · Sensation:    · -       Intact  · RLE ROM: WFL within anterior hip precautions   · RLE Strength: appears WFL but did not formally assess due to pain and recent surgery   · LLE ROM: WFL  · LLE Strength: WFL    Functional Mobility:  · Bed Mobility:     · Scooting: contact guard assistance  · Supine to Sit: contact guard assistance  · Transfers:     · Sit to Stand:  contact guard assistance with rolling walker  · Gait:  Patient ambulated ~60ft with Rolling Walker and contact guard assistance using 3-point gait. Patient demonstrated decreased benny and decreased step length during gait due to pain, decreased ROM, and decreased strength.  · Balance:  Patient stood at the toilet to urinate in urinal with contact guard assistance and rolling walker.     Therapeutic Activities and Exercises:  Patient and wife educated in:  -PT role and POC  -safety with transfers including hand placement  -gait sequencing and RW management  -OOB activity to maximize recovery including ambulating with nursing staff assistance and RW while in the hospital   -anterior hip precautions     AM-PAC 6 CLICK MOBILITY  Total Score:18     Patient left up in chair with all lines intact, call button in reach, RN notified, and wife present.    GOALS:   Multidisciplinary Problems     Physical Therapy Goals        Problem: Physical  Therapy    Goal Priority Disciplines Outcome Goal Variances Interventions   Physical Therapy Goal     PT, PT/OT Ongoing, Progressing     Description: Goals to be met by: 2022    Patient will increase functional independence with mobility by performin. Supine to sit with supervision  2. Sit to stand transfer with Supervision  3. Gait x300 feet with Supervision using Rolling Walker  4. Ascend/Descend 8 steps with right handrail and stand by assistance  5. Lower extremity exercise program x30 reps per handout, with supervision                       History:     Past Medical History:   Diagnosis Date    BPH (benign prostatic hyperplasia)     Elevated liver enzymes 2019    Herpes simplex infection of penis 2018    Hyperlipidemia     Hypertension     Leg length discrepancy 10/16/2021    R longer, Consider foot eval & may need custom insert in shoe    Lumbar spondylosis 2019       Past Surgical History:   Procedure Laterality Date    APPENDECTOMY      CERVICAL DISCECTOMY      CERVICAL SPINE SURGERY      INJECTION OF ANESTHETIC AGENT AROUND MEDIAL BRANCH NERVES INNERVATING LUMBAR FACET JOINT Bilateral 2020    Procedure: Block-nerve-medial branch-lumbar-- Bilateral (With Steroid);  Surgeon: Vamshi Anderson Jr., MD;  Location: [facility];  Service: Pain Management;  Laterality: Bilateral;  No Sedation    INJECTION OF ANESTHETIC AGENT AROUND MEDIAL BRANCH NERVES INNERVATING LUMBAR FACET JOINT Bilateral 2020    Procedure: Block-nerve-medial branch-lumbar--Bilateral L4-5, L5-S1 with steriods;  Surgeon: Vamshi Anderson Jr., MD;  Location: [facility];  Service: Pain Management;  Laterality: Bilateral;       Time Tracking:     PT Received On: 22  PT Start Time: 1313     PT Stop Time: 1333  PT Total Time (min): 20 min     Billable Minutes: Evaluation 10 and Gait Training 10    2022

## 2022-06-29 NOTE — PLAN OF CARE
Problem: Occupational Therapy  Goal: Occupational Therapy Goal  Description: Goals to be met by: 7/1/22    Patient will increase functional independence with ADLs by performing:    UE Dressing with modified independence.  LE Dressing with supervision.  Grooming while standing at sink with modified independence.  Toileting from toilet with modified independence for hygiene and clothing management.   Toilet transfer to toilet with supervision.    Outcome: Ongoing, Progressing     OT evaluation complete with goals established. Patient ambulated to/from bathroom with contact guard assistance with rolling walker and min verbal cues for safety. Patient rolled rolling walker over toilet with contact guard assistance to complete toilet tasks in standing. At the sink, patient completed grooming task with contact guard assistance and min verbal cues for walker placement and safety. Patient required verbal cues throughout for safety awareness.

## 2022-06-29 NOTE — NURSING TRANSFER
Nursing Transfer Note      6/29/2022     Reason patient is being transferred: scheduled stay    Transfer To: 303    Transfer via bed    Transfer with bhavna valentino    Transported by Rn, PCT    Medicines sent: NAcl    Any special needs or follow-up needed: N/A    Chart send with patient: Yes    Notified: spouse    Patient reassessed at: 6/29/22 1126    Upon arrival to floor: patient oriented to room, call bell in reach and bed in lowest position

## 2022-06-29 NOTE — ANESTHESIA PREPROCEDURE EVALUATION
06/29/2022  Pre-operative evaluation for Procedure(s) (LRB):  ARTHROPLASTY, HIP, TOTAL, ANTERIOR APPROACH: RIGHT: DEPUY-ACTIS+PINNACLE (Right)    Babatunde Oconnor is a 67 y.o. male     Patient Active Problem List   Diagnosis    Essential hypertension    Benign prostatic hyperplasia    Prediabetes    Lumbar spondylosis    DDD (degenerative disc disease), lumbar    Neuroforaminal stenosis of lumbar spine    Chronic bilateral low back pain without sciatica    Chronic pain    Facet arthropathy, lumbar    NAFLD (nonalcoholic fatty liver disease)    Hyperlipidemia    Impaired fasting glucose    Leg length discrepancy    Primary osteoarthritis of right hip    ASHD (arteriosclerotic heart disease)    Herpes genitalis in men    Lumbar disc herniation    Primary osteoarthritis involving multiple joints    HERNANDO (obstructive sleep apnea)    Alcohol use       Review of patient's allergies indicates:  No Known Allergies    No current facility-administered medications on file prior to encounter.     Current Outpatient Medications on File Prior to Encounter   Medication Sig Dispense Refill    tamsulosin (FLOMAX) 0.4 mg Cap TAKE 1 CAPSULE BY MOUTH  ONCE DAILY 90 capsule 0       Past Surgical History:   Procedure Laterality Date    APPENDECTOMY      CERVICAL DISCECTOMY  2008    CERVICAL SPINE SURGERY      INJECTION OF ANESTHETIC AGENT AROUND MEDIAL BRANCH NERVES INNERVATING LUMBAR FACET JOINT Bilateral 1/7/2020    Procedure: Block-nerve-medial branch-lumbar-- Bilateral (With Steroid);  Surgeon: Vamshi Anderson Jr., MD;  Location: [facility];  Service: Pain Management;  Laterality: Bilateral;  No Sedation    INJECTION OF ANESTHETIC AGENT AROUND MEDIAL BRANCH NERVES INNERVATING LUMBAR FACET JOINT Bilateral 5/14/2020    Procedure: Block-nerve-medial branch-lumbar--Bilateral L4-5, L5-S1 with  clarissa;  Surgeon: Vamshi Anderson Jr., MD;  [facility];  Service: Pain Management;  Laterality: Bilateral;       Social History     Socioeconomic History    Marital status:      Spouse name: Ese    Number of children: 1   Occupational History     Comment: teacher -   Tobacco Use    Smoking status: Never Smoker    Smokeless tobacco: Never Used   Substance and Sexual Activity    Alcohol use: Yes     Alcohol/week: 3.0 standard drinks     Types: 3 Shots of liquor per week     Comment: daily    Drug use: No    Sexual activity: Yes     Partners: Female   Social History Narrative    Stairs- 15     EKG:  Normal sinus rhythm   Left axis deviation   Abnormal ECG   No previous ECGs available   Confirmed by Aston Alford MD ([id number]) on 6/16/2022 11:05:23 AM     2D Echo:  No results found for this or any previous visit.        Pre-op Assessment    I have reviewed the Patient Summary Reports.     I have reviewed the Nursing Notes. I have reviewed the NPO Status.   I have reviewed the Medications.     Review of Systems  Anesthesia Hx:  Denies Family Hx of Anesthesia complications.   Denies Personal Hx of Anesthesia complications.   Cardiovascular:   Exercise tolerance: good Hypertension Denies Dysrhythmias.   Denies Angina.  Denies IBRAHIM.    Pulmonary:   Denies COPD.  Denies Asthma. Sleep Apnea, CPAP    Renal/:   Denies Chronic Renal Disease.     Hepatic/GI:   Liver Disease, (NAFLD)    Musculoskeletal:   Arthritis   Spine Disorders: cervical and lumbar Disc disease, Degenerative disease and Chronic Pain    Neurological:   Denies CVA. Denies Seizures.    Endocrine:   Denies Diabetes.        Physical Exam  General: Well nourished, Cooperative, Alert and Oriented    Airway:  Mallampati: III / III  Mouth Opening: Normal  TM Distance: Normal  Tongue: Normal  Neck ROM: Normal ROM    Dental:  Intact    Chest/Lungs:  Clear to auscultation, Normal Respiratory Rate    Heart:  Rate: Normal  Rhythm: Regular  "Rhythm    Skin:Patient with small, erythematous patch of lesions near midline, on the skin overlying the sacrum.  Very distant to location of planned epidural placement.  Patient reports these to be "herpetic" lesions that he gets once every few months, treated with a short course of acyclovir.      Anesthesia Plan  Type of Anesthesia, risks & benefits discussed:    Anesthesia Type: Regional, Gen Natural Airway, CSE  Intra-op Monitoring Plan: Standard ASA Monitors  Post Op Pain Control Plan: multimodal analgesia and IV/PO Opioids PRN  Airway Plan: Direct, Post-Induction  Informed Consent: Informed consent signed with the Patient and all parties understand the risks and agree with anesthesia plan.  All questions answered.   ASA Score: 2  Day of Surgery Review of History & Physical: H&P Update referred to the surgeon/provider.    Ready For Surgery From Anesthesia Perspective.     .      "

## 2022-06-29 NOTE — TRANSFER OF CARE
"Anesthesia Transfer of Care Note    Patient: Babatunde Oconnor    Procedure(s) Performed: Procedure(s) (LRB):  ARTHROPLASTY, HIP, TOTAL, ANTERIOR APPROACH: RIGHT: DEPUY-ACTIS+PINNACLE (Right)    Patient location: PACU    Anesthesia Type: CSE    Transport from OR: Transported from OR on 6-10 L/min O2 by face mask with adequate spontaneous ventilation    Post pain: adequate analgesia    Post assessment: no apparent anesthetic complications    Post vital signs: stable    Level of consciousness: awake    Nausea/Vomiting: no nausea/vomiting    Complications: none    Transfer of care protocol was followed      Last vitals:   Visit Vitals  BP (!) 149/74 (BP Location: Right arm, Patient Position: Lying)   Pulse 77   Temp 36.9 °C (98.5 °F) (Oral)   Resp 16   Ht 5' 6" (1.676 m)   Wt 80.7 kg (178 lb)   SpO2 95%   BMI 28.73 kg/m²     "

## 2022-06-29 NOTE — ASSESSMENT & PLAN NOTE
67 y.o. male POD1 s/p R IVAN    Pain control: multimodal per surgical home  PT/OT: WBAT RLE, Anterior hip precautions, No hip flexion 0-90 deg. No extremes of motion.  DVT PPx: ASA 81mg BID, FCDs at all times when not ambulating  Abx: postop Ancef  Labs: Hct 34    Dispo: d/c home with HH pending PT

## 2022-06-29 NOTE — HPI
CC:  Right hip pain     Babatunde Oconnor is a 67 y.o. male with Right hip pain.  Pain is worse with activity and weight bearing.  Patient has experienced interference of activities of daily living due to increased pain and decreased range of motion. Patient has failed non-operative treatment including NSAIDs, as well as greater than 3 months of activity modification. Babatunde Oconnor ambulates independently.      Relevant medical conditions of significance in perioperative period:  HTN: on meds  ACDF  L spine stenosis s/p WING  Chronic pain  BPH on flomax  Pre-DM 5.7  NAFLD, AST/ALT normal

## 2022-06-29 NOTE — OP NOTE
Munir HOBSON right hip  OPERATIVE NOTE      Date of Operation:   6/29/2022    Providers Performing:   Surgeon(s):  Talon Amaral III, MD  Assistant: Eber Trujillo MD    Preoperative Diagnosis:   Right hip osteoarthritis, M16.11    Postoperative Diagnosis:   Same    Operative Procedure:   Right Total Hip Arthroplasty, Anterior Approach, CPT 54268    Anesthesia:   Spinal+catheter  YASMANI cocktail: ENIO    Estimated Blood Loss:   350 cc     Specimens:   None    Complications:   None, stable to PACU.    Implants Utilized:   Depuy  [place] Gription; Size 54  [place] Altrx polyethylene liner; 54 OD, 36 ID, neutral  Actis size 4 HO  Biolox Delta Ceramic 12/14 taper 36mm + 5  Acetabular screw 30mm, 25mm    Implant Name Type Inv. Item Serial No.  Lot No. LRB No. Used Action   CUP ACT MULTIHOLE PINNA 54MM - [id number]  CUP ACT MULTIHOLE PINNA 54MM  [employer] [id number] Right 1 Implanted   SCREW PINACLE 6.5MM X 30MM - [id number]  SCREW PINACLE 6.5MM X 30MM  [employer] [id number] Right 1 Implanted   SCREW PINNACLE 6.5 X 25 - [id number]  SCREW PINNACLE 6.5 X 25  [employer] [id number] Right 1 Implanted   LINE ACET PINN 36X54 ALTRX - [id number]  LINE ACET PINN 36X54 ALTRX  [employer] [id number] Right 1 Implanted   STEM ACTIS FEM COLLARED HIGH 4 - [id number]  STEM ACTIS FEM COLLARED HIGH 4  SYNTHES [id number] Right 1 Implanted   HEAD BIOLOX CERAMC FEM +5 36MM - [id number]  HEAD BIOLOX CERAMC FEM +5 36MM  SY[employer] [id number] Right 1 Implanted       Indications:   The patient has longstanding right hip pain secondary to the above diagnosis.. They have failed conservative management which includes anti-inflammatory medications and activity modification. We reviewed the risks and benefits of the direct anterior hip replacement with the patient prior to surgery including risk of infection, lateral thigh numbness, blood clot, leg length inequality, dislocation, components loosening, components wearing out, need for revision surgery,  continued pain, limping, and injury to nerves and vessels.  After discussing the risks and benefits of the procedure they would like to proceed with surgery.    Operative Notes:   The patient was met in the holding area. The operative site was marked. Anesthesia administered spinal anesthesia. The patient was placed supine on a [place] table and the operative site was identified. The hip was prepped and draped in the usual fashion. IV antibiotics were administered and a timeout was performed.     I performed a direct anterior approach to the hip. Skin incision was made and the fascia of the tensor fascia oriana was identified. I utilized the interval between the tensor fascia oriana and sartorious for direct dissection to the hip joint. I identified and coagulated the ascending branch of the lateral circumflex vessel. Standard retractors over the anterior hip capsule were placed and the capsulotomy was performed. The capsule was tagged for retraction. The femoral head was identified and the femoral neck osteotomy was made in accordance with preoperative templating. The femoral head was then removed with a corkscrew.    The standard anterior, posterior, and superior retractors were placed around the acetabulum. The capsular labrum and foveal contents were removed. Sequential acetabular reamers were used to prepare the acetabulum. Once good good fit was achieved, the final acetabular cup was selected to be one millimeter larger in diameter than the last reamer used. The cup was checked for a good rim fit and a provisional impaction was performed to verify positioning on fluoroscopy. Once this was satisfactory, the impaction was completed. I checked to make sure there was no overhanging osteophytes anteriorly as well as making sure that there was not excessive acetabular cup exposed. Screws were placed in the cup to augment initial fixation. The final liner was impacted into place and I confirmed that it was well  seated.    The hydraulic hook was placed around the femur. The femur was externally rotated and the standard calcar and greater trochanter retractors were placed. A provisional posterior capsulotomy was performed. Then, gross traction was released and the leg was extended and adducted. The appropriate release was performed to allow elevation of the femur for good visualization of the femoral canal.     A box osteotome was used to open the femoral canal and a canal finder was used to sound the canal. The starter broach and sequential broaches were used until stability was appropriate. A trial reduction was performed and intraoperative fluoroscopy was used to confirm appropriate leg lengths and offset.  Once the trial femoral components appeared appropriately positioned, the hip was dislocated, the femur re-exposed, and the trial femoral components were removed. The canal was irrigated and the final femoral stem was impacted to the level of the neck cut. The final ball was impacted onto a dry trunnion and the hip was reduced checking for appropriate soft tissue tension. Final intra-operative fluoroscopy was obtained to confirm implant positioning, leg length, and offset.     While checking xray, a 0.35% betadine solution was left to soak in the wound. The wound was copiously irrigated. I checked for any residual bleeders and made sure that there was appropriate hemostasis. The local anesthetic cocktail was administered. 1 gram of vancomycin powder was placed in the wound. The wound was closed in layers using #1 vicryl at the fascia, then 2-0 vicryl, 3-0 monocryl, 4-0 monocryl and Prineo Mesh+Dermabond. A sterile dressing was placed.     There were no complications or evidence of intraoperative fracture. All counts were correct.    The first-assistant was critical to all steps of the operation, including retraction during exposure and bone preparation, as well as the deep and superficial wound closure.  Dr. Amaral  performed and/or supervised the key portions of this surgical procedure, including evaluation of the bone cuts, reshaping of the bony elements, and insertion of the provisional and final components.    Post Op Plan:   The patient will be weightbearing as tolerated with a walker with no extremes of motion  ASA for DVT prophylaxis (81mg BID for one month)  24 hours of IV antibiotics.  POUR risk  Due patient and or surgical factors the patient will receive an Rx for cefadroxil 500mg BID x7 days after discharge per [place] data:   Tim MM, Tanvi JE, Alfonso GUZMAN, Tsering [place]. [facility] Clinical Research Award: Extended Oral Antibiotics Prevent Periprosthetic Joint Infection in High-Risk Cases: 3855 Patients With 1-Year Follow-Up. J Arthroplasty. 2021 Jul;36(7S):S18-S25. doi: 10.1016/j.arth.2021.01.051. Epub 2021 Jan 23. PMID: 07293100.        Signed by: Talon Amaral III, MD

## 2022-06-29 NOTE — PLAN OF CARE
Problem: Adult Inpatient Plan of Care  Goal: Patient-Specific Goal (Individualized)  Outcome: Ongoing, Progressing  Flowsheets (Taken 6/29/2022 1330)  Anxieties, Fears or Concerns: re surgery  Individualized Care Needs: pain control  Patient-Specific Goals (Include Timeframe): keep up to date re poc     Problem: Pain (Hip Arthroplasty)  Goal: Acceptable Pain Control  Intervention: Prevent or Manage Pain  Flowsheets (Taken 6/29/2022 1330)  Pain Management Interventions: pain management plan reviewed with patient/caregiver     Problem: Postoperative Urinary Retention (Hip Arthroplasty)  Goal: Effective Urinary Elimination  Intervention: Monitor and Manage Urinary Retention  Flowsheets (Taken 6/29/2022 1330)  Urinary Elimination Promotion: toileting offered

## 2022-06-29 NOTE — PT/OT/SLP EVAL
"Occupational Therapy   Evaluation    Name: Babatunde Oconnor  MRN: [id number]  Admitting Diagnosis:  Primary osteoarthritis of right hip Day of Surgery    Recommendations:     Discharge Recommendations: home health OT  Discharge Equipment Recommendations:  hip kit  Barriers to discharge:  None    Assessment:     Babatunde Oconnor is a 67 y.o. male with a medical diagnosis of Primary osteoarthritis of right hip.  He presents s/p right IVAN anterior approach. Patient ambulated to/from bathroom with contact guard assistance with rolling walker and min verbal cues for safety. Patient rolled rolling walker over toilet with contact guard assistance to complete toilet tasks in standing. At the sink, patient completed grooming task with contact guard assistance and min verbal cues for walker placement and safety. Patient required verbal cues throughout for safety awareness.  Patient would benefit from skilled OT needs s/p right IVAN anterior approach while adhering to anterior precautions to increase independence with ADLs and ADL transfers. Performance deficits affecting function: weakness, impaired endurance, impaired self care skills, impaired functional mobilty, gait instability, impaired balance, decreased lower extremity function, decreased safety awareness, decreased ROM, orthopedic precautions, impaired skin.      Rehab Prognosis: Good; patient would benefit from acute skilled OT services to address these deficits and reach maximum level of function.       Plan:     Patient to be seen daily to address the above listed problems via self-care/home management, therapeutic exercises, therapeutic activities  · Plan of Care Expires: 07/01/22  · Plan of Care Reviewed with: patient, spouse    Subjective     Chief Complaint: "pain in hip"   Patient/Family Comments/goals: "start walking"     Occupational Profile:  Living Environment: Patient lives with their spouse in 2nd floor condo with 15 steps and a right handrail to " enter and has a tub.   Previous level of function: Independent with mobility and ADLs  Roles and Routines: 6th-7th grade    Equipment Used at Home:  walker, rolling, bedside commode, grab bar, shower chair, cane, straight  Assistance upon Discharge: wife     Pain/Comfort:  Pain Rating 1: 3/10  Location - Side 1: Right  Location 1: hip  Pain Addressed 1: Pre-medicate for activity, Reposition, Distraction  Pain Rating Post-Intervention 1: 3/10    Patients cultural, spiritual, Restorationism conflicts given the current situation: no    Objective:     Communicated with: Nursing prior to session.  Patient found up in chair with peripheral IV, FCD, cryotherapy upon OT entry to room.    General Precautions: Standard, fall   Orthopedic Precautions:RLE weight bearing as tolerated, RLE anterior precautions (no extreme motions, hip flexion 0-90)   Braces: N/A     Occupational Performance:      Functional Mobility/Transfers:  · Patient completed Sit <> Stand Transfer with contact guard assistance  with  rolling walker    · Patient completed chair transfer step transfer technique with contact guard assistance and rolling walker  · Functional Mobility:  Patient ambulated to/from bathroom with contact guard assistance with rolling walker and min verbal cues for safety.     Activities of Daily Living:  · Grooming: contact guard assistance to wash hands standing at sink with rolling walker. Patient required verbal cues for walker placement and safety.   · Toileting: contact guard assistance to roll rolling walker over toilet with contact guard assistance to complete toilet tasks in standing    Cognitive/Visual Perceptual:  Cognitive/Psychosocial Skills:     -       Oriented to: Person, Place, Time and Situation   -       Follows Commands/attention:Follows multistep  commands  -       Safety awareness/insight to disability: impaired     Physical Exam:  Upper Extremity Range of Motion:     -       Right Upper Extremity:  WFL  -       Left Upper Extremity: WFL  Upper Extremity Strength:    -       Right Upper Extremity: WFL  -       Left Upper Extremity: WFL    AMPAC 6 Click ADL:  AMPAC Total Score: 19    Treatment & Education:    Patient educated on anterior hip precautions with no extreme motions and hip flexion 0-90 degrees   Patient educated on hand placement for transfers    Patient educated on rolling walker placement for ADLs  Patient educated on role OT and plan of care s/p surgery at [facility], white board updated as needed       Patient left up in chair with all lines intact, call button in reach, RN notified and wife present    GOALS:   Multidisciplinary Problems     Occupational Therapy Goals        Problem: Occupational Therapy    Goal Priority Disciplines Outcome Interventions   Occupational Therapy Goal     OT, PT/OT Ongoing, Progressing    Description: Goals to be met by: 7/1/22    Patient will increase functional independence with ADLs by performing:    UE Dressing with modified independence.  LE Dressing with supervision.  Grooming while standing at sink with modified independence.  Toileting from toilet with modified independence for hygiene and clothing management.   Toilet transfer to toilet with supervision.                     History:     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    Hyperlipidemia     Hypertension     Leg length discrepancy 10/16/2021    R longer, Consider foot eval & may need custom insert in shoe    Lumbar spondylosis 12/13/2019         Past Surgical History:   Procedure Laterality Date    APPENDECTOMY      CERVICAL DISCECTOMY  2008    CERVICAL SPINE SURGERY      INJECTION OF ANESTHETIC AGENT AROUND MEDIAL BRANCH NERVES INNERVATING LUMBAR FACET JOINT Bilateral 1/7/2020    Procedure: Block-nerve-medial branch-lumbar-- Bilateral (With Steroid);   Surgeon: Vamshi Anderson Jr., MD;  Location: [facility];  Service: Pain Management;  Laterality: Bilateral;  No Sedation    INJECTION OF ANESTHETIC AGENT AROUND MEDIAL BRANCH NERVES INNERVATING LUMBAR FACET JOINT Bilateral 5/14/2020    Procedure: Block-nerve-medial branch-lumbar--Bilateral L4-5, L5-S1 with steriods;  Surgeon: Vamshi Anderson Jr., MD;  Location: [facility];  Service: Pain Management;  Laterality: Bilateral;       Time Tracking:     OT Date of Treatment: 06/29/22  OT Start Time: 1450  OT Stop Time: 1506  OT Total Time (min): 16 min    Billable Minutes:Evaluation 8  Self Care/Home Management 8    KEVIN Otoole  6/29/2022

## 2022-06-29 NOTE — NURSING
Pt arrived to unit via hospital bed from PACU.  Pt aaox4, vss, no s/s of distress noted.  Pt states pain to right hip 4/10.  Dressing cdi.  Ice Pack in place.  IVF infusing.  Pt instructed to call for assistance when getting up and he verbalized understanding.  Call light within reach.  Family at bedside.

## 2022-06-29 NOTE — ANESTHESIA POSTPROCEDURE EVALUATION
Anesthesia Post Evaluation    Patient: Babatunde Oconnor    Procedure(s) Performed: Procedure(s) (LRB):  ARTHROPLASTY, HIP, TOTAL, ANTERIOR APPROACH: RIGHT: DEPUY-ACTIS+PINNACLE (Right)    Final Anesthesia Type: CSE      Patient location during evaluation: PACU  Patient participation: Yes- Able to Participate  Level of consciousness: awake and alert and oriented  Post-procedure vital signs: reviewed and stable  Pain management: adequate  Airway patency: patent    PONV status at discharge: No PONV  Anesthetic complications: no      Cardiovascular status: blood pressure returned to baseline and hemodynamically stable  Respiratory status: unassisted, room air and spontaneous ventilation  Hydration status: euvolemic  Follow-up not needed.          Vitals Value Taken Time   /68 06/29/22 1200   Temp 36.5 °C (97.7 °F) 06/29/22 1200   Pulse 67 06/29/22 1200   Resp 18 06/29/22 1200   SpO2 95 % 06/29/22 1200         No case tracking events are documented in the log.      Pain/Prakash Score: Pain Rating Prior to Med Admin: 4 (6/29/2022 11:12 AM)  Pain Rating Post Med Admin: 4 (6/29/2022 11:26 AM)  Prakash Score: 9 (6/29/2022 11:26 AM)

## 2022-06-29 NOTE — PLAN OF CARE
Pre Op Check list complete. Pt resting in bed with call light in reach. Will bring pt wife to bedside and plan is for her to hold pt belongings. Pt still needs anesthesia consent. Pt at walker in car. Dr. Amaral notified of herpes sore to sacral spine. Anesthesia MD notified as well.

## 2022-06-29 NOTE — INTERVAL H&P NOTE
The patient has been examined and the H&P has been reviewed:    I concur with the findings and no changes have occurred since H+P was written    Surgery risks, benefits and alternative options discussed and understood by patient/family.    Patient says every 3-4 months he has zoster flare on back, low sacral area. Normally takes 2 days of acyclovir and it goes away.   New flare started yesterday but has run out of meds so has not taken.   No open/raw skin on area.   Have discussed with anesthesia who will eval for spinal and will likely start meds while here.

## 2022-06-29 NOTE — ANESTHESIA PROCEDURE NOTES
CSE    Patient location during procedure: OR  Start time: 6/29/2022 8:35 AM  Timeout: 6/29/2022 8:35 AM  End time: 6/29/2022 8:45 AM      Staffing  Authorizing Provider: Bam Hamlin MD  Performing Provider: Bam Hamlin MD    Preanesthetic Checklist  Completed: patient identified, IV checked, site marked, risks and benefits discussed, surgical consent, monitors and equipment checked, pre-op evaluation and timeout performed  CSE  Patient position: sitting  Prep: ChloraPrep  Patient monitoring: heart rate, continuous pulse ox and frequent blood pressure checks  Approach: midline  Spinal Needle  Needle type: pencil-tip   Needle gauge: 25 G  Needle length: 5 in  Epidural Needle  Injection technique: SOL air  Needle type: Tuohy   Needle gauge: 17 G  Needle length: 3.5 in  Needle insertion depth: 5.5 cm  Location: L3-4   Catheter  Catheter type: springwound  Catheter size: 19 G  Catheter at skin depth: 10 cm  Additional Documentation: incremental injection, negative aspiration for heme and no paresthesia on injection  Assessment  Intrathecal Medications:   administered: primary anesthetic mcg of    Medications:    Medications: Intrathecal - mepivacaine (CARBOCAINE) injection 15 mg/mL (1.5%) - Intrathecal   3 mL - 6/29/2022 8:45:00 AM

## 2022-06-29 NOTE — HOSPITAL COURSE
On 6/29/22, the patient arrived to the [facility] for proper pre-operative management.  Upon completion of pre-operative preparation, the patient was taken back to the operative theatre. R IVAN was performed without complication and the patient was transported to the post anesthesia care unit in stable condition.  After appropriate recovery from the anaesthetic agents used during the surgery, the patient was then transported to the hospital inpatient floor.  The interim of the hospital stay from arrival on the floor up to discharge has been uncomplicated. The patient has tolerated regular diet.  The patient's pain has been controlled using a multimodal approach. Currently, the patient's pain is well controlled on an oral regimen.  The patient has been voiding without difficulty.  The patient began participation in physical therapy after surgery and has progressed throughout the entire hospital stay.  Currently, the patient's progress is sufficient to allow the them to be discharged to home with home health safely.  The patient agrees with this assessment and desires a discharge today.

## 2022-06-29 NOTE — PLAN OF CARE
Patient tolerated PT session well. Patient ambulated ~60ft with RW and contact guard assistance. No LOB or SOB noted. Patient educated in anterior hip precautions. Patient will have HHPT.       Problem: Physical Therapy  Goal: Physical Therapy Goal  Description: Goals to be met by: 2022    Patient will increase functional independence with mobility by performin. Supine to sit with supervision  2. Sit to stand transfer with Supervision  3. Gait x300 feet with Supervision using Rolling Walker  4. Ascend/Descend 8 steps with right handrail and stand by assistance  5. Lower extremity exercise program x30 reps per handout, with supervision      Outcome: Ongoing, Progressing

## 2022-06-30 ENCOUNTER — PATIENT MESSAGE (OUTPATIENT)
Dept: ADMINISTRATIVE | Facility: OTHER | Age: 67
End: 2022-06-30
Payer: COMMERCIAL

## 2022-06-30 VITALS
SYSTOLIC BLOOD PRESSURE: 139 MMHG | BODY MASS INDEX: 28.61 KG/M2 | DIASTOLIC BLOOD PRESSURE: 64 MMHG | HEIGHT: 66 IN | WEIGHT: 178 LBS | HEART RATE: 57 BPM | RESPIRATION RATE: 18 BRPM | TEMPERATURE: 98 F | OXYGEN SATURATION: 97 %

## 2022-06-30 LAB
BUN SERPL-MCNC: 10 MG/DL (ref 6–30)
CHLORIDE SERPL-SCNC: 106 MMOL/L (ref 95–110)
CREAT SERPL-MCNC: 0.7 MG/DL (ref 0.5–1.4)
GLUCOSE SERPL-MCNC: 131 MG/DL (ref 70–110)
HCT VFR BLD CALC: 34 %PCV (ref 36–54)
POC IONIZED CALCIUM: 1.08 MMOL/L (ref 1.06–1.42)
POC TCO2 (MEASURED): 23 MMOL/L (ref 23–29)
POTASSIUM BLD-SCNC: 4.1 MMOL/L (ref 3.5–5.1)
SAMPLE: ABNORMAL
SODIUM BLD-SCNC: 138 MMOL/L (ref 136–145)

## 2022-06-30 PROCEDURE — 94761 N-INVAS EAR/PLS OXIMETRY MLT: CPT

## 2022-06-30 PROCEDURE — 97110 THERAPEUTIC EXERCISES: CPT | Mod: CQ

## 2022-06-30 PROCEDURE — 84295 ASSAY OF SERUM SODIUM: CPT

## 2022-06-30 PROCEDURE — 97116 GAIT TRAINING THERAPY: CPT | Mod: CQ

## 2022-06-30 PROCEDURE — 85014 HEMATOCRIT: CPT

## 2022-06-30 PROCEDURE — 82565 ASSAY OF CREATININE: CPT

## 2022-06-30 PROCEDURE — 84132 ASSAY OF SERUM POTASSIUM: CPT

## 2022-06-30 PROCEDURE — 25000003 PHARM REV CODE 250: Performed by: ANESTHESIOLOGY

## 2022-06-30 PROCEDURE — 99900035 HC TECH TIME PER 15 MIN (STAT)

## 2022-06-30 PROCEDURE — 25000003 PHARM REV CODE 250: Performed by: PHYSICIAN ASSISTANT

## 2022-06-30 PROCEDURE — 97535 SELF CARE MNGMENT TRAINING: CPT

## 2022-06-30 PROCEDURE — 99499 UNLISTED E&M SERVICE: CPT | Mod: ,,, | Performed by: ANESTHESIOLOGY

## 2022-06-30 PROCEDURE — 25000003 PHARM REV CODE 250: Performed by: STUDENT IN AN ORGANIZED HEALTH CARE EDUCATION/TRAINING PROGRAM

## 2022-06-30 PROCEDURE — 97530 THERAPEUTIC ACTIVITIES: CPT | Mod: CQ

## 2022-06-30 PROCEDURE — 82330 ASSAY OF CALCIUM: CPT

## 2022-06-30 PROCEDURE — 99499 NO LOS: ICD-10-PCS | Mod: ,,, | Performed by: ANESTHESIOLOGY

## 2022-06-30 RX ORDER — CELECOXIB 200 MG/1
200 CAPSULE ORAL DAILY
Status: DISCONTINUED | OUTPATIENT
Start: 2022-06-30 | End: 2022-06-30 | Stop reason: HOSPADM

## 2022-06-30 RX ORDER — TALC
6 POWDER (GRAM) TOPICAL NIGHTLY PRN
Status: DISCONTINUED | OUTPATIENT
Start: 2022-06-30 | End: 2022-06-30 | Stop reason: HOSPADM

## 2022-06-30 RX ADMIN — FAMOTIDINE 20 MG: 20 TABLET, FILM COATED ORAL at 09:06

## 2022-06-30 RX ADMIN — POLYETHYLENE GLYCOL 3350 17 G: 17 POWDER, FOR SOLUTION ORAL at 09:06

## 2022-06-30 RX ADMIN — CELECOXIB 200 MG: 200 CAPSULE ORAL at 09:06

## 2022-06-30 RX ADMIN — ACYCLOVIR 200 MG: 200 CAPSULE ORAL at 06:06

## 2022-06-30 RX ADMIN — ACETAMINOPHEN 1000 MG: 500 TABLET ORAL at 06:06

## 2022-06-30 RX ADMIN — SENNOSIDES AND DOCUSATE SODIUM 1 TABLET: 50; 8.6 TABLET ORAL at 09:06

## 2022-06-30 RX ADMIN — Medication 6 MG: at 12:06

## 2022-06-30 RX ADMIN — ACETAMINOPHEN 1000 MG: 500 TABLET ORAL at 12:06

## 2022-06-30 RX ADMIN — SODIUM CHLORIDE: 0.9 INJECTION, SOLUTION INTRAVENOUS at 12:06

## 2022-06-30 RX ADMIN — ASPIRIN 81 MG: 81 TABLET, COATED ORAL at 09:06

## 2022-06-30 RX ADMIN — METHOCARBAMOL 750 MG: 750 TABLET ORAL at 09:06

## 2022-06-30 RX ADMIN — Medication 2 G: at 12:06

## 2022-06-30 RX ADMIN — MUPIROCIN 1 G: 20 OINTMENT TOPICAL at 09:06

## 2022-06-30 RX ADMIN — TAMSULOSIN HYDROCHLORIDE 0.4 MG: 0.4 CAPSULE ORAL at 09:06

## 2022-06-30 NOTE — PLAN OF CARE
Problem: Occupational Therapy  Goal: Occupational Therapy Goal  Description: Goals to be met by: 7/1/22    Patient will increase functional independence with ADLs by performing:    UE Dressing with modified independence.  LE Dressing with supervision.  Grooming while standing at sink with modified independence.  Toileting from toilet with modified independence for hygiene and clothing management.   Toilet transfer to toilet with supervision. Met    Outcome: Ongoing, Progressing   Patient's goals are appropriate.

## 2022-06-30 NOTE — DISCHARGE SUMMARY
[facility])  Orthopedics  Discharge Summary      Patient Name: Babatunde Oconnor  MRN: [id number]  Admission Date: 6/29/2022  Hospital Length of Stay: 0 days  Discharge Date and Time: .06/30/2022  Attending Physician: Talon Amaral III, MD   Discharging Provider: Eber Trujillo MD  Primary Care Provider: Negar Pandey MD    HPI:   CC:  Right hip pain     Babatunde Oconnor is a 67 y.o. male with Right hip pain.  Pain is worse with activity and weight bearing.  Patient has experienced interference of activities of daily living due to increased pain and decreased range of motion. Patient has failed non-operative treatment including NSAIDs, as well as greater than 3 months of activity modification. Babatunde Oconnor ambulates independently.      Relevant medical conditions of significance in perioperative period:  HTN: on meds  ACDF  L spine stenosis s/p WING  Chronic pain  BPH on flomax  Pre-DM 5.7  NAFLD, AST/ALT normal      Procedure(s) (LRB):  ARTHROPLASTY, HIP, TOTAL, ANTERIOR APPROACH: RIGHT: DEPUY-ACTIS+PINNACLE (Right)      Hospital Course:  On 6/29/22, the patient arrived to the [facility] for proper pre-operative management.  Upon completion of pre-operative preparation, the patient was taken back to the operative theatre. R IVAN was performed without complication and the patient was transported to the post anesthesia care unit in stable condition.  After appropriate recovery from the anaesthetic agents used during the surgery, the patient was then transported to the hospital inpatient floor.  The interim of the hospital stay from arrival on the floor up to discharge has been uncomplicated. The patient has tolerated regular diet.  The patient's pain has been controlled using a multimodal approach. Currently, the patient's pain is well controlled on an oral regimen.  The patient has been voiding without difficulty.  The patient began participation in physical  therapy after surgery and has progressed throughout the entire hospital stay.  Currently, the patient's progress is sufficient to allow the them to be discharged to home with home health safely.  The patient agrees with this assessment and desires a discharge today.      Goals of Care Treatment Preferences:         Consults (From admission, onward)        Status Ordering Provider     Inpatient consult to Social Work  Once        Provider:  (Not yet assigned)    Acknowledged TESHA FONSECA     Inpatient consult to Respiratory Care  Once        Provider:  (Not yet assigned)    Acknowledged TESHA FONSECA          Pending Diagnostic Studies:     None        Final Active Diagnoses:    Diagnosis Date Noted POA    PRINCIPAL PROBLEM:  Primary osteoarthritis of right hip [M16.11] 05/04/2022 Yes      Problems Resolved During this Admission:      Discharged Condition: good    Disposition: Home-Health Care [facility]    Follow Up:    Patient Instructions:      Activity as tolerated     Weight bearing as tolerated     No driving, operating heavy equipment or signing legal documents while taking pain medication     Leave dressing on - Keep it clean, dry, and intact until clinic visit   Order Comments: Do not remove surgical dressing for 2 weeks post-op. This will be done only by MD at initial post-op visit. If dressing is completely saturated, replace with identical dressing - silver-impregnated hydrocolloid dressing.    Do not get dressings wet. Do not shower.    If dressing continues to be saturated or there are signs of infection, please call [facility] [phone] for further instructions and to make appt to be seen.     Call MD for: fluid/liquid/drainage on dressing     Call MD for:  temperature >100.4     Call MD for:  persistent nausea and vomiting     Call MD for:  severe uncontrolled pain     Call MD for:  difficulty breathing, headache or visual disturbances     Call MD for:  redness, tenderness, or signs of  infection (pain, swelling, redness, odor or green/yellow discharge around incision site)     Call MD for:  hives     Call MD for:  persistent dizziness or light-headedness     Call MD for:  extreme fatigue     Ok to shower at home, running water only, towel dry, use shower chair for safety, no soaking in a tub or pool for one month.     Medications:  Reconciled Home Medications:      Medication List      START taking these medications    acetaminophen 650 MG Tbsr  Commonly known as: TYLENOL  Take 1 tablet (650 mg total) by mouth every 6 to 8 hours as needed (pain).     acyclovir 400 MG tablet  Commonly known as: ZOVIRAX  Take 0.5 tablets (200 mg total) by mouth 5 (five) times daily. for 7 days     cefadroxil 500 MG Cap  Commonly known as: DURICEF  Take 1 capsule (500 mg total) by mouth every 12 (twelve) hours. for 7 days     celecoxib 200 MG capsule  Commonly known as: CeleBREX  Take 1 capsule (200 mg total) by mouth once daily.     docusate sodium 100 MG capsule  Commonly known as: COLACE  Take 1 capsule (100 mg total) by mouth 2 (two) times daily as needed for Constipation.     methocarbamoL 750 MG Tab  Commonly known as: ROBAXIN  Take 1 tablet (750 mg total) by mouth 3 (three) times daily as needed (muscle spasms).     oxyCODONE 5 MG immediate release tablet  Commonly known as: ROXICODONE  Take 1 tab by mouth every 4-6 hours as needed for pain        CHANGE how you take these medications    aspirin 81 MG EC tablet  Commonly known as: ECOTRIN  Take 1 tablet (81 mg total) by mouth 2 (two) times daily.  What changed: when to take this        CONTINUE taking these medications    amlodipine-atorvastatin 10-10 mg per tablet  Commonly known as: CADUET  TAKE 1 TABLET BY MOUTH ONCE DAILY     bisoprolol-hydrochlorothiazide 5-6.25 mg 5-6.25 mg Tab  Commonly known as: ZIAC  TAKE 1 TABLET BY MOUTH ONCE DAILY     tamsulosin 0.4 mg Cap  Commonly known as: FLOMAX  TAKE 1 CAPSULE BY MOUTH  ONCE DAILY            Eber Trujillo  MD  Orthopedics  [facility])

## 2022-06-30 NOTE — NURSING
Provided patient with pain scale card. Patient/family educated on the use of pain scale card. Patient/family educated re:side effects of new medications. Patient/family verbalized understanding.

## 2022-06-30 NOTE — PLAN OF CARE
Pt discharged from unit.  Pt aaox4, vss, no s/s of distress noted.  Pt denies pain at this time.  Dressing to right hip remains cdi.  IV removed from left hand w/ catheter intact.  Discharge instructions given to pt and he verbalized understanding.  Home meds and f/u appts reviewed as well.  [place] given to pt.  Meds delivered to bedside prior to discharge.  Pt reminded to  acyclovir at [facility] and he verbalized understanding.  Pt left unit via w/c and was accompanied to front of hospital to meet ride.  All personal belongings sent with pt.

## 2022-06-30 NOTE — PLAN OF CARE
Problem: Adult Inpatient Plan of Care  Goal: Plan of Care Review  Outcome: Ongoing, Progressing  Flowsheets (Taken 6/30/2022 0521)  Plan of Care Reviewed With: patient  Goal: Patient-Specific Goal (Individualized)  Outcome: Ongoing, Progressing  Flowsheets (Taken 6/30/2022 0521)  Anxieties, Fears or Concerns: Generalized  Individualized Care Needs: Pain management  Patient-Specific Goals (Include Timeframe): Keep patient up to date on Plan of Care     Problem: Pain Acute  Goal: Acceptable Pain Control and Functional Ability  Outcome: Ongoing, Progressing  Intervention: Develop Pain Management Plan  Flowsheets (Taken 6/30/2022 0521)  Pain Management Interventions:   care clustered   cold applied   medication offered   pain management plan reviewed with patient/caregiver   quiet environment facilitated   relaxation techniques promoted  Intervention: Prevent or Manage Pain  Flowsheets (Taken 6/30/2022 0521)  Sleep/Rest Enhancement: relaxation techniques promoted  Sensory Stimulation Regulation:   auditory stimulation minimized   care clustered   quiet environment promoted  Bowel Elimination Promotion: adequate fluid intake promoted  Medication Review/Management:   medications reviewed   high-risk medications identified     Problem: Bleeding (Hip Arthroplasty)  Goal: Absence of Bleeding  Outcome: Ongoing, Progressing  Intervention: Monitor and Manage Bleeding  Flowsheets (Taken 6/30/2022 0521)  Bleeding Management: dressing monitored     Problem: Functional Ability Impaired (Hip Arthroplasty)  Goal: Optimal Functional Ability  Outcome: Ongoing, Progressing  Intervention: Promote Optimal Functional Status  Flowsheets (Taken 6/30/2022 0521)  Self-Care Promotion:   BADL personal objects within reach   safe use of adaptive equipment encouraged  Assistive Device Utilized: walker  Activity Management: Ambulated to bathroom - L4     Problem: Neurovascular Compromise (Hip Arthroplasty)  Goal: Intact Neurovascular  Status  Outcome: Ongoing, Progressing  Intervention: Prevent or Manage Neurovascular Compromise  Flowsheets (Taken 6/30/2022 0521)  Compartment Syndrome Management: active flexion/extension encouraged  Compartment Syndrome Surveillance: no pain with passive muscle stretch     Problem: Pain (Hip Arthroplasty)  Goal: Acceptable Pain Control  Outcome: Ongoing, Progressing  Intervention: Prevent or Manage Pain  Flowsheets (Taken 6/30/2022 0521)  Pain Management Interventions:   care clustered   cold applied   medication offered   pain management plan reviewed with patient/caregiver   quiet environment facilitated   relaxation techniques promoted

## 2022-06-30 NOTE — PLAN OF CARE
Problem: Pain Acute  Goal: Acceptable Pain Control and Functional Ability  Intervention: Prevent or Manage Pain  Flowsheets (Taken 6/30/2022 1005)  Sensory Stimulation Regulation:   care clustered   lighting decreased  Medication Review/Management: medications reviewed     Problem: Pain Acute  Goal: Acceptable Pain Control and Functional Ability  Intervention: Optimize Psychosocial Wellbeing  Flowsheets (Taken 6/30/2022 1005)  Supportive Measures:   active listening utilized   positive reinforcement provided     Problem: Adjustment to Surgery (Hip Arthroplasty)  Goal: Optimal Coping  Intervention: Support Psychosocial Response to Surgery and Mobility Changes  Flowsheets (Taken 6/30/2022 1005)  Supportive Measures:   active listening utilized   positive reinforcement provided     Problem: Bleeding (Hip Arthroplasty)  Goal: Absence of Bleeding  Intervention: Monitor and Manage Bleeding  Flowsheets (Taken 6/30/2022 1005)  Bleeding Management: dressing monitored     Problem: Infection (Hip Arthroplasty)  Goal: Absence of Infection Signs and Symptoms  Intervention: Prevent or Manage Infection  Flowsheets (Taken 6/30/2022 1005)  Infection Management: aseptic technique maintained

## 2022-06-30 NOTE — PT/OT/SLP PROGRESS
Physical Therapy Treatment    Patient Name:  Babatunde Oconnor   MRN:  [id number]    Recommendations:     Discharge Recommendations:  home health PT   Discharge Equipment Recommendations: hip kit   Barriers to discharge: Inaccessible home    Assessment:     Babatunde Oconnor is a 67 y.o. male admitted with a medical diagnosis of Primary osteoarthritis of right hip.  He presents with the following impairments/functional limitations:   (weakness; impaired endurance; impaired self care skills; impaired functional mobilty; gait instability; impaired balance; decreased lower extremity function; decreased safety awareness; decreased ROM; orthopedic precautions; impaired skin) .pt  presents with  improved overall functional mobility requiring decreased assistance and increased activity tolerance to perform functional mobility. Pt required vcs for safety. Pt would continue to benefit from skilled PT to address overall functional mobility, to return to functional baseline and goals. Goals remain appropriate.      Rehab Prognosis: Good; patient would benefit from acute skilled PT services to address these deficits and reach maximum level of function.    Recent Surgery: Procedure(s) (LRB):  ARTHROPLASTY, HIP, TOTAL, ANTERIOR APPROACH: RIGHT: DEPUY-ACTIS+PINNACLE (Right) 1 Day Post-Op    Plan:     During this hospitalization, patient to be seen daily to address the identified rehab impairments via gait training, therapeutic activities, therapeutic exercises and progress toward the following goals:    · Plan of Care Expires:  07/01/22    Subjective     Patient/Family Comments/goals: I am ready  Pain/Comfort:  · Pain Rating 1:  (did not rate)  · Location - Side 1: Right  · Location - Orientation 1: generalized  · Location 1: hip  · Pain Addressed 1: Reposition, Pre-medicate for activity, Distraction  · Pain Rating Post-Intervention 1:  (did not rate)      Objective:     Communicated with RN prior to session.  Patient found  HOB elevated with  (peripheral IV; FCD; cryotherapy) upon PT entry to room.     General Precautions: Standard, fall   Orthopedic Precautions: (RLE weight bearing as tolerated; RLE anterior precautions   no extreme motions, hip flexions 0-90)   Braces: N/A  Respiratory Status: Room air     Functional Mobility:  · Bed Mobility:     ? Scooting: (S)  ? Supine <> Sit:SBA on bed and mat  · Transfers:     ? Sit to Stand:  SBA/(S) with rolling walker with vcs for hand placement  · Gait:  Patient ambulated 170ft x 2  with Rolling Walker and SBA using 3-point gait with chair follow for safety. Patient demonstrated decreased benny and decreased step length during gait due to pain, decreased ROM, and decreased strength.vcs for safety, and upright posture  ·   Patient stood at the toilet to urinate in urinal with contact guard assistance and rolling walker.   ascend/Descend curb step with RW  and stand by assistance/CGA vcs for sequencing and safety   Ascend/Descend 12 steps with right handrail and stand by assistance vcs for sequencing and safety  AM-PAC 6 CLICK MOBILITY  Turning over in bed (including adjusting bedclothes, sheets and blankets)?: 3  Sitting down on and standing up from a chair with arms (e.g., wheelchair, bedside commode, etc.): 3  Moving from lying on back to sitting on the side of the bed?: 3  Moving to and from a bed to a chair (including a wheelchair)?: 3  Need to walk in hospital room?: 3  Climbing 3-5 steps with a railing?: 3  Basic Mobility Total Score: 18       Therapeutic Activities and Exercises:   Therapist provided instruction and educated of  patient on progress, safety,d/c,PT POC,   proper body mechanics, energy conservation, and fall prevention strategies during tasks listed above, on anterior hip precautions     Patient  facilitated therex 3 x 10 reps  B LE AROM AP,QS,GS,SAQ,HS . Patient required skilled PTA for instruction of exercises and appropriate cues to perform exercises safely,  sequencing and appropriately.   Exercises performed to develop and maintain pt's strength, endurance and flexibility.  Updated white board with appropriate PT mobility information for medical team notification     Donned an extra gown     Call nursing/pct to transfer to chair/use bathroom. Pt stated understanding      Bedside table in front of patient and area set up for function, convenience, and safety. RN aware of patient's mobility needs and status. Questions/concerns addressed within PTA scope of practice; patient  with no further questions. Time was provided for active listening, discussion of health disposition, and discussion of safe discharge. Pt?verbalized?agreement .    Patient left up in chair with all lines intact, call button in reach, nsg notified and FCDS intact present..    GOALS:   Multidisciplinary Problems     Physical Therapy Goals        Problem: Physical Therapy    Goal Priority Disciplines Outcome Goal Variances Interventions   Physical Therapy Goal     PT, PT/OT Ongoing, Progressing     Description: Goals to be met by: 2022    Patient will increase functional independence with mobility by performin. Supine to sit with supervision  2. Sit to stand transfer with Supervision  3. Gait x300 feet with Supervision using Rolling Walker  4. Ascend/Descend 8 steps with right handrail and stand by assistance  5. Lower extremity exercise program x30 reps per handout, with supervision                       Time Tracking:     PT Received On: 22  PT Start Time: 739     PT Stop Time: 818  PT Total Time (min): 39 min     Billable Minutes: Gait Training 15, Therapeutic Activity 9 and Therapeutic Exercise 15    Treatment Type: Treatment  PT/PTA: PTA     PTA Visit Number: 1     2022

## 2022-06-30 NOTE — PROGRESS NOTES
[facility])  Orthopedics  Progress Note    Patient Name: Babatunde Oconnor  MRN: [id number]  Admission Date: 6/29/2022  Hospital Length of Stay: 0 days  Attending Provider: Talon Amaral III, MD  Primary Care Provider: Negar Pandey MD  Follow-up For: Procedure(s) (LRB):  ARTHROPLASTY, HIP, TOTAL, ANTERIOR APPROACH: RIGHT: DEPUY-ACTIS+PINNACLE (Right)    Post-Operative Day: 1 Day Post-Op  Subjective:     Principal Problem:Primary osteoarthritis of right hip    Principal Orthopedic Problem: same    Interval History: Patient examined at the bedside. NAEON. Pain controlled. Tolerating PO w/out N/V and voiding appropriately.   Ambulated 60 ft with PT yesterday. Hct 34.     Review of patient's allergies indicates:  No Known Allergies    Current Facility-Administered Medications   Medication    0.9%  NaCl infusion    acetaminophen tablet 1,000 mg    acyclovir capsule 200 mg    amLODIPine tablet 10 mg    aspirin EC tablet 81 mg    atorvastatin tablet 10 mg    celecoxib capsule 200 mg    famotidine tablet 20 mg    melatonin tablet 6 mg    methocarbamoL tablet 750 mg    metoprolol succinate (TOPROL-XL) 24 hr tablet 100 mg    morphine injection 2 mg    mupirocin 2 % ointment 1 g    naloxone 0.4 mg/mL injection 0.02 mg    ondansetron injection 4 mg    oxyCODONE immediate release tablet 5 mg    oxyCODONE immediate release tablet Tab 10 mg    polyethylene glycol packet 17 g    polyethylene glycol packet 17 g    pregabalin capsule 150 mg    prochlorperazine injection Soln 5 mg    senna-docusate 8.6-50 mg per tablet 1 tablet    tamsulosin 24 hr capsule 0.4 mg     Objective:     Vital Signs (Most Recent):  Temp: 98 °F (36.7 °C) (06/30/22 0405)  Pulse: 66 (06/30/22 0405)  Resp: 18 (06/30/22 0405)  BP: 136/69 (06/30/22 0405)  SpO2: 96 % (06/30/22 0405) Vital Signs (24h Range):  Temp:  [97.5 °F (36.4 °C)-98.5 °F (36.9 °C)] 98 °F (36.7 °C)  Pulse:  [63-82] 66  Resp:  [13-18] 18  SpO2:  [92  "%-98 %] 96 %  BP: (103-149)/(57-74) 136/69     Weight: 80.7 kg (178 lb)  Height: 5' 6" (167.6 cm)  Body mass index is 28.73 kg/m².      Intake/Output Summary (Last 24 hours) at 6/30/2022 0642  Last data filed at 6/30/2022 0600  Gross per 24 hour   Intake 6045 ml   Output 5200 ml   Net 845 ml       Ortho/SPM Exam  AAOx4  NAD  Reg rate  No increased WOB    RLE  Dressing c/d/i  Ice in place  SILT T/SP/DP/Wray/Sa  Motor intact T/SP/DP  Palpable DP pulse  FCDs in place and functioning    Significant Labs: All pertinent labs within the past 24 hours have been reviewed.    Significant Imaging: I have reviewed all pertinent imaging results/findings.    Assessment/Plan:     * Primary osteoarthritis of right hip  67 y.o. male POD1 s/p R IVAN    Pain control: multimodal per surgical home  PT/OT: WBAT RLE, Anterior hip precautions, No hip flexion 0-90 deg. No extremes of motion.  DVT PPx: ASA 81mg BID, FCDs at all times when not ambulating  Abx: postop Ancef  Labs: Hct 34    Dispo: d/c home with HH pending PT          Eber Trujillo MD  Orthopedics  [facility] ([facility])  "

## 2022-06-30 NOTE — PT/OT/SLP PROGRESS
Occupational Therapy   Treatment    Name: Babatunde Oconnor  MRN: [id number]  Admitting Diagnosis:  Primary osteoarthritis of right hip  1 Day Post-Op    Recommendations:     Discharge Recommendations: home health OT  Discharge Equipment Recommendations:  hip kit  Barriers to discharge:  None    Assessment:     Babatunde Oconnor is a 67 y.o. male with a medical diagnosis of Primary osteoarthritis of right hip. Performance deficits affecting function are weakness, impaired endurance, impaired self care skills, impaired functional mobilty, gait instability, impaired balance, decreased lower extremity function, decreased safety awareness, decreased ROM, orthopedic precautions, impaired skin. Patient would benefit from continued skilled OT to improve functional mobility, increase independence with ADLs, and address established goals prior to discharging home.    Rehab Prognosis:  Good; patient would benefit from acute skilled OT services to address these deficits and reach maximum level of function.       Plan:     Patient to be seen daily to address the above listed problems via self-care/home management, therapeutic activities, therapeutic exercises  · Plan of Care Expires: 07/01/22  · Plan of Care Reviewed with: patient    Subjective     Pain/Comfort:  · Pain Rating 1:  (patient did not rate)  · Location - Side 1: Right  · Location - Orientation 1: generalized  · Location 1: hip  · Pain Addressed 1: Reposition, Distraction, Pre-medicate for activity  · Pain Rating Post-Intervention 1:  (patient did not rate)    Objective:     Communicated with: NSG prior to session.  Patient found up in chair with peripheral IV, FCD, cryotherapy upon OT entry to room.    General Precautions: Standard, fall   Orthopedic Precautions:RLE weight bearing as tolerated, RLE anterior precautions (no extreme motions, hip flexions 0-90)   Braces: N/A  Respiratory Status: Room air     Occupational Performance:     Functional  Mobility/Transfers:  · Patient completed Sit <> Stand Transfer with supervision  with  rolling walker   · Patient completed Toilet Transfer bedside chair>toilet with functional ambulation technique with supervision with  rolling walker; patient then ambulated to sink with BSC pulled behind patient as he took a seated rest break for therapist to obtain items for patient to perform ADLs at sink. Patient then ambulated back to bedside chair with RW with supervision.     Activities of Daily Living:  · Grooming: supervision standing at sink for washing hair, oral care, and washing face.   · Bathing: minimum assistance spongebath from standing at sink and seated as needed on BS to complete task.   · Upper Body Dressing: SBA for doffing front gown and donning pullover shirt  · Lower Body Dressing: stand by assistance Educated patient thoroughly with doffing and donning socks with reacher and sock aid. Donning shoes with long handle shoe horn. Donning underwear and shorts with long handle reacher.   · Toileting: supervision      [facility] 6 Click ADL: 21    Treatment & Education:  Role of OT and POC  ADL retraining  Functional mobility training  Safety    Reinforcement with orthopedic precautions and not bending more than 90 degrees during all ADL tasks throughout OT session.     Patient left up in chair with call button in reach and all needs met. Education:      GOALS:   Multidisciplinary Problems     Occupational Therapy Goals        Problem: Occupational Therapy    Goal Priority Disciplines Outcome Interventions   Occupational Therapy Goal     OT, PT/OT Ongoing, Progressing    Description: Goals to be met by: 7/1/22    Patient will increase functional independence with ADLs by performing:    UE Dressing with modified independence.  LE Dressing with supervision.  Grooming while standing at sink with modified independence.  Toileting from toilet with modified independence for hygiene and clothing management.   Toilet  transfer to toilet with supervision. Met                     Time Tracking:     OT Date of Treatment: 06/30/22  OT Start Time: 0835  OT Stop Time: 0900  OT Total Time (min): 25 min    Billable Minutes:Self Care/Home Management 25 6/30/2022

## 2022-06-30 NOTE — SUBJECTIVE & OBJECTIVE
"Principal Problem:Primary osteoarthritis of right hip    Principal Orthopedic Problem: same    Interval History: Patient examined at the bedside. NAEON. Pain controlled. Tolerating PO w/out N/V and voiding appropriately.   Ambulated 60 ft with PT yesterday. Hct 34.     Review of patient's allergies indicates:  No Known Allergies    Current Facility-Administered Medications   Medication    0.9%  NaCl infusion    acetaminophen tablet 1,000 mg    acyclovir capsule 200 mg    amLODIPine tablet 10 mg    aspirin EC tablet 81 mg    atorvastatin tablet 10 mg    celecoxib capsule 200 mg    famotidine tablet 20 mg    melatonin tablet 6 mg    methocarbamoL tablet 750 mg    metoprolol succinate (TOPROL-XL) 24 hr tablet 100 mg    morphine injection 2 mg    mupirocin 2 % ointment 1 g    naloxone 0.4 mg/mL injection 0.02 mg    ondansetron injection 4 mg    oxyCODONE immediate release tablet 5 mg    oxyCODONE immediate release tablet Tab 10 mg    polyethylene glycol packet 17 g    polyethylene glycol packet 17 g    pregabalin capsule 150 mg    prochlorperazine injection Soln 5 mg    senna-docusate 8.6-50 mg per tablet 1 tablet    tamsulosin 24 hr capsule 0.4 mg     Objective:     Vital Signs (Most Recent):  Temp: 98 °F (36.7 °C) (06/30/22 0405)  Pulse: 66 (06/30/22 0405)  Resp: 18 (06/30/22 0405)  BP: 136/69 (06/30/22 0405)  SpO2: 96 % (06/30/22 0405) Vital Signs (24h Range):  Temp:  [97.5 °F (36.4 °C)-98.5 °F (36.9 °C)] 98 °F (36.7 °C)  Pulse:  [63-82] 66  Resp:  [13-18] 18  SpO2:  [92 %-98 %] 96 %  BP: (103-149)/(57-74) 136/69     Weight: 80.7 kg (178 lb)  Height: 5' 6" (167.6 cm)  Body mass index is 28.73 kg/m².      Intake/Output Summary (Last 24 hours) at 6/30/2022 0642  Last data filed at 6/30/2022 0600  Gross per 24 hour   Intake 6045 ml   Output 5200 ml   Net 845 ml       Ortho/SPM Exam  AAOx4  NAD  Reg rate  No increased WOB    RLE  Dressing c/d/i  Ice in place  SILT T/SP/DP/Wray/Sa  Motor intact T/SP/DP  Palpable DP " pulse  FCDs in place and functioning    Significant Labs: All pertinent labs within the past 24 hours have been reviewed.    Significant Imaging: I have reviewed all pertinent imaging results/findings.

## 2022-06-30 NOTE — PROGRESS NOTES
Acute Pain Service and Perioperative Surgical Home Progress Note    Interval history  Babatunde Oconnor is a 67 y.o., male,     Surgery:  Procedure(s) (LRB):  ARTHROPLASTY, HIP, TOTAL, ANTERIOR APPROACH: RIGHT: DEPUY-ACTIS+PINNACLE (Right)    Post Op Day #: 1    Problem List:    Active Hospital Problems    Diagnosis  POA    *Primary osteoarthritis of right hip [M16.11]  Yes      Resolved Hospital Problems   No resolved problems to display.       Subjective:       General appearance of alert, oriented, no complaints   Pain with rest: 2    Numbers   Pain with movement: 4    Numbers   Side Effects    1. Pruritis No    2. Nausea No    3. Motor Blockade Yes, 0=Ability to raise lower extremities off bed    4. Sedation No, 1=awake and alert    Schedule Medications:    acetaminophen  1,000 mg Oral Q6H    acyclovir  200 mg Oral 5x Daily    amLODIPine  10 mg Oral Daily    aspirin  81 mg Oral BID    atorvastatin  10 mg Oral Daily    celecoxib  200 mg Oral Daily    famotidine  20 mg Oral BID    methocarbamoL  750 mg Oral TID    metoprolol succinate  100 mg Oral Daily    mupirocin  1 g Nasal BID    polyethylene glycol  17 g Oral Daily    pregabalin  150 mg Oral QHS    senna-docusate 8.6-50 mg  1 tablet Oral BID    tamsulosin  1 capsule Oral Daily        Continuous Infusions:   sodium chloride 0.9% 150 mL/hr at 06/30/22 0026        PRN Medications:  melatonin, morphine, naloxone, ondansetron, oxyCODONE, oxyCODONE, polyethylene glycol, prochlorperazine       Antibiotics:  Antibiotics (From admission, onward)            Start     Stop Route Frequency Ordered    06/29/22 1245  mupirocin 2 % ointment 1 g         07/04 0859 Nasl 2 times daily 06/29/22 1244             Objective:         Vital Signs (Most Recent):  Temp: 98 °F (36.7 °C) (06/30/22 0405)  Pulse: 66 (06/30/22 0405)  Resp: 18 (06/30/22 0405)  BP: 136/69 (06/30/22 0405)  SpO2: 96 % (06/30/22 0405) Vital Signs Range (Last 24H):  Temp:  [97.5 °F (36.4  °C)-98.5 °F (36.9 °C)]   Pulse:  [63-82]   Resp:  [13-18]   BP: (103-149)/(57-74)   SpO2:  [92 %-98 %]          I & O (Last 24H):    Intake/Output Summary (Last 24 hours) at 6/30/2022 0538  Last data filed at 6/30/2022 0425  Gross per 24 hour   Intake 4270 ml   Output 5200 ml   Net -930 ml       Physical Exam:    GA: Alert, comfortable, no acute distress.   Pulmonary: Clear to auscultation A/P/L. No wheezing, crackles, or rhonchi.  Cardiac: RRR S1 & S2 w/o rubs/murmurs/gallops.   Abdominal:Bowel sounds present. No tenderness to palpation or distension. No appreciable hepatosplenomegaly.   Skin: No jaundice, rashes, or visible lesions.         Laboratory:  CBC:   Recent Labs     06/30/22  0413   HCT 34*       BMP: No results for input(s): NA, K, CO2, CL, BUN, CREATININE, GLU, MG, PHOS, CALCIUM in the last 72 hours.    No results for input(s): PT, INR, PROTIME, APTT in the last 72 hours.      Anticoagulant dose ASA 81 @ 2022    Assessment:  No acute events overnight.  Compartments are soft.  Good distal pulses.  Good strength.           Pain control adequate    Plan:     1) Pain: Patient pain well-controlled with multi-modal regimen.   2) BPH, no trouble urinating overnight   3) hypertension and hyperlipidemia  -continue home regimen   4) FEN/GI: Tolerating liquids, advance diet as tolerated    5) Dispo: Pt working well with PT/OT. Case management and SW for placement. Plan for D/C today.          Evaluator Dylan Gonzalez PA-C

## 2022-07-01 ENCOUNTER — CLINICAL SUPPORT (OUTPATIENT)
Dept: ORTHOPEDICS | Facility: CLINIC | Age: 67
End: 2022-07-01
Payer: MEDICARE

## 2022-07-01 PROCEDURE — 99499 UNLISTED E&M SERVICE: CPT | Mod: 95,,, | Performed by: ORTHOPAEDIC SURGERY

## 2022-07-01 PROCEDURE — 99499 NO LOS: ICD-10-PCS | Mod: 95,,, | Performed by: ORTHOPAEDIC SURGERY

## 2022-07-01 NOTE — PROGRESS NOTES
I called the patient today regarding his surgery with Dr. Talon Amaral III. The patient had a right anterior IVAN on 6/29.     Pain Scale: 1 / 10    Any issues with Fever: no    Any issues with medications (specifically DVT prophylaxis): No. ASA 81 BID    Any issues with bowel movements:  Passing quynh: No.                                                                 Urination: No.                                                                 Constipation: No. last BM 7/1    Completing at home exercises: Yes:     Any concerns regarding their dressing/bandage:  No.    Patient confirmed first HH-PT appointment:  HHPT on  7/1 at 12 pm.     Any other concerns: No.        The patient was informed that if they have any urgent issues with their bandage, medications or any other health concerns regarding their surgery to call the 24/7 Orthopedic Post-op Hot Line at [phone]. The patient was reminded that if they have any chest pain or shortness of breath to call 911 or go to the ER.    The patient verbalized understanding and does not have any other questions

## 2022-07-04 ENCOUNTER — PATIENT MESSAGE (OUTPATIENT)
Dept: ADMINISTRATIVE | Facility: OTHER | Age: 67
End: 2022-07-04
Payer: COMMERCIAL

## 2022-07-05 DIAGNOSIS — N40.0 BENIGN PROSTATIC HYPERPLASIA, UNSPECIFIED WHETHER LOWER URINARY TRACT SYMPTOMS PRESENT: ICD-10-CM

## 2022-07-05 RX ORDER — TAMSULOSIN HYDROCHLORIDE 0.4 MG/1
CAPSULE ORAL
Qty: 180 CAPSULE | Refills: 0 | Status: SHIPPED | OUTPATIENT
Start: 2022-07-05 | End: 2022-08-05

## 2022-07-05 NOTE — TELEPHONE ENCOUNTER
Care Due:                  Date            Visit Type   Department     Provider  --------------------------------------------------------------------------------                                SAME DAY -                              ESTABLISHED   [facility] INTERNAL  Last Visit: 10-      PATIENT      MEDICINE       Tabatha Pham  Next Visit: None Scheduled  None         None Found                                                            Last  Test          Frequency    Reason                     Performed    Due Date  --------------------------------------------------------------------------------    Lipid Panel.  12 months..  amlodipine-atorvastatin..  06- 06-    [facility] Embedded Care Gaps. Reference number: [id number]. 7/05/2022   12:28:45 AM CDT

## 2022-07-07 ENCOUNTER — PATIENT MESSAGE (OUTPATIENT)
Dept: ADMINISTRATIVE | Facility: OTHER | Age: 67
End: 2022-07-07
Payer: COMMERCIAL

## 2022-07-12 ENCOUNTER — PATIENT MESSAGE (OUTPATIENT)
Dept: ADMINISTRATIVE | Facility: OTHER | Age: 67
End: 2022-07-12
Payer: COMMERCIAL

## 2022-07-14 ENCOUNTER — PATIENT MESSAGE (OUTPATIENT)
Dept: ADMINISTRATIVE | Facility: OTHER | Age: 67
End: 2022-07-14
Payer: COMMERCIAL

## 2022-07-14 ENCOUNTER — OFFICE VISIT (OUTPATIENT)
Dept: ORTHOPEDICS | Facility: CLINIC | Age: 67
End: 2022-07-14
Payer: MEDICARE

## 2022-07-14 VITALS — HEIGHT: 66 IN | WEIGHT: 177.94 LBS | BODY MASS INDEX: 28.6 KG/M2

## 2022-07-14 DIAGNOSIS — Z96.641 STATUS POST TOTAL HIP REPLACEMENT, RIGHT: Primary | ICD-10-CM

## 2022-07-14 PROCEDURE — 99999 PR PBB SHADOW E&M-EST. PATIENT-LVL III: CPT | Mod: PBBFAC,,, | Performed by: PHYSICIAN ASSISTANT

## 2022-07-14 PROCEDURE — 99024 PR POST-OP FOLLOW-UP VISIT: ICD-10-PCS | Mod: S$GLB,,, | Performed by: PHYSICIAN ASSISTANT

## 2022-07-14 PROCEDURE — 1159F PR MEDICATION LIST DOCUMENTED IN MEDICAL RECORD: ICD-10-PCS | Mod: CPTII,S$GLB,, | Performed by: PHYSICIAN ASSISTANT

## 2022-07-14 PROCEDURE — 3008F PR BODY MASS INDEX (BMI) DOCUMENTED: ICD-10-PCS | Mod: CPTII,S$GLB,, | Performed by: PHYSICIAN ASSISTANT

## 2022-07-14 PROCEDURE — 3288F FALL RISK ASSESSMENT DOCD: CPT | Mod: CPTII,S$GLB,, | Performed by: PHYSICIAN ASSISTANT

## 2022-07-14 PROCEDURE — 1125F PR PAIN SEVERITY QUANTIFIED, PAIN PRESENT: ICD-10-PCS | Mod: CPTII,S$GLB,, | Performed by: PHYSICIAN ASSISTANT

## 2022-07-14 PROCEDURE — 1101F PR PT FALLS ASSESS DOC 0-1 FALLS W/OUT INJ PAST YR: ICD-10-PCS | Mod: CPTII,S$GLB,, | Performed by: PHYSICIAN ASSISTANT

## 2022-07-14 PROCEDURE — 99024 POSTOP FOLLOW-UP VISIT: CPT | Mod: S$GLB,,, | Performed by: PHYSICIAN ASSISTANT

## 2022-07-14 PROCEDURE — 99999 PR PBB SHADOW E&M-EST. PATIENT-LVL III: ICD-10-PCS | Mod: PBBFAC,,, | Performed by: PHYSICIAN ASSISTANT

## 2022-07-14 PROCEDURE — 3008F BODY MASS INDEX DOCD: CPT | Mod: CPTII,S$GLB,, | Performed by: PHYSICIAN ASSISTANT

## 2022-07-14 PROCEDURE — 1159F MED LIST DOCD IN RCRD: CPT | Mod: CPTII,S$GLB,, | Performed by: PHYSICIAN ASSISTANT

## 2022-07-14 PROCEDURE — 1101F PT FALLS ASSESS-DOCD LE1/YR: CPT | Mod: CPTII,S$GLB,, | Performed by: PHYSICIAN ASSISTANT

## 2022-07-14 PROCEDURE — 1125F AMNT PAIN NOTED PAIN PRSNT: CPT | Mod: CPTII,S$GLB,, | Performed by: PHYSICIAN ASSISTANT

## 2022-07-14 PROCEDURE — 3288F PR FALLS RISK ASSESSMENT DOCUMENTED: ICD-10-PCS | Mod: CPTII,S$GLB,, | Performed by: PHYSICIAN ASSISTANT

## 2022-07-14 NOTE — PROGRESS NOTES
"Babatunde Oconnor presents for initial post-operative visit following a right total hip arthroplasty performed by Dr. Amaral on 6/29/2022. Patient is pleased and has much less pain than he has had for years.     Exam:  Height 5' 6" (1.676 m), weight 80.7 kg (177 lb 14.6 oz).   Patient is ambulating well with cane    Incision is clean and dry without drainage or erythema.      Initial post-operative radiographs reviewed today revealing satisfactory position of the prosthesis.    A/P  2 weeks s/p right total hip replacement    - Patient advised to keep the incision clean and dry for the next 24 hours after which he  may wash the area with antibacterial soap in the shower, but will not submerge incision until one month post op.  - Antibiotic prophylaxis reviewed  - Continue ASA for 1 month post op  - Pain medication: no refill needed   - Follow up in 4 weeks with surgeon. Pt will call clinic immediately with problems/concerns.       "

## 2022-07-20 ENCOUNTER — PATIENT MESSAGE (OUTPATIENT)
Dept: ADMINISTRATIVE | Facility: OTHER | Age: 67
End: 2022-07-20
Payer: COMMERCIAL

## 2022-07-27 ENCOUNTER — PATIENT MESSAGE (OUTPATIENT)
Dept: ORTHOPEDICS | Facility: CLINIC | Age: 67
End: 2022-07-27
Payer: COMMERCIAL

## 2022-07-28 ENCOUNTER — OFFICE VISIT (OUTPATIENT)
Dept: ORTHOPEDICS | Facility: CLINIC | Age: 67
End: 2022-07-28
Payer: COMMERCIAL

## 2022-07-28 VITALS — WEIGHT: 185.88 LBS | HEIGHT: 66 IN | BODY MASS INDEX: 29.87 KG/M2

## 2022-07-28 DIAGNOSIS — M51.36 DDD (DEGENERATIVE DISC DISEASE), LUMBAR: ICD-10-CM

## 2022-07-28 DIAGNOSIS — M47.816 LUMBAR SPONDYLOSIS: ICD-10-CM

## 2022-07-28 DIAGNOSIS — M54.9 DORSALGIA, UNSPECIFIED: ICD-10-CM

## 2022-07-28 DIAGNOSIS — M43.10 DEGENERATIVE SPONDYLOLISTHESIS: Primary | ICD-10-CM

## 2022-07-28 DIAGNOSIS — M54.16 LUMBAR RADICULOPATHY: ICD-10-CM

## 2022-07-28 PROCEDURE — 1160F PR REVIEW ALL MEDS BY PRESCRIBER/CLIN PHARMACIST DOCUMENTED: ICD-10-PCS | Mod: CPTII,S$GLB,, | Performed by: ORTHOPAEDIC SURGERY

## 2022-07-28 PROCEDURE — 1159F PR MEDICATION LIST DOCUMENTED IN MEDICAL RECORD: ICD-10-PCS | Mod: CPTII,S$GLB,, | Performed by: ORTHOPAEDIC SURGERY

## 2022-07-28 PROCEDURE — 99999 PR PBB SHADOW E&M-EST. PATIENT-LVL III: ICD-10-PCS | Mod: PBBFAC,,, | Performed by: ORTHOPAEDIC SURGERY

## 2022-07-28 PROCEDURE — 1159F MED LIST DOCD IN RCRD: CPT | Mod: CPTII,S$GLB,, | Performed by: ORTHOPAEDIC SURGERY

## 2022-07-28 PROCEDURE — 99214 OFFICE O/P EST MOD 30 MIN: CPT | Mod: 24,GC,S$GLB, | Performed by: ORTHOPAEDIC SURGERY

## 2022-07-28 PROCEDURE — 3008F PR BODY MASS INDEX (BMI) DOCUMENTED: ICD-10-PCS | Mod: CPTII,S$GLB,, | Performed by: ORTHOPAEDIC SURGERY

## 2022-07-28 PROCEDURE — 1125F PR PAIN SEVERITY QUANTIFIED, PAIN PRESENT: ICD-10-PCS | Mod: CPTII,S$GLB,, | Performed by: ORTHOPAEDIC SURGERY

## 2022-07-28 PROCEDURE — 1101F PT FALLS ASSESS-DOCD LE1/YR: CPT | Mod: CPTII,S$GLB,, | Performed by: ORTHOPAEDIC SURGERY

## 2022-07-28 PROCEDURE — 99024 PR POST-OP FOLLOW-UP VISIT: ICD-10-PCS | Mod: ,,, | Performed by: ORTHOPAEDIC SURGERY

## 2022-07-28 PROCEDURE — 1160F RVW MEDS BY RX/DR IN RCRD: CPT | Mod: CPTII,S$GLB,, | Performed by: ORTHOPAEDIC SURGERY

## 2022-07-28 PROCEDURE — 99214 PR OFFICE/OUTPT VISIT, EST, LEVL IV, 30-39 MIN: ICD-10-PCS | Mod: 24,GC,S$GLB, | Performed by: ORTHOPAEDIC SURGERY

## 2022-07-28 PROCEDURE — 99024 POSTOP FOLLOW-UP VISIT: CPT | Mod: ,,, | Performed by: ORTHOPAEDIC SURGERY

## 2022-07-28 PROCEDURE — 3288F FALL RISK ASSESSMENT DOCD: CPT | Mod: CPTII,S$GLB,, | Performed by: ORTHOPAEDIC SURGERY

## 2022-07-28 PROCEDURE — 1101F PR PT FALLS ASSESS DOC 0-1 FALLS W/OUT INJ PAST YR: ICD-10-PCS | Mod: CPTII,S$GLB,, | Performed by: ORTHOPAEDIC SURGERY

## 2022-07-28 PROCEDURE — 3288F PR FALLS RISK ASSESSMENT DOCUMENTED: ICD-10-PCS | Mod: CPTII,S$GLB,, | Performed by: ORTHOPAEDIC SURGERY

## 2022-07-28 PROCEDURE — 3008F BODY MASS INDEX DOCD: CPT | Mod: CPTII,S$GLB,, | Performed by: ORTHOPAEDIC SURGERY

## 2022-07-28 PROCEDURE — 1125F AMNT PAIN NOTED PAIN PRSNT: CPT | Mod: CPTII,S$GLB,, | Performed by: ORTHOPAEDIC SURGERY

## 2022-07-28 PROCEDURE — 99999 PR PBB SHADOW E&M-EST. PATIENT-LVL III: CPT | Mod: PBBFAC,,, | Performed by: ORTHOPAEDIC SURGERY

## 2022-07-28 RX ORDER — GABAPENTIN 300 MG/1
300 CAPSULE ORAL 3 TIMES DAILY
Qty: 60 CAPSULE | Refills: 1 | Status: SHIPPED | OUTPATIENT
Start: 2022-07-28 | End: 2022-08-25

## 2022-07-28 NOTE — PROGRESS NOTES
DATE: 7/28/2022  PATIENT: Babatunde Oconnor    Attending Physician: Tramaine Lorenz M.D.    CHIEF COMPLAINT: LBP and RLE pain    HISTORY:  Babatunde Oconnor is a 67 y.o. male with a history of right IVAN by Dr. Amaral (06/29) and C3-4 ACDF in 2008 in [facility]) presents for initial evaluation of low back and right leg pain (Back - 6, Leg - 3). The pain has been present for for a long time but has become more severe especially since the operation on 06/29. The patient describes the pain as sharp and it radiates posteriorly down right buttock/thigh to the knee.  The pain is worse with standing and improved by sitting. There is is associated numbness and tingling in the right leg. There is no subjective weakness. Prior treatments have included meds (tylenol, naproxen), PT, and injections (MBB), but no surgery.    The Patient denies myelopathic symptoms such as handwriting changes or difficulty with buttons/coins/keys. Denies perineal paresthesias, bowel/bladder dysfunction.    The patient does not smoke or endorse IVDU. Pt endorses being pre-diabetic with a HgA1c of 5.5 (06/16). The patient is not on any blood thinners and does not take chronic narcotics. He works as a .    PAST MEDICAL/SURGICAL HISTORY:  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    Hyperlipidemia     Hypertension     Leg length discrepancy 10/16/2021    R longer, Consider foot eval & may need custom insert in shoe    Lumbar spondylosis 12/13/2019     Past Surgical History:   Procedure Laterality Date    APPENDECTOMY      ARTHROPLASTY OF HIP BY ANTERIOR APPROACH Right 6/29/2022    Procedure: ARTHROPLASTY, HIP, TOTAL, ANTERIOR APPROACH: RIGHT: DEPUY-ACTIS+PINNACLE;  Surgeon: Talon Amaral III, MD;  Location: [facility];  Service: Orthopedics;  Laterality: Right;    CERVICAL DISCECTOMY  2008     CERVICAL SPINE SURGERY      INJECTION OF ANESTHETIC AGENT AROUND MEDIAL BRANCH NERVES INNERVATING LUMBAR FACET JOINT Bilateral 1/7/2020    Procedure: Block-nerve-medial branch-lumbar-- Bilateral (With Steroid);  Surgeon: Vamshi Anderson Jr., MD;  Location: [facility];  Service: Pain Management;  Laterality: Bilateral;  No Sedation    INJECTION OF ANESTHETIC AGENT AROUND MEDIAL BRANCH NERVES INNERVATING LUMBAR FACET JOINT Bilateral 5/14/2020    Procedure: Block-nerve-medial branch-lumbar--Bilateral L4-5, L5-S1 with steriods;  Surgeon: Vamshi Anderson Jr., MD;  Location: [facility];  Service: Pain Management;  Laterality: Bilateral;       Current Medications:   Current Outpatient Medications:     acetaminophen (TYLENOL) 650 MG TbSR, Take 1 tablet (650 mg total) by mouth every 6 to 8 hours as needed (pain)., Disp: 120 tablet, Rfl: 0    amlodipine-atorvastatin (CADUET) 10-10 mg per tablet, TAKE 1 TABLET BY MOUTH ONCE DAILY, Disp: 90 tablet, Rfl: 0    aspirin (ECOTRIN) 81 MG EC tablet, Take 1 tablet (81 mg total) by mouth 2 (two) times daily., Disp: 60 tablet, Rfl: 0    acyclovir (ZOVIRAX) 400 MG tablet, Take 0.5 tablets (200 mg total) by mouth 5 (five) times daily. for 7 days, Disp: 18 tablet, Rfl: 0    bisoprolol-hydrochlorothiazide 5-6.25 mg (ZIAC) 5-6.25 mg Tab, TAKE 1 TABLET BY MOUTH ONCE DAILY (Patient not taking: Reported on 7/28/2022), Disp: 90 tablet, Rfl: 0    celecoxib (CELEBREX) 200 MG capsule, Take 1 capsule (200 mg total) by mouth once daily. (Patient not taking: Reported on 7/28/2022), Disp: 30 capsule, Rfl: 0    docusate sodium (COLACE) 100 MG capsule, Take 1 capsule (100 mg total) by mouth 2 (two) times daily as needed for Constipation. (Patient not taking: Reported on 7/28/2022), Disp: 60 capsule, Rfl: 0    gabapentin (NEURONTIN) 300 MG capsule, Take 1 capsule (300 mg total) by mouth 3 (three) times daily., Disp: 60 capsule, Rfl: 1    methocarbamoL (ROBAXIN) 750 MG Tab, Take 1  "tablet (750 mg total) by mouth 3 (three) times daily as needed (muscle spasms). (Patient not taking: Reported on 7/28/2022), Disp: 30 tablet, Rfl: 0    oxyCODONE (ROXICODONE) 5 MG immediate release tablet, Take 1 tab by mouth every 4-6 hours as needed for pain (Patient not taking: Reported on 7/28/2022), Disp: 30 tablet, Rfl: 0    tamsulosin (FLOMAX) 0.4 mg Cap, TAKE 2 CAPSULES (0.8 MG TOTAL) BY MOUTH ONCE DAILY. (Patient not taking: Reported on 7/28/2022), Disp: 180 capsule, Rfl: 0    Social History:   Social History     Socioeconomic History    Marital status:      Spouse name: Ese    Number of children: 1   Occupational History     Comment: teacher -   Tobacco Use    Smoking status: Never Smoker    Smokeless tobacco: Never Used   Substance and Sexual Activity    Alcohol use: Yes     Alcohol/week: 4.0 standard drinks     Types: 1 Cans of beer, 3 Shots of liquor per week     Comment: liquor or beer daily    Drug use: No    Sexual activity: Yes     Partners: Female   Social History Narrative    Stairs- 15       REVIEW OF SYSTEMS:  Constitution: Negative. Negative for chills, fever and night sweats.   Cardiovascular: Negative for chest pain and syncope.   Respiratory: Negative for cough and shortness of breath.   Gastrointestinal: See HPI. Negative for nausea/vomiting. Negative for abdominal pain.  Genitourinary: See HPI. Negative for discoloration or dysuria.  Hematologic/Lymphatic: No for bleeding/clotting disorders.   Musculoskeletal: Negative for falls and muscle weakness.   Neurological: See HPI. No history of seizures. No history of cranial surgery or shunts.  Neurological: See HPI. No seizures.   Endocrine: Negative for polydipsia, polyphagia and polyuria.   Allergic/Immunologic: Negative for hives and persistent infections.     EXAM:  Ht 5' 6" (1.676 m)   Wt 84.3 kg (185 lb 13.6 oz)   BMI 30.00 kg/m²     PHYSICAL EXAMINATION:    General: The patient is a comfortable 67 y.o. male in no " apparent distress, the patient is orientatied to person, place and time.  Psych: Normal mood and affect  HEENT: Vision grossly intact, hearing intact to the spoken word.  Lungs: Respirations unlabored.  Gait: Normal station and gait, no difficulty with toe or heel walk.   Skin: Dorsal lumbar skin negative for rashes, lesions, hairy patches and surgical scars. There is no lumbar tenderness to palpation.  Range of motion: Lumbar range of motion is acceptable.  Spinal Balance: Global saggital and coronal spinal balance acceptable, no significant for scoliosis and kyphosis.  Musculoskeletal: No pain with the range of motion of the bilateral hips. No trochanteric tenderness to palpation.  Vascular: Bilateral lower extremities warm and well perfused, Dorsalis pedis pulses 2+ bilaterally.  Neurological: Normal strength and tone in all major motor groups in the bilateral lower extremities except for 4/5 in b/l EHL. Normal sensation to light touch in the L2-S1 dermatomes bilaterally.  Deep tendon reflexes symmetric, intact in the bilateral lower extremities.  Negative Babinski bilaterally. Straight leg raise negative bilaterally.    IMAGING:   Today I independently reviewed the following images and my interpretations are as follows:    AP, Lat and Flex/Ex  upright L-spine demonstrate lumbar spondylosis and DDD. L4-5 anterolisthesis measuring 8mm.    MRI lumbar from 2018 showed moderate-severe L4-5 central and b/l foraminal stenosis.    Body mass index is 30 kg/m².  Hemoglobin A1C   Date Value Ref Range Status   06/16/2021 5.5 4.0 - 5.6 % Final     Comment:     ADA Screening Guidelines:  5.7-6.4%  Consistent with prediabetes  >or=6.5%  Consistent with diabetes    High levels of fetal hemoglobin interfere with the HbA1C  assay. Heterozygous hemoglobin variants (HbS, HgC, etc)do  not significantly interfere with this assay.   However, presence of multiple variants may affect accuracy.     11/24/2020 5.5 4.0 - 5.6 % Final      Comment:     ADA Screening Guidelines:  5.7-6.4%  Consistent with prediabetes  >or=6.5%  Consistent with diabetes  High levels of fetal hemoglobin interfere with the HbA1C  assay. Heterozygous hemoglobin variants (HbS, HgC, etc)do  not significantly interfere with this assay.   However, presence of multiple variants may affect accuracy.     12/10/2019 5.7 (H) 4.0 - 5.6 % Final     Comment:     ADA Screening Guidelines:  5.7-6.4%  Consistent with prediabetes  >or=6.5%  Consistent with diabetes  High levels of fetal hemoglobin interfere with the HbA1C  assay. Heterozygous hemoglobin variants (HbS, HgC, etc)do  not significantly interfere with this assay.   However, presence of multiple variants may affect accuracy.         ASSESSMENT/PLAN:    Babatunde was seen today for back pain.    Diagnoses and all orders for this visit:    Degenerative spondylolisthesis    Lumbar spondylosis    DDD (degenerative disc disease), lumbar    Dorsalgia, unspecified  -     MRI Lumbar Spine Without Contrast; Future    Lumbar radiculopathy  -     Procedure Request Order for Pain Management; Future  -     gabapentin (NEURONTIN) 300 MG capsule; Take 1 capsule (300 mg total) by mouth 3 (three) times daily.      Follow up in about 4 weeks (around 8/25/2022).    Patient has degenerative spondylolisthesis and spinal stenosis with RLE radiculopathy. I discussed the natural history of their diagnoses as well as surgical and nonsurgical treatment options. I educated the patient on the importance of core/back strengthening, correct posture, bending/lifting ergonomics, and low-impact aerobic exercises (walking, elliptical, and aquatherapy). I prescribed Gabapentin. I ordered an updated lumbar MRI to assess stenosis progression. I will refer the patient to Pain Management for L4-5 WING. Patient will follow up in a month for re-evaluation. Patient is a candidate for L4-5 PLDF/TLIF (R) if he fails conservative management.    I have personally examined the  patient and agree with the above plan.    Tramaine Lorenz MD  Orthopaedic Spine Surgeon  Department of Orthopaedic Surgery  [phone]

## 2022-07-29 ENCOUNTER — TELEPHONE (OUTPATIENT)
Dept: PAIN MEDICINE | Facility: CLINIC | Age: 67
End: 2022-07-29
Payer: COMMERCIAL

## 2022-07-29 ENCOUNTER — PES CALL (OUTPATIENT)
Dept: ADMINISTRATIVE | Facility: CLINIC | Age: 67
End: 2022-07-29
Payer: COMMERCIAL

## 2022-07-29 DIAGNOSIS — M54.16 LUMBAR RADICULOPATHY: Primary | ICD-10-CM

## 2022-07-29 NOTE — TELEPHONE ENCOUNTER
Staff spoke with patient regarding procedure on the 12th of august, patient was asked to stop aspirin 5 days prior to procedure. Patient verbalized understanding and will stop the aspirin on the 8th of August.

## 2022-07-30 ENCOUNTER — PATIENT MESSAGE (OUTPATIENT)
Dept: ORTHOPEDICS | Facility: CLINIC | Age: 67
End: 2022-07-30
Payer: COMMERCIAL

## 2022-07-30 DIAGNOSIS — Z96.641 STATUS POST TOTAL HIP REPLACEMENT, RIGHT: Primary | ICD-10-CM

## 2022-08-01 ENCOUNTER — PATIENT MESSAGE (OUTPATIENT)
Dept: ORTHOPEDICS | Facility: CLINIC | Age: 67
End: 2022-08-01
Payer: COMMERCIAL

## 2022-08-01 ENCOUNTER — HOSPITAL ENCOUNTER (OUTPATIENT)
Dept: RADIOLOGY | Facility: HOSPITAL | Age: 67
Discharge: HOME | End: 2022-08-01
Attending: ORTHOPAEDIC SURGERY
Payer: COMMERCIAL

## 2022-08-01 DIAGNOSIS — M54.9 DORSALGIA, UNSPECIFIED: ICD-10-CM

## 2022-08-01 PROCEDURE — 72148 MRI LUMBAR SPINE W/O DYE: CPT | Mod: 26,,, | Performed by: RADIOLOGY

## 2022-08-01 PROCEDURE — 72148 MRI LUMBAR SPINE WITHOUT CONTRAST: ICD-10-PCS | Mod: 26,,, | Performed by: RADIOLOGY

## 2022-08-01 PROCEDURE — 72148 MRI LUMBAR SPINE W/O DYE: CPT | Mod: TC

## 2022-08-01 RX ORDER — DEXTROMETHORPHAN HYDROBROMIDE, GUAIFENESIN 5; 100 MG/5ML; MG/5ML
650 LIQUID ORAL EVERY 8 HOURS PRN
Qty: 60 TABLET | Refills: 0 | Status: SHIPPED | OUTPATIENT
Start: 2022-08-01 | End: 2022-11-03

## 2022-08-03 ENCOUNTER — PATIENT MESSAGE (OUTPATIENT)
Dept: ORTHOPEDICS | Facility: CLINIC | Age: 67
End: 2022-08-03
Payer: COMMERCIAL

## 2022-08-03 DIAGNOSIS — Z96.641 STATUS POST TOTAL HIP REPLACEMENT, RIGHT: Primary | ICD-10-CM

## 2022-08-04 DIAGNOSIS — N40.0 BENIGN PROSTATIC HYPERPLASIA, UNSPECIFIED WHETHER LOWER URINARY TRACT SYMPTOMS PRESENT: ICD-10-CM

## 2022-08-05 RX ORDER — TAMSULOSIN HYDROCHLORIDE 0.4 MG/1
CAPSULE ORAL
Qty: 90 CAPSULE | Refills: 0 | Status: SHIPPED | OUTPATIENT
Start: 2022-08-05 | End: 2022-10-18

## 2022-08-05 NOTE — TELEPHONE ENCOUNTER
Care Due:                  Date            Visit Type   Department     Provider  --------------------------------------------------------------------------------                                SAME DAY -                              ESTABLISHED   [facility] INTERNAL  Last Visit: 10-      PATIENT      MEDICINE       Tabatha Pham  Next Visit: None Scheduled  None         None Found                                                            Last  Test          Frequency    Reason                     Performed    Due Date  --------------------------------------------------------------------------------    Office Visit  12 months..  amlodipine-atorvastatin,   10-   10-                             bisoprolol-hydrochlorothi                             azide, tamsulosin........    [employer] Embedded Care Gaps. Reference number: [id number]. 8/04/2022   9:25:55 PM CDT

## 2022-08-07 DIAGNOSIS — G89.29 CHRONIC BILATERAL LOW BACK PAIN WITH SCIATICA, SCIATICA LATERALITY UNSPECIFIED: ICD-10-CM

## 2022-08-07 DIAGNOSIS — M54.40 CHRONIC BILATERAL LOW BACK PAIN WITH SCIATICA, SCIATICA LATERALITY UNSPECIFIED: ICD-10-CM

## 2022-08-09 RX ORDER — NAPROXEN 500 MG/1
TABLET ORAL
Qty: 60 TABLET | Refills: 1 | OUTPATIENT
Start: 2022-08-09

## 2022-08-10 ENCOUNTER — TELEPHONE (OUTPATIENT)
Dept: ORTHOPEDICS | Facility: CLINIC | Age: 67
End: 2022-08-10
Payer: COMMERCIAL

## 2022-08-10 ENCOUNTER — TELEPHONE (OUTPATIENT)
Dept: PAIN MEDICINE | Facility: CLINIC | Age: 67
End: 2022-08-10
Payer: COMMERCIAL

## 2022-08-10 NOTE — TELEPHONE ENCOUNTER
Staff spoke with the patient regarding their procedure with Dr. Moy scheduled on 08/12/2022; the patient was provided the Arrival Information and scheduled time 08:30AM.     The patient verbalized understanding.    Thank you,

## 2022-08-10 NOTE — TELEPHONE ENCOUNTER
I called and spoke to Nichole at [facility]. I told her that I do have the PT forms and I faxed them over this morning but I do not have the other forms. Ms. Varela stated that she is going to fax them over again.    ----- Message from Benjie Perdomo sent at 8/10/2022 10:50 AM CDT -----  Regarding: FW: Nichole/ [facility]/  [phone]  Good morning,     Can you see if you faxed it this morning?    Sincerely,   Jered Perdomo MS, OTC  OR & Clinical Assistant to Dr. Talon Amaral III  Phone: [phone]  Fax: [phone]    ----- Message -----  From: Daniela Alegria  Sent: 8/10/2022  10:42 AM CDT  To: [facility] MOBLEY Staff  Subject: Nichole/ [facility]/  [phone]       Type: Patient Call Back    Who called:    Nichole    What is the request in detail: [facility] is needing a call back from staff regarding the rest of the forms that was faxed to office, they only received one.  Thank you    Would the patient rather a call back or a response via [facility]?      Best call back number:  [phone]          Thank you

## 2022-08-12 ENCOUNTER — HOSPITAL ENCOUNTER (OUTPATIENT)
Facility: HOSPITAL | Age: 67
Discharge: HOME | End: 2022-08-12
Attending: STUDENT IN AN ORGANIZED HEALTH CARE EDUCATION/TRAINING PROGRAM | Admitting: STUDENT IN AN ORGANIZED HEALTH CARE EDUCATION/TRAINING PROGRAM
Payer: COMMERCIAL

## 2022-08-12 VITALS
BODY MASS INDEX: 28.93 KG/M2 | RESPIRATION RATE: 16 BRPM | DIASTOLIC BLOOD PRESSURE: 72 MMHG | OXYGEN SATURATION: 98 % | SYSTOLIC BLOOD PRESSURE: 133 MMHG | HEIGHT: 66 IN | TEMPERATURE: 98 F | WEIGHT: 180 LBS | HEART RATE: 60 BPM

## 2022-08-12 DIAGNOSIS — M54.16 LUMBAR RADICULOPATHY: Primary | ICD-10-CM

## 2022-08-12 PROCEDURE — 62323 NJX INTERLAMINAR LMBR/SAC: CPT | Performed by: STUDENT IN AN ORGANIZED HEALTH CARE EDUCATION/TRAINING PROGRAM

## 2022-08-12 PROCEDURE — 25500020 PHARM REV CODE 255: Performed by: STUDENT IN AN ORGANIZED HEALTH CARE EDUCATION/TRAINING PROGRAM

## 2022-08-12 PROCEDURE — 64483 NJX AA&/STRD TFRM EPI L/S 1: CPT | Performed by: STUDENT IN AN ORGANIZED HEALTH CARE EDUCATION/TRAINING PROGRAM

## 2022-08-12 PROCEDURE — 62323 NJX INTERLAMINAR LMBR/SAC: CPT | Mod: ,,, | Performed by: STUDENT IN AN ORGANIZED HEALTH CARE EDUCATION/TRAINING PROGRAM

## 2022-08-12 PROCEDURE — 25000003 PHARM REV CODE 250: Performed by: STUDENT IN AN ORGANIZED HEALTH CARE EDUCATION/TRAINING PROGRAM

## 2022-08-12 PROCEDURE — 63600175 PHARM REV CODE 636 W HCPCS: Performed by: STUDENT IN AN ORGANIZED HEALTH CARE EDUCATION/TRAINING PROGRAM

## 2022-08-12 PROCEDURE — 64493 INJ PARAVERT F JNT L/S 1 LEV: CPT | Performed by: STUDENT IN AN ORGANIZED HEALTH CARE EDUCATION/TRAINING PROGRAM

## 2022-08-12 PROCEDURE — 62323 PR INJ LUMBAR/SACRAL, W/IMAGING GUIDANCE: ICD-10-PCS | Mod: ,,, | Performed by: STUDENT IN AN ORGANIZED HEALTH CARE EDUCATION/TRAINING PROGRAM

## 2022-08-12 RX ORDER — MIDAZOLAM HYDROCHLORIDE 1 MG/ML
2 INJECTION INTRAMUSCULAR; INTRAVENOUS ONCE AS NEEDED
Status: DISCONTINUED | OUTPATIENT
Start: 2022-08-12 | End: 2022-08-12 | Stop reason: HOSPADM

## 2022-08-12 RX ORDER — FENTANYL CITRATE 50 UG/ML
25 INJECTION, SOLUTION INTRAMUSCULAR; INTRAVENOUS ONCE AS NEEDED
Status: DISCONTINUED | OUTPATIENT
Start: 2022-08-12 | End: 2022-08-12 | Stop reason: HOSPADM

## 2022-08-12 RX ORDER — SODIUM CHLORIDE 9 MG/ML
500 INJECTION, SOLUTION INTRAVENOUS CONTINUOUS
Status: DISCONTINUED | OUTPATIENT
Start: 2022-08-12 | End: 2022-08-12 | Stop reason: HOSPADM

## 2022-08-12 RX ORDER — LIDOCAINE HYDROCHLORIDE 10 MG/ML
INJECTION INFILTRATION; PERINEURAL
Status: DISCONTINUED | OUTPATIENT
Start: 2022-08-12 | End: 2022-08-12 | Stop reason: HOSPADM

## 2022-08-12 RX ORDER — DEXAMETHASONE SODIUM PHOSPHATE 4 MG/ML
INJECTION, SOLUTION INTRA-ARTICULAR; INTRALESIONAL; INTRAMUSCULAR; INTRAVENOUS; SOFT TISSUE
Status: DISCONTINUED | OUTPATIENT
Start: 2022-08-12 | End: 2022-08-12 | Stop reason: HOSPADM

## 2022-08-12 NOTE — PLAN OF CARE
VSS. Patient able to tolerate oral liquids. Patient denies c/o pain. Dressing intact. No distress noted. Patient states he is ready for discharge. Discharge instructions reviewed with patient and family, verbalized understanding. IV discontinued with catheter tip intact. Family at bedside to help patient dress. Patient voided before d/c. Patient walked out to lobby with staff.

## 2022-08-12 NOTE — DISCHARGE SUMMARY
[facility] - Surgery (Hospital)  Discharge Note  Short Stay    Procedure(s) (LRB):  Lumbar WING L4-5 (N/A)    OUTCOME: Patient tolerated treatment/procedure well without complication and is now ready for discharge.    DISPOSITION: Home or Self Care    FINAL DIAGNOSIS:  <principal problem not specified>    FOLLOWUP: In clinic    DISCHARGE INSTRUCTIONS:  No discharge procedures on file.     TIME SPENT ON DISCHARGE: 10 minutes

## 2022-08-12 NOTE — PATIENT INSTRUCTIONS
[facility]  [facility]  [employer] service # [phone]    POST-PROCEDURE INSTRUCTIONS:    Today you had an injection that included a steroid medications.  The steroid may or may not have been mixed with a local anesthetic when it was injected.   If the injection was in the neck, you may feel some pressure, numbness, or slight weakness in the arm after the procedure for a short period of time (this is a normal response), if this persists for longer than 1 day please contact our office or go to the emergency room.  If the injection was in the low back, you may feel some pressure, numbness, or slight weakness in the leg after the procedure for a short period of time (this is a normal response), if this persists for longer than 1 day please contact our office or go to the emergency room.  You may get side effects from the steroid.  This is not uncommon.  Symptoms include: elevated blood sugar, elevated blood pressure, headache, flushing, nausea, insomnia.  These symptoms are transient and will resolve within 1-3 days.  If symptoms last longer than this please contact our office or head to the emergency room.  Steroid medications can take anywhere from 3-14 days to take effect (rarely longer).  You may notice that your pain worsens for a short period of time after the injection, this would not be unusual due to the pressure and trauma from the needle.    If you do not have a follow up appointment scheduled, please contact our office to get a post-procedure follow up scheduled 2-3 weeks after the procedure.  This can be done as a virtual visit if that is more convenient for you.    What you need to do:    Keep a record of your response to the injection you had today.    How much relief did you get?   When did the relief start and how long did it last?  Were you able to decrease the use of any of your pain medications?  Were you able to increase your level of activity?  How long did  the relief last?    What to watch out for:    If you experience any of the following symptoms after your procedure, please notify the messaging service immediately (see above for contact information):   fever (increased oral temperature)   bleeding or swelling at the injection site,    drainage, rash or redness at the injection site    possible signs of infection    increased pain at the injection site   worsening of your usual pain   severe headache   new or worsening numbness    new arm and/or leg weakness, or    changes in bowel and/or bladder function: urinating or defecating on yourself and not knowing that you did it.    PLEASE FOLLOW ALL INSTRUCTIONS CAREFULLY     Do not engage in strenuous activity (e.g., lifting or pushing heavy objects or repeated bending) for 24 hours.     Do not take a bath, swim or use Jacuzzi for 24 hours after procedure. (A shower is fine).   Remove any Band-Aids when you get home.    Use cold/ice, as needed for comfort.  We recommend the use of cold therapy alternating on for 20 minutes, off for 20 minutes.    Do not apply direct heat (heating pad or heat packs) to the injection site for 24 hours.     Resume your usual medications, unless instructed otherwise by your Pain Physician.     If you are on warfarin (Coumadin) or other blood thinner, resume this medication as instructed by your prescribing Physician.    IF AT ANY POINT YOU ARE VERY CONCERNED ABOUT YOUR SYMPTOMS, PLEASE GO TO THE EMERGENCY ROOM.    If you develop worsening pain, weakness, numbness, lose bowel or bladder control (i.e., having an accident where you did not even know you had to go to the bathroom and suddenly noticed you soiled yourself), saddle anesthesia (a loss of sensation restricted to the area of the buttocks, anus and between the legs -- i.e., those parts of your body that would touch a saddle if you were sitting on one) you need to go immediately to the emergency department for evaluation and  treatment.    ----------------------------------------------------------------------------------------------------------------------------------------------------------------  If you received Sedation please read the following instructions:  POST SEDATION INSTRUCTIONS    Today you received intravenous medication (also known as sedation) that was used to help you relax and/or decrease discomfort during your procedure. This medication will be acting in your body for the next 24 hours, so you might feel a little tired or sleepy. This feeling will slowly wear off.   Common side effects associated with these medications include: drowsiness, dizziness, sleepiness, confusion, feeling excited, difficulty remembering things, lack of steadiness with walking or balance, loss of fine muscle control, slowed reflexes, difficulty focusing, and blurred vision.  Some over-the-counter and prescription medications (e.g., muscle relaxants, opioids, mood-altering medications, sedatives/hypnotics, antihistamines) can interact with the intravenous medication you received and cause an increased risk of the side effects listed above in addition to other potentially life threatening side effects. Use extreme caution if you are taking such medications, and consult with your Pain Physician or prescribing physician if you have any questions.  For the next 12-24 hours:    DO NOT--Drive a car, operate machinery or power tools   DO NOT--Drink any alcoholic beverages (not even beer), they may dangerously increase the risk of side effects.    DO NOT--Make any important legal or business decisions or sign important documents.  We advise you to have someone to assist you at home. Move slowly and carefully. Do not make sudden changes in position. Be aware of dizziness or light-headedness and move accordingly.   If you seek medical treatment within 24 hours, let the nurse or doctor caring for you know that you have received the above medications. If you  have any questions or concerns related to your sedation or treatment today please contact us.

## 2022-08-12 NOTE — OP NOTE
"PROCEDURE:  LUMBAR L4-5 INTERLAMINAR EPIDURAL STEROID INJECTION    Patient Name: Babatunde Oconnor  MRN: [id number]  DATE OF PROCEDURE: 08/12/2022    INJECTION # 1    DIAGNOSIS: Lumbar Radiculopathy  CPT CODE: 59008      POSTPROCEDURE DIAGNOSIS: Same    PHYSICIAN: Toño Moy DO  NEEDLE TYPE: - 20G 3.5" Touhy Needle  MEDICATIONS INJECTED: 8cc mixture of 7cc Normal Saline + 10mg Dexamethasone (10mg/ml)  CONTRAST: Omni 300    Sedation Medications - None    Estimated Blood Loss - <2ml  Drains: None  Specimens Removed: None  Urine Output - Not Measured  Complications: None  Outcome: Good    Informed Consent:  The patient's condition and proposed procedures, risks, and alternatives were discussed with the patient or responsible party.  The patient's / responsible party's questions were answered.   The patient / responsible party appeared to understand and chose to proceed.  Informed consent was obtained.  After obtaining written consent, an IV hep lock was placed. (See nurses notes for details).     Procedure in Detail:  The patient was taken back to the OR suite and placed in a prone position. The skin overlying the injection site was prepped and draped in an aseptic fashion. The target injection site (see above) was identified with fluoroscopy and marked.     Procedural Pause:  A procedural pause verifying correct patient, medical record number, allergies, medications to be administered, current vital signs, and surgical site was performed immediately prior to beginning the procedure.    The skin and subcutaneous tissue overlying the target site of injection for the L4-5 epidural steroid injection was/were anesthetized using 4 mL of 1% lidocaine with a 25-gauge, 1½-inch needle.  The above noted Tuohy needle was advanced under fluoroscopic guidance towards the epidural space. Lateral fluoroscopic imaging was used to confirm depth. The epidural space was identified using a loss of resistance to saline " technique. (See above for loss of resistance depth). A microbore extension tubing was attached to the needle to minimize any movement of the needle during injection or syringe change.  After negative aspiration for heme or CSF, 1ml of contrast was injected to confirm placement and no intrathecal or vascular spread.  After repeat negative aspiration for heme or CSF, the above noted steroid solution was slowly injected in increments. The needle was then retracted approximately nursing home and the needle track was flushed with 0.5 ml of Lidocaine 1% to clear the needle prior to removal. The Tuohy needle was then removed.     The heart rate, pulse oximetry, and blood pressure were continuously monitored throughout the procedure.  The prpocedure was well tolerated. He was carefully escorted to the recovery room in stable condition. Patient was monitored by RN for recovery period.  The patient will be contacted in the next few days to determine extent of relief.  Patient was given post procedure and discharge instructions to follow at home.  The patient was discharged in a stable condition.    Note Electronically Signed By:  Toño Patel

## 2022-08-12 NOTE — H&P
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    Hyperlipidemia     Hypertension     Leg length discrepancy 10/16/2021    R longer, Consider foot eval & may need custom insert in shoe    Lumbar spondylosis 12/13/2019     Past Surgical History:   Procedure Laterality Date    APPENDECTOMY      ARTHROPLASTY OF HIP BY ANTERIOR APPROACH Right 6/29/2022    Procedure: ARTHROPLASTY, HIP, TOTAL, ANTERIOR APPROACH: RIGHT: DEPUY-ACTIS+PINNACLE;  Surgeon: Talon Amaral III, MD;  Location: [facility] OR;  Service: Orthopedics;  Laterality: Right;    CERVICAL DISCECTOMY  2008    CERVICAL SPINE SURGERY      INJECTION OF ANESTHETIC AGENT AROUND MEDIAL BRANCH NERVES INNERVATING LUMBAR FACET JOINT Bilateral 1/7/2020    Procedure: Block-nerve-medial branch-lumbar-- Bilateral (With Steroid);  Surgeon: Vamshi Anderson Jr., MD;  Location: [facility] PAIN MGT;  Service: Pain Management;  Laterality: Bilateral;  No Sedation    INJECTION OF ANESTHETIC AGENT AROUND MEDIAL BRANCH NERVES INNERVATING LUMBAR FACET JOINT Bilateral 5/14/2020    Procedure: Block-nerve-medial branch-lumbar--Bilateral L4-5, L5-S1 with steriods;  Surgeon: Vamshi Anderson Jr., MD;  Location: [facility] PAIN [facility]GT;  Service: Pain Management;  Laterality: Bilateral;     Review of patient's allergies indicates:  No Known Allergies   Current Facility-Administered Medications   Medication    0.9%  NaCl infusion    fentaNYL 50 mcg/mL injection 25 mcg    midazolam (VERSED) 1 mg/mL injection 2 mg       PMHx, PSHx, Allergies, Medications reviewed in epic    ROS negative except pain complaints in HPI    OBJECTIVE:    /67   Pulse 71   Temp 98.4 °F (36.9 °C) (Oral)   Resp 16   Ht 5' 6" (1.676 " m)   Wt 81.6 kg (180 lb)   SpO2 98%   BMI 29.05 kg/m²     PHYSICAL EXAMINATION:    GENERAL: Well appearing, in no acute distress, alert and oriented x3.  PSYCH:  Mood and affect appropriate.  SKIN: Skin color, texture, turgor normal, no rashes or lesions which will impact the procedure.  CV: RRR with palpation of the radial artery.  PULM: No evidence of respiratory difficulty, symmetric chest rise. Clear to auscultation.  NEURO: Cranial nerves grossly intact.    Plan:    Proceed with procedure as planned Procedure(s) (LRB):  Lumbar WING L4-5 (N/A)    Toño Patel  08/12/2022

## 2022-08-15 ENCOUNTER — DOCUMENT SCAN (OUTPATIENT)
Dept: HOME HEALTH SERVICES | Facility: HOSPITAL | Age: 67
End: 2022-08-15
Payer: COMMERCIAL

## 2022-08-16 ENCOUNTER — HOSPITAL ENCOUNTER (OUTPATIENT)
Dept: RADIOLOGY | Facility: HOSPITAL | Age: 67
Discharge: HOME | End: 2022-08-16
Attending: ORTHOPAEDIC SURGERY
Payer: COMMERCIAL

## 2022-08-16 ENCOUNTER — OFFICE VISIT (OUTPATIENT)
Dept: ORTHOPEDICS | Facility: CLINIC | Age: 67
End: 2022-08-16
Payer: COMMERCIAL

## 2022-08-16 VITALS — HEIGHT: 66 IN | WEIGHT: 174.94 LBS | BODY MASS INDEX: 28.11 KG/M2

## 2022-08-16 DIAGNOSIS — Z96.641 STATUS POST TOTAL HIP REPLACEMENT, RIGHT: ICD-10-CM

## 2022-08-16 DIAGNOSIS — Z96.641 STATUS POST TOTAL HIP REPLACEMENT, RIGHT: Primary | ICD-10-CM

## 2022-08-16 PROCEDURE — 3008F BODY MASS INDEX DOCD: CPT | Mod: CPTII,S$GLB,, | Performed by: ORTHOPAEDIC SURGERY

## 2022-08-16 PROCEDURE — 73502 X-RAY EXAM HIP UNI 2-3 VIEWS: CPT | Mod: 26,RT,, | Performed by: RADIOLOGY

## 2022-08-16 PROCEDURE — 99024 PR POST-OP FOLLOW-UP VISIT: ICD-10-PCS | Mod: S$GLB,,, | Performed by: ORTHOPAEDIC SURGERY

## 2022-08-16 PROCEDURE — 73502 XR HIP WITH PELVIS WHEN PERFORMED, 2 OR 3  VIEWS RIGHT: ICD-10-PCS | Mod: 26,RT,, | Performed by: RADIOLOGY

## 2022-08-16 PROCEDURE — 3008F PR BODY MASS INDEX (BMI) DOCUMENTED: ICD-10-PCS | Mod: CPTII,S$GLB,, | Performed by: ORTHOPAEDIC SURGERY

## 2022-08-16 PROCEDURE — 3288F PR FALLS RISK ASSESSMENT DOCUMENTED: ICD-10-PCS | Mod: CPTII,S$GLB,, | Performed by: ORTHOPAEDIC SURGERY

## 2022-08-16 PROCEDURE — 1126F AMNT PAIN NOTED NONE PRSNT: CPT | Mod: CPTII,S$GLB,, | Performed by: ORTHOPAEDIC SURGERY

## 2022-08-16 PROCEDURE — 1101F PT FALLS ASSESS-DOCD LE1/YR: CPT | Mod: CPTII,S$GLB,, | Performed by: ORTHOPAEDIC SURGERY

## 2022-08-16 PROCEDURE — 99999 PR PBB SHADOW E&M-EST. PATIENT-LVL III: CPT | Mod: PBBFAC,,, | Performed by: ORTHOPAEDIC SURGERY

## 2022-08-16 PROCEDURE — 99999 PR PBB SHADOW E&M-EST. PATIENT-LVL III: ICD-10-PCS | Mod: PBBFAC,,, | Performed by: ORTHOPAEDIC SURGERY

## 2022-08-16 PROCEDURE — 1159F PR MEDICATION LIST DOCUMENTED IN MEDICAL RECORD: ICD-10-PCS | Mod: CPTII,S$GLB,, | Performed by: ORTHOPAEDIC SURGERY

## 2022-08-16 PROCEDURE — 3288F FALL RISK ASSESSMENT DOCD: CPT | Mod: CPTII,S$GLB,, | Performed by: ORTHOPAEDIC SURGERY

## 2022-08-16 PROCEDURE — 1159F MED LIST DOCD IN RCRD: CPT | Mod: CPTII,S$GLB,, | Performed by: ORTHOPAEDIC SURGERY

## 2022-08-16 PROCEDURE — 1101F PR PT FALLS ASSESS DOC 0-1 FALLS W/OUT INJ PAST YR: ICD-10-PCS | Mod: CPTII,S$GLB,, | Performed by: ORTHOPAEDIC SURGERY

## 2022-08-16 PROCEDURE — 1126F PR PAIN SEVERITY QUANTIFIED, NO PAIN PRESENT: ICD-10-PCS | Mod: CPTII,S$GLB,, | Performed by: ORTHOPAEDIC SURGERY

## 2022-08-16 PROCEDURE — 99024 POSTOP FOLLOW-UP VISIT: CPT | Mod: S$GLB,,, | Performed by: ORTHOPAEDIC SURGERY

## 2022-08-16 PROCEDURE — 73502 X-RAY EXAM HIP UNI 2-3 VIEWS: CPT | Mod: TC,RT

## 2022-08-16 NOTE — PROGRESS NOTES
Subjective:     HPI:   Babatunde Oconnor is a 67 y.o. male who presents 6 weeks out from right IVAN     Date of surgery: 6/29/22    Medications: one tylenol daily, pain clinic gave lyrica for his back from Dr Lorenz    Assistive Devices: none    PT: finished    Limitations: none    Doing well  Back at work, walking a lot, no pain whatsoever  Back flared up post op, got WING for back last week and now doing great       Objective:   Body mass index is 28.24 kg/m².  Exam:    Gait: limp/antalgic/trendelenburg none    Incision: healed    Active straight leg raise: good strength, no discomfort    Extension: 0    Flexion: 100    Abduction: 30    Adduction: 20    External rotation: 30    Internal rotation: 20    LLD: 0 cm supine       Imaging:  Indication:  Annual exam status post right total hip arthroplasty  Exam Ordered: Radiographs taken today include an anteroposterior pelvis, and cross table lateral view of the proximal femur including the hip joint  Details of Examination: Today's exam shows a well fixed, well positioned total hip arthroplasty with no evidence of wear, osteolysis, or loosening.  Impression:  Status post right total hip arthroplasty, implant in good position with no abnormality       Assessment:       ICD-10-CM ICD-9-CM   1. Status post total hip replacement, right  Z96.641 V43.64      Doing well     Plan:       Patient is doing very well with the hip replacement at this time.  They will continue routine care of their hip and see me for their routine follow-up.  If there are problems in the interim they will see me back sooner. Prophylactic antibiotic protocol given and explained to patient.     Phase 2 hip exercises    6 week follow up       No orders of the defined types were placed in this encounter.            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    Hyperlipidemia      Hypertension     Leg length discrepancy 10/16/2021    R longer, Consider foot eval & may need custom insert in shoe    Lumbar spondylosis 12/13/2019       Past Surgical History:   Procedure Laterality Date    APPENDECTOMY      ARTHROPLASTY OF HIP BY ANTERIOR APPROACH Right 6/29/2022    Procedure: ARTHROPLASTY, HIP, TOTAL, ANTERIOR APPROACH: RIGHT: DEPUY-ACTIS+PINNACLE;  Surgeon: Talon Amaral III, MD;  Location: [facility] OR;  Service: Orthopedics;  Laterality: Right;    CERVICAL DISCECTOMY  2008    CERVICAL SPINE SURGERY      EPIDURAL STEROID INJECTION N/A 8/12/2022    Procedure: Lumbar WING L4-5;  Surgeon: Toño Moy DO;  Location: [facility] OR;  Service: Pain Management;  Laterality: N/A;    INJECTION OF ANESTHETIC AGENT AROUND MEDIAL BRANCH NERVES INNERVATING LUMBAR FACET JOINT Bilateral 1/7/2020    Procedure: Block-nerve-medial branch-lumbar-- Bilateral (With Steroid);  Surgeon: Vamshi Anderson Jr., MD;  Location: [facility] PAIN MGT;  Service: Pain Management;  Laterality: Bilateral;  No Sedation    INJECTION OF ANESTHETIC AGENT AROUND MEDIAL BRANCH NERVES INNERVATING LUMBAR FACET JOINT Bilateral 5/14/2020    Procedure: Block-nerve-medial branch-lumbar--Bilateral L4-5, L5-S1 with steriods;  Surgeon: Vamshi Anderson Jr., MD;  Location: [facility] PAIN MGT;  Service: Pain Management;  Laterality: Bilateral;       Family History   Problem Relation Age of Onset    Diabetes Father        Social History     Socioeconomic History    Marital status:      Spouse name: Ese    Number of children: 1   Occupational History     Comment: teacher -   Tobacco Use    Smoking status: Never Smoker    Smokeless tobacco: Never Used   Substance and Sexual Activity    Alcohol use: Yes     Alcohol/week: 4.0 standard drinks     Types: 1 Cans of beer, 3 Shots of liquor per week     Comment: liquor or beer daily    Drug use: No    Sexual activity: Yes     Partners: Female   Social History Narrative    Stairs- 15        8

## 2022-08-25 ENCOUNTER — OFFICE VISIT (OUTPATIENT)
Dept: ORTHOPEDICS | Facility: CLINIC | Age: 67
End: 2022-08-25
Payer: MEDICARE

## 2022-08-25 VITALS — BODY MASS INDEX: 28.98 KG/M2 | HEIGHT: 66 IN | WEIGHT: 180.31 LBS

## 2022-08-25 DIAGNOSIS — M54.16 LUMBAR RADICULOPATHY: ICD-10-CM

## 2022-08-25 DIAGNOSIS — M47.816 LUMBAR SPONDYLOSIS: ICD-10-CM

## 2022-08-25 DIAGNOSIS — M43.10 DEGENERATIVE SPONDYLOLISTHESIS: Primary | ICD-10-CM

## 2022-08-25 DIAGNOSIS — M51.36 DDD (DEGENERATIVE DISC DISEASE), LUMBAR: ICD-10-CM

## 2022-08-25 PROCEDURE — 1160F PR REVIEW ALL MEDS BY PRESCRIBER/CLIN PHARMACIST DOCUMENTED: ICD-10-PCS | Mod: CPTII,S$GLB,, | Performed by: ORTHOPAEDIC SURGERY

## 2022-08-25 PROCEDURE — 3288F FALL RISK ASSESSMENT DOCD: CPT | Mod: CPTII,S$GLB,, | Performed by: ORTHOPAEDIC SURGERY

## 2022-08-25 PROCEDURE — 3288F PR FALLS RISK ASSESSMENT DOCUMENTED: ICD-10-PCS | Mod: CPTII,S$GLB,, | Performed by: ORTHOPAEDIC SURGERY

## 2022-08-25 PROCEDURE — 3008F BODY MASS INDEX DOCD: CPT | Mod: CPTII,S$GLB,, | Performed by: ORTHOPAEDIC SURGERY

## 2022-08-25 PROCEDURE — 1126F PR PAIN SEVERITY QUANTIFIED, NO PAIN PRESENT: ICD-10-PCS | Mod: CPTII,S$GLB,, | Performed by: ORTHOPAEDIC SURGERY

## 2022-08-25 PROCEDURE — 1126F AMNT PAIN NOTED NONE PRSNT: CPT | Mod: CPTII,S$GLB,, | Performed by: ORTHOPAEDIC SURGERY

## 2022-08-25 PROCEDURE — 1160F RVW MEDS BY RX/DR IN RCRD: CPT | Mod: CPTII,S$GLB,, | Performed by: ORTHOPAEDIC SURGERY

## 2022-08-25 PROCEDURE — 1159F MED LIST DOCD IN RCRD: CPT | Mod: CPTII,S$GLB,, | Performed by: ORTHOPAEDIC SURGERY

## 2022-08-25 PROCEDURE — 99999 PR PBB SHADOW E&M-EST. PATIENT-LVL III: CPT | Mod: PBBFAC,,, | Performed by: ORTHOPAEDIC SURGERY

## 2022-08-25 PROCEDURE — 99214 PR OFFICE/OUTPT VISIT, EST, LEVL IV, 30-39 MIN: ICD-10-PCS | Mod: 24,S$GLB,, | Performed by: ORTHOPAEDIC SURGERY

## 2022-08-25 PROCEDURE — 3008F PR BODY MASS INDEX (BMI) DOCUMENTED: ICD-10-PCS | Mod: CPTII,S$GLB,, | Performed by: ORTHOPAEDIC SURGERY

## 2022-08-25 PROCEDURE — 99214 OFFICE O/P EST MOD 30 MIN: CPT | Mod: 24,S$GLB,, | Performed by: ORTHOPAEDIC SURGERY

## 2022-08-25 PROCEDURE — 99999 PR PBB SHADOW E&M-EST. PATIENT-LVL III: ICD-10-PCS | Mod: PBBFAC,,, | Performed by: ORTHOPAEDIC SURGERY

## 2022-08-25 PROCEDURE — 1101F PR PT FALLS ASSESS DOC 0-1 FALLS W/OUT INJ PAST YR: ICD-10-PCS | Mod: CPTII,S$GLB,, | Performed by: ORTHOPAEDIC SURGERY

## 2022-08-25 PROCEDURE — 1101F PT FALLS ASSESS-DOCD LE1/YR: CPT | Mod: CPTII,S$GLB,, | Performed by: ORTHOPAEDIC SURGERY

## 2022-08-25 PROCEDURE — 1159F PR MEDICATION LIST DOCUMENTED IN MEDICAL RECORD: ICD-10-PCS | Mod: CPTII,S$GLB,, | Performed by: ORTHOPAEDIC SURGERY

## 2022-08-25 RX ORDER — PREGABALIN 150 MG/1
150 CAPSULE ORAL DAILY
Qty: 60 CAPSULE | Refills: 1 | Status: SHIPPED | OUTPATIENT
Start: 2022-08-25 | End: 2022-08-28 | Stop reason: SDUPTHER

## 2022-08-25 NOTE — PROGRESS NOTES
"DATE: 8/25/2022  PATIENT: Babatunde Oconnor    Attending Physician: Tramaine Lorenz M.D.    HISTORY:  Babatunde Oconnor is a 67 y.o. male with a history of right IVAN by Dr. Amaral (06/29) and C3-4 ACDF in 2008 in [facility]) who returns to me today for FU after L4-5 WING, which helped with 90% of his pain. He also got a new MRI. Patient still has mild LBP that radiates posteriorly down right buttock/thigh to the knee. He has been taking lyrica and wants a refill.    PMH/PSH/FamHx/SocHx:  Unchanged from prior visit     ROS:  Positive for LBP and RLE pain  Denies perineal paresthesias, bowel or bladder incontinence    EXAM:  Ht 5' 6" (1.676 m)   Wt 81.8 kg (180 lb 5.4 oz)   BMI 29.11 kg/m²     My physical examination was notable for the following findings: motor intact BLE except for 4/5 in b/l EHL; SILT    IMAGING:  Today I independently reviewed the following images and my interpretations are as follows:    Previous L-spine XRs showed lumbar spondylosis and DDD. L4-5 anterolisthesis measuring 8mm.    MRI lumbar showed moderate-severe L4-5 central and b/l foraminal stenosis.     ASSESSMENT/PLAN:  Patient has degenerative spondylolisthesis and spinal stenosis with RLE radiculopathy. I discussed the natural history of their diagnoses as well as surgical and nonsurgical treatment options. I educated the patient on the importance of core/back strengthening, correct posture, bending/lifting ergonomics, and low-impact aerobic exercises (walking, elliptical, and aquatherapy). I prescribed Lyrica. Continue meds and exercises. Patient will follow up in 3 months for re-evaluation. Patient is a candidate for L4-5 PLDF/TLIF (R) if he fails conservative management.    Tramaine Lorenz MD  Orthopaedic Spine Surgeon  Department of Orthopaedic Surgery  [phone]  "

## 2022-08-29 ENCOUNTER — TELEPHONE (OUTPATIENT)
Dept: ORTHOPEDICS | Facility: CLINIC | Age: 67
End: 2022-08-29
Payer: COMMERCIAL

## 2022-08-29 NOTE — TELEPHONE ENCOUNTER
I called Ms. Cruz back to let her know that I have not gotten anything else for Mr. Arroyo and will need her to re-fax it to [phone]. I left a voicemail with a call back number.     ----- Message from Tremontana Chevalier sent at 8/29/2022 12:19 PM CDT -----  Regarding: pt advice  Contact: Anthony @ [phone]  SridharAnthony from [facility] clling to see if Dr. Amaral rcved orders that were faxed that require sig. Faxed on 08/02 and 08/10. Pls call Anthony @ [phone], fax .

## 2022-09-05 DIAGNOSIS — E78.5 HYPERLIPIDEMIA, UNSPECIFIED HYPERLIPIDEMIA TYPE: ICD-10-CM

## 2022-09-05 DIAGNOSIS — I10 ESSENTIAL HYPERTENSION: ICD-10-CM

## 2022-09-05 RX ORDER — AMLODIPINE BESYLATE AND ATORVASTATIN CALCIUM 10; 10 MG/1; MG/1
1 TABLET, FILM COATED ORAL DAILY
Qty: 90 TABLET | Refills: 0 | Status: SHIPPED | OUTPATIENT
Start: 2022-09-05 | End: 2022-12-05

## 2022-09-06 DIAGNOSIS — I10 ESSENTIAL HYPERTENSION: ICD-10-CM

## 2022-09-07 ENCOUNTER — PATIENT MESSAGE (OUTPATIENT)
Dept: INTERNAL MEDICINE | Facility: CLINIC | Age: 67
End: 2022-09-07
Payer: COMMERCIAL

## 2022-09-07 RX ORDER — BISOPROLOL FUMARATE AND HYDROCHLOROTHIAZIDE 5; 6.25 MG/1; MG/1
TABLET ORAL
Qty: 90 TABLET | Refills: 0 | Status: SHIPPED | OUTPATIENT
Start: 2022-09-07 | End: 2023-03-14 | Stop reason: SDUPTHER

## 2022-09-07 NOTE — TELEPHONE ENCOUNTER
No new care gaps identified.  [facility] Embedded Care Gaps. Reference number: [id number]. 9/06/2022   10:10:40 PM CDT

## 2022-09-22 ENCOUNTER — PATIENT MESSAGE (OUTPATIENT)
Dept: ADMINISTRATIVE | Facility: OTHER | Age: 67
End: 2022-09-22
Payer: COMMERCIAL

## 2022-09-22 ENCOUNTER — OFFICE VISIT (OUTPATIENT)
Dept: ORTHOPEDICS | Facility: CLINIC | Age: 67
End: 2022-09-22
Payer: MEDICARE

## 2022-09-22 VITALS — HEIGHT: 66 IN | WEIGHT: 180.25 LBS | BODY MASS INDEX: 28.97 KG/M2

## 2022-09-22 DIAGNOSIS — Z96.641 STATUS POST TOTAL HIP REPLACEMENT, RIGHT: Primary | ICD-10-CM

## 2022-09-22 PROCEDURE — 1159F MED LIST DOCD IN RCRD: CPT | Mod: CPTII,S$GLB,, | Performed by: ORTHOPAEDIC SURGERY

## 2022-09-22 PROCEDURE — 99024 POSTOP FOLLOW-UP VISIT: CPT | Mod: S$GLB,,, | Performed by: ORTHOPAEDIC SURGERY

## 2022-09-22 PROCEDURE — 99999 PR PBB SHADOW E&M-EST. PATIENT-LVL III: CPT | Mod: PBBFAC,,, | Performed by: ORTHOPAEDIC SURGERY

## 2022-09-22 PROCEDURE — 1101F PT FALLS ASSESS-DOCD LE1/YR: CPT | Mod: CPTII,S$GLB,, | Performed by: ORTHOPAEDIC SURGERY

## 2022-09-22 PROCEDURE — 99024 PR POST-OP FOLLOW-UP VISIT: ICD-10-PCS | Mod: S$GLB,,, | Performed by: ORTHOPAEDIC SURGERY

## 2022-09-22 PROCEDURE — 1159F PR MEDICATION LIST DOCUMENTED IN MEDICAL RECORD: ICD-10-PCS | Mod: CPTII,S$GLB,, | Performed by: ORTHOPAEDIC SURGERY

## 2022-09-22 PROCEDURE — 3288F FALL RISK ASSESSMENT DOCD: CPT | Mod: CPTII,S$GLB,, | Performed by: ORTHOPAEDIC SURGERY

## 2022-09-22 PROCEDURE — 1126F AMNT PAIN NOTED NONE PRSNT: CPT | Mod: CPTII,S$GLB,, | Performed by: ORTHOPAEDIC SURGERY

## 2022-09-22 PROCEDURE — 3008F PR BODY MASS INDEX (BMI) DOCUMENTED: ICD-10-PCS | Mod: CPTII,S$GLB,, | Performed by: ORTHOPAEDIC SURGERY

## 2022-09-22 PROCEDURE — 3288F PR FALLS RISK ASSESSMENT DOCUMENTED: ICD-10-PCS | Mod: CPTII,S$GLB,, | Performed by: ORTHOPAEDIC SURGERY

## 2022-09-22 PROCEDURE — 1126F PR PAIN SEVERITY QUANTIFIED, NO PAIN PRESENT: ICD-10-PCS | Mod: CPTII,S$GLB,, | Performed by: ORTHOPAEDIC SURGERY

## 2022-09-22 PROCEDURE — 1101F PR PT FALLS ASSESS DOC 0-1 FALLS W/OUT INJ PAST YR: ICD-10-PCS | Mod: CPTII,S$GLB,, | Performed by: ORTHOPAEDIC SURGERY

## 2022-09-22 PROCEDURE — 3008F BODY MASS INDEX DOCD: CPT | Mod: CPTII,S$GLB,, | Performed by: ORTHOPAEDIC SURGERY

## 2022-09-22 PROCEDURE — 99999 PR PBB SHADOW E&M-EST. PATIENT-LVL III: ICD-10-PCS | Mod: PBBFAC,,, | Performed by: ORTHOPAEDIC SURGERY

## 2022-09-22 NOTE — PROGRESS NOTES
Subjective:     HPI:   Babatunde Oconnor is a 67 y.o. male who presents 12 weeks out from right IVAN     Date of surgery: 6/29/22    Medications: none for hip, just back    Assistive Devices: none    Limitations: none    Hip is perfect  CBP persists, going to get some more injections  Working full time     Objective:   Body mass index is 29.09 kg/m².  Exam:    Gait: limp/antalgic/trendelenburg none    Incision: healed    Active straight leg raise: good strength, no discomfort    Extension: 0    Flexion: 100    Abduction: 30    Adduction: 20    External rotation: 30    Internal rotation: 20    LLD: 0 cm supine       Imaging:  None today        Assessment:       ICD-10-CM ICD-9-CM   1. Status post total hip replacement, right  Z96.641 V43.64        Doing well     Plan:       Patient is doing very well with the hip replacement at this time.  They will continue routine care of their hip and see me for their routine follow-up.  If there are problems in the interim they will see me back sooner. Prophylactic antibiotic protocol given and explained to patient.     9 month follow up for annual xray      No orders of the defined types were placed in this encounter.            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    Hyperlipidemia     Hypertension     Leg length discrepancy 10/16/2021    R longer, Consider foot eval & may need custom insert in shoe    Lumbar spondylosis 12/13/2019       Past Surgical History:   Procedure Laterality Date    APPENDECTOMY      ARTHROPLASTY OF HIP BY ANTERIOR APPROACH Right 6/29/2022    Procedure: ARTHROPLASTY, HIP, TOTAL, ANTERIOR APPROACH: RIGHT: DEPUY-ACTIS+PINNACLE;  Surgeon: Talon Amaral III, MD;  Location: [facility];  Service: Orthopedics;  Laterality: Right;    CERVICAL DISCECTOMY  2008    CERVICAL SPINE SURGERY      EPIDURAL STEROID INJECTION N/A 8/12/2022     Procedure: Lumbar WING L4-5;  Surgeon: Toño Moy DO;  Location: [facility] OR;  Service: Pain Management;  Laterality: N/A;    INJECTION OF ANESTHETIC AGENT AROUND MEDIAL BRANCH NERVES INNERVATING LUMBAR FACET JOINT Bilateral 1/7/2020    Procedure: Block-nerve-medial branch-lumbar-- Bilateral (With Steroid);  Surgeon: Vamshi Anderson Jr., MD;  Location: [facility] PAIN MGT;  Service: Pain Management;  Laterality: Bilateral;  No Sedation    INJECTION OF ANESTHETIC AGENT AROUND MEDIAL BRANCH NERVES INNERVATING LUMBAR FACET JOINT Bilateral 5/14/2020    Procedure: Block-nerve-medial branch-lumbar--Bilateral L4-5, L5-S1 with steriods;  Surgeon: Vamshi Anderson Jr., MD;  Location: [facility] PAIN MGT;  Service: Pain Management;  Laterality: Bilateral;       Family History   Problem Relation Age of Onset    Diabetes Father        Social History     Socioeconomic History    Marital status:      Spouse name: Ese    Number of children: 1   Occupational History     Comment: teacher -   Tobacco Use    Smoking status: Never    Smokeless tobacco: Never   Substance and Sexual Activity    Alcohol use: Yes     Alcohol/week: 4.0 standard drinks     Types: 1 Cans of beer, 3 Shots of liquor per week     Comment: liquor or beer daily    Drug use: No    Sexual activity: Yes     Partners: Female   Social History Narrative    Stairs- 15

## 2022-10-08 ENCOUNTER — PATIENT MESSAGE (OUTPATIENT)
Dept: ORTHOPEDICS | Facility: CLINIC | Age: 67
End: 2022-10-08
Payer: COMMERCIAL

## 2022-10-10 ENCOUNTER — OFFICE VISIT (OUTPATIENT)
Dept: ORTHOPEDICS | Facility: CLINIC | Age: 67
End: 2022-10-10
Payer: COMMERCIAL

## 2022-10-10 ENCOUNTER — TELEPHONE (OUTPATIENT)
Dept: ORTHOPEDICS | Facility: CLINIC | Age: 67
End: 2022-10-10
Payer: COMMERCIAL

## 2022-10-10 VITALS — HEIGHT: 74 IN | BODY MASS INDEX: 23.98 KG/M2 | WEIGHT: 186.81 LBS

## 2022-10-10 DIAGNOSIS — M54.16 LUMBAR RADICULOPATHY: ICD-10-CM

## 2022-10-10 DIAGNOSIS — M48.062 LUMBAR STENOSIS WITH NEUROGENIC CLAUDICATION: ICD-10-CM

## 2022-10-10 DIAGNOSIS — M43.10 DEGENERATIVE SPONDYLOLISTHESIS: Primary | ICD-10-CM

## 2022-10-10 DIAGNOSIS — M51.36 DDD (DEGENERATIVE DISC DISEASE), LUMBAR: ICD-10-CM

## 2022-10-10 DIAGNOSIS — M47.816 LUMBAR SPONDYLOSIS: ICD-10-CM

## 2022-10-10 DIAGNOSIS — M48.07 SPINAL STENOSIS, LUMBOSACRAL REGION: ICD-10-CM

## 2022-10-10 PROCEDURE — 1101F PR PT FALLS ASSESS DOC 0-1 FALLS W/OUT INJ PAST YR: ICD-10-PCS | Mod: CPTII,S$GLB,, | Performed by: ORTHOPAEDIC SURGERY

## 2022-10-10 PROCEDURE — 3008F BODY MASS INDEX DOCD: CPT | Mod: CPTII,S$GLB,, | Performed by: ORTHOPAEDIC SURGERY

## 2022-10-10 PROCEDURE — 1125F AMNT PAIN NOTED PAIN PRSNT: CPT | Mod: CPTII,S$GLB,, | Performed by: ORTHOPAEDIC SURGERY

## 2022-10-10 PROCEDURE — 99999 PR PBB SHADOW E&M-EST. PATIENT-LVL III: ICD-10-PCS | Mod: PBBFAC,,, | Performed by: ORTHOPAEDIC SURGERY

## 2022-10-10 PROCEDURE — 1159F MED LIST DOCD IN RCRD: CPT | Mod: CPTII,S$GLB,, | Performed by: ORTHOPAEDIC SURGERY

## 2022-10-10 PROCEDURE — 1159F PR MEDICATION LIST DOCUMENTED IN MEDICAL RECORD: ICD-10-PCS | Mod: CPTII,S$GLB,, | Performed by: ORTHOPAEDIC SURGERY

## 2022-10-10 PROCEDURE — 1125F PR PAIN SEVERITY QUANTIFIED, PAIN PRESENT: ICD-10-PCS | Mod: CPTII,S$GLB,, | Performed by: ORTHOPAEDIC SURGERY

## 2022-10-10 PROCEDURE — 99999 PR PBB SHADOW E&M-EST. PATIENT-LVL III: CPT | Mod: PBBFAC,,, | Performed by: ORTHOPAEDIC SURGERY

## 2022-10-10 PROCEDURE — 99214 OFFICE O/P EST MOD 30 MIN: CPT | Mod: S$GLB,,, | Performed by: ORTHOPAEDIC SURGERY

## 2022-10-10 PROCEDURE — 3288F PR FALLS RISK ASSESSMENT DOCUMENTED: ICD-10-PCS | Mod: CPTII,S$GLB,, | Performed by: ORTHOPAEDIC SURGERY

## 2022-10-10 PROCEDURE — 3008F PR BODY MASS INDEX (BMI) DOCUMENTED: ICD-10-PCS | Mod: CPTII,S$GLB,, | Performed by: ORTHOPAEDIC SURGERY

## 2022-10-10 PROCEDURE — 3288F FALL RISK ASSESSMENT DOCD: CPT | Mod: CPTII,S$GLB,, | Performed by: ORTHOPAEDIC SURGERY

## 2022-10-10 PROCEDURE — 1160F PR REVIEW ALL MEDS BY PRESCRIBER/CLIN PHARMACIST DOCUMENTED: ICD-10-PCS | Mod: CPTII,S$GLB,, | Performed by: ORTHOPAEDIC SURGERY

## 2022-10-10 PROCEDURE — 1101F PT FALLS ASSESS-DOCD LE1/YR: CPT | Mod: CPTII,S$GLB,, | Performed by: ORTHOPAEDIC SURGERY

## 2022-10-10 PROCEDURE — 1160F RVW MEDS BY RX/DR IN RCRD: CPT | Mod: CPTII,S$GLB,, | Performed by: ORTHOPAEDIC SURGERY

## 2022-10-10 PROCEDURE — 99214 PR OFFICE/OUTPT VISIT, EST, LEVL IV, 30-39 MIN: ICD-10-PCS | Mod: S$GLB,,, | Performed by: ORTHOPAEDIC SURGERY

## 2022-10-10 RX ORDER — GABAPENTIN 300 MG/1
300 CAPSULE ORAL 3 TIMES DAILY
Qty: 60 CAPSULE | Refills: 1 | Status: SHIPPED | OUTPATIENT
Start: 2022-10-10 | End: 2022-12-19

## 2022-10-10 RX ORDER — PREGABALIN 150 MG/1
150 CAPSULE ORAL DAILY
Qty: 60 CAPSULE | Refills: 5 | Status: SHIPPED | OUTPATIENT
Start: 2022-10-10 | End: 2022-12-19

## 2022-10-10 NOTE — PROGRESS NOTES
"DATE: 10/10/2022  PATIENT: Babatunde Oconnor    Attending Physician: Tramaine Lorenz M.D.    HISTORY:  Babatunde Oconnor is a 67 y.o. male with a history of right IVAN by Dr. Amaral (06/29) and C3-4 ACDF in 2008 in [facility]) who returns to me today for FU. He still has mild LBP that radiates posteriorly down b/l (R>L) buttock/thigh to the knee. Worse with walking and improved with leaning forward on a shopping cart. He has been taking lyrica. He is miserable and wants surgical intervention.    8/12/22: L4-5 WING - helped 90% of pain for 2 weeks    PMH/PSH/FamHx/SocHx:  Unchanged from prior visit     ROS:  Positive for LBP and RLE pain  Denies perineal paresthesias, bowel or bladder incontinence    EXAM:  Ht 6' 2" (1.88 m)   Wt 84.8 kg (186 lb 13.4 oz)   BMI 23.99 kg/m²     My physical examination was notable for the following findings: motor intact BLE except for 4/5 in b/l EHL; SILT    IMAGING:  Today I independently reviewed the following images and my interpretations are as follows:    Previous L-spine XRs showed lumbar spondylosis and DDD. L4-5 anterolisthesis measuring 8mm.    MRI lumbar showed moderate-severe L4-5 central and b/l foraminal stenosis.     ASSESSMENT/PLAN:  Patient has degenerative spondylolisthesis and spinal stenosis with neurogenic claudication. I discussed the natural history of their diagnoses as well as surgical and nonsurgical treatment options. I educated the patient on the importance of core/back strengthening, correct posture, bending/lifting ergonomics, and low-impact aerobic exercises (walking, elliptical, and aquatherapy). Patient has failed conservative management and is a candidate for L4-5 PLDF/TLIF (R). I ordered CT lumbar for preop planning.    I had a sit down discussion with the patient regarding the above procedure. We specifically discussed the risks, benefits, and alternatives to surgery. We discussed the surgical procedure including the skin incision, nerve " decompression, bone fusion, allograft, iliac crest bone graft, and surgical implants including pedicle screws and interbody devices as indicated: they understand the risks include but are not limited to death, paralysis, blindness, bleeding, infection, damage to arteries, veins and nerves, spinal fluid leak, continued or worsening pain, no improvement in symptoms, non-union, and the possible need for more surgery in the future, the possible need for perioperative blood transfusion, as well as the possibility other unforseen and unknown complications. We talked about expected hospital stay and recovery period. All questions were answered; they understand and wish to proceed.     Tramaine Lorenz MD  Orthopaedic Spine Surgeon  Department of Orthopaedic Surgery  [phone]

## 2022-10-11 ENCOUNTER — TELEPHONE (OUTPATIENT)
Dept: ORTHOPEDICS | Facility: CLINIC | Age: 67
End: 2022-10-11
Payer: COMMERCIAL

## 2022-10-11 RX ORDER — METHYLPREDNISOLONE 4 MG/1
TABLET ORAL
Qty: 1 EACH | Refills: 0 | Status: SHIPPED | OUTPATIENT
Start: 2022-10-11 | End: 2022-10-27 | Stop reason: CLARIF

## 2022-10-11 NOTE — TELEPHONE ENCOUNTER
Spoke with patient and informed him of PA-C  message and he stated an verbal understanding. Patient will call back if pain is still unbearable before his surgery date. He stated an verbal understanding.

## 2022-10-11 NOTE — TELEPHONE ENCOUNTER
----- Message from Tramaine Lorenz MD sent at 10/11/2022  9:19 AM CDT -----  Regarding: ct scan  Could you schedule his CT scan?

## 2022-10-11 NOTE — TELEPHONE ENCOUNTER
----- Message from Marian Nicholson sent at 10/11/2022  8:10 AM CDT -----  Regarding: Rx  Contact: [phone]  Pt is calling stating that the medication that was prescribed to him are not helping him with his pain. He was prescribed pregabalin (LYRICA) 150 MG capsule gabapentin (NEURONTIN) 300 MG capsule. He stated that his wife gave him 4mg of cortisone and stated that it helped a lot. He wants to know if he can get either the shot or the pill to help until the procedure on 11/18. Please call back to discuss further @ [phone]

## 2022-10-11 NOTE — TELEPHONE ENCOUNTER
----- Message from Loli Dunn sent at 10/11/2022  9:35 AM CDT -----  Pt returning call to office       Confirmed patient's contact info below:  Contact Name: Babatunde Oconnor  Phone Number: [phone]

## 2022-10-25 ENCOUNTER — HOSPITAL ENCOUNTER (OUTPATIENT)
Dept: RADIOLOGY | Facility: HOSPITAL | Age: 67
Discharge: HOME | End: 2022-10-25
Attending: ORTHOPAEDIC SURGERY
Payer: COMMERCIAL

## 2022-10-25 DIAGNOSIS — M48.07 SPINAL STENOSIS, LUMBOSACRAL REGION: ICD-10-CM

## 2022-10-25 PROCEDURE — 72131 CT LUMBAR SPINE W/O DYE: CPT | Mod: 26,,, | Performed by: RADIOLOGY

## 2022-10-25 PROCEDURE — 72131 CT LUMBAR SPINE W/O DYE: CPT | Mod: TC

## 2022-10-25 PROCEDURE — 72131 CT LUMBAR SPINE WITHOUT CONTRAST: ICD-10-PCS | Mod: 26,,, | Performed by: RADIOLOGY

## 2022-10-27 DIAGNOSIS — Z01.818 PREOPERATIVE TESTING: Primary | ICD-10-CM

## 2022-10-27 NOTE — ANESTHESIA PAT ROS NOTE
10/27/2022  Babatunde Oconnor is a 67 y.o., male.      Pre-op Assessment          Review of Systems  Anesthesia Hx:  No problems with previous Anesthesia   History of prior surgery of interest to airway management or planning: cervical fusion.         Denies Family Hx of Anesthesia complications.    Denies Personal Hx of Anesthesia complications.                    Social:  Non-Smoker, Alcohol Use EVERY OTHER DAY , 1-2 DRINKS      Hematology/Oncology:  Hematology Normal   Oncology Normal                                   EENT/Dental:  EENT/Dental Normal           Cardiovascular:            Denies Angina.     hyperlipidemia     Functional Capacity 4 METS, ABLE TO CLIMB 12 FLIGTHS OF STAIRS                   Hypertension, Essential Hypertension  , Recent typical clinic B/P of 133/77       Pulmonary:       Denies Shortness of breath.  Denies Recent URI.  HAS NOT HAD A SLEEP STUDY YET , THINKS HE MAY HAVE HERNANDO               Renal/:    BPH              Hepatic/GI:         Liver Disease, Fatty Liver (NAFLD)        Musculoskeletal:   Musculoskeletal General/Symptoms:  Functional capacity is ambulatory without assistance.   Joint Disease:  Arthritis, Osteoarthritis AND MULTIPLE JOINTS       Lumbar Spine Disorders, Lumbar Disc Disease, Spondylolisthesis, Radiculopathy L-DDD  Neurological:   Neuro Symptoms of pain OF LOWER BACK AND BLE   Osteoarthritis                           Endocrine:  Endocrine Normal            Psych:  Psychiatric Normal                       Anesthesia Assessment: Preoperative EQUATION    Planned Procedure: Procedure(s) (LRB):  FUSION, SPINE, LUMBAR, TLIF, POSTERIOR APPROACH, USING PEDICLE SCREW L4-5 (R) GLOBUS ROBOT SNS: SSEP/EMG (Right)  Requested Anesthesia Type:General  Surgeon: Tramaine Lorenz MD  Service: Neurosurgery  Known or anticipated Date of Surgery:11/18/2022    Surgeon notes:  reviewed    Electronic QUestionnaire Assessment completed via nurse interview with patient.        Triage considerations:     The patient has no apparent active cardiac condition (No unstable coronary Syndrome such as severe unstable angina or recent [<1 month] myocardial infarction, decompensated CHF, severe valvular   disease or significant arrhythmia)    Previous anesthesia records:No problems and NEURAXIAL ANESTHESIA  6/29/2022 ARTHROPLASTY, HIP, TOTAL, ANTERIOR APPROACH: RIGHT: DEPUY-ACTIS+PINNACLE (Right)   Airway:  Mallampati: III / III  Mouth Opening: Normal  TM Distance: Normal  Tongue: Normal  Neck ROM: Normal ROM    ** H/O CERVICAL FUSION**    Last PCP note: within 3 months , within [facility]   Subspecialty notes: Ortho    Other important co-morbidities: HLD, HTN, and LUMBAR-DDD, BPH, NAFLD, OSTEOARTHRITIS       Tests already available:  Available tests,  within 1 month , 3-6 months ago , within [facility] .   10/25/2022 CT LUMBAR SPINE W/O CONTRAST, 8/1/2022 MRI LUMBAR SPINE W/O CONTRAST, 6/6/2022 EKG          Instructions given. (See in Nurse's note)    Optimization:  Anesthesia Preop Clinic Assessment -not Indicated for this surgery    Medical Opinion Indicated          Plan:    Testing:  CMP, Hematology Profile, PT/INR, PTT, T&S, and UA     Consultation:Patient's PCP for re-evaluation     Patient  has previously scheduled Medical Appointment:none    Navigation: Tests Scheduled. TBD             Consults scheduled.TBD             Results will be tracked by Preop Clinic.  11/7  Labs and UA resutled and noted by Dr. Martinez.  11/7 Medical clearance given byDr. Maira Stearns  on 11/3: Pt has no active cardiac condition (ACS/USA, decompenstated CHF, significant arrhythmias or severe valvular disease) and can easily achieve 4 METS.  As such, pt does not require further cardiac evaluation prior to undergoing surgery.  Pt should remain on beta-blockers throughout the entire luis-procedure time period.  Aspirin therapy  can be held but should be restarted as soon as safely possible.  The remaining cardiac meds can be held as needed but should also be restarted after the procedure. These recommendations follow the most current Guideline on Perioperative Cardiovascular Evaluation and Management of Patients Undergoing Noncardiac Surgery released by the ACC/AHA (JACC 2014.07.944).   11/7 ----- Message from Tramaine Lorenz MD sent at 11/7/2022 12:08 PM CST -----  5 days before and after  ----- Message -----  From: Shila Betts RN  Sent: 11/7/2022  12:02 PM CST  To: Shila Betts RN, Tramaine Lorenz MD, #   Are you ok with the patient staying  on ASA 81 mg. If not, how many days prior to surgery would you like it held?  See below note.   Thanks!      Patient is to continue ASA perioperatively given low cardiac risk and low-moderate invasive procedure. Can be adjusted per surgery team. If discontinued, recommend restarting within 24 hours of surgery completion if hemodynamically stable.   Signed,  Maira Stearns MD   Electronically signed by Maira Stearns MD at 11/7/2022 11:07 AM  Shila Betts RN BSN

## 2022-10-27 NOTE — PRE-PROCEDURE INSTRUCTIONS
Patient stated has not had any problem with anesthesia in the past.Will need medical clearance from your PCP, Dr.Emily Pandey. I will ask  Dr. Pandey for ASA 18 instructions. Will need labs and ua. Our  will call to set up these appts.   Preop instructions given. Hold other aspirin containing products, nsaids(aleve, advil, motrin, ibuprofen, naprosyn, naproxen, voltaren, diclofenac), vitamins and supplements one week prior to surgery.   May take Tylenol.    Medication instructions given:  acetaminophen (TYLENOL) 650 MG TbSR 60 tablet 0 8/1/2022     Sig: Take 1 tablet (650 mg total) by mouth every 8 (eight) hours as needed (pain).   Route: Oral             Shila Betts RN 10/27/2022  3:08 PM   TAKE IF NEEDED             amlodipine-atorvastatin (CADUET) 10-10 mg per tablet 90 tablet 0 9/5/2022     Sig: Take 1 tablet by mouth once daily.   Route: Oral             Shila Betts RN 10/27/2022  3:10 PM   TAKE IN AM OF SURGERY.              aspirin (ECOTRIN) 81 MG EC tablet 60 tablet 0 6/28/2022 7/30/2022   Sig: Take 1 tablet (81 mg total) by mouth 2 (two) times daily.   Route: Oral   bisoprolol-hydrochlorothiazide 5-6.25 mg (ZIAC) 5-6.25 mg Tab 90 tablet 0 9/7/2022     Sig: TAKE 1 TABLET BY MOUTH ONCE DAILY             Shila Betts RN 10/27/2022  3:10 PM   HOLD IN AM OF SURGERY.             docusate sodium (COLACE) 100 MG capsule 60 capsule 0 6/28/2022     Sig: Take 1 capsule (100 mg total) by mouth 2 (two) times daily as needed for Constipation.   Route: Oral             Shila Betts RN 10/27/2022  3:08 PM   HOLD IN AM OF SURGERY.             gabapentin (NEURONTIN) 300 MG capsule 60 capsule 1 10/10/2022     Sig: Take 1 capsule (300 mg total) by mouth 3 (three) times daily.   Route: Minesh Betts RN 10/27/2022  3:11 PM   TAKE IN AM OF SURGERY.              pregabalin (LYRICA) 150 MG capsule 60 capsule 5 10/10/2022     Sig: Take 1 capsule (150 mg total) by mouth once daily.   Route: Oral              Shila Betts RN 10/27/2022  3:12 PM   TAKE IN AM OF SURGERY.              tamsulosin (FLOMAX) 0.4 mg Cap 180 capsule 0 10/18/2022     Sig: TAKE 2 CAPSULES BY MOUTH ONCE DAILY.             Shila Betts RN 10/27/2022  3:12 PM   TAKE THE NIGHT BEFORE SURGERY.              Your surgery has been scheduled for:_Friday 11/18/2022_________________________________________  Periop Center (Shila) [phone]  You should report to:  ____[facility], located on the [place] side of the first floor of the           [facility] ([phone])  __x__The Second Floor Surgery Center, located on the [facility] side of the            Second floor of the [facility] ([phone])  ____3rd Floor [facility] located on the [facility] side of the [facility] [phone]  Please Note   Tell your doctor if you take Aspirin, products containing Aspirin, herbal medications  or blood thinners, such as Coumadin, Ticlid, or Plavix.  (Consult your provider regarding holding or stopping before surgery).  Arrange for someone to drive you home following surgery.  You will not be allowed to leave the surgical facility alone or drive yourself home following sedation and anesthesia.  Before Surgery  Stop taking all vitamins/ herbal medications 14days prior to surgery  No Motrin/Advil (Ibuprofen) 7 days before surgery  No Aleve (Naproxen) 7 days before surgery  Stop Taking Asprin, products containing Asprin _7____days before surgery  Stop taking blood thinners_______days before surgery  No Goody's/BC  Powder 7 days before surgery  Refrain from drinking alcoholic beverages for 24hours before and after surgery  Stop or limit smoking _________days before surgery( nonsmoker)  You may take Tylenol for pain  Night before Surgery  Nothing to eat or drink after midnight.  Take a shower or bath (shower is recommended).  Bathe with Hibiclens soap or an antibacterial soap from the neck down.   If not supplied by your surgeon, hibiclens soap will need to be purchased over the counter in pharmacy.  Rinse soap off thoroughly.  Shampoo your hair with your regular shampoo  The Day of Surgery  NOTHING TO  DRINK 2 hours before arrival time. If you are told to take medication on the morning of surgery, it may be taken with a sip of water.   Take another bath or shower with hibiclens or any antibacterial soap, to reduce the chance of infection.  Take heart and blood pressure medications with a small sip of water, as advised by the perioperative team.  Do not take fluid pills  You may brush your teeth and rinse your mouth, but do not swall any additional water.   Do not apply perfumes, powder, body lotions or deodorant on the day of surgery.  Nail polish should be removed.  Do not wear makeup or moisturizer  Wear comfortable clothes, such as a button front shirt and loose fitting pants.  Leave all jewelry, including body piercings, and valuables at home.    Bring any devices you will neeed after surgery such as crutches or canes.  If you have sleep apnea, please bring your CPAP machine  In the event that your physical condition changes including the onset of a cold or respiratory illness, or if you have to delay or cancel your surgery, please notify your surgeon.   Stated knows where the surgery center is located. Verbalizes understanding. Sent preop and med instructions to [facility] portal.

## 2022-11-01 ENCOUNTER — TELEPHONE (OUTPATIENT)
Dept: INTERNAL MEDICINE | Facility: CLINIC | Age: 67
End: 2022-11-01
Payer: COMMERCIAL

## 2022-11-01 NOTE — TELEPHONE ENCOUNTER
----- Message from Shila Betts RN sent at 10/27/2022  3:40 PM CDT -----  Patient is scheduled for TLIF L4-5 on 11/18 with Dr. Lorenz. ( Approximately 255 minutes of general anesthesia) He will need medical clearance . Please schedule a preop clearance appt.   I will need ASA 81 mg instructions.  Please provide stop time instructions, by entering a note in EPIC.    Thanks!

## 2022-11-03 ENCOUNTER — OFFICE VISIT (OUTPATIENT)
Dept: INTERNAL MEDICINE | Facility: CLINIC | Age: 67
End: 2022-11-03
Payer: COMMERCIAL

## 2022-11-03 ENCOUNTER — LAB VISIT (OUTPATIENT)
Dept: LAB | Facility: HOSPITAL | Age: 67
End: 2022-11-03
Attending: ANESTHESIOLOGY
Payer: COMMERCIAL

## 2022-11-03 VITALS
TEMPERATURE: 98 F | BODY MASS INDEX: 29.41 KG/M2 | HEART RATE: 78 BPM | WEIGHT: 183 LBS | DIASTOLIC BLOOD PRESSURE: 78 MMHG | OXYGEN SATURATION: 99 % | HEIGHT: 66 IN | SYSTOLIC BLOOD PRESSURE: 130 MMHG

## 2022-11-03 DIAGNOSIS — Z01.818 PREOPERATIVE TESTING: ICD-10-CM

## 2022-11-03 DIAGNOSIS — Z01.818 PREOPERATIVE CLEARANCE: Primary | ICD-10-CM

## 2022-11-03 LAB
BILIRUB UR QL STRIP: NEGATIVE
CLARITY UR REFRACT.AUTO: CLEAR
COLOR UR AUTO: YELLOW
GLUCOSE UR QL STRIP: NEGATIVE
HGB UR QL STRIP: NEGATIVE
KETONES UR QL STRIP: NEGATIVE
LEUKOCYTE ESTERASE UR QL STRIP: NEGATIVE
NITRITE UR QL STRIP: NEGATIVE
PH UR STRIP: 6 [PH] (ref 5–8)
PROT UR QL STRIP: NEGATIVE
SP GR UR STRIP: 1.02 (ref 1–1.03)
URN SPEC COLLECT METH UR: NORMAL

## 2022-11-03 PROCEDURE — 3008F PR BODY MASS INDEX (BMI) DOCUMENTED: ICD-10-PCS | Mod: CPTII,S$GLB,,

## 2022-11-03 PROCEDURE — 3008F BODY MASS INDEX DOCD: CPT | Mod: CPTII,S$GLB,,

## 2022-11-03 PROCEDURE — 3078F DIAST BP <80 MM HG: CPT | Mod: CPTII,S$GLB,,

## 2022-11-03 PROCEDURE — 99999 PR PBB SHADOW E&M-EST. PATIENT-LVL III: ICD-10-PCS | Mod: PBBFAC,,,

## 2022-11-03 PROCEDURE — 81003 URINALYSIS AUTO W/O SCOPE: CPT | Performed by: ANESTHESIOLOGY

## 2022-11-03 PROCEDURE — 3075F PR MOST RECENT SYSTOLIC BLOOD PRESS GE 130-139MM HG: ICD-10-PCS | Mod: CPTII,S$GLB,,

## 2022-11-03 PROCEDURE — 1101F PR PT FALLS ASSESS DOC 0-1 FALLS W/OUT INJ PAST YR: ICD-10-PCS | Mod: CPTII,S$GLB,,

## 2022-11-03 PROCEDURE — 99999 PR PBB SHADOW E&M-EST. PATIENT-LVL III: CPT | Mod: PBBFAC,,,

## 2022-11-03 PROCEDURE — 3288F PR FALLS RISK ASSESSMENT DOCUMENTED: ICD-10-PCS | Mod: CPTII,S$GLB,,

## 2022-11-03 PROCEDURE — 99213 OFFICE O/P EST LOW 20 MIN: CPT | Mod: S$GLB,,,

## 2022-11-03 PROCEDURE — 1126F AMNT PAIN NOTED NONE PRSNT: CPT | Mod: CPTII,S$GLB,,

## 2022-11-03 PROCEDURE — 3078F PR MOST RECENT DIASTOLIC BLOOD PRESSURE < 80 MM HG: ICD-10-PCS | Mod: CPTII,S$GLB,,

## 2022-11-03 PROCEDURE — 3288F FALL RISK ASSESSMENT DOCD: CPT | Mod: CPTII,S$GLB,,

## 2022-11-03 PROCEDURE — 1101F PT FALLS ASSESS-DOCD LE1/YR: CPT | Mod: CPTII,S$GLB,,

## 2022-11-03 PROCEDURE — 1126F PR PAIN SEVERITY QUANTIFIED, NO PAIN PRESENT: ICD-10-PCS | Mod: CPTII,S$GLB,,

## 2022-11-03 PROCEDURE — 99213 PR OFFICE/OUTPT VISIT, EST, LEVL III, 20-29 MIN: ICD-10-PCS | Mod: S$GLB,,,

## 2022-11-03 PROCEDURE — 3075F SYST BP GE 130 - 139MM HG: CPT | Mod: CPTII,S$GLB,,

## 2022-11-04 DIAGNOSIS — Z01.818 PREOPERATIVE TESTING: Primary | ICD-10-CM

## 2022-11-05 NOTE — PROGRESS NOTES
"Subjective:       Patient ID: Babatunde Oconnor is a 67 y.o. male.    Chief Complaint: Pre-op Exam    67yoM w PMHx HTN, prediabeties, HLD presents for pre-operative exam for TLIF scheduled 11/18. Pt experiences chronic pain 2/2 lumbar stenosis refractory to gabapentin/pregabalin and injections.     Pt has received perioperative medication administration instructions from PCP, reviewed.     Cardiac and anesthesia history reviewed. No contraindications at present.    Review of Systems   Constitutional:  Positive for activity change. Negative for appetite change, chills, diaphoresis and fever.   HENT:  Negative for nasal congestion, postnasal drip and sore throat.    Eyes:  Negative for photophobia, pain and visual disturbance.   Respiratory:  Negative for apnea, chest tightness and shortness of breath.    Cardiovascular:  Negative for chest pain, palpitations and leg swelling.   Gastrointestinal:  Negative for abdominal distention, abdominal pain, change in bowel habit and change in bowel habit.   Endocrine: Negative for polydipsia, polyphagia and polyuria.   Genitourinary:  Negative for decreased urine volume, hematuria and urgency.   Musculoskeletal:  Positive for arthralgias, back pain and myalgias. Negative for neck stiffness.   Integumentary:  Negative for pallor, rash and wound.   Neurological:  Positive for numbness. Negative for tremors and seizures.   Psychiatric/Behavioral:  Negative for agitation, confusion and dysphoric mood.        Objective:      Vitals:    11/03/22 1444   BP: 130/78   BP Location: Right arm   Patient Position: Sitting   BP Method: Medium (Manual)   Pulse: 78   Temp: 97.9 °F (36.6 °C)   TempSrc: Temporal   SpO2: 99%   Weight: 83 kg (183 lb)   Height: 5' 6" (1.676 m)       Physical Exam  Vitals reviewed.   Constitutional:       Appearance: He is obese. He is not ill-appearing or diaphoretic.   HENT:      Head: Normocephalic and atraumatic.      Right Ear: External ear normal.      Left " Ear: External ear normal.      Nose: Nose normal.   Eyes:      General: No scleral icterus.     Extraocular Movements: Extraocular movements intact.      Conjunctiva/sclera: Conjunctivae normal.   Cardiovascular:      Rate and Rhythm: Normal rate and regular rhythm.      Pulses: Normal pulses.      Heart sounds: Normal heart sounds.   Pulmonary:      Effort: Pulmonary effort is normal.      Breath sounds: Normal breath sounds.   Abdominal:      General: There is no distension.      Palpations: Abdomen is soft.      Tenderness: There is no abdominal tenderness.   Musculoskeletal:         General: No swelling or deformity. Normal range of motion.      Cervical back: Normal range of motion. No tenderness.      Right lower leg: No edema.      Left lower leg: No edema.   Lymphadenopathy:      Cervical: No cervical adenopathy.   Skin:     General: Skin is warm and dry.      Capillary Refill: Capillary refill takes less than 2 seconds.      Findings: No lesion.   Neurological:      General: No focal deficit present.      Mental Status: He is alert and oriented to person, place, and time.   Psychiatric:         Mood and Affect: Mood normal.         Behavior: Behavior normal.         Thought Content: Thought content normal.         Judgment: Judgment normal.         aPTT   Date Value Ref Range Status   11/03/2022 24.9 21.0 - 32.0 sec Final     Comment:     aPTT therapeutic range = 39-69 seconds     INR   Date Value Ref Range Status   11/03/2022 1.0 0.8 - 1.2 Final     Comment:     Coumadin Therapy:  2.0 - 3.0 for INR for all indicators except mechanical heart valves  and antiphospholipid syndromes which should use 2.5 - 3.5.       Lab Results   Component Value Date    WBC 7.41 11/03/2022    HGB 13.6 (L) 11/03/2022    HCT 42.3 11/03/2022    MCV 92 11/03/2022     11/03/2022       CMP  Sodium   Date Value Ref Range Status   11/03/2022 137 136 - 145 mmol/L Final     Potassium   Date Value Ref Range Status   11/03/2022 4.1  3.5 - 5.1 mmol/L Final     Chloride   Date Value Ref Range Status   11/03/2022 104 95 - 110 mmol/L Final     CO2   Date Value Ref Range Status   11/03/2022 22 (L) 23 - 29 mmol/L Final     Glucose   Date Value Ref Range Status   11/03/2022 89 70 - 110 mg/dL Final     BUN   Date Value Ref Range Status   11/03/2022 16 8 - 23 mg/dL Final     Creatinine   Date Value Ref Range Status   11/03/2022 0.8 0.5 - 1.4 mg/dL Final     Calcium   Date Value Ref Range Status   11/03/2022 9.4 8.7 - 10.5 mg/dL Final     Total Protein   Date Value Ref Range Status   11/03/2022 7.2 6.0 - 8.4 g/dL Final     Albumin   Date Value Ref Range Status   11/03/2022 4.5 3.5 - 5.2 g/dL Final     Total Bilirubin   Date Value Ref Range Status   11/03/2022 0.6 0.1 - 1.0 mg/dL Final     Comment:     For infants and newborns, interpretation of results should be based  on gestational age, weight and in agreement with clinical  observations.    Premature Infant recommended reference ranges:  Up to 24 hours.............<8.0 mg/dL  Up to 48 hours............<12.0 mg/dL  3-5 days..................<15.0 mg/dL  6-29 days.................<15.0 mg/dL       Alkaline Phosphatase   Date Value Ref Range Status   11/03/2022 40 (L) 55 - 135 U/L Final     AST   Date Value Ref Range Status   11/03/2022 27 10 - 40 U/L Final     ALT   Date Value Ref Range Status   11/03/2022 28 10 - 44 U/L Final     Anion Gap   Date Value Ref Range Status   11/03/2022 11 8 - 16 mmol/L Final     eGFR if    Date Value Ref Range Status   06/16/2022 >60.0 >60 mL/min/1.73 m^2 Final     eGFR if non    Date Value Ref Range Status   06/16/2022 >60.0 >60 mL/min/1.73 m^2 Final     Comment:     Calculation used to obtain the estimated glomerular filtration  rate (eGFR) is the CKD-EPI equation.            Assessment:       Problem List Items Addressed This Visit    None  Visit Diagnoses       Preoperative clearance    -  Primary            Plan:       Preoperative  clearance      Revised Cardiac Risk Index   High risk surgery: No  History of ischemic heart disease: No  History of congestive heart failure: No  History of stroke: No  Insulin dependent diabetes: No  Cr > 2: No  0 points, class I risk, 3.9% risk of cardiac event 2014 ACC/AHA Perioperative Guidelines  Known or risk factors for CAD: Yes  High risk of MACE: No  Functional capacity < 4 METs: Yes     Pt has no active cardiac condition (ACS/USA, decompenstated CHF, significant arrhythmias or severe valvular disease) and can easily achieve 4 METS.  As such, pt does not require further cardiac evaluation prior to undergoing surgery.  Pt should remain on beta-blockers throughout the entire luis-procedure time period.  Aspirin therapy can be held but should be restarted as soon as safely possible.  The remaining cardiac meds can be held as needed but should also be restarted after the procedure. These recommendations follow the most current Guideline on Perioperative Cardiovascular Evaluation and Management of Patients Undergoing Noncardiac Surgery released by the ACC/AHA (JACC 2014.07.944).    - agree with documented medication administration instructions perioperatively    Follow-up PRN    Case discussed with Dr. Calvin Stearns MD  Medical Resident  [facility]

## 2022-11-07 ENCOUNTER — TELEPHONE (OUTPATIENT)
Dept: INTERNAL MEDICINE | Facility: CLINIC | Age: 67
End: 2022-11-07
Payer: COMMERCIAL

## 2022-11-07 ENCOUNTER — TELEPHONE (OUTPATIENT)
Dept: PREADMISSION TESTING | Facility: HOSPITAL | Age: 67
End: 2022-11-07
Payer: COMMERCIAL

## 2022-11-07 NOTE — PRE-PROCEDURE INSTRUCTIONS
Instructed patient to hold Aspirin 81 mg 5 days prior to surgery per Dr. Tramaine Lorenz. ( Last dose 11/12). Verbalizes understanding.

## 2022-11-07 NOTE — TELEPHONE ENCOUNTER
----- Message from Shila Betts RN sent at 11/7/2022  8:10 AM CST -----  Patient is scheduled for TLIF L4-5 on 11/18 with Dr. Lorenz. ( Approximately 255 minutes of general anesthesia) I saw your medical clearance.    I will need ASA 81 mg instructions. How many days to hold prior to surgery?  Please provide stop time instructions, by entering a note in EPIC.    Thanks!

## 2022-11-07 NOTE — TELEPHONE ENCOUNTER
Patient is to continue ASA perioperatively given low cardiac risk and low-moderate invasive procedure. Can be adjusted per surgery team. If discontinued, recommend restarting within 24 hours of surgery completion if hemodynamically stable.    Signed,  Maira Stearns MD

## 2022-11-07 NOTE — TELEPHONE ENCOUNTER
----- Message from Tramaine Lorenz MD sent at 11/7/2022 12:08 PM CST -----  5 days before and after  ----- Message -----  From: Shila Betts RN  Sent: 11/7/2022  12:02 PM CST  To: Shila Betts RN, Tramaine Lorenz MD, #    Are you ok with the patient staying  on ASA 81 mg. If not, how many days prior to surgery would you like it held?  See below note.   Thanks!     Patient is to continue ASA perioperatively given low cardiac risk and low-moderate invasive procedure. Can be adjusted per surgery team. If discontinued, recommend restarting within 24 hours of surgery completion if hemodynamically stable.     Signed,  Maira Stearns MD    Electronically signed by Maira Stearns MD at 11/7/2022 11:07 AM

## 2022-11-17 ENCOUNTER — TELEPHONE (OUTPATIENT)
Dept: ORTHOPEDICS | Facility: CLINIC | Age: 67
End: 2022-11-17
Payer: COMMERCIAL

## 2022-11-17 ENCOUNTER — ANESTHESIA EVENT (OUTPATIENT)
Dept: SURGERY | Facility: HOSPITAL | Age: 67
End: 2022-11-17
Payer: COMMERCIAL

## 2022-11-17 ENCOUNTER — PATIENT MESSAGE (OUTPATIENT)
Dept: SURGERY | Facility: HOSPITAL | Age: 67
End: 2022-11-17
Payer: COMMERCIAL

## 2022-11-17 NOTE — TELEPHONE ENCOUNTER
----- Message from Loli Dunn sent at 11/17/2022  4:35 PM CST -----  Contact: pt  Pt calling in regards to time of arrival for procedure tomorrow      Confirmed patient's contact info below:  Contact Name: Babatunde Oconnor  Phone Number: [phone]

## 2022-11-17 NOTE — TELEPHONE ENCOUNTER
Spoke with patient and informed her of surgery date and time and she agreed verbally. Informed patient of area to park and where to go that morning to sign in (Day of Surgery). No eating or drinking after 12 am. And wash with Rosalinda-[employer]. Also wash hair night before surgery. Patient agreed verbally.

## 2022-11-17 NOTE — TELEPHONE ENCOUNTER
----- Message from Mee Monte sent at 11/17/2022  3:58 PM CST -----  Regarding: pt advice  Contact: [phone]  Pt has not received a call with arrival time and pre procedural direction for tomorrow's surgery. Pls call

## 2022-11-18 ENCOUNTER — ANESTHESIA (OUTPATIENT)
Dept: SURGERY | Facility: HOSPITAL | Age: 67
End: 2022-11-18
Payer: COMMERCIAL

## 2022-11-18 ENCOUNTER — HOSPITAL ENCOUNTER (OUTPATIENT)
Facility: HOSPITAL | Age: 67
Discharge: HOME | End: 2022-11-20
Attending: ORTHOPAEDIC SURGERY | Admitting: ORTHOPAEDIC SURGERY
Payer: COMMERCIAL

## 2022-11-18 DIAGNOSIS — Z01.818 PREOPERATIVE TESTING: ICD-10-CM

## 2022-11-18 DIAGNOSIS — M43.16 SPONDYLOLISTHESIS AT L4-L5 LEVEL: ICD-10-CM

## 2022-11-18 LAB
ABO + RH BLD: NORMAL
BLD GP AB SCN CELLS X3 SERPL QL: NORMAL

## 2022-11-18 PROCEDURE — 36000710: Performed by: ORTHOPAEDIC SURGERY

## 2022-11-18 PROCEDURE — 36000711: Performed by: ORTHOPAEDIC SURGERY

## 2022-11-18 PROCEDURE — 63600175 PHARM REV CODE 636 W HCPCS

## 2022-11-18 PROCEDURE — D9220A PRA ANESTHESIA: Mod: ,,, | Performed by: ANESTHESIOLOGY

## 2022-11-18 PROCEDURE — 27800903 OPTIME MED/SURG SUP & DEVICES OTHER IMPLANTS: Performed by: ORTHOPAEDIC SURGERY

## 2022-11-18 PROCEDURE — 22633 PR ARTHRODESIS, COMBINED TECHN, SNGL INTERSPACE, LUMBAR: ICD-10-PCS | Mod: ,,, | Performed by: ORTHOPAEDIC SURGERY

## 2022-11-18 PROCEDURE — 86901 BLOOD TYPING SEROLOGIC RH(D): CPT | Performed by: ANESTHESIOLOGY

## 2022-11-18 PROCEDURE — 63600175 PHARM REV CODE 636 W HCPCS: Performed by: STUDENT IN AN ORGANIZED HEALTH CARE EDUCATION/TRAINING PROGRAM

## 2022-11-18 PROCEDURE — D9220A PRA ANESTHESIA: ICD-10-PCS | Mod: ,,, | Performed by: ANESTHESIOLOGY

## 2022-11-18 PROCEDURE — 25000003 PHARM REV CODE 250: Performed by: STUDENT IN AN ORGANIZED HEALTH CARE EDUCATION/TRAINING PROGRAM

## 2022-11-18 PROCEDURE — 37000009 HC ANESTHESIA EA ADD 15 MINS: Performed by: ORTHOPAEDIC SURGERY

## 2022-11-18 PROCEDURE — 22633 ARTHRD CMBN 1NTRSPC LUMBAR: CPT | Mod: ,,, | Performed by: ORTHOPAEDIC SURGERY

## 2022-11-18 PROCEDURE — C1713 ANCHOR/SCREW BN/BN,TIS/BN: HCPCS | Performed by: ORTHOPAEDIC SURGERY

## 2022-11-18 PROCEDURE — 27201423 OPTIME MED/SURG SUP & DEVICES STERILE SUPPLY: Performed by: ORTHOPAEDIC SURGERY

## 2022-11-18 PROCEDURE — 22853 INSJ BIOMECHANICAL DEVICE: CPT | Mod: ,,, | Performed by: ORTHOPAEDIC SURGERY

## 2022-11-18 PROCEDURE — 63600175 PHARM REV CODE 636 W HCPCS: Performed by: ORTHOPAEDIC SURGERY

## 2022-11-18 PROCEDURE — 25000003 PHARM REV CODE 250: Performed by: ORTHOPAEDIC SURGERY

## 2022-11-18 PROCEDURE — 20930 SP BONE ALGRFT MORSEL ADD-ON: CPT | Mod: ,,, | Performed by: ORTHOPAEDIC SURGERY

## 2022-11-18 PROCEDURE — 37000008 HC ANESTHESIA 1ST 15 MINUTES: Performed by: ORTHOPAEDIC SURGERY

## 2022-11-18 PROCEDURE — 20930 PR ALLOGRAFT FOR SPINE SURGERY ONLY MORSELIZED: ICD-10-PCS | Mod: ,,, | Performed by: ORTHOPAEDIC SURGERY

## 2022-11-18 PROCEDURE — C1729 CATH, DRAINAGE: HCPCS | Performed by: ORTHOPAEDIC SURGERY

## 2022-11-18 PROCEDURE — 22840 INSERT SPINE FIXATION DEVICE: CPT | Mod: ,,, | Performed by: ORTHOPAEDIC SURGERY

## 2022-11-18 PROCEDURE — 22840 PR POSTERIOR NON-SEGMENTAL INSTRUMENTATION: ICD-10-PCS | Mod: ,,, | Performed by: ORTHOPAEDIC SURGERY

## 2022-11-18 PROCEDURE — 71000033 HC RECOVERY, INTIAL HOUR: Performed by: ORTHOPAEDIC SURGERY

## 2022-11-18 PROCEDURE — 71000015 HC POSTOP RECOV 1ST HR: Performed by: ORTHOPAEDIC SURGERY

## 2022-11-18 PROCEDURE — 22853 PR INSERT BIOMECH DEV W/INTERBODY ARTHRODESIS, EA CONTIGUOUS DEFECT: ICD-10-PCS | Mod: ,,, | Performed by: ORTHOPAEDIC SURGERY

## 2022-11-18 DEVICE — CAP SPINAL LOCK THRD CREO 5.5: Type: IMPLANTABLE DEVICE | Site: BACK | Status: FUNCTIONAL

## 2022-11-18 DEVICE — GRAFT ALTAPORE BIOACTIVE 5ML: Type: IMPLANTABLE DEVICE | Site: BACK | Status: FUNCTIONAL

## 2022-11-18 DEVICE — IMPLANTABLE DEVICE: Type: IMPLANTABLE DEVICE | Site: BACK | Status: FUNCTIONAL

## 2022-11-18 DEVICE — SCREW BONE SPINAL CREO 6.5X45: Type: IMPLANTABLE DEVICE | Site: BACK | Status: FUNCTIONAL

## 2022-11-18 DEVICE — SCREW BONE SPINAL 6.5X40MM: Type: IMPLANTABLE DEVICE | Site: BACK | Status: FUNCTIONAL

## 2022-11-18 RX ORDER — OXYCODONE HYDROCHLORIDE 10 MG/1
10 TABLET ORAL
Status: DISCONTINUED | OUTPATIENT
Start: 2022-11-18 | End: 2022-11-20 | Stop reason: HOSPADM

## 2022-11-18 RX ORDER — PHENYLEPHRINE HCL IN 0.9% NACL 1 MG/10 ML
SYRINGE (ML) INTRAVENOUS
Status: DISCONTINUED | OUTPATIENT
Start: 2022-11-18 | End: 2022-11-18

## 2022-11-18 RX ORDER — DEXAMETHASONE SODIUM PHOSPHATE 4 MG/ML
INJECTION, SOLUTION INTRA-ARTICULAR; INTRALESIONAL; INTRAMUSCULAR; INTRAVENOUS; SOFT TISSUE
Status: DISCONTINUED | OUTPATIENT
Start: 2022-11-18 | End: 2022-11-18

## 2022-11-18 RX ORDER — CELECOXIB 200 MG/1
200 CAPSULE ORAL DAILY
Status: DISCONTINUED | OUTPATIENT
Start: 2022-11-19 | End: 2022-11-20 | Stop reason: HOSPADM

## 2022-11-18 RX ORDER — OXYCODONE HYDROCHLORIDE 5 MG/1
5-10 TABLET ORAL EVERY 4 HOURS PRN
Qty: 42 TABLET | Refills: 0 | Status: SHIPPED | OUTPATIENT
Start: 2022-11-18 | End: 2022-12-19

## 2022-11-18 RX ORDER — GABAPENTIN 300 MG/1
300 CAPSULE ORAL 3 TIMES DAILY
Status: DISCONTINUED | OUTPATIENT
Start: 2022-11-18 | End: 2022-11-20 | Stop reason: HOSPADM

## 2022-11-18 RX ORDER — DROPERIDOL 2.5 MG/ML
0.62 INJECTION, SOLUTION INTRAMUSCULAR; INTRAVENOUS ONCE AS NEEDED
Status: DISCONTINUED | OUTPATIENT
Start: 2022-11-18 | End: 2022-11-18 | Stop reason: HOSPADM

## 2022-11-18 RX ORDER — KETAMINE HCL IN 0.9 % NACL 50 MG/5 ML
SYRINGE (ML) INTRAVENOUS
Status: DISCONTINUED | OUTPATIENT
Start: 2022-11-18 | End: 2022-11-18

## 2022-11-18 RX ORDER — DOCUSATE SODIUM 100 MG/1
100 CAPSULE, LIQUID FILLED ORAL 2 TIMES DAILY PRN
Status: DISCONTINUED | OUTPATIENT
Start: 2022-11-18 | End: 2022-11-20 | Stop reason: HOSPADM

## 2022-11-18 RX ORDER — DEXMEDETOMIDINE HYDROCHLORIDE 100 UG/ML
INJECTION, SOLUTION INTRAVENOUS
Status: DISCONTINUED | OUTPATIENT
Start: 2022-11-18 | End: 2022-11-18

## 2022-11-18 RX ORDER — ONDANSETRON 2 MG/ML
INJECTION INTRAMUSCULAR; INTRAVENOUS
Status: DISCONTINUED | OUTPATIENT
Start: 2022-11-18 | End: 2022-11-18

## 2022-11-18 RX ORDER — MUPIROCIN 20 MG/G
OINTMENT TOPICAL
Status: DISCONTINUED | OUTPATIENT
Start: 2022-11-18 | End: 2022-11-18 | Stop reason: HOSPADM

## 2022-11-18 RX ORDER — CELECOXIB 200 MG/1
200 CAPSULE ORAL DAILY
Qty: 30 CAPSULE | Refills: 0 | Status: SHIPPED | OUTPATIENT
Start: 2022-11-18 | End: 2022-12-19

## 2022-11-18 RX ORDER — OXYCODONE HYDROCHLORIDE 5 MG/1
5 TABLET ORAL
Status: DISCONTINUED | OUTPATIENT
Start: 2022-11-18 | End: 2022-11-20 | Stop reason: HOSPADM

## 2022-11-18 RX ORDER — ROCURONIUM BROMIDE 10 MG/ML
INJECTION, SOLUTION INTRAVENOUS
Status: DISCONTINUED | OUTPATIENT
Start: 2022-11-18 | End: 2022-11-18

## 2022-11-18 RX ORDER — HALOPERIDOL 5 MG/ML
INJECTION INTRAMUSCULAR
Status: DISCONTINUED | OUTPATIENT
Start: 2022-11-18 | End: 2022-11-18

## 2022-11-18 RX ORDER — MORPHINE SULFATE 2 MG/ML
2 INJECTION, SOLUTION INTRAMUSCULAR; INTRAVENOUS
Status: DISCONTINUED | OUTPATIENT
Start: 2022-11-18 | End: 2022-11-20 | Stop reason: HOSPADM

## 2022-11-18 RX ORDER — TAMSULOSIN HYDROCHLORIDE 0.4 MG/1
2 CAPSULE ORAL DAILY
Status: DISCONTINUED | OUTPATIENT
Start: 2022-11-19 | End: 2022-11-20 | Stop reason: HOSPADM

## 2022-11-18 RX ORDER — SODIUM CHLORIDE 0.9 % (FLUSH) 0.9 %
10 SYRINGE (ML) INJECTION
Status: DISCONTINUED | OUTPATIENT
Start: 2022-11-18 | End: 2022-11-18 | Stop reason: HOSPADM

## 2022-11-18 RX ORDER — LIDOCAINE HYDROCHLORIDE AND EPINEPHRINE 10; 10 MG/ML; UG/ML
INJECTION, SOLUTION INFILTRATION; PERINEURAL
Status: DISCONTINUED | OUTPATIENT
Start: 2022-11-18 | End: 2022-11-18 | Stop reason: HOSPADM

## 2022-11-18 RX ORDER — LIDOCAINE HYDROCHLORIDE 20 MG/ML
INJECTION, SOLUTION EPIDURAL; INFILTRATION; INTRACAUDAL; PERINEURAL
Status: DISCONTINUED | OUTPATIENT
Start: 2022-11-18 | End: 2022-11-18

## 2022-11-18 RX ORDER — CEFAZOLIN SODIUM/WATER 2 G/20 ML
2 SYRINGE (ML) INTRAVENOUS
Status: DISCONTINUED | OUTPATIENT
Start: 2022-11-18 | End: 2022-11-18 | Stop reason: HOSPADM

## 2022-11-18 RX ORDER — ONDANSETRON 8 MG/1
8 TABLET, ORALLY DISINTEGRATING ORAL EVERY 8 HOURS PRN
Status: DISCONTINUED | OUTPATIENT
Start: 2022-11-18 | End: 2022-11-20 | Stop reason: HOSPADM

## 2022-11-18 RX ORDER — FENTANYL CITRATE 50 UG/ML
INJECTION, SOLUTION INTRAMUSCULAR; INTRAVENOUS
Status: DISCONTINUED | OUTPATIENT
Start: 2022-11-18 | End: 2022-11-18

## 2022-11-18 RX ORDER — LIDOCAINE HYDROCHLORIDE 10 MG/ML
1 INJECTION, SOLUTION EPIDURAL; INFILTRATION; INTRACAUDAL; PERINEURAL ONCE AS NEEDED
Status: DISCONTINUED | OUTPATIENT
Start: 2022-11-18 | End: 2022-11-20 | Stop reason: HOSPADM

## 2022-11-18 RX ORDER — HYDROMORPHONE HYDROCHLORIDE 1 MG/ML
0.2 INJECTION, SOLUTION INTRAMUSCULAR; INTRAVENOUS; SUBCUTANEOUS EVERY 5 MIN PRN
Status: DISCONTINUED | OUTPATIENT
Start: 2022-11-18 | End: 2022-11-18 | Stop reason: HOSPADM

## 2022-11-18 RX ORDER — METHOCARBAMOL 750 MG/1
750 TABLET, FILM COATED ORAL 3 TIMES DAILY
Status: DISCONTINUED | OUTPATIENT
Start: 2022-11-18 | End: 2022-11-20 | Stop reason: HOSPADM

## 2022-11-18 RX ORDER — ACETAMINOPHEN 500 MG
1000 TABLET ORAL EVERY 8 HOURS
Qty: 180 TABLET | Refills: 0 | Status: SHIPPED | OUTPATIENT
Start: 2022-11-18 | End: 2022-12-19

## 2022-11-18 RX ORDER — CEFAZOLIN SODIUM/D5W 2 G/100 ML
2 PLASTIC BAG, INJECTION (ML) INTRAVENOUS
Status: COMPLETED | OUTPATIENT
Start: 2022-11-18 | End: 2022-11-19

## 2022-11-18 RX ORDER — CELECOXIB 200 MG/1
400 CAPSULE ORAL ONCE
Status: COMPLETED | OUTPATIENT
Start: 2022-11-18 | End: 2022-11-18

## 2022-11-18 RX ORDER — ACETAMINOPHEN 500 MG
1000 TABLET ORAL EVERY 6 HOURS
Status: DISCONTINUED | OUTPATIENT
Start: 2022-11-18 | End: 2022-11-20 | Stop reason: HOSPADM

## 2022-11-18 RX ORDER — SODIUM CHLORIDE 0.9 % (FLUSH) 0.9 %
10 SYRINGE (ML) INJECTION
Status: DISCONTINUED | OUTPATIENT
Start: 2022-11-18 | End: 2022-11-20 | Stop reason: HOSPADM

## 2022-11-18 RX ORDER — TALC
6 POWDER (GRAM) TOPICAL NIGHTLY PRN
Status: DISCONTINUED | OUTPATIENT
Start: 2022-11-18 | End: 2022-11-20 | Stop reason: HOSPADM

## 2022-11-18 RX ORDER — PROPOFOL 10 MG/ML
VIAL (ML) INTRAVENOUS
Status: DISCONTINUED | OUTPATIENT
Start: 2022-11-18 | End: 2022-11-18

## 2022-11-18 RX ORDER — HYDROMORPHONE HYDROCHLORIDE 1 MG/ML
INJECTION, SOLUTION INTRAMUSCULAR; INTRAVENOUS; SUBCUTANEOUS
Status: COMPLETED
Start: 2022-11-18 | End: 2022-11-18

## 2022-11-18 RX ORDER — METHOCARBAMOL 750 MG/1
750 TABLET, FILM COATED ORAL 3 TIMES DAILY
Qty: 90 TABLET | Refills: 0 | Status: SHIPPED | OUTPATIENT
Start: 2022-11-18 | End: 2022-12-19

## 2022-11-18 RX ORDER — HEPARIN SODIUM 5000 [USP'U]/ML
5000 INJECTION, SOLUTION INTRAVENOUS; SUBCUTANEOUS EVERY 8 HOURS
Status: DISCONTINUED | OUTPATIENT
Start: 2022-11-19 | End: 2022-11-20 | Stop reason: HOSPADM

## 2022-11-18 RX ORDER — CEFAZOLIN SODIUM 1 G/3ML
INJECTION, POWDER, FOR SOLUTION INTRAMUSCULAR; INTRAVENOUS
Status: DISCONTINUED | OUTPATIENT
Start: 2022-11-18 | End: 2022-11-18

## 2022-11-18 RX ADMIN — HYDROMORPHONE HYDROCHLORIDE 0.2 MG: 1 INJECTION, SOLUTION INTRAMUSCULAR; INTRAVENOUS; SUBCUTANEOUS at 03:11

## 2022-11-18 RX ADMIN — OXYCODONE HYDROCHLORIDE 10 MG: 10 TABLET ORAL at 03:11

## 2022-11-18 RX ADMIN — GABAPENTIN 300 MG: 300 CAPSULE ORAL at 08:11

## 2022-11-18 RX ADMIN — HALOPERIDOL LACTATE 1 MG: 5 INJECTION, SOLUTION INTRAMUSCULAR at 02:11

## 2022-11-18 RX ADMIN — Medication 100 MCG: at 12:11

## 2022-11-18 RX ADMIN — GLYCOPYRROLATE 0.2 MG: 0.2 INJECTION INTRAMUSCULAR; INTRAVENOUS at 11:11

## 2022-11-18 RX ADMIN — FENTANYL CITRATE 100 MCG: 50 INJECTION, SOLUTION INTRAMUSCULAR; INTRAVENOUS at 11:11

## 2022-11-18 RX ADMIN — DEXMEDETOMIDINE HYDROCHLORIDE 8 MCG: 100 INJECTION, SOLUTION INTRAVENOUS at 02:11

## 2022-11-18 RX ADMIN — CELECOXIB 400 MG: 200 CAPSULE ORAL at 03:11

## 2022-11-18 RX ADMIN — GLYCOPYRROLATE 0.4 MG: 0.2 INJECTION INTRAMUSCULAR; INTRAVENOUS at 01:11

## 2022-11-18 RX ADMIN — ACETAMINOPHEN 1000 MG: 500 TABLET ORAL at 11:11

## 2022-11-18 RX ADMIN — FENTANYL CITRATE 50 MCG: 50 INJECTION, SOLUTION INTRAMUSCULAR; INTRAVENOUS at 01:11

## 2022-11-18 RX ADMIN — CEFAZOLIN 2 G: 330 INJECTION, POWDER, FOR SOLUTION INTRAMUSCULAR; INTRAVENOUS at 11:11

## 2022-11-18 RX ADMIN — SODIUM CHLORIDE: 0.9 INJECTION, SOLUTION INTRAVENOUS at 01:11

## 2022-11-18 RX ADMIN — Medication 2 G: at 08:11

## 2022-11-18 RX ADMIN — ONDANSETRON 4 MG: 2 INJECTION INTRAMUSCULAR; INTRAVENOUS at 02:11

## 2022-11-18 RX ADMIN — SODIUM CHLORIDE: 0.9 INJECTION, SOLUTION INTRAVENOUS at 11:11

## 2022-11-18 RX ADMIN — Medication 20 MG: at 01:11

## 2022-11-18 RX ADMIN — ROCURONIUM BROMIDE 10 MG: 10 INJECTION, SOLUTION INTRAVENOUS at 12:11

## 2022-11-18 RX ADMIN — METHOCARBAMOL 750 MG: 750 TABLET ORAL at 08:11

## 2022-11-18 RX ADMIN — OXYCODONE 5 MG: 5 TABLET ORAL at 06:11

## 2022-11-18 RX ADMIN — ROCURONIUM BROMIDE 60 MG: 10 INJECTION, SOLUTION INTRAVENOUS at 11:11

## 2022-11-18 RX ADMIN — FENTANYL CITRATE 50 MCG: 50 INJECTION, SOLUTION INTRAMUSCULAR; INTRAVENOUS at 02:11

## 2022-11-18 RX ADMIN — SODIUM CHLORIDE 0.3 MCG/KG/MIN: 9 INJECTION, SOLUTION INTRAVENOUS at 12:11

## 2022-11-18 RX ADMIN — SODIUM CHLORIDE, SODIUM GLUCONATE, SODIUM ACETATE, POTASSIUM CHLORIDE, MAGNESIUM CHLORIDE, SODIUM PHOSPHATE, DIBASIC, AND POTASSIUM PHOSPHATE: .53; .5; .37; .037; .03; .012; .00082 INJECTION, SOLUTION INTRAVENOUS at 12:11

## 2022-11-18 RX ADMIN — ROCURONIUM BROMIDE 40 MG: 10 INJECTION, SOLUTION INTRAVENOUS at 12:11

## 2022-11-18 RX ADMIN — MUPIROCIN: 20 OINTMENT TOPICAL at 10:11

## 2022-11-18 RX ADMIN — LIDOCAINE HYDROCHLORIDE 100 MG: 20 INJECTION, SOLUTION EPIDURAL; INFILTRATION; INTRACAUDAL; PERINEURAL at 11:11

## 2022-11-18 RX ADMIN — PROPOFOL 120 MG: 10 INJECTION, EMULSION INTRAVENOUS at 11:11

## 2022-11-18 RX ADMIN — ACETAMINOPHEN 1000 MG: 500 TABLET ORAL at 06:11

## 2022-11-18 RX ADMIN — Medication 30 MG: at 11:11

## 2022-11-18 RX ADMIN — DEXAMETHASONE SODIUM PHOSPHATE 8 MG: 4 INJECTION INTRA-ARTICULAR; INTRALESIONAL; INTRAMUSCULAR; INTRAVENOUS; SOFT TISSUE at 11:11

## 2022-11-18 RX ADMIN — SUGAMMADEX 200 MG: 100 INJECTION, SOLUTION INTRAVENOUS at 01:11

## 2022-11-18 NOTE — OP NOTE
DATE OF PROCEDURE: 11/18/2022.     SURGEON: Tramaine Lorenz M.D.     FIRST ASSISTANT: Sandee Shanks MD, PGY-3 Orthopedics     PREOPERATIVE DIAGNOSIS:   1.   L4-5 spondylolisthesis grade 1  2.   Symptomatic lumbar spinal stenosis with neurogenic claudication     POSTOPERATIVE DIAGNOSIS:   Same     PROCEDURES PERFORMED:  Posterior lateral and posterior lumbar interbody fusion L4-5  2.   Posterior nonsegmental instrumentation L4-5  3.   L4 laminectomy, and bilateral foraminotomy for decompression of central and bilateral foraminal stenosis.  4.   Application of titanium intervertebral spacer device to L4-5 disc defect  5.   Robotic assistance for placement of the pedicle screws (requiring greater than 30 minutes of preoperative planning time)  6.   Auto and allografting     ANESTHESIA: General endotracheal anesthesia.     ESTIMATED BLOOD LOSS: 150 mL.     IMPLANTS: Globus screws, and expandable 7-13 mm height (22mm length), 15deg lordosis, titanium cage.  Altapore putty      SPECIMENS: None.     FINDINGS: severe L4-5 stenosis.     DRAINS: One deep and one superficial HV drains     COMPLICATIONS: None.     SPONGE AND NEEDLE COUNT: Correct x2.     NEUROLOGICAL MONITORING: Somatosensory potentials, free run EMG, and EMG stimulation lumbar pedicle screws. There were no changes to SSEP baselines. There no significant EMG activity. All screws stimulated at or greater than than 20 milliamps.      REASON FOR OPERATION AND BRIEF HISTORY AND PHYSICAL: Babatunde Oconnor is a 67 y.o. male with an L4-5 spondylolisthesis, and refractory lumbar spinal stenosis with radiculopathy.  She has failed extensive conservative management and is here for surgery today.     DESCRIPTION OF INFORMED CONSENT: Please see my last clinic note for a full description of the informed consent I had with the patient.     DESCRIPTION OF PROCEDURE: The patient was met in the preoperative area where all final questions were answered. Their lumbar spine  was marked as the operative site. The patient was then brought to the operating room where anesthesia was induced. The patient was then flipped prone onto the Goyo spine table. All bony prominences were padded, paying special attention to the eyes, nose and mouth, axillae, ulnar nerve and hands, genitalia, knees and feet, this was confirmed to be adequate with the anesthesia team. Sequential compression devices were run throughout the case on the bilateral calves. The surgical field was prepped and draped in normal sterile manner after using fluoroscopy to localize our incisoin. A full time-out was then called, verifying patient's identity, surgery, site, and that they had been administered a weight appropriate dose of Ancef, no specific nursing, anesthetic or surgical concerns were identified and it was decided that it was safe to proceed with surgery. I informed all members of the operative team that they were empowered to speak up regarding concerns at any time during the procedure.     I then made a midline incision and carried dissection down through the skin and subcutaneous tissues using combination of sharp dissection and electrocautery where necessary. The dorsal fascia of the lumbar spine was identified and incised in the midline and I performed a preliminary subperiosteal exposure of the bilateral L3-4 and L4-5 facet joints, and what I took to be the L4 and L5 transverse processes bilaterally. At the conclusion my preliminary exposure, I placed a marker on what I took to be the left L5 pedicle, took a lateral radiograph, and this confirmed my levels.  Next I finalized my exposure. Subsequently I performed bilateral L4-5 facetectomies with an osteotome.     We placed a pin in the right posterior superior iliac spine for the robotic array. AP and lateral fluoroscopy images were taken to register to the preoperative CT scan and plan that we had performed prior to the incision.  Once that was done, the  robotic arm was used to cannulate pedicle screw tracts bilaterally at L4 and L5. Xrays confirmed adequate positioning of the screws on AP and lateral images.     I then began my neurological decompression. High-speed drill was used to thin the L4 lamina at the level of the pars. This was then removed on block, and I released the ligamentum flavum centrally with a forward angle curette and resected it with a Kerrison punch. I subsequently performed a central as well as bilateral foraminal decompression with a Kerrison punch. Working on the right of midline I used a osteotome to remove the superior articular process of L5. I then performed a subtotal L4-5 diskectomy in a standard fashion. The disc space was then thoroughly irrigated and a expandable 7-13 titanium spacer was impacted, and expanded, and confirmed to be at the correct level in adequate position radiographically. The disc space then packed with morselized bone graft.     The wound was then copiously irrigated. Rods were measured, cut, contoured, and locked into place with torque wrench. Morselized bone graft, mixed with 1 g vancomycin powder, was laid dorsally along the decorticated transverse processes from L4-5.     500 mg of vancomycin powder was placed in the deep space, and a deep drain was then placed. The deep fascia was then closed with #1 Vicryl sutures in an interrupted, figure-of-eight fashion. A superficial drain was then placed. The superficial layer was then irrigated and closed over an additional 500 mg of vancomycin powder with 2-0 Vicryl, 3-0 Monocryl, Dermabond and Steri-Strips. A soft dressing was then placed. The drains were secured in place with silk sutures. The patient was then flipped supine, extubated and transferred to the Recovery Room in stable condition.    Tramaine Lorenz MD  Orthopaedic Spine Surgeon  Department of Orthopaedic Surgery  [phone]

## 2022-11-18 NOTE — PROGRESS NOTES
PreOp complete. H&P and anesthesia consent needed. Type and screen sent to blood bank. Belongings given to wife, Ese.

## 2022-11-18 NOTE — H&P
"DATE: 11/18/2022  PATIENT: Babatunde Oconnor    Attending Physician: Tramaine Lorenz M.D.    HISTORY:  Babatunde Oconnor is a 67 y.o. male with a history of right IVAN by Dr. Amaral (06/29) and C3-4 ACDF in 2008 in [facility]) who returns to me today for FU. He still has mild LBP that radiates posteriorly down b/l (R>L) buttock/thigh to the knee. Worse with walking and improved with leaning forward on a shopping cart. He has been taking lyrica. He is miserable and wants surgical intervention.    8/12/22: L4-5 WING - helped 90% of pain for 2 weeks    PMH/PSH/FamHx/SocHx:  Unchanged from prior visit     ROS:  Positive for LBP and RLE pain  Denies perineal paresthesias, bowel or bladder incontinence    EXAM:  BP (!) 141/73 (BP Location: Left arm, Patient Position: Lying)   Pulse 60   Temp 98.3 °F (36.8 °C) (Temporal)   Resp 17   Ht 5' 6" (1.676 m)   Wt 82.6 kg (182 lb)   SpO2 98%   BMI 29.38 kg/m²     My physical examination was notable for the following findings: motor intact BLE except for 4/5 in b/l EHL; SILT    IMAGING:  Today I independently reviewed the following images and my interpretations are as follows:    Previous L-spine XRs showed lumbar spondylosis and DDD. L4-5 anterolisthesis measuring 8mm.    MRI lumbar showed moderate-severe L4-5 central and b/l foraminal stenosis.     ASSESSMENT/PLAN:  Patient has degenerative spondylolisthesis and spinal stenosis with neurogenic claudication.    OR today for L4-5 PLDF/TLIF    Tramaine Lorenz MD  Orthopaedic Spine Surgeon  Department of Orthopaedic Surgery  [phone]    "

## 2022-11-18 NOTE — ANESTHESIA PROCEDURE NOTES
Intubation    Date/Time: 11/18/2022 11:30 AM  Performed by: Jairon Shook MD  Authorized by: Kenneth Pond MD     Intubation:     Induction:  Intravenous    Intubated:  Postinduction    Mask Ventilation:  Easy mask    Attempts:  1    Attempted By:  Student    Method of Intubation:  Direct    Blade:  Bustos 3    Laryngeal View Grade: Grade I - full view of cords      Difficult Airway Encountered?: No      Complications:  None    Airway Device:  Oral endotracheal tube    Airway Device Size:  7.5    Style/Cuff Inflation:  Cuffed (inflated to minimal occlusive pressure)    Tube secured:  22    Secured at:  The lips    Placement Verified By:  Capnometry and Revisualization with laryngoscopy    Complicating Factors:  None    Findings Post-Intubation:  BS equal bilateral and atraumatic/condition of teeth unchanged

## 2022-11-18 NOTE — NURSING TRANSFER
Nursing Transfer Note      11/18/2022     Reason patient is being transferred: meets criteria    Transfer To: 507    Transfer via stretcher    Transfer with drains    Transported by pct    Medicines sent: none    Any special needs or follow-up needed: none    Chart send with patient: Yes    Notified: spouse    Patient reassessed at: 1600

## 2022-11-18 NOTE — ANESTHESIA PREPROCEDURE EVALUATION
[facility]  Anesthesia Pre-Operative Evaluation         Patient Name: Babatunde Oconnor  YOB: 1955  MRN: [id number]    SUBJECTIVE:     Pre-operative evaluation for Procedure(s) (LRB):  FUSION, SPINE, LUMBAR, TLIF, POSTERIOR APPROACH, USING PEDICLE SCREW L4-5 (R) GLOBUS ROBOT SNS: SSEP/EMG (Right)     11/17/2022    Babatunde Oconnor is a 67 y.o. male w/ a significant PMHx of HTN, lumbar spondylosis, and HERNANDO.    Patient now presents for the above procedure(s).      LDA: None documented.      Prev airway: None documented.    Drips: None documented.       Patient Active Problem List   Diagnosis    Essential hypertension    Benign prostatic hyperplasia    Prediabetes    Lumbar spondylosis    DDD (degenerative disc disease), lumbar    Neuroforaminal stenosis of lumbar spine    Chronic bilateral low back pain without sciatica    Chronic pain    Facet arthropathy, lumbar    NAFLD (nonalcoholic fatty liver disease)    Hyperlipidemia    Impaired fasting glucose    Leg length discrepancy    Primary osteoarthritis of right hip    ASHD (arteriosclerotic heart disease)    Herpes genitalis in men    Lumbar disc herniation    Primary osteoarthritis involving multiple joints    HERNANDO (obstructive sleep apnea)    Alcohol use       Review of patient's allergies indicates:  No Known Allergies    Current Outpatient Medications:  No current facility-administered medications for this encounter.    Current Outpatient Medications:     amlodipine-atorvastatin (CADUET) 10-10 mg per tablet, Take 1 tablet by mouth once daily., Disp: 90 tablet, Rfl: 0    aspirin (ECOTRIN) 81 MG EC tablet, Take 1 tablet (81 mg total) by mouth 2 (two) times daily., Disp: 60 tablet, Rfl: 0    bisoprolol-hydrochlorothiazide 5-6.25 mg (ZIAC) 5-6.25 mg Tab, TAKE 1 TABLET BY MOUTH ONCE DAILY, Disp: 90 tablet, Rfl: 0    docusate sodium (COLACE) 100 MG capsule, Take 1 capsule (100 mg total) by  mouth 2 (two) times daily as needed for Constipation., Disp: 60 capsule, Rfl: 0    gabapentin (NEURONTIN) 300 MG capsule, Take 1 capsule (300 mg total) by mouth 3 (three) times daily., Disp: 60 capsule, Rfl: 1    pregabalin (LYRICA) 150 MG capsule, Take 1 capsule (150 mg total) by mouth once daily., Disp: 60 capsule, Rfl: 5    tamsulosin (FLOMAX) 0.4 mg Cap, TAKE 2 CAPSULES BY MOUTH ONCE DAILY., Disp: 180 capsule, Rfl: 0    Past Surgical History:   Procedure Laterality Date    APPENDECTOMY      ARTHROPLASTY OF HIP BY ANTERIOR APPROACH Right 6/29/2022    Procedure: ARTHROPLASTY, HIP, TOTAL, ANTERIOR APPROACH: RIGHT: DEPUY-ACTIS+PINNACLE;  Surgeon: Talon Amaral III, MD;  Location: [facility] OR;  Service: Orthopedics;  Laterality: Right;    CERVICAL DISCECTOMY  2008    CERVICAL SPINE SURGERY      EPIDURAL STEROID INJECTION N/A 8/12/2022    Procedure: Lumbar WING L4-5;  Surgeon: Toño Moy DO;  Location: [facility] OR;  Service: Pain Management;  Laterality: N/A;    INJECTION OF ANESTHETIC AGENT AROUND MEDIAL BRANCH NERVES INNERVATING LUMBAR FACET JOINT Bilateral 1/7/2020    Procedure: Block-nerve-medial branch-lumbar-- Bilateral (With Steroid);  Surgeon: Vamshi Anderson Jr., MD;  Location: [facility] PAIN MGT;  Service: Pain Management;  Laterality: Bilateral;  No Sedation    INJECTION OF ANESTHETIC AGENT AROUND MEDIAL BRANCH NERVES INNERVATING LUMBAR FACET JOINT Bilateral 5/14/2020    Procedure: Block-nerve-medial branch-lumbar--Bilateral L4-5, L5-S1 with steriods;  Surgeon: Vamshi Anderson Jr., MD;  Location: [facility] PAIN MGT;  Service: Pain Management;  Laterality: Bilateral;       Social History     Socioeconomic History    Marital status:      Spouse name: Ese    Number of children: 1   Occupational History     Comment: teacher -   Tobacco Use    Smoking status: Never    Smokeless tobacco: Never   Substance and Sexual Activity    Alcohol use: Yes     Alcohol/week: 4.0 standard drinks      Types: 1 Cans of beer, 3 Shots of liquor per week     Comment: liquor or beer daily    Drug use: No    Sexual activity: Yes     Partners: Female   Social History Narrative    Stairs- 15     Social Determinants of Health     Financial Resource Strain: Low Risk     Difficulty of Paying Living Expenses: Not hard at all   Food Insecurity: No Food Insecurity    Worried About Running Out of Food in the Last Year: Never true    Ran Out of Food in the Last Year: Never true   Transportation Needs: No Transportation Needs    Lack of Transportation (Medical): No    Lack of Transportation (Non-Medical): No   Physical Activity: Inactive    Days of Exercise per Week: 0 days    Minutes of Exercise per Session: 0 min   Stress: No Stress Concern Present    Feeling of Stress : Not at all   Social Connections: Unknown    Frequency of Communication with Friends and Family: Twice a week    Frequency of Social Gatherings with Friends and Family: Once a week    Attends Club or Organization Meetings: Never    Marital Status:    Housing Stability: Low Risk     Unable to Pay for Housing in the Last Year: No    Number of Places Lived in the Last Year: 1    Unstable Housing in the Last Year: No       OBJECTIVE:     Vital Signs Range (Last 24H):         Significant Labs:  Lab Results   Component Value Date    WBC 7.41 11/03/2022    HGB 13.6 (L) 11/03/2022    HCT 42.3 11/03/2022     11/03/2022    CHOL 225 (H) 06/16/2021    TRIG 91 06/16/2021    HDL 81 (H) 06/16/2021    ALT 28 11/03/2022    AST 27 11/03/2022     11/03/2022    K 4.1 11/03/2022     11/03/2022    CREATININE 0.8 11/03/2022    BUN 16 11/03/2022    CO2 22 (L) 11/03/2022    TSH 2.058 06/16/2021    PSA 0.42 12/10/2019    INR 1.0 11/03/2022    HGBA1C 5.5 06/16/2021       Diagnostic Studies: No relevant studies.    EKG:   Results for orders placed or performed during the hospital encounter of 06/16/22   EKG 12-lead    Collection Time: 06/16/22   9:43 AM    Narrative    Test Reason : Z01.818,    Vent. Rate : 065 BPM     Atrial Rate : 065 BPM     P-R Int : 146 ms          QRS Dur : 086 ms      QT Int : 408 ms       P-R-T Axes : 063 -34 016 degrees     QTc Int : 424 ms    Normal sinus rhythm  Left axis deviation  Abnormal ECG  No previous ECGs available  Confirmed by Aston Alford MD ([id number]) on 6/16/2022 11:05:23 AM    Referred By: RADHA FRY           Confirmed By:Aston Alford MD       2D ECHO:  TTE:  No results found for this or any previous visit.    SUZI:  No results found for this or any previous visit.    ASSESSMENT/PLAN:                                                                                                   11/17/2022  Babatunde Oconnor is a 67 y.o., male.      Pre-op Assessment          Review of Systems  Anesthesia Hx:  No problems with previous Anesthesia  History of prior surgery of interest to airway management or planning: cervical fusion. Denies Family Hx of Anesthesia complications.   Denies Personal Hx of Anesthesia complications.   Social:  Non-Smoker, Alcohol Use EVERY OTHER DAY , 1-2 DRINKS   Hematology/Oncology:  Hematology Normal   Oncology Normal     EENT/Dental:EENT/Dental Normal   Cardiovascular:    Denies Angina. hyperlipidemia  Functional Capacity 4 METS, ABLE TO CLIMB 12 FLIGTHS OF STAIRS  Hypertension, Essential Hypertension , Recent typical clinic B/P of 133/77    Pulmonary:   Denies Shortness of breath.  Denies Recent URI. HAS NOT HAD A SLEEP STUDY YET , THINKS HE MAY HAVE HERNANDO   Renal/:   BPH    Hepatic/GI:  Liver Disease, Fatty Liver (NAFLD)    Musculoskeletal:  Musculoskeletal General/Symptoms: Functional capacity is ambulatory without assistance.  Joint Disease:  Arthritis, Osteoarthritis AND MULTIPLE JOINTS Lumbar Spine Disorders, Lumbar Disc Disease, Spondylolisthesis, Radiculopathy L-DDD  Neurological:  Neuro Symptoms of pain OF LOWER BACK AND BLEOsteoarthritis    Endocrine:  Endocrine Normal     Psych:  Psychiatric Normal           Physical Exam  General: Well nourished, Cooperative, Alert and Oriented    Airway:  Mallampati: III / III  Mouth Opening: Normal  TM Distance: Normal  Tongue: Normal  Neck ROM: Normal ROM    Dental:  Intact        Anesthesia Plan  Type of Anesthesia, risks & benefits discussed:    Anesthesia Type: Gen ETT  Intra-op Monitoring Plan: Standard ASA Monitors  Post Op Pain Control Plan: multimodal analgesia  Induction:  IV  Airway Plan: Direct  Informed Consent: Informed consent signed with the Patient and all parties understand the risks and agree with anesthesia plan.  All questions answered.   ASA Score: 4  Day of Surgery Review of History & Physical: H&P Update referred to the surgeon/provider.    Ready For Surgery From Anesthesia Perspective.     .

## 2022-11-18 NOTE — TRANSFER OF CARE
"Anesthesia Transfer of Care Note    Patient: Babatunde Oconnor    Procedure(s) Performed: Procedure(s) (LRB):  FUSION, SPINE, LUMBAR, TLIF, POSTERIOR APPROACH, USING PEDICLE SCREW L4-5 (R) GLOBUS ROBOT SNS: SSEP/EMG (Right)    Patient location: PACU    Anesthesia Type: general    Transport from OR: Transported from OR on 6-10 L/min O2 by face mask with adequate spontaneous ventilation    Post pain: adequate analgesia    Post assessment: no apparent anesthetic complications    Post vital signs: stable    Level of consciousness: awake and alert    Nausea/Vomiting: no nausea/vomiting    Complications: none    Transfer of care protocol was followed      Last vitals:   Visit Vitals  /62   Pulse 69   Temp 36.5 °C (97.7 °F)   Resp 17   Ht 5' 6" (1.676 m)   Wt 82.6 kg (182 lb)   SpO2 99%   BMI 29.38 kg/m²     "

## 2022-11-18 NOTE — DISCHARGE INSTRUCTIONS
DR. DONI ROWE'S POSTOPERATIVE INSTRUCTIONS -   LUMBAR FUSION       Antibiotics: You do not need additional antibiotics at home.    NSAIDs: Please refrain from taking ibuprofen (Advil), naproxen (Aleve), and other non steroidal anti-inflammatory medications other than the Celebrex that will be prescribed to you after surgery.    Wound Care: You may remove your dressing and shower 7 days after surgery. Until then please keep your wound clean and dry. Sponge baths are acceptable. Do not go in a pool or hot tub until seen in clinic. Please leave the small steri-strips covering your wound in place until they fall off naturally (2 weeks). You may notice clear suture ends hanging from the sides of your incision after the steri-strips are removed, it is ok to clip these with scissors.    Brace: You may be prescribed a brace, please wear this when up and walking, it is not necessary to wear at night when sleeping.    Pain: We will use a multimodal approach for pain management after your surgery.  You will be given a prescription for pain medicine when you are discharged from the hospital.  You will also be given prescriptions for Robaxin (a muscle relaxer), Gabapentin, Celebrex and Tylenol.  Please note: you will only be given ONE prescription for narcotics when you are discharged from the hospital.  This medication is for breakthrough pain only. This medication will not be refilled.  The other medications given to you may be refilled if needed.      Infection: Signs of infection include increasing wound drainage and redness around the wound, as well as a temperature over 101.5 degrees. It is unnecessary to take your temperature on a routine basis. Please call the below number if you are concerned about an infection.    Driving and Work: It is ok to return to driving and work as long as you are not taking narcotic pain medications and can walk greater than 100 feet. Please do not lift over 10 pounds or participate in  exercise or sports until cleared by Dr. Lorenz.    Deep Venous Thrombosis (Blood Clots): Symptoms include swelling in the legs and shortness of breath. Please call the office or proceed to the nearest emergency room if you have any of these symptoms.    Physical Therapy: The best physical therapy immediately after surgery is walking. Please try to walk as much as possible.    Follow-up: You will be scheduled for a follow-up appointment in 4 weeks with either Dr. Lorenz or his physician assistant, Arelis Morley PA-C.    Questions: During business hours please call [phone] for routine questions. For after hours questions please call [phone] and ask to speak with the Orthopaedic resident on call.    Disability: If you submitted short term disability paperwork for us to complete and would like to check the status, please call the Disability Department at [phone].  You may fax any necessary paperwork to [phone].

## 2022-11-19 LAB
ANION GAP SERPL CALC-SCNC: 11 MMOL/L (ref 8–16)
BASOPHILS # BLD AUTO: 0.02 K/UL (ref 0–0.2)
BASOPHILS NFR BLD: 0.2 % (ref 0–1.9)
BUN SERPL-MCNC: 11 MG/DL (ref 8–23)
CALCIUM SERPL-MCNC: 9 MG/DL (ref 8.7–10.5)
CHLORIDE SERPL-SCNC: 101 MMOL/L (ref 95–110)
CO2 SERPL-SCNC: 24 MMOL/L (ref 23–29)
CREAT SERPL-MCNC: 0.7 MG/DL (ref 0.5–1.4)
DIFFERENTIAL METHOD: ABNORMAL
EOSINOPHIL # BLD AUTO: 0 K/UL (ref 0–0.5)
EOSINOPHIL NFR BLD: 0 % (ref 0–8)
ERYTHROCYTE [DISTWIDTH] IN BLOOD BY AUTOMATED COUNT: 14.5 % (ref 11.5–14.5)
EST. GFR  (NO RACE VARIABLE): >60 ML/MIN/1.73 M^2
GLUCOSE SERPL-MCNC: 114 MG/DL (ref 70–110)
HCT VFR BLD AUTO: 38.8 % (ref 40–54)
HGB BLD-MCNC: 12.9 G/DL (ref 14–18)
IMM GRANULOCYTES # BLD AUTO: 0.03 K/UL (ref 0–0.04)
IMM GRANULOCYTES NFR BLD AUTO: 0.3 % (ref 0–0.5)
LYMPHOCYTES # BLD AUTO: 0.8 K/UL (ref 1–4.8)
LYMPHOCYTES NFR BLD: 7.3 % (ref 18–48)
MCH RBC QN AUTO: 29.7 PG (ref 27–31)
MCHC RBC AUTO-ENTMCNC: 33.2 G/DL (ref 32–36)
MCV RBC AUTO: 89 FL (ref 82–98)
MONOCYTES # BLD AUTO: 1 K/UL (ref 0.3–1)
MONOCYTES NFR BLD: 8.8 % (ref 4–15)
NEUTROPHILS # BLD AUTO: 9.5 K/UL (ref 1.8–7.7)
NEUTROPHILS NFR BLD: 83.4 % (ref 38–73)
NRBC BLD-RTO: 0 /100 WBC
PLATELET # BLD AUTO: 254 K/UL (ref 150–450)
PMV BLD AUTO: 10.8 FL (ref 9.2–12.9)
POTASSIUM SERPL-SCNC: 3.8 MMOL/L (ref 3.5–5.1)
RBC # BLD AUTO: 4.35 M/UL (ref 4.6–6.2)
SODIUM SERPL-SCNC: 136 MMOL/L (ref 136–145)
WBC # BLD AUTO: 11.35 K/UL (ref 3.9–12.7)

## 2022-11-19 PROCEDURE — 25000003 PHARM REV CODE 250: Performed by: STUDENT IN AN ORGANIZED HEALTH CARE EDUCATION/TRAINING PROGRAM

## 2022-11-19 PROCEDURE — 97116 GAIT TRAINING THERAPY: CPT

## 2022-11-19 PROCEDURE — 80048 BASIC METABOLIC PNL TOTAL CA: CPT | Performed by: STUDENT IN AN ORGANIZED HEALTH CARE EDUCATION/TRAINING PROGRAM

## 2022-11-19 PROCEDURE — 36415 COLL VENOUS BLD VENIPUNCTURE: CPT | Performed by: STUDENT IN AN ORGANIZED HEALTH CARE EDUCATION/TRAINING PROGRAM

## 2022-11-19 PROCEDURE — 85025 COMPLETE CBC W/AUTO DIFF WBC: CPT | Performed by: STUDENT IN AN ORGANIZED HEALTH CARE EDUCATION/TRAINING PROGRAM

## 2022-11-19 PROCEDURE — 94761 N-INVAS EAR/PLS OXIMETRY MLT: CPT

## 2022-11-19 PROCEDURE — 97161 PT EVAL LOW COMPLEX 20 MIN: CPT

## 2022-11-19 PROCEDURE — 63600175 PHARM REV CODE 636 W HCPCS: Performed by: STUDENT IN AN ORGANIZED HEALTH CARE EDUCATION/TRAINING PROGRAM

## 2022-11-19 PROCEDURE — 97165 OT EVAL LOW COMPLEX 30 MIN: CPT

## 2022-11-19 PROCEDURE — 97535 SELF CARE MNGMENT TRAINING: CPT

## 2022-11-19 RX ADMIN — Medication 2 G: at 04:11

## 2022-11-19 RX ADMIN — ACETAMINOPHEN 1000 MG: 500 TABLET ORAL at 11:11

## 2022-11-19 RX ADMIN — METHOCARBAMOL 750 MG: 750 TABLET ORAL at 08:11

## 2022-11-19 RX ADMIN — HEPARIN SODIUM 5000 UNITS: 5000 INJECTION INTRAVENOUS; SUBCUTANEOUS at 02:11

## 2022-11-19 RX ADMIN — GABAPENTIN 300 MG: 300 CAPSULE ORAL at 02:11

## 2022-11-19 RX ADMIN — HEPARIN SODIUM 5000 UNITS: 5000 INJECTION INTRAVENOUS; SUBCUTANEOUS at 09:11

## 2022-11-19 RX ADMIN — GABAPENTIN 300 MG: 300 CAPSULE ORAL at 08:11

## 2022-11-19 RX ADMIN — OXYCODONE 5 MG: 5 TABLET ORAL at 09:11

## 2022-11-19 RX ADMIN — METHOCARBAMOL 750 MG: 750 TABLET ORAL at 09:11

## 2022-11-19 RX ADMIN — ACETAMINOPHEN 1000 MG: 500 TABLET ORAL at 05:11

## 2022-11-19 RX ADMIN — Medication 2 G: at 11:11

## 2022-11-19 RX ADMIN — CELECOXIB 200 MG: 200 CAPSULE ORAL at 08:11

## 2022-11-19 RX ADMIN — HEPARIN SODIUM 5000 UNITS: 5000 INJECTION INTRAVENOUS; SUBCUTANEOUS at 07:11

## 2022-11-19 RX ADMIN — TAMSULOSIN HYDROCHLORIDE 0.8 MG: 0.4 CAPSULE ORAL at 02:11

## 2022-11-19 RX ADMIN — ACETAMINOPHEN 1000 MG: 500 TABLET ORAL at 06:11

## 2022-11-19 RX ADMIN — DOCUSATE SODIUM 100 MG: 100 CAPSULE ORAL at 09:11

## 2022-11-19 RX ADMIN — GABAPENTIN 300 MG: 300 CAPSULE ORAL at 09:11

## 2022-11-19 RX ADMIN — METHOCARBAMOL 750 MG: 750 TABLET ORAL at 02:11

## 2022-11-19 NOTE — HOSPITAL COURSE
* Spondylolisthesis at L4-L5 level  67 y.o. male s/p L4/5 TLIF 11/18/22 by Dr Lorenz. Surgery uncomplicated. Drains placed. Admitted post operatively. Did well post operatively w/o complication. Pain well controlled. Drained  dc'ed POD2. Did well with PT/OT and they recced DC home. Post op xr showed good fixation no hardware complicatiion. Dc'ed home POD2 good condition. FU 4 wks

## 2022-11-19 NOTE — SUBJECTIVE & OBJECTIVE
"Principal Problem:Spondylolisthesis at L4-L5 level    Principal Orthopedic Problem: sp L4/5 TLIF 11/18    Interval History: POD1. NAEON. VS wnl. Labs pending. Drains 130/10ss output. No numbness, tingling, weakness. All preop pain is better. On board for PT/OT today    Review of patient's allergies indicates:  No Known Allergies    Current Facility-Administered Medications   Medication    acetaminophen tablet 1,000 mg    ceFAZolin 2 gram in dextrose 5% 100 mL IVPB (premix)    celecoxib capsule 200 mg    docusate sodium capsule 100 mg    gabapentin capsule 300 mg    heparin (porcine) injection 5,000 Units    LIDOcaine (PF) 10 mg/ml (1%) injection 10 mg    melatonin tablet 6 mg    methocarbamoL tablet 750 mg    oxyCODONE immediate release tablet 5 mg    And    oxyCODONE immediate release tablet Tab 10 mg    And    morphine injection 2 mg    ondansetron disintegrating tablet 8 mg    sodium chloride 0.9% flush 10 mL    tamsulosin 24 hr capsule 0.8 mg     Objective:     Vital Signs (Most Recent):  Temp: 96.3 °F (35.7 °C) (11/19/22 0415)  Pulse: (!) 56 (11/19/22 0415)  Resp: 16 (11/19/22 0415)  BP: 119/68 (11/19/22 0415)  SpO2: (!) 92 % (11/19/22 0415)   Vital Signs (24h Range):  Temp:  [96.3 °F (35.7 °C)-98.3 °F (36.8 °C)] 96.3 °F (35.7 °C)  Pulse:  [56-70] 56  Resp:  [15-20] 16  SpO2:  [91 %-99 %] 92 %  BP: (100-141)/(61-73) 119/68     Weight: 82.6 kg (182 lb)  Height: 5' 6" (167.6 cm)  Body mass index is 29.38 kg/m².      Intake/Output Summary (Last 24 hours) at 11/19/2022 0707  Last data filed at 11/19/2022 0607  Gross per 24 hour   Intake 1770 ml   Output 2135 ml   Net -365 ml       Ortho/SPM Exam  General Exam  General appearance: A&Ox3. NAD.   HEENT: Normocephalic, head atraumatic. Conjuntiva normal. EOMI. External appearance of nose, mouth, ears wnl.  Neck: Supple. Trachea midline.  Respiratory: Breathing unlabored on room air. Symmetric chest rise.   CV: Extremities warm and well perfused.  Neurologic: No focal " motor or sensory deficits.   Psychatric: A&Ox3. Appropriate mood and affect.  Skin: Warm, dry, well perfused. No rash.    Spine  Dressing cdi  Drain x2 ss output  NVI    Significant Labs: BMP: No results for input(s): GLU, NA, K, CL, CO2, BUN, CREATININE, CALCIUM, MG in the last 48 hours.  CBC: No results for input(s): WBC, HGB, HCT, PLT in the last 48 hours.  All pertinent labs within the past 24 hours have been reviewed.    Significant Imaging: I have reviewed and interpreted all pertinent imaging results/findings.

## 2022-11-19 NOTE — PT/OT/SLP EVAL
Physical Therapy  Co-Evaluation and co-treatment with OT    Patient Name:  Babatunde Oconnor   MRN:  [id number]    Recommendations:     Discharge Recommendations:   (home no needs)   Discharge Equipment Recommendations: none   Barriers to discharge: None    Assessment:     Babatunde Oconnor is a 67 y.o. male admitted with a medical diagnosis of Spondylolisthesis at L4-L5 level.  He presents with the following impairments/functional limitations:  impaired endurance, impaired functional mobility, gait instability, impaired balance pt tolerated treatment well and will benefit from cont PT while in hospital to progress physically and for increased independence with mobility. Pt will be able to discharge home with no needs when medically stable. Pt is s/p L4-5 TLIF etc 11/18/22.    Rehab Prognosis: Good; patient would benefit from acute skilled PT services to address these deficits and reach maximum level of function.    Recent Surgery: Procedure(s) (LRB):  FUSION, SPINE, LUMBAR, TLIF, POSTERIOR APPROACH, USING PEDICLE SCREW L4-5 (R) [employer] ROBOT SNS: SSEP/EMG (Right) 1 Day Post-Op    Plan:     During this hospitalization, patient to be seen 3 x/week to address the identified rehab impairments via gait training, therapeutic activities and progress toward the following goals:    Plan of Care Expires:  12/17/22    Subjective     Chief Complaint: pt c/o dizziness with sitting on EOB and slight pain with movement.   Patient/Family Comments/goals: to get better and go home.   Pain/Comfort:  Pain Rating 1: 0/10  Pain Rating Post-Intervention 1: 3/10 (RN to get pain meds)    Patients cultural, spiritual, Zoroastrianism conflicts given the current situation: no    Living Environment:  Pt works full-time as  and lives with his homemaker wife. They live in [facility] with B&B upstairs and slab entrance.   Prior to admission, patients level of function was Independent .  Equipment used at home: walker, rolling,  cane, straight, shower chair.  DME owned (not currently used): none.  Upon discharge, patient will have assistance from wife.    Objective:     Communicated with nurse prior to session.  Patient found supine with hemovac, SCD, kay catheter (hep lock IV)  upon PT entry to room.    General Precautions: Standard, fall   Orthopedic Precautions:spinal precautions   Braces:    Respiratory Status: Room air    Exams:  Cognitive Exam:  Patient is oriented to Person, Place, Time, and Situation  RLE ROM: WFL  RLE Strength: WFL  LLE ROM: WFL  LLE Strength: WFL    Functional Mobility:  Bed Mobility:   pt needed verbal cues for hand placement and sequencing for functional mobility and log rolling.   Rolling Right: contact guard assistance  Supine to Sit: contact guard assistance    Transfers:     Sit to Stand:  stand by assistance with no AD    Gait: pt received gait training ~ 104 ft with SBA.     Stairs: pt was SBA up/down 9 steps with R handrail    Balance: pt was SBA standing balance at sink to perform ADLS with OT    PT concentrated on BLE MMT, ROM, transfer, gait training and stair training with evaluation and treatment.   Pt white board updated with current therapists name and level of mobility assistance needed.         AM-PAC 6 CLICK MOBILITY  Total Score:18       Treatment & Education:  Pt received verbal instructions in role of PT and POC, log rolling, spinal precautions. Pt verbally expressed understanding of such and demonstrated understanding of log rolling.     Patient left up in chair with all lines intact and call button in reach. Pt  SCD on after treatment.     GOALS:   Multidisciplinary Problems       Physical Therapy Goals          Problem: Physical Therapy    Goal Priority Disciplines Outcome Goal Variances Interventions   Physical Therapy Goal     PT, PT/OT Ongoing, Progressing     Description: Goals to be met by: 22     Patient will increase functional independence with mobility by performin.  Supine to sit with Modified [place]  2. Sit to stand transfer with Modified [place]  3. Gait  x 250 feet with Supervision .   4. Ascend/descend 12 stair with right Handrails Supervision .                          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    Hyperlipidemia     Hypertension     Leg length discrepancy 10/16/2021    R longer, Consider foot eval & may need custom insert in shoe    Lumbar spondylosis 12/13/2019       Past Surgical History:   Procedure Laterality Date    APPENDECTOMY      ARTHROPLASTY OF HIP BY ANTERIOR APPROACH Right 6/29/2022    Procedure: ARTHROPLASTY, HIP, TOTAL, ANTERIOR APPROACH: RIGHT: DEPUY-ACTIS+PINNACLE;  Surgeon: Talon Amaral III, MD;  Location: [facility] OR;  Service: Orthopedics;  Laterality: Right;    CERVICAL DISCECTOMY  2008    CERVICAL SPINE SURGERY      EPIDURAL STEROID INJECTION N/A 8/12/2022    Procedure: Lumbar WING L4-5;  Surgeon: Toño Moy DO;  Location: [facility] OR;  Service: Pain Management;  Laterality: N/A;    INJECTION OF ANESTHETIC AGENT AROUND MEDIAL BRANCH NERVES INNERVATING LUMBAR FACET JOINT Bilateral 1/7/2020    Procedure: Block-nerve-medial branch-lumbar-- Bilateral (With Steroid);  Surgeon: Vamshi Anderson Jr., MD;  Location: [facility] PAIN [facility];  Service: Pain Management;  Laterality: Bilateral;  No Sedation    INJECTION OF ANESTHETIC AGENT AROUND MEDIAL BRANCH NERVES INNERVATING LUMBAR FACET JOINT Bilateral 5/14/2020    Procedure: Block-nerve-medial branch-lumbar--Bilateral L4-5, L5-S1 with steriods;  Surgeon: Vamshi Anderson Jr., MD;  Location: [facility] PAIN T;  Service: Pain Management;  Laterality: Bilateral;       Time Tracking:     PT Received On: 11/19/22  PT Start Time: 0801     PT Stop Time: 0828  PT Total Time (min): 27 min     Billable Minutes: Evaluation 8 min and Gait Training 19  min      11/19/2022

## 2022-11-19 NOTE — PROGRESS NOTES
"[facility] - [facility]  Orthopedics  Progress Note    Patient Name: Babatunde Oconnor  MRN: [id number]  Admission Date: 11/18/2022  Hospital Length of Stay: 0 days  Attending Provider: Tramaine Lorenz MD  Primary Care Provider: Negar Pandey MD  Follow-up For: Procedure(s) (LRB):  FUSION, SPINE, LUMBAR, TLIF, POSTERIOR APPROACH, USING PEDICLE SCREW L4-5 (R) GLOBUS ROBOT SNS: SSEP/EMG (Right)    Post-Operative Day: 1 Day Post-Op  Subjective:     Principal Problem:Spondylolisthesis at L4-L5 level    Principal Orthopedic Problem: sp L4/5 TLIF 11/18    Interval History: POD1. NAEON. VS wnl. Labs pending. Drains 130/10ss output. No numbness, tingling, weakness. All preop pain is better. On board for PT/OT today    Review of patient's allergies indicates:  No Known Allergies    Current Facility-Administered Medications   Medication    acetaminophen tablet 1,000 mg    ceFAZolin 2 gram in dextrose 5% 100 mL IVPB (premix)    celecoxib capsule 200 mg    docusate sodium capsule 100 mg    gabapentin capsule 300 mg    heparin (porcine) injection 5,000 Units    LIDOcaine (PF) 10 mg/ml (1%) injection 10 mg    melatonin tablet 6 mg    methocarbamoL tablet 750 mg    oxyCODONE immediate release tablet 5 mg    And    oxyCODONE immediate release tablet Tab 10 mg    And    morphine injection 2 mg    ondansetron disintegrating tablet 8 mg    sodium chloride 0.9% flush 10 mL    tamsulosin 24 hr capsule 0.8 mg     Objective:     Vital Signs (Most Recent):  Temp: 96.3 °F (35.7 °C) (11/19/22 0415)  Pulse: (!) 56 (11/19/22 0415)  Resp: 16 (11/19/22 0415)  BP: 119/68 (11/19/22 0415)  SpO2: (!) 92 % (11/19/22 0415)   Vital Signs (24h Range):  Temp:  [96.3 °F (35.7 °C)-98.3 °F (36.8 °C)] 96.3 °F (35.7 °C)  Pulse:  [56-70] 56  Resp:  [15-20] 16  SpO2:  [91 %-99 %] 92 %  BP: (100-141)/(61-73) 119/68     Weight: 82.6 kg (182 lb)  Height: 5' 6" (167.6 cm)  Body mass index is 29.38 kg/m².      Intake/Output Summary (Last 24 hours) at " 11/19/2022 0707  Last data filed at 11/19/2022 0607  Gross per 24 hour   Intake 1770 ml   Output 2135 ml   Net -365 ml       Ortho/SPM Exam  General Exam  General appearance: A&Ox3. NAD.   HEENT: Normocephalic, head atraumatic. Conjuntiva normal. EOMI. External appearance of nose, mouth, ears wnl.  Neck: Supple. Trachea midline.  Respiratory: Breathing unlabored on room air. Symmetric chest rise.   CV: Extremities warm and well perfused.  Neurologic: No focal motor or sensory deficits.   Psychatric: A&Ox3. Appropriate mood and affect.  Skin: Warm, dry, well perfused. No rash.    Spine  Dressing cdi  Drain x2 ss output  NVI    Significant Labs: BMP: No results for input(s): GLU, NA, K, CL, CO2, BUN, CREATININE, CALCIUM, MG in the last 48 hours.  CBC: No results for input(s): WBC, HGB, HCT, PLT in the last 48 hours.  All pertinent labs within the past 24 hours have been reviewed.    Significant Imaging: I have reviewed and interpreted all pertinent imaging results/findings.    Assessment/Plan:     * Spondylolisthesis at L4-L5 level  67 y.o. male s/p L4/5 TLIF 11/18/22 by Dr Lorenz      Pain: multimodal  WBS:  WBAT  DVT ppx: SCDs, heparin TID to start today  PT/OT:  daily  Labs:  Pending, will FU  Abx:  Post op ancef   Drain:  Superficial suction, deep gravity continue today  Delmis:  ELLEN now  Dispo:  Pending post op course   F/u:  4 wks                Sandee Shanks MD  Orthopedics  [facility] - Surgery

## 2022-11-19 NOTE — PT/OT/SLP EVAL
"Occupational Therapy   Co-Evaluation  Co-treat with PT to accommodate pt activity tolerance and need for skilled hands for safe intervention.    Name: Babatunde Oconnor  MRN: [id number]  Admitting Diagnosis:  Spondylolisthesis at L4-L5 level  Recent Surgery: Procedure(s) (LRB):  FUSION, SPINE, LUMBAR, TLIF, POSTERIOR APPROACH, USING PEDICLE SCREW L4-5 (R) GLOBUS ROBOT SNS: SSEP/EMG (Right) 1 Day Post-Op    Recommendations:     Discharge Recommendations: home health OT  Discharge Equipment Recommendations:  none  Barriers to discharge:  None    Assessment:     Babatunde Oconnor is a 67 y.o. male with a medical diagnosis of Spondylolisthesis at L4-L5 level.  He presents with spondylolisthesis at L4-L5 level. Performance deficits affecting function: impaired endurance, impaired self care skills, impaired functional mobility, gait instability, impaired balance, decreased lower extremity function.      Rehab Prognosis: Good; patient would benefit from acute skilled OT services to address these deficits and reach maximum level of function.       Plan:     Patient to be seen 3 x/week to address the above listed problems via self-care/home management, therapeutic activities, therapeutic exercises, neuromuscular re-education  Plan of Care Expires:    Plan of Care Reviewed with: patient    Subjective     Chief Complaint: Spondylolisthesis at L4-L5 level  Patient/Family Comments/goals: "I feel pretty good!"    Occupational Profile:  Living Environment: Pt lives with wife, bedroom on 2nd floor, threshold to enter, tub shower combo  Previous level of function: Pt was independent prior to admission  Roles and Routines: Pt is a   Equipment Used at Home:  cane, straight, shower chair, walker, rolling  Assistance upon Discharge: HHOT recommended upon d/c    Pain/Comfort:  Pain Rating 1: 0/10    Patients cultural, spiritual, Mormon conflicts given the current situation:      Objective:     Communicated " with: RNYehuda, prior to session.  Patient found HOB elevated with hemovac, SCD, kay catheter, peripheral IV upon OT entry to room.    General Precautions: Standard, fall   Orthopedic Precautions:spinal precautions   Braces: N/A  Respiratory Status: Room air    Occupational Performance:    Bed Mobility:    Patient completed Rolling/Turning to Right with contact guard assistance  Patient completed Supine to Sit with contact guard assistance  Patient completed Sit to Supine with contact guard assistance    Functional Mobility/Transfers:  Patient completed Sit <> Stand Transfer with stand by assistance  with  no assistive device   Patient completed Bed <> Chair Transfer using Step Transfer technique with stand by assistance with no assistive device  Functional Mobility: completed for household distances to bathroom in preparation for ADLs. Pt ascended/descended 9 stairs with SBA and R handrail in preparation for home and community mobility    Activities of Daily Living:  Grooming: stand by assistance standing at sink  Lower Body Dressing: total assistance for donning socks EOB with adherence to spinal pxns    Cognitive/Visual Perceptual:  Cognitive/Psychosocial Skills:     -       Oriented to: Person, Place, Time, and Situation     Physical Exam:  Upper Extremity Range of Motion:     -       Right Upper Extremity: WFL  -       Left Upper Extremity: WFL  Upper Extremity Strength:    -       Right Upper Extremity: WFL  -       Left Upper Extremity: WFL   Strength:    -       Right Upper Extremity: WFL  -       Left Upper Extremity: WFL  Fine Motor Coordination:    -       Intact    AMPAC 6 Click ADL:  AMPAC Total Score: 19    Treatment & Education:  Pt educated on OT POC  Pt educated on using call bell to request assistance for fxnl ambulation  All questions within OT scope of practice answered to satisfaction  Pt left with all lines intact and all needs met         Patient left up in chair with all lines intact,  call button in reach, and RN notified    GOALS:   Multidisciplinary Problems       Occupational Therapy Goals          Problem: Occupational Therapy    Goal Priority Disciplines Outcome Interventions   Occupational Therapy Goal     OT, PT/OT Ongoing, Progressing    Description: Goals to be met by: 12/3/22     Patient will increase functional independence with ADLs by performing:    UE Dressing with Modified [place].  LE Dressing with Modified [place] with hip kit as needed.  Toileting from bedside commode with Modified [place] for hygiene and clothing management.   Toilet transfer to bedside commode with Modified [place].                         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    Hyperlipidemia     Hypertension     Leg length discrepancy 10/16/2021    R longer, Consider foot eval & may need custom insert in shoe    Lumbar spondylosis 12/13/2019         Past Surgical History:   Procedure Laterality Date    APPENDECTOMY      ARTHROPLASTY OF HIP BY ANTERIOR APPROACH Right 6/29/2022    Procedure: ARTHROPLASTY, HIP, TOTAL, ANTERIOR APPROACH: RIGHT: DEPUY-ACTIS+PINNACLE;  Surgeon: Talon Amaral III, MD;  Location: [facility] OR;  Service: Orthopedics;  Laterality: Right;    CERVICAL DISCECTOMY  2008    CERVICAL SPINE SURGERY      EPIDURAL STEROID INJECTION N/A 8/12/2022    Procedure: Lumbar WING L4-5;  Surgeon: Toño Moy DO;  Location: [facility] OR;  Service: Pain Management;  Laterality: N/A;    INJECTION OF ANESTHETIC AGENT AROUND MEDIAL BRANCH NERVES INNERVATING LUMBAR FACET JOINT Bilateral 1/7/2020    Procedure: Block-nerve-medial branch-lumbar-- Bilateral (With Steroid);  Surgeon: Vamshi Anderson Jr., MD;  Location: [facility] PAIN MGT;  Service: Pain Management;  Laterality: Bilateral;  No Sedation    INJECTION OF ANESTHETIC AGENT AROUND MEDIAL BRANCH  NERVES INNERVATING LUMBAR FACET JOINT Bilateral 5/14/2020    Procedure: Block-nerve-medial branch-lumbar--Bilateral L4-5, L5-S1 with steriods;  Surgeon: Vamshi Anderson Jr., MD;  Location: [facility] PAIN MGT;  Service: Pain Management;  Laterality: Bilateral;       Time Tracking:     OT Date of Treatment: 11/19/22  OT Start Time: 0801  OT Stop Time: 0825  OT Total Time (min): 24 min  PT present throughout session    Billable Minutes:Evaluation 12  Self Care/Home Management 12    11/19/2022

## 2022-11-19 NOTE — PLAN OF CARE
Problem: Occupational Therapy  Goal: Occupational Therapy Goal  Description: Goals to be met by: 12/3/22     Patient will increase functional independence with ADLs by performing:    UE Dressing with Modified [place].  LE Dressing with Modified [place] with hip kit as needed.  Toileting from bedside commode with Modified [place] for hygiene and clothing management.   Toilet transfer to bedside commode with Modified [place].    Outcome: Ongoing, Progressing

## 2022-11-19 NOTE — ASSESSMENT & PLAN NOTE
67 y.o. male s/p L4/5 TLIF 11/18/22 by Dr Lorenz      Pain: multimodal  WBS:  WBAT  DVT ppx: SCDs, heparin TID to start today  PT/OT:  daily  Labs:  Pending, will FU  Abx:  Post op ancef   Drain:  Superficial suction, deep gravity continue today  Delmis:  ELLEN now  Dispo:  Pending post op course   F/u:  4 wks

## 2022-11-19 NOTE — PLAN OF CARE
Problem: Adult Inpatient Plan of Care  Goal: Plan of Care Review  Outcome: Ongoing, Progressing  Goal: Patient-Specific Goal (Individualized)  Outcome: Ongoing, Progressing  Goal: Absence of Hospital-Acquired Illness or Injury  Outcome: Ongoing, Progressing  Goal: Optimal Comfort and Wellbeing  Outcome: Ongoing, Progressing     Problem: Infection  Goal: Absence of Infection Signs and Symptoms  Outcome: Ongoing, Progressing     Problem: Fall Injury Risk  Goal: Absence of Fall and Fall-Related Injury  Outcome: Ongoing, Progressing     Problem: Bleeding (Spinal Surgery)  Goal: Absence of Bleeding  Outcome: Ongoing, Progressing     Problem: Fluid and Electrolyte Imbalance (Spinal Surgery)  Goal: Fluid and Electrolyte Balance  Outcome: Ongoing, Progressing     Problem: Neurologic Impairment (Spinal Surgery)  Goal: Optimal Neurologic Function  Outcome: Ongoing, Progressing     Problem: Ongoing Anesthesia Effects (Spinal Surgery)  Goal: Anesthesia/Sedation Recovery  Outcome: Ongoing, Progressing     Problem: Pain (Spinal Surgery)  Goal: Acceptable Pain Control  Outcome: Ongoing, Progressing     Problem: Postoperative Nausea and Vomiting (Spinal Surgery)  Goal: Nausea and Vomiting Relief  Outcome: Ongoing, Progressing     Problem: Respiratory Compromise (Spinal Surgery)  Goal: Effective Oxygenation and Ventilation  Outcome: Ongoing, Progressing

## 2022-11-19 NOTE — ANESTHESIA POSTPROCEDURE EVALUATION
Anesthesia Post Evaluation    Patient: Babatunde Oconnor    Procedure(s) Performed: Procedure(s) (LRB):  FUSION, SPINE, LUMBAR, TLIF, POSTERIOR APPROACH, USING PEDICLE SCREW L4-5 (R) GLOBUS ROBOT SNS: SSEP/EMG (Right)    Final Anesthesia Type: general      Patient location during evaluation: PACU  Patient participation: Yes- Able to Participate  Level of consciousness: awake  Post-procedure vital signs: reviewed and stable  Pain management: adequate  Airway patency: patent    PONV status at discharge: No PONV  Anesthetic complications: no      Cardiovascular status: blood pressure returned to baseline  Respiratory status: unassisted  Hydration status: euvolemic  Follow-up not needed.          Vitals Value Taken Time   /64 11/18/22 2005   Temp 36.7 °C (98 °F) 11/18/22 2005   Pulse 65 11/18/22 2005   Resp 16 11/18/22 2005   SpO2 91 % 11/18/22 2005         Event Time   Out of Recovery 15:30:00         Pain/Prakash Score: Pain Rating Prior to Med Admin: 4 (11/18/2022  6:39 PM)  Prakash Score: 10 (11/18/2022  3:30 PM)

## 2022-11-19 NOTE — PLAN OF CARE
Problem: Physical Therapy  Goal: Physical Therapy Goal  Description: Goals to be met by: 22     Patient will increase functional independence with mobility by performin. Supine to sit with Modified [place]  2. Sit to stand transfer with Modified [place]  3. Gait  x 250 feet with Supervision .   4. Ascend/descend 12 stair with right Handrails Supervision .     Outcome: Ongoing, Progressing   Evaluation completed and goals appropriate. 2022

## 2022-11-19 NOTE — PLAN OF CARE
Pt is Aox4, VSS. Pain assessed and managed with PRN medication. Pt is progressing towards goals. SCD's in place with frequent checks for skin breakdown. Drains in place, output documented. Fall precautions in place, no reported falls. IV site CDI. Benites in place, output documented.  Ambulates with 1x assist. Safety precautions in place bed in lowest position,wheels locked, call light within reach,  id band and allergy band on, and clutter free environment.

## 2022-11-19 NOTE — NURSING
Nurses Note -- 4 Eyes      11/18/2022   6:19 PM      Skin assessed during: Admit      [x] No Pressure Injuries Present    []Prevention Measures Documented      [] Yes- Altered Skin Integrity Present or Discovered   [] LDA Added if Not in Epic (Describe Wound)   [] New Altered Skin Integrity was Present on Admit and Documented in LDA   [] Wound Image Taken    Wound Care Consulted? No    Attending Nurse:  Montse Shafer LPN     Second RN/Staff Member:  Susie Rucker RN

## 2022-11-20 VITALS
RESPIRATION RATE: 21 BRPM | BODY MASS INDEX: 29.25 KG/M2 | WEIGHT: 182 LBS | SYSTOLIC BLOOD PRESSURE: 134 MMHG | OXYGEN SATURATION: 96 % | TEMPERATURE: 98 F | DIASTOLIC BLOOD PRESSURE: 72 MMHG | HEART RATE: 87 BPM | HEIGHT: 66 IN

## 2022-11-20 PROCEDURE — 63600175 PHARM REV CODE 636 W HCPCS: Performed by: STUDENT IN AN ORGANIZED HEALTH CARE EDUCATION/TRAINING PROGRAM

## 2022-11-20 PROCEDURE — 25000003 PHARM REV CODE 250: Performed by: STUDENT IN AN ORGANIZED HEALTH CARE EDUCATION/TRAINING PROGRAM

## 2022-11-20 RX ADMIN — CELECOXIB 200 MG: 200 CAPSULE ORAL at 08:11

## 2022-11-20 RX ADMIN — HEPARIN SODIUM 5000 UNITS: 5000 INJECTION INTRAVENOUS; SUBCUTANEOUS at 05:11

## 2022-11-20 RX ADMIN — METHOCARBAMOL 750 MG: 750 TABLET ORAL at 03:11

## 2022-11-20 RX ADMIN — ACETAMINOPHEN 1000 MG: 500 TABLET ORAL at 05:11

## 2022-11-20 RX ADMIN — GABAPENTIN 300 MG: 300 CAPSULE ORAL at 03:11

## 2022-11-20 RX ADMIN — GABAPENTIN 300 MG: 300 CAPSULE ORAL at 08:11

## 2022-11-20 RX ADMIN — ACETAMINOPHEN 1000 MG: 500 TABLET ORAL at 11:11

## 2022-11-20 RX ADMIN — TAMSULOSIN HYDROCHLORIDE 0.8 MG: 0.4 CAPSULE ORAL at 08:11

## 2022-11-20 RX ADMIN — METHOCARBAMOL 750 MG: 750 TABLET ORAL at 08:11

## 2022-11-20 RX ADMIN — HEPARIN SODIUM 5000 UNITS: 5000 INJECTION INTRAVENOUS; SUBCUTANEOUS at 03:11

## 2022-11-20 NOTE — PROGRESS NOTES
Pt resting in bed. Tolerated being in the chair for majority of this shift. Benites catheter removed this shift and pt voiding per urinal without complications. Pt controlled this shift with scheduled medications. Drain x2 continue intact and in use per order. No c/o from pt at this time. Rounds completed this shift. Call light in reach, safety ongoing.

## 2022-11-20 NOTE — ASSESSMENT & PLAN NOTE
67 y.o. male s/p L4/5 TLIF 11/18/22 by Dr Lorenz      Pain: multimodal  WBS:  WBAT  DVT ppx: SCDs, heparin TID while inpatient  PT/OT:  daily  Drain:  DC now  Dispo:  Home today after XR  F/u:  4 wks

## 2022-11-20 NOTE — DISCHARGE SUMMARY
[facility] - [facility]  Orthopedics  Discharge Summary      Patient Name: Babatunde Oconnor  MRN: [id number]  Admission Date: 11/18/2022  Hospital Length of Stay: 0 days  Discharge Date and Time:  11/20/2022 2:48 PM  Attending Physician: Tramaine Lorenz MD   Discharging Provider: Kendrick Young MD  Primary Care Provider: Negar Pandey MD    HPI:   Babatunde Oconnor is a 67 y.o. male with a history of right IVAN by Dr. Amaral (06/29) and C3-4 ACDF in 2008 in [facility]) who returns to me today for FU. He still has mild LBP that radiates posteriorly down b/l (R>L) buttock/thigh to the knee. Worse with walking and improved with leaning forward on a shopping cart. He has been taking lyrica. He is miserable and wants surgical intervention.     8/12/22: L4-5 WING - helped 90% of pain for 2 weeks     PMH/PSH/FamHx/SocHx:  Unchanged from prior visit     Procedure(s) (LRB):  FUSION, SPINE, LUMBAR, TLIF, POSTERIOR APPROACH, USING PEDICLE SCREW L4-5 (R) GLOBUS ROBOT SNS: SSEP/EMG (Right)      Hospital Course:    * Spondylolisthesis at L4-L5 level  67 y.o. male s/p L4/5 TLIF 11/18/22 by Dr Lorenz. Surgery uncomplicated. Drains placed. Admitted post operatively. Did well post operatively w/o complication. Pain well controlled. Drained  dc'ed POD2. Did well with PT/OT and they recced DC home. Post op xr showed good fixation no hardware complicatiion. Dc'ed home POD2 good condition. FU 4 wks           Goals of Care Treatment Preferences:  Code Status: Full Code      Consults (From admission, onward)        Status Ordering Provider     Consult to Case Management/Social Work  Once        Provider:  (Not yet assigned)    Acknowledged KENDRICK YOUNG          Significant Diagnostic Studies:     Pending Diagnostic Studies:     None        Final Active Diagnoses:    Diagnosis Date Noted POA    PRINCIPAL PROBLEM:  Spondylolisthesis at L4-L5 level [M43.16] 11/18/2022 Not Applicable      Problems Resolved During this Admission:       Discharged Condition: good    Disposition: Home or Self Care    Follow Up: 3-4 wks spine clinic      Patient Instructions:   DR. DONI ROWE'S POSTOPERATIVE INSTRUCTIONS -   LUMBAR FUSION       Antibiotics: You do not need additional antibiotics at home.    NSAIDs: Please refrain from taking ibuprofen (Advil), naproxen (Aleve), and other non steroidal anti-inflammatory medications other than the Celebrex that will be prescribed to you after surgery.    Wound Care: You may remove your dressing and shower 7 days after surgery. Until then please keep your wound clean and dry. Sponge baths are acceptable. Do not go in a pool or hot tub until seen in clinic. Please leave the small steri-strips covering your wound in place until they fall off naturally (2 weeks). You may notice clear suture ends hanging from the sides of your incision after the steri-strips are removed, it is ok to clip these with scissors.    Brace: You may be prescribed a brace, please wear this when up and walking, it is not necessary to wear at night when sleeping.    Pain: We will use a multimodal approach for pain management after your surgery.  You will be given a prescription for pain medicine when you are discharged from the hospital.  You will also be given prescriptions for Robaxin (a muscle relaxer), Gabapentin, Celebrex and Tylenol.  Please note: you will only be given ONE prescription for narcotics when you are discharged from the hospital.  This medication is for breakthrough pain only. This medication will not be refilled.  The other medications given to you may be refilled if needed.      Infection: Signs of infection include increasing wound drainage and redness around the wound, as well as a temperature over 101.5 degrees. It is unnecessary to take your temperature on a routine basis. Please call the below number if you are concerned about an infection.    Driving and Work: It is ok to return to driving and work as long as you are not  taking narcotic pain medications and can walk greater than 100 feet. Please do not lift over 10 pounds or participate in exercise or sports until cleared by Dr. Munoz.    Deep Venous Thrombosis (Blood Clots): Symptoms include swelling in the legs and shortness of breath. Please call the office or proceed to the nearest emergency room if you have any of these symptoms.    Physical Therapy: The best physical therapy immediately after surgery is walking. Please try to walk as much as possible.    Follow-up: You will be scheduled for a follow-up appointment in 4 weeks with either Dr. Munoz or his physician assistant, Maira Gonzalez PA-C.    Questions: During business hours please call [phone] for routine questions. For after hours questions please call [phone] and ask to speak with the Orthopaedic resident on call.    Disability: If you submitted short term disability paperwork for us to complete and would like to check the status, please call the Disability Department at [phone].  You may fax any necessary paperwork to [phone].             Medications:  Reconciled Home Medications:      Medication List      START taking these medications    acetaminophen 500 MG tablet  Commonly known as: TYLENOL  Take 2 tablets (1,000 mg total) by mouth every 8 (eight) hours.     celecoxib 200 MG capsule  Commonly known as: CeleBREX  Take 1 capsule (200 mg total) by mouth once daily.     methocarbamoL 750 MG Tab  Commonly known as: ROBAXIN  Take 1 tablet (750 mg total) by mouth 3 (three) times daily.     oxyCODONE 5 MG immediate release tablet  Commonly known as: ROXICODONE  Take 1-2 tablets (5-10 mg total) by mouth every 4 (four) hours as needed for Pain.        CONTINUE taking these medications    amlodipine-atorvastatin 10-10 mg per tablet  Commonly known as: CADUET  Take 1 tablet by mouth once daily.     aspirin 81 MG EC tablet  Commonly known as: ECOTRIN  Take 1 tablet (81 mg total) by mouth 2  (two) times daily.     bisoprolol-hydrochlorothiazide 5-6.25 mg 5-6.25 mg Tab  Commonly known as: ZIAC  TAKE 1 TABLET BY MOUTH ONCE DAILY     docusate sodium 100 MG capsule  Commonly known as: COLACE  Take 1 capsule (100 mg total) by mouth 2 (two) times daily as needed for Constipation.     gabapentin 300 MG capsule  Commonly known as: NEURONTIN  Take 1 capsule (300 mg total) by mouth 3 (three) times daily.     pregabalin 150 MG capsule  Commonly known as: LYRICA  Take 1 capsule (150 mg total) by mouth once daily.     tamsulosin 0.4 mg Cap  Commonly known as: FLOMAX  TAKE 2 CAPSULES BY MOUTH ONCE DAILY.            Sandee Shanks MD  Orthopedics  [facility] - Surgery

## 2022-11-20 NOTE — PLAN OF CARE
Problem: Adult Inpatient Plan of Care  Goal: Plan of Care Review  Outcome: Met  Goal: Patient-Specific Goal (Individualized)  Outcome: Met  Goal: Absence of Hospital-Acquired Illness or Injury  Outcome: Met  Goal: Optimal Comfort and Wellbeing  Outcome: Met  Goal: Readiness for Transition of Care  Outcome: Met     Problem: Infection  Goal: Absence of Infection Signs and Symptoms  Outcome: Met     Problem: Fall Injury Risk  Goal: Absence of Fall and Fall-Related Injury  Outcome: Met     Problem: Bleeding (Spinal Surgery)  Goal: Absence of Bleeding  Outcome: Met     Problem: Bowel Motility Impaired (Spinal Surgery)  Goal: Effective Bowel Elimination  Outcome: Met     Problem: Fluid and Electrolyte Imbalance (Spinal Surgery)  Goal: Fluid and Electrolyte Balance  Outcome: Met     Problem: Functional Ability Impaired (Spinal Surgery)  Goal: Optimal Functional Ability  Outcome: Met     Problem: Infection (Spinal Surgery)  Goal: Absence of Infection Signs and Symptoms  Outcome: Met     Problem: Neurologic Impairment (Spinal Surgery)  Goal: Optimal Neurologic Function  Outcome: Met     Problem: Ongoing Anesthesia Effects (Spinal Surgery)  Goal: Anesthesia/Sedation Recovery  Outcome: Met     Problem: Pain (Spinal Surgery)  Goal: Acceptable Pain Control  Outcome: Met     Problem: Postoperative Nausea and Vomiting (Spinal Surgery)  Goal: Nausea and Vomiting Relief  Outcome: Met     Problem: Postoperative Urinary Retention (Spinal Surgery)  Goal: Effective Urinary Elimination  Outcome: Met     Problem: Respiratory Compromise (Spinal Surgery)  Goal: Effective Oxygenation and Ventilation  Outcome: Met   Pt is A+Ox4. IV d/c. Discharge education and instructions given. Pt verbalized understanding. Wife picked up meds from pharmacy. Wife to transport pt home.

## 2022-11-20 NOTE — PLAN OF CARE
Pt is Aox4, VSS. Pain assessed and managed with PRN medication. Hemovac in place, out documented. Pt is progressing towards goals.  Fall precautions in place, no reported falls. IV site CDI. Elimination schedule in place, urinal at bedside. Ambulates independently.  Safety precautions in place bed in lowest position,wheels locked, call light within reach, id band and allergy band on, and clutter free environment.

## 2022-11-20 NOTE — SUBJECTIVE & OBJECTIVE
"Principal Problem:Spondylolisthesis at L4-L5 level    Principal Orthopedic Problem: sp L4/5 TLIF 11/18    Interval History: POD2. NAEON. VS wnl. No new labs. Drains 50/30 output overnight. Did well with PT yesterday, they rec DC home, no HH needed    Review of patient's allergies indicates:  No Known Allergies    Current Facility-Administered Medications   Medication    acetaminophen tablet 1,000 mg    celecoxib capsule 200 mg    docusate sodium capsule 100 mg    gabapentin capsule 300 mg    heparin (porcine) injection 5,000 Units    LIDOcaine (PF) 10 mg/ml (1%) injection 10 mg    melatonin tablet 6 mg    methocarbamoL tablet 750 mg    oxyCODONE immediate release tablet 5 mg    And    oxyCODONE immediate release tablet Tab 10 mg    And    morphine injection 2 mg    ondansetron disintegrating tablet 8 mg    sodium chloride 0.9% flush 10 mL    tamsulosin 24 hr capsule 0.8 mg     Objective:     Vital Signs (Most Recent):  Temp: 97.7 °F (36.5 °C) (11/20/22 0851)  Pulse: 79 (11/20/22 0851)  Resp: (!) 24 (11/20/22 0851)  BP: 125/60 (11/20/22 0851)  SpO2: 95 % (11/20/22 0851)   Vital Signs (24h Range):  Temp:  [96.6 °F (35.9 °C)-98.9 °F (37.2 °C)] 97.7 °F (36.5 °C)  Pulse:  [66-79] 79  Resp:  [14-24] 24  SpO2:  [92 %-95 %] 95 %  BP: (113-125)/(57-89) 125/60     Weight: 82.6 kg (182 lb)  Height: 5' 6" (167.6 cm)  Body mass index is 29.38 kg/m².      Intake/Output Summary (Last 24 hours) at 11/20/2022 1019  Last data filed at 11/20/2022 0355  Gross per 24 hour   Intake 100 ml   Output 2420 ml   Net -2320 ml         Ortho/SPM Exam  General Exam  General appearance: A&Ox3. NAD.   HEENT: Normocephalic, head atraumatic. Conjuntiva normal. EOMI. External appearance of nose, mouth, ears wnl.  Neck: Supple. Trachea midline.  Respiratory: Breathing unlabored on room air. Symmetric chest rise.   CV: Extremities warm and well perfused.  Neurologic: No focal motor or sensory deficits.   Psychatric: A&Ox3. Appropriate mood and " affect.  Skin: Warm, dry, well perfused. No rash.    Spine  Dressing cdi  Drain x2 ss output  NVI    Significant Labs: BMP:   Recent Labs   Lab 11/19/22  0545   *      K 3.8      CO2 24   BUN 11   CREATININE 0.7   CALCIUM 9.0     CBC:   Recent Labs   Lab 11/19/22  0545   WBC 11.35   HGB 12.9*   HCT 38.8*        All pertinent labs within the past 24 hours have been reviewed.    Significant Imaging: I have reviewed and interpreted all pertinent imaging results/findings.

## 2022-11-20 NOTE — PROGRESS NOTES
"[facility] - [facility]  Orthopedics  Progress Note    Patient Name: Babatunde Oconnor  MRN: [id number]  Admission Date: 11/18/2022  Hospital Length of Stay: 0 days  Attending Provider: Tramaine Lorenz MD  Primary Care Provider: Negar Pandey MD  Follow-up For: Procedure(s) (LRB):  FUSION, SPINE, LUMBAR, TLIF, POSTERIOR APPROACH, USING PEDICLE SCREW L4-5 (R) GLOBUS ROBOT SNS: SSEP/EMG (Right)    Post-Operative Day: 2 Days Post-Op  Subjective:     Principal Problem:Spondylolisthesis at L4-L5 level    Principal Orthopedic Problem: sp L4/5 TLIF 11/18    Interval History: POD2. NAEON. VS wnl. No new labs. Drains 50/30 output overnight. Did well with PT yesterday, they rec DC home, no HH needed    Review of patient's allergies indicates:  No Known Allergies    Current Facility-Administered Medications   Medication    acetaminophen tablet 1,000 mg    celecoxib capsule 200 mg    docusate sodium capsule 100 mg    gabapentin capsule 300 mg    heparin (porcine) injection 5,000 Units    LIDOcaine (PF) 10 mg/ml (1%) injection 10 mg    melatonin tablet 6 mg    methocarbamoL tablet 750 mg    oxyCODONE immediate release tablet 5 mg    And    oxyCODONE immediate release tablet Tab 10 mg    And    morphine injection 2 mg    ondansetron disintegrating tablet 8 mg    sodium chloride 0.9% flush 10 mL    tamsulosin 24 hr capsule 0.8 mg     Objective:     Vital Signs (Most Recent):  Temp: 97.7 °F (36.5 °C) (11/20/22 0851)  Pulse: 79 (11/20/22 0851)  Resp: (!) 24 (11/20/22 0851)  BP: 125/60 (11/20/22 0851)  SpO2: 95 % (11/20/22 0851)   Vital Signs (24h Range):  Temp:  [96.6 °F (35.9 °C)-98.9 °F (37.2 °C)] 97.7 °F (36.5 °C)  Pulse:  [66-79] 79  Resp:  [14-24] 24  SpO2:  [92 %-95 %] 95 %  BP: (113-125)/(57-89) 125/60     Weight: 82.6 kg (182 lb)  Height: 5' 6" (167.6 cm)  Body mass index is 29.38 kg/m².      Intake/Output Summary (Last 24 hours) at 11/20/2022 1019  Last data filed at 11/20/2022 0355  Gross per 24 hour   Intake 100 " ml   Output 2420 ml   Net -2320 ml         Ortho/SPM Exam  General Exam  General appearance: A&Ox3. NAD.   HEENT: Normocephalic, head atraumatic. Conjuntiva normal. EOMI. External appearance of nose, mouth, ears wnl.  Neck: Supple. Trachea midline.  Respiratory: Breathing unlabored on room air. Symmetric chest rise.   CV: Extremities warm and well perfused.  Neurologic: No focal motor or sensory deficits.   Psychatric: A&Ox3. Appropriate mood and affect.  Skin: Warm, dry, well perfused. No rash.    Spine  Dressing cdi  Drain x2 ss output  NVI    Significant Labs: BMP:   Recent Labs   Lab 11/19/22  0545   *      K 3.8      CO2 24   BUN 11   CREATININE 0.7   CALCIUM 9.0     CBC:   Recent Labs   Lab 11/19/22  0545   WBC 11.35   HGB 12.9*   HCT 38.8*        All pertinent labs within the past 24 hours have been reviewed.    Significant Imaging: I have reviewed and interpreted all pertinent imaging results/findings.    Assessment/Plan:     * Spondylolisthesis at L4-L5 level  67 y.o. male s/p L4/5 TLIF 11/18/22 by Dr Lorenz      Pain: multimodal  WBS:  WBAT  DVT ppx: SCDs, heparin TID while inpatient  PT/OT:  daily  Drain:  DC now  Dispo:  Home today after XR  F/u:  4 wks                Sandee Shanks MD  Orthopedics  [facility] - Surgery

## 2022-11-21 ENCOUNTER — PATIENT MESSAGE (OUTPATIENT)
Dept: ORTHOPEDICS | Facility: CLINIC | Age: 67
End: 2022-11-21
Payer: COMMERCIAL

## 2022-12-01 ENCOUNTER — TELEPHONE (OUTPATIENT)
Dept: ORTHOPEDICS | Facility: CLINIC | Age: 67
End: 2022-12-01
Payer: COMMERCIAL

## 2022-12-01 NOTE — TELEPHONE ENCOUNTER
----- Message from Iesha Cuevas sent at 12/1/2022  2:40 PM CST -----  Regarding: POST OP APPOINTMENT  Contact: Wife - Ese Oconnor  Wife - Ese Oconnor state pt need to his post op appointment appointment sooner than 12/19/2022 Pt's surgery was on 11/18/2022 and post op was suppose to be x2 wks after surgery Wife ask for a call      Contact info  [phone] Wife

## 2022-12-04 DIAGNOSIS — I10 ESSENTIAL HYPERTENSION: ICD-10-CM

## 2022-12-04 DIAGNOSIS — E78.5 HYPERLIPIDEMIA, UNSPECIFIED HYPERLIPIDEMIA TYPE: ICD-10-CM

## 2022-12-05 ENCOUNTER — PATIENT MESSAGE (OUTPATIENT)
Dept: INTERNAL MEDICINE | Facility: CLINIC | Age: 67
End: 2022-12-05
Payer: COMMERCIAL

## 2022-12-05 RX ORDER — AMLODIPINE BESYLATE AND ATORVASTATIN CALCIUM 10; 10 MG/1; MG/1
TABLET, FILM COATED ORAL
Qty: 90 TABLET | Refills: 0 | Status: SHIPPED | OUTPATIENT
Start: 2022-12-05 | End: 2023-02-27 | Stop reason: SDUPTHER

## 2022-12-05 NOTE — TELEPHONE ENCOUNTER
Message sent to pt via portal letting him know his refill was been sent to pharmacy and he needs to make apt with new PCP to est care to ensure future refills

## 2022-12-18 ENCOUNTER — PATIENT MESSAGE (OUTPATIENT)
Dept: ADMINISTRATIVE | Facility: OTHER | Age: 67
End: 2022-12-18
Payer: COMMERCIAL

## 2022-12-19 ENCOUNTER — TELEPHONE (OUTPATIENT)
Dept: ORTHOPEDICS | Facility: CLINIC | Age: 67
End: 2022-12-19
Payer: COMMERCIAL

## 2022-12-19 ENCOUNTER — HOSPITAL ENCOUNTER (OUTPATIENT)
Dept: RADIOLOGY | Facility: HOSPITAL | Age: 67
Discharge: HOME | End: 2022-12-19
Attending: ORTHOPAEDIC SURGERY
Payer: COMMERCIAL

## 2022-12-19 ENCOUNTER — OFFICE VISIT (OUTPATIENT)
Dept: ORTHOPEDICS | Facility: CLINIC | Age: 67
End: 2022-12-19
Payer: COMMERCIAL

## 2022-12-19 VITALS — WEIGHT: 176.81 LBS | HEIGHT: 66 IN | BODY MASS INDEX: 28.42 KG/M2

## 2022-12-19 DIAGNOSIS — M54.50 LOW BACK PAIN, UNSPECIFIED BACK PAIN LATERALITY, UNSPECIFIED CHRONICITY, UNSPECIFIED WHETHER SCIATICA PRESENT: Primary | ICD-10-CM

## 2022-12-19 DIAGNOSIS — Z98.1 S/P LUMBAR FUSION: Primary | ICD-10-CM

## 2022-12-19 DIAGNOSIS — M54.50 LOW BACK PAIN, UNSPECIFIED BACK PAIN LATERALITY, UNSPECIFIED CHRONICITY, UNSPECIFIED WHETHER SCIATICA PRESENT: ICD-10-CM

## 2022-12-19 DIAGNOSIS — M51.36 DDD (DEGENERATIVE DISC DISEASE), LUMBAR: Primary | ICD-10-CM

## 2022-12-19 PROCEDURE — 72100 XR LUMBAR SPINE AP AND LATERAL: ICD-10-PCS | Mod: 26,,, | Performed by: RADIOLOGY

## 2022-12-19 PROCEDURE — 1159F PR MEDICATION LIST DOCUMENTED IN MEDICAL RECORD: ICD-10-PCS | Mod: CPTII,S$GLB,, | Performed by: ORTHOPAEDIC SURGERY

## 2022-12-19 PROCEDURE — 3288F FALL RISK ASSESSMENT DOCD: CPT | Mod: CPTII,S$GLB,, | Performed by: ORTHOPAEDIC SURGERY

## 2022-12-19 PROCEDURE — 1126F PR PAIN SEVERITY QUANTIFIED, NO PAIN PRESENT: ICD-10-PCS | Mod: CPTII,S$GLB,, | Performed by: ORTHOPAEDIC SURGERY

## 2022-12-19 PROCEDURE — 1101F PR PT FALLS ASSESS DOC 0-1 FALLS W/OUT INJ PAST YR: ICD-10-PCS | Mod: CPTII,S$GLB,, | Performed by: ORTHOPAEDIC SURGERY

## 2022-12-19 PROCEDURE — 72100 X-RAY EXAM L-S SPINE 2/3 VWS: CPT | Mod: TC

## 2022-12-19 PROCEDURE — 1159F MED LIST DOCD IN RCRD: CPT | Mod: CPTII,S$GLB,, | Performed by: ORTHOPAEDIC SURGERY

## 2022-12-19 PROCEDURE — 1126F AMNT PAIN NOTED NONE PRSNT: CPT | Mod: CPTII,S$GLB,, | Performed by: ORTHOPAEDIC SURGERY

## 2022-12-19 PROCEDURE — 99999 PR PBB SHADOW E&M-EST. PATIENT-LVL III: ICD-10-PCS | Mod: PBBFAC,,, | Performed by: ORTHOPAEDIC SURGERY

## 2022-12-19 PROCEDURE — 99024 POSTOP FOLLOW-UP VISIT: CPT | Mod: S$GLB,,, | Performed by: ORTHOPAEDIC SURGERY

## 2022-12-19 PROCEDURE — 1101F PT FALLS ASSESS-DOCD LE1/YR: CPT | Mod: CPTII,S$GLB,, | Performed by: ORTHOPAEDIC SURGERY

## 2022-12-19 PROCEDURE — 99999 PR PBB SHADOW E&M-EST. PATIENT-LVL III: CPT | Mod: PBBFAC,,, | Performed by: ORTHOPAEDIC SURGERY

## 2022-12-19 PROCEDURE — 3008F PR BODY MASS INDEX (BMI) DOCUMENTED: ICD-10-PCS | Mod: CPTII,S$GLB,, | Performed by: ORTHOPAEDIC SURGERY

## 2022-12-19 PROCEDURE — 72100 X-RAY EXAM L-S SPINE 2/3 VWS: CPT | Mod: 26,,, | Performed by: RADIOLOGY

## 2022-12-19 PROCEDURE — 3008F BODY MASS INDEX DOCD: CPT | Mod: CPTII,S$GLB,, | Performed by: ORTHOPAEDIC SURGERY

## 2022-12-19 PROCEDURE — 99024 PR POST-OP FOLLOW-UP VISIT: ICD-10-PCS | Mod: S$GLB,,, | Performed by: ORTHOPAEDIC SURGERY

## 2022-12-19 PROCEDURE — 3288F PR FALLS RISK ASSESSMENT DOCUMENTED: ICD-10-PCS | Mod: CPTII,S$GLB,, | Performed by: ORTHOPAEDIC SURGERY

## 2022-12-19 NOTE — PROGRESS NOTES
Date: 12/19/2022    Supervising Physician: Tramaine Lorenz M.D.    Date of Surgery: 11/18/22    Procedure: L4-5 TLIF    History: Babatunde Oconnor is seen today for follow-up following the above listed procedure. Overall the patient is doing well but today notes he has no pain. He reports 10 days of back soreness after surgery but now he is doing well and not taking medication.    he denies fever, chills, and sweats since the time of the surgery.       Exam:  Incision is healing well, clean, dry and intact.   There is no sign of infection. Neuro exam is stable. No signs of DVT.    Radiographs: hardware in place no failure    Assessment/Plan: 4 weeks post op.    Doing well postoperatively.  reviewed.    I will plan to see the patient back for the next postop visit in 2 months.     Thank you for the opportunity to participate in this patient's care. Please give me a call if there are any concerns or questions.

## 2023-01-27 ENCOUNTER — PATIENT MESSAGE (OUTPATIENT)
Dept: RESEARCH | Facility: HOSPITAL | Age: 68
End: 2023-01-27
Payer: COMMERCIAL

## 2023-02-20 ENCOUNTER — HOSPITAL ENCOUNTER (OUTPATIENT)
Dept: RADIOLOGY | Facility: HOSPITAL | Age: 68
Discharge: HOME | End: 2023-02-20
Attending: ORTHOPAEDIC SURGERY
Payer: COMMERCIAL

## 2023-02-20 ENCOUNTER — OFFICE VISIT (OUTPATIENT)
Dept: ORTHOPEDICS | Facility: CLINIC | Age: 68
End: 2023-02-20
Payer: COMMERCIAL

## 2023-02-20 VITALS — BODY MASS INDEX: 29.69 KG/M2 | HEIGHT: 66 IN | WEIGHT: 184.75 LBS

## 2023-02-20 DIAGNOSIS — M51.36 DDD (DEGENERATIVE DISC DISEASE), LUMBAR: ICD-10-CM

## 2023-02-20 DIAGNOSIS — Z98.1 S/P LUMBAR FUSION: Primary | ICD-10-CM

## 2023-02-20 PROCEDURE — 99999 PR PBB SHADOW E&M-EST. PATIENT-LVL III: ICD-10-PCS | Mod: PBBFAC,,, | Performed by: ORTHOPAEDIC SURGERY

## 2023-02-20 PROCEDURE — 72100 X-RAY EXAM L-S SPINE 2/3 VWS: CPT | Mod: TC

## 2023-02-20 PROCEDURE — 3008F PR BODY MASS INDEX (BMI) DOCUMENTED: ICD-10-PCS | Mod: CPTII,S$GLB,, | Performed by: ORTHOPAEDIC SURGERY

## 2023-02-20 PROCEDURE — 99024 PR POST-OP FOLLOW-UP VISIT: ICD-10-PCS | Mod: S$GLB,,, | Performed by: ORTHOPAEDIC SURGERY

## 2023-02-20 PROCEDURE — 1126F PR PAIN SEVERITY QUANTIFIED, NO PAIN PRESENT: ICD-10-PCS | Mod: CPTII,S$GLB,, | Performed by: ORTHOPAEDIC SURGERY

## 2023-02-20 PROCEDURE — 72100 XR LUMBAR SPINE AP AND LATERAL: ICD-10-PCS | Mod: 26,,, | Performed by: RADIOLOGY

## 2023-02-20 PROCEDURE — 72100 X-RAY EXAM L-S SPINE 2/3 VWS: CPT | Mod: 26,,, | Performed by: RADIOLOGY

## 2023-02-20 PROCEDURE — 1159F MED LIST DOCD IN RCRD: CPT | Mod: CPTII,S$GLB,, | Performed by: ORTHOPAEDIC SURGERY

## 2023-02-20 PROCEDURE — 1159F PR MEDICATION LIST DOCUMENTED IN MEDICAL RECORD: ICD-10-PCS | Mod: CPTII,S$GLB,, | Performed by: ORTHOPAEDIC SURGERY

## 2023-02-20 PROCEDURE — 1101F PR PT FALLS ASSESS DOC 0-1 FALLS W/OUT INJ PAST YR: ICD-10-PCS | Mod: CPTII,S$GLB,, | Performed by: ORTHOPAEDIC SURGERY

## 2023-02-20 PROCEDURE — 3288F FALL RISK ASSESSMENT DOCD: CPT | Mod: CPTII,S$GLB,, | Performed by: ORTHOPAEDIC SURGERY

## 2023-02-20 PROCEDURE — 3288F PR FALLS RISK ASSESSMENT DOCUMENTED: ICD-10-PCS | Mod: CPTII,S$GLB,, | Performed by: ORTHOPAEDIC SURGERY

## 2023-02-20 PROCEDURE — 1126F AMNT PAIN NOTED NONE PRSNT: CPT | Mod: CPTII,S$GLB,, | Performed by: ORTHOPAEDIC SURGERY

## 2023-02-20 PROCEDURE — 99999 PR PBB SHADOW E&M-EST. PATIENT-LVL III: CPT | Mod: PBBFAC,,, | Performed by: ORTHOPAEDIC SURGERY

## 2023-02-20 PROCEDURE — 1101F PT FALLS ASSESS-DOCD LE1/YR: CPT | Mod: CPTII,S$GLB,, | Performed by: ORTHOPAEDIC SURGERY

## 2023-02-20 PROCEDURE — 3008F BODY MASS INDEX DOCD: CPT | Mod: CPTII,S$GLB,, | Performed by: ORTHOPAEDIC SURGERY

## 2023-02-20 PROCEDURE — 99024 POSTOP FOLLOW-UP VISIT: CPT | Mod: S$GLB,,, | Performed by: ORTHOPAEDIC SURGERY

## 2023-02-20 NOTE — PROGRESS NOTES
Date: 02/20/2023    Supervising Physician: Tramaine Lorenz M.D.    Date of Surgery: 11/18/22    Procedure: L4-5 TLIF    History: Babatunde Oconnor is seen today for follow-up following the above listed procedure. Overall the patient is doing well but today notes he has no pain.     he denies fever, chills, and sweats since the time of the surgery.     Exam:  Incision is healing well, clean, dry and intact.   There is no sign of infection. Neuro exam is stable. No signs of DVT.    Radiographs: hardware in place no failure    Assessment/Plan: 3 months post op.    Doing well postoperatively.  reviewed.    I will plan to see the patient back for the next postop visit in 1 years.     Thank you for the opportunity to participate in this patient's care. Please give me a call if there are any concerns or questions.

## 2023-02-25 ENCOUNTER — PATIENT MESSAGE (OUTPATIENT)
Dept: PRIMARY CARE CLINIC | Facility: CLINIC | Age: 68
End: 2023-02-25
Payer: COMMERCIAL

## 2023-02-27 ENCOUNTER — PATIENT MESSAGE (OUTPATIENT)
Dept: PRIMARY CARE CLINIC | Facility: CLINIC | Age: 68
End: 2023-02-27
Payer: COMMERCIAL

## 2023-03-13 ENCOUNTER — PATIENT MESSAGE (OUTPATIENT)
Dept: INTERNAL MEDICINE | Facility: CLINIC | Age: 68
End: 2023-03-13
Payer: COMMERCIAL

## 2023-03-13 ENCOUNTER — PATIENT MESSAGE (OUTPATIENT)
Dept: PRIMARY CARE CLINIC | Facility: CLINIC | Age: 68
End: 2023-03-13
Payer: COMMERCIAL

## 2023-03-13 ENCOUNTER — TELEPHONE (OUTPATIENT)
Dept: INTERNAL MEDICINE | Facility: CLINIC | Age: 68
End: 2023-03-13
Payer: COMMERCIAL

## 2023-03-13 DIAGNOSIS — I10 ESSENTIAL HYPERTENSION: ICD-10-CM

## 2023-03-13 DIAGNOSIS — N40.0 BENIGN PROSTATIC HYPERPLASIA, UNSPECIFIED WHETHER LOWER URINARY TRACT SYMPTOMS PRESENT: ICD-10-CM

## 2023-03-13 NOTE — TELEPHONE ENCOUNTER
----- Message from Alecia Culver sent at 3/13/2023  3:47 PM CDT -----  Contact: [phone] Patient  Patient is returning a phone call.  Who left a message for the patient: Laura Vieira LPN  Does patient know what this is regarding:  running out of medication before his 04/20 appt  Would you like a call back, or a response through your [facility] portal?:   call back  Comments:

## 2023-03-13 NOTE — TELEPHONE ENCOUNTER
I spoke to pt, he scheduled an appt to establish care but the time no longer works.He will be available for any time or day after 5-26-23.  Appt scheduled 5-26-23 at 8:30 am.  Pt verbalized understanding

## 2023-03-13 NOTE — TELEPHONE ENCOUNTER
Reason for visit: Annual Check-up      Comments:   Annual visit. Establish PCP.   I tried calling pt to assist with scheduling an appt.  No answer. Left VM for pt to call the clinic

## 2023-03-14 RX ORDER — BISOPROLOL FUMARATE AND HYDROCHLOROTHIAZIDE 5; 6.25 MG/1; MG/1
1 TABLET ORAL DAILY
Qty: 90 TABLET | Refills: 0 | Status: SHIPPED | OUTPATIENT
Start: 2023-03-14 | End: 2023-03-30 | Stop reason: SDUPTHER

## 2023-03-14 RX ORDER — TAMSULOSIN HYDROCHLORIDE 0.4 MG/1
2 CAPSULE ORAL DAILY
Qty: 180 CAPSULE | Refills: 0 | Status: SHIPPED | OUTPATIENT
Start: 2023-03-14 | End: 2023-03-30 | Stop reason: SDUPTHER

## 2023-03-16 ENCOUNTER — PATIENT MESSAGE (OUTPATIENT)
Dept: PRIMARY CARE CLINIC | Facility: CLINIC | Age: 68
End: 2023-03-16
Payer: COMMERCIAL

## 2023-03-17 ENCOUNTER — PATIENT MESSAGE (OUTPATIENT)
Dept: ADMINISTRATIVE | Facility: OTHER | Age: 68
End: 2023-03-17
Payer: COMMERCIAL

## 2023-03-26 ENCOUNTER — PATIENT MESSAGE (OUTPATIENT)
Dept: PRIMARY CARE CLINIC | Facility: CLINIC | Age: 68
End: 2023-03-26
Payer: COMMERCIAL

## 2023-03-26 DIAGNOSIS — I10 ESSENTIAL HYPERTENSION: ICD-10-CM

## 2023-03-26 DIAGNOSIS — N40.0 BENIGN PROSTATIC HYPERPLASIA, UNSPECIFIED WHETHER LOWER URINARY TRACT SYMPTOMS PRESENT: ICD-10-CM

## 2023-03-26 DIAGNOSIS — E78.5 HYPERLIPIDEMIA, UNSPECIFIED HYPERLIPIDEMIA TYPE: ICD-10-CM

## 2023-03-26 RX ORDER — TAMSULOSIN HYDROCHLORIDE 0.4 MG/1
2 CAPSULE ORAL DAILY
Qty: 180 CAPSULE | Refills: 0 | Status: CANCELLED | OUTPATIENT
Start: 2023-03-26

## 2023-03-26 RX ORDER — BISOPROLOL FUMARATE AND HYDROCHLOROTHIAZIDE 5; 6.25 MG/1; MG/1
1 TABLET ORAL DAILY
Qty: 90 TABLET | Refills: 0 | Status: CANCELLED | OUTPATIENT
Start: 2023-03-26

## 2023-03-27 ENCOUNTER — OFFICE VISIT (OUTPATIENT)
Dept: URGENT CARE | Facility: CLINIC | Age: 68
End: 2023-03-27
Payer: COMMERCIAL

## 2023-03-27 VITALS
BODY MASS INDEX: 29.25 KG/M2 | WEIGHT: 182 LBS | OXYGEN SATURATION: 95 % | RESPIRATION RATE: 16 BRPM | TEMPERATURE: 99 F | DIASTOLIC BLOOD PRESSURE: 81 MMHG | HEIGHT: 66 IN | HEART RATE: 81 BPM | SYSTOLIC BLOOD PRESSURE: 137 MMHG

## 2023-03-27 DIAGNOSIS — Z20.822 CLOSE EXPOSURE TO COVID-19 VIRUS: ICD-10-CM

## 2023-03-27 DIAGNOSIS — J30.1 SEASONAL ALLERGIC RHINITIS DUE TO POLLEN: Primary | ICD-10-CM

## 2023-03-27 LAB
CTP QC/QA: YES
SARS-COV-2 AG RESP QL IA.RAPID: NEGATIVE

## 2023-03-27 PROCEDURE — 87811 SARS-COV-2 COVID19 W/OPTIC: CPT | Mod: QW,S$GLB,, | Performed by: FAMILY MEDICINE

## 2023-03-27 PROCEDURE — 87811 SARS CORONAVIRUS 2 ANTIGEN POCT, MANUAL READ: ICD-10-PCS | Mod: QW,S$GLB,, | Performed by: FAMILY MEDICINE

## 2023-03-27 PROCEDURE — 99213 PR OFFICE/OUTPT VISIT, EST, LEVL III, 20-29 MIN: ICD-10-PCS | Mod: S$GLB,,, | Performed by: FAMILY MEDICINE

## 2023-03-27 PROCEDURE — 99213 OFFICE O/P EST LOW 20 MIN: CPT | Mod: S$GLB,,, | Performed by: FAMILY MEDICINE

## 2023-03-27 RX ORDER — PROMETHAZINE HYDROCHLORIDE AND DEXTROMETHORPHAN HYDROBROMIDE 6.25; 15 MG/5ML; MG/5ML
5 SYRUP ORAL EVERY 8 HOURS PRN
Qty: 180 ML | Refills: 0 | Status: SHIPPED | OUTPATIENT
Start: 2023-03-27 | End: 2023-04-06

## 2023-03-27 RX ORDER — PREDNISONE 20 MG/1
40 TABLET ORAL DAILY
Qty: 10 TABLET | Refills: 0 | Status: SHIPPED | OUTPATIENT
Start: 2023-03-27 | End: 2023-04-01

## 2023-03-27 NOTE — PROGRESS NOTES
"Subjective:       Patient ID: Babatunde Oconnor is a 68 y.o. male.    Vitals:  height is 5' 6" (1.676 m) and weight is 82.6 kg (182 lb). His temperature is 98.5 °F (36.9 °C). His blood pressure is 137/81 and his pulse is 81. His respiration is 16 and oxygen saturation is 95%.     Chief Complaint: Sinus Problem    This is a 68 y.o. male who presents today with a chief complaint of sore throat, mild cough, and runny nose x 5 days.   Pt had direct exposure to Covid. Treated with Nyquil and ibuprofen.      Sinus Problem  This is a new problem. The current episode started in the past 7 days. The problem is unchanged. There has been no fever. His pain is at a severity of 0/10. He is experiencing no pain. Associated symptoms include coughing and a sore throat. Pertinent negatives include no congestion, ear pain, headaches or neck pain. (Runny nose) Treatments tried: Nyquil and ibuprofen.     HENT:  Positive for sore throat. Negative for ear pain and congestion.    Neck: Negative for neck pain.   Respiratory:  Positive for cough.    Neurological:  Negative for headaches.     Objective:      Physical Exam   Constitutional: He is oriented to person, place, and time. He appears well-developed. He is cooperative.  Non-toxic appearance. He does not appear ill. No distress.   HENT:   Head: Normocephalic and atraumatic.   Ears:   Right Ear: Hearing, tympanic membrane, external ear and ear canal normal.   Left Ear: Hearing, tympanic membrane, external ear and ear canal normal.   Nose: Nose normal. No mucosal edema, rhinorrhea or nasal deformity. No epistaxis. Right sinus exhibits no maxillary sinus tenderness and no frontal sinus tenderness. Left sinus exhibits no maxillary sinus tenderness and no frontal sinus tenderness.   Mouth/Throat: Uvula is midline, oropharynx is clear and moist and mucous membranes are normal. No trismus in the jaw. Normal dentition. No uvula swelling. No oropharyngeal exudate, posterior oropharyngeal " edema or posterior oropharyngeal erythema.   Eyes: Conjunctivae and lids are normal. No scleral icterus.   Neck: Trachea normal and phonation normal. Neck supple. No edema present. No erythema present. No neck rigidity present.   Cardiovascular: Normal rate, regular rhythm, normal heart sounds and normal pulses.   Pulmonary/Chest: Effort normal and breath sounds normal. No respiratory distress. He has no decreased breath sounds. He has no rhonchi.   Abdominal: Normal appearance.   Musculoskeletal: Normal range of motion.         General: No deformity. Normal range of motion.   Neurological: He is alert and oriented to person, place, and time. He exhibits normal muscle tone. Coordination normal.   Skin: Skin is warm, dry, intact, not diaphoretic and not pale.   Psychiatric: His speech is normal and behavior is normal. Judgment and thought content normal.   Nursing note and vitals reviewed.      Assessment:       1. Seasonal allergic rhinitis due to pollen    2. Close exposure to COVID-19 virus          Plan:         Seasonal allergic rhinitis due to pollen  -     predniSONE (DELTASONE) 20 MG tablet; Take 2 tablets (40 mg total) by mouth once daily. for 5 days  Dispense: 10 tablet; Refill: 0  -     promethazine-dextromethorphan (PROMETHAZINE-DM) 6.25-15 mg/5 mL Syrp; Take 5 mLs by mouth every 8 (eight) hours as needed.  Dispense: 180 mL; Refill: 0    Close exposure to COVID-19 virus  -     SARS Coronavirus 2 Antigen, POCT Manual Read

## 2023-03-28 ENCOUNTER — PATIENT MESSAGE (OUTPATIENT)
Dept: PRIMARY CARE CLINIC | Facility: CLINIC | Age: 68
End: 2023-03-28
Payer: COMMERCIAL

## 2023-03-28 DIAGNOSIS — N40.0 BENIGN PROSTATIC HYPERPLASIA, UNSPECIFIED WHETHER LOWER URINARY TRACT SYMPTOMS PRESENT: ICD-10-CM

## 2023-03-28 DIAGNOSIS — I10 ESSENTIAL HYPERTENSION: ICD-10-CM

## 2023-03-30 ENCOUNTER — OFFICE VISIT (OUTPATIENT)
Dept: URGENT CARE | Facility: CLINIC | Age: 68
End: 2023-03-30
Payer: COMMERCIAL

## 2023-03-30 VITALS
TEMPERATURE: 98 F | HEIGHT: 66 IN | WEIGHT: 182 LBS | SYSTOLIC BLOOD PRESSURE: 131 MMHG | BODY MASS INDEX: 29.25 KG/M2 | RESPIRATION RATE: 18 BRPM | OXYGEN SATURATION: 98 % | DIASTOLIC BLOOD PRESSURE: 80 MMHG | HEART RATE: 70 BPM

## 2023-03-30 DIAGNOSIS — R05.9 COUGH, UNSPECIFIED TYPE: Primary | ICD-10-CM

## 2023-03-30 DIAGNOSIS — T78.40XA ALLERGY, INITIAL ENCOUNTER: ICD-10-CM

## 2023-03-30 PROCEDURE — 99213 OFFICE O/P EST LOW 20 MIN: CPT | Mod: S$GLB,,, | Performed by: FAMILY MEDICINE

## 2023-03-30 PROCEDURE — 99213 PR OFFICE/OUTPT VISIT, EST, LEVL III, 20-29 MIN: ICD-10-PCS | Mod: S$GLB,,, | Performed by: FAMILY MEDICINE

## 2023-03-30 RX ORDER — PROMETHAZINE HYDROCHLORIDE AND DEXTROMETHORPHAN HYDROBROMIDE 6.25; 15 MG/5ML; MG/5ML
5 SYRUP ORAL EVERY 4 HOURS PRN
Qty: 240 ML | Refills: 0 | Status: SHIPPED | OUTPATIENT
Start: 2023-03-30 | End: 2023-04-09

## 2023-03-30 RX ORDER — MONTELUKAST SODIUM 10 MG/1
10 TABLET ORAL NIGHTLY
Qty: 15 TABLET | Refills: 0 | Status: SHIPPED | OUTPATIENT
Start: 2023-03-30 | End: 2023-04-29

## 2023-03-30 RX ORDER — TAMSULOSIN HYDROCHLORIDE 0.4 MG/1
2 CAPSULE ORAL DAILY
Qty: 180 CAPSULE | Refills: 0 | Status: SHIPPED | OUTPATIENT
Start: 2023-03-30 | End: 2023-06-19 | Stop reason: SDUPTHER

## 2023-03-30 RX ORDER — BISOPROLOL FUMARATE AND HYDROCHLOROTHIAZIDE 5; 6.25 MG/1; MG/1
1 TABLET ORAL DAILY
Qty: 90 TABLET | Refills: 0 | Status: SHIPPED | OUTPATIENT
Start: 2023-03-30 | End: 2023-06-19 | Stop reason: SDUPTHER

## 2023-03-30 NOTE — PROGRESS NOTES
"Subjective:      Patient ID: Babatunde Oconnor is a 68 y.o. male.    Vitals:  height is 5' 6" (1.676 m) and weight is 82.6 kg (182 lb). His oral temperature is 98 °F (36.7 °C). His blood pressure is 131/80 and his pulse is 70. His respiration is 18 and oxygen saturation is 98%.     Chief Complaint: Cough and Chest Congestion    This is a 68 y.o. male who presents today with a chief complaint of  cough and chest congestion x 8 days. Pt  was seen in Urgent care 3/27/23  of rthe same symptoms. Pt state he is talking old medicine clindamycin and want to get some more, and cough medicine    Cough  This is a recurrent problem. The current episode started in the past 7 days. The problem has been unchanged. The problem occurs constantly. The cough is Non-productive. Pertinent negatives include no chest pain, chills, ear congestion, ear pain, fever, headaches, heartburn, hemoptysis, myalgias, nasal congestion, postnasal drip, rash, rhinorrhea, sore throat, shortness of breath, sweats, weight loss or wheezing. Nothing aggravates the symptoms. Risk factors for lung disease include animal exposure. He has tried oral steroids, OTC inhaler and steroid inhaler for the symptoms. The treatment provided no relief. There is no history of asthma, bronchiectasis, bronchitis, COPD, emphysema, environmental allergies or pneumonia.   Constitution: Negative for chills and fever.   HENT:  Negative for ear pain, postnasal drip and sore throat.    Cardiovascular:  Negative for chest pain.   Respiratory:  Positive for cough. Negative for bloody sputum, shortness of breath and wheezing.    Gastrointestinal:  Negative for heartburn.   Musculoskeletal:  Negative for muscle ache.   Skin:  Negative for rash.   Allergic/Immunologic: Negative for environmental allergies.   Neurological:  Negative for headaches.    Objective:     Physical Exam   Constitutional: He is oriented to person, place, and time. He does not appear ill. No distress.   HENT: "   Head: Normocephalic and atraumatic.   Ears:   Right Ear: Tympanic membrane, external ear and ear canal normal.   Left Ear: Tympanic membrane, external ear and ear canal normal.   Nose: Rhinorrhea and congestion present.   Mouth/Throat: Mucous membranes are moist. Oropharynx is clear.   Eyes: Conjunctivae are normal. Pupils are equal, round, and reactive to light. Extraocular movement intact   Neck: Neck supple.   Cardiovascular: Normal rate, regular rhythm, normal heart sounds and normal pulses.   Pulmonary/Chest: Effort normal and breath sounds normal. No respiratory distress.   Abdominal: Normal appearance and bowel sounds are normal. flat abdomen   Musculoskeletal: Normal range of motion.         General: Normal range of motion.   Neurological: no focal deficit. He is oriented to person, place, and time.   Skin: Skin is warm and dry. Capillary refill takes less than 2 seconds. jaundice  Psychiatric: His behavior is normal. Mood, judgment and thought content normal.   Nursing note and vitals reviewed.    Assessment:Plan:   1. Cough, unspecified type  - promethazine-dextromethorphan (PROMETHAZINE-DM) 6.25-15 mg/5 mL Syrp; Take 5 mLs by mouth every 4 (four) hours as needed.  Dispense: 240 mL; Refill: 0    2. Allergy, initial encounter  - montelukast (SINGULAIR) 10 mg tablet; Take 1 tablet (10 mg total) by mouth every evening.  Dispense: 15 tablet; Refill: 0

## 2023-04-29 ENCOUNTER — OFFICE VISIT (OUTPATIENT)
Dept: URGENT CARE | Facility: CLINIC | Age: 68
End: 2023-04-29
Payer: COMMERCIAL

## 2023-04-29 VITALS
OXYGEN SATURATION: 96 % | BODY MASS INDEX: 29.25 KG/M2 | DIASTOLIC BLOOD PRESSURE: 69 MMHG | SYSTOLIC BLOOD PRESSURE: 124 MMHG | TEMPERATURE: 98 F | WEIGHT: 182 LBS | HEIGHT: 66 IN | RESPIRATION RATE: 16 BRPM | HEART RATE: 86 BPM

## 2023-04-29 DIAGNOSIS — T78.40XD ALLERGY, SUBSEQUENT ENCOUNTER: Primary | ICD-10-CM

## 2023-04-29 PROCEDURE — 99213 PR OFFICE/OUTPT VISIT, EST, LEVL III, 20-29 MIN: ICD-10-PCS | Mod: S$GLB,,, | Performed by: NURSE PRACTITIONER

## 2023-04-29 PROCEDURE — 99213 OFFICE O/P EST LOW 20 MIN: CPT | Mod: S$GLB,,, | Performed by: NURSE PRACTITIONER

## 2023-04-29 RX ORDER — AZELASTINE 1 MG/ML
1 SPRAY, METERED NASAL 2 TIMES DAILY
Qty: 30 ML | Refills: 0 | Status: SHIPPED | OUTPATIENT
Start: 2023-04-29 | End: 2023-07-08 | Stop reason: SDUPTHER

## 2023-04-29 RX ORDER — FLUTICASONE PROPIONATE 50 MCG
2 SPRAY, SUSPENSION (ML) NASAL DAILY
Qty: 15.8 ML | Refills: 2 | Status: SHIPPED | OUTPATIENT
Start: 2023-04-29 | End: 2023-05-06

## 2023-04-29 NOTE — PROGRESS NOTES
"Subjective:      Patient ID: Babatunde Oconnor is a 68 y.o. male.    Vitals:  height is 5' 6" (1.676 m) and weight is 82.6 kg (182 lb). His temperature is 98.3 °F (36.8 °C). His blood pressure is 124/69 and his pulse is 86. His respiration is 16 and oxygen saturation is 96%.     Chief Complaint: Cough    This is a 68 y.o. male who presents today with a chief complaint of cough, watery eyes, and sore throat x 5 weeks. Treated with cough drops, OTC cough medicine, and benadryl. Seen 1 month ago in  , treated with oral steroids x 4 days. Only taking benadryl 50 mg for symptoms . Has upcoming appt with PCP in a few weeks        Cough  This is a new problem. The current episode started more than 1 month ago. The problem has been unchanged. The cough is Productive of sputum. Associated symptoms include a sore throat. Pertinent negatives include no chest pain, chills, ear congestion, ear pain, fever, headaches, myalgias, postnasal drip, rash, shortness of breath or wheezing. Associated symptoms comments: Watery eyes. Nothing aggravates the symptoms. He has tried OTC cough suppressant (cough drops and benadryl) for the symptoms. The treatment provided mild relief. There is no history of asthma, bronchitis or COPD.   Constitution: Negative for chills, sweating, fatigue and fever.   HENT:  Positive for sore throat. Negative for ear pain, ear discharge, congestion, postnasal drip, sinus pain, sinus pressure, trouble swallowing and voice change.    Neck: Negative for neck pain and painful lymph nodes.   Cardiovascular:  Negative for chest pain, palpitations and sob on exertion.   Respiratory:  Positive for cough. Negative for chest tightness, sputum production, shortness of breath and wheezing.    Gastrointestinal:  Negative for abdominal pain, nausea, vomiting and diarrhea.   Musculoskeletal:  Negative for muscle ache.   Skin:  Negative for color change, pale and rash.   Allergic/Immunologic: Negative for sneezing. "   Neurological:  Negative for headaches.   Hematologic/Lymphatic: Negative for swollen lymph nodes.    Objective:     Physical Exam   Constitutional: He is oriented to person, place, and time. He appears well-developed. He is cooperative.  Non-toxic appearance. He does not appear ill. No distress.   HENT:   Head: Normocephalic and atraumatic.   Ears:   Right Ear: Hearing, tympanic membrane, external ear and ear canal normal.   Left Ear: Hearing, tympanic membrane, external ear and ear canal normal.   Nose: Nose normal. No mucosal edema, rhinorrhea, nasal deformity or congestion. No epistaxis. Right sinus exhibits no maxillary sinus tenderness and no frontal sinus tenderness. Left sinus exhibits no maxillary sinus tenderness and no frontal sinus tenderness.   Mouth/Throat: Uvula is midline, oropharynx is clear and moist and mucous membranes are normal. Mucous membranes are moist. No trismus in the jaw. Normal dentition. No uvula swelling. No oropharyngeal exudate, posterior oropharyngeal edema or posterior oropharyngeal erythema.   Eyes: Conjunctivae and lids are normal. Right eye exhibits no discharge. Left eye exhibits no discharge. No scleral icterus.   Neck: Trachea normal and phonation normal. Neck supple. No edema present. No erythema present. No neck rigidity present.   Cardiovascular: Normal rate, regular rhythm and normal heart sounds.   Pulmonary/Chest: Effort normal and breath sounds normal. No stridor. No respiratory distress. He has no decreased breath sounds. He has no wheezes. He has no rhonchi. He has no rales. He exhibits no tenderness.   Abdominal: Normal appearance.   Musculoskeletal: Normal range of motion.         General: No deformity. Normal range of motion.      Cervical back: He exhibits no tenderness.   Lymphadenopathy:     He has no cervical adenopathy.   Neurological: He is alert and oriented to person, place, and time. He exhibits normal muscle tone. Coordination normal.   Skin: Skin is  warm, dry, intact, not diaphoretic and not pale.   Psychiatric: His speech is normal and behavior is normal. Judgment and thought content normal.   Nursing note and vitals reviewed.    Assessment:     1. Allergy, subsequent encounter      Symptomatic care, f/u with PCP  Plan:       Allergy, subsequent encounter  -     fluticasone propionate (FLONASE) 50 mcg/actuation nasal spray; 2 sprays (100 mcg total) by Each Nostril route once daily. for 7 days  Dispense: 15.8 mL; Refill: 2  -     azelastine (ASTELIN) 137 mcg (0.1 %) nasal spray; 1 spray (137 mcg total) by Nasal route 2 (two) times daily.  Dispense: 30 mL; Refill: 0                  Patient Instructions   See additional patient Instructions provided    - Rest.    - Drink plenty of fluids.  - Bacterial infections can be treated with oral antibiotics, however, Viral upper respiratory infections will not respond to antibiotics but with simple symptomatic care, typically run their course in 10-14 days.     - Tylenol or Ibuprofen as directed as needed for fever/pain. Avoid tylenol if you have a history of liver disease. Do not take ibuprofen if you have a history of GI bleeding, kidney disease, or if you take blood thinners.     - You can take over-the-counter claritin, zyrtec, allegra, or xyzal as directed. These are antihistamines that can help with runny nose, nasal congestion, sneezing, and helps to dry up post-nasal drip, which usually causes sore throat and cough.   - If you do NOT have high blood pressure, you may use a decongestant form (D)  of this medication (ie. Claritin- D, zyrtec-D, allegra-D) or if you do not take the D form, you can take sudafed (pseudoephedrine) over the counter, which is a decongestant. Do NOT take two decongestant (D) medications at the same time (such as mucinex-D and claritin-D or plain sudafed and claritin D)    - You can use Flonase (fluticasone) nasal spray as directed for sinus congestion and postnasal drip. This is a steroid  nasal spray that works locally over time to decrease the inflammation in your nose/sinuses and help with allergic symptoms. This is not an quick- relief spray like afrin, but it works well if used daily.  Discontinue if you develop nose bleed  - use nasal saline prior to Flonase.  - Use Ocean Spray Nasal Saline 1-3 puffs each nostril every 2-3 hours then blow out onto tissue. This is to irrigate the nasal passage way to clear the sinus openings. Use until sinus problem resolved.    - you can take plain Mucinex (guaifenesin) 1200 mg twice a day to help loosen mucous.     -warm salt water gargles can help with sore throat    - warm tea with honey can help with cough. Honey is a natural cough suppressant.    - Dextromethorphan (DM) is a cough suppressant over the counter (ie. mucinex DM, robitussin, delsym; dayquil/nyquil has DM as well.)    - Follow up with your PCP or specialty clinic as directed in the next 1-2 weeks if not improved or as needed.  You can call [phone] to schedule an appointment with the appropriate provider.      - Go to the ER if you develop new or worsening symptoms.     - You must understand that you have received an Urgent Care treatment only and that you may be released before all of your medical problems are known or treated.   - You, the patient, will arrange for follow up care as instructed.   - If your condition worsens or fails to improve we recommend that you receive another evaluation at the ER immediately or contact your PCP to discuss your concerns or return here.     Patient Instructions   - You must understand that you have received an Urgent Care treatment only and that you may be released before all of your medical problems are known or treated.   - You, the patient, will arrange for follow up care as instructed.   - If your condition worsens or fails to improve we recommend that you receive another evaluation at the ER immediately or contact your PCP to discuss your  concerns or return here.     Advised on return/follow-up precautions. Advised on ER precautions. Answered all patient questions. Patient verbalized understanding and voiced agreement with current treatment plan.

## 2023-04-29 NOTE — PATIENT INSTRUCTIONS
See additional patient Instructions provided    - Rest.    - Drink plenty of fluids.  - Bacterial infections can be treated with oral antibiotics, however, Viral upper respiratory infections will not respond to antibiotics but with simple symptomatic care, typically run their course in 10-14 days.     - Tylenol or Ibuprofen as directed as needed for fever/pain. Avoid tylenol if you have a history of liver disease. Do not take ibuprofen if you have a history of GI bleeding, kidney disease, or if you take blood thinners.     - You can take over-the-counter claritin, zyrtec, allegra, or xyzal as directed. These are antihistamines that can help with runny nose, nasal congestion, sneezing, and helps to dry up post-nasal drip, which usually causes sore throat and cough.   - If you do NOT have high blood pressure, you may use a decongestant form (D)  of this medication (ie. Claritin- D, zyrtec-D, allegra-D) or if you do not take the D form, you can take sudafed (pseudoephedrine) over the counter, which is a decongestant. Do NOT take two decongestant (D) medications at the same time (such as mucinex-D and claritin-D or plain sudafed and claritin D)    - You can use Flonase (fluticasone) nasal spray as directed for sinus congestion and postnasal drip. This is a steroid nasal spray that works locally over time to decrease the inflammation in your nose/sinuses and help with allergic symptoms. This is not an quick- relief spray like afrin, but it works well if used daily.  Discontinue if you develop nose bleed  - use nasal saline prior to Flonase.  - Use Ocean Spray Nasal Saline 1-3 puffs each nostril every 2-3 hours then blow out onto tissue. This is to irrigate the nasal passage way to clear the sinus openings. Use until sinus problem resolved.    - you can take plain Mucinex (guaifenesin) 1200 mg twice a day to help loosen mucous.     -warm salt water gargles can help with sore throat    - warm tea with honey can help with  cough. Honey is a natural cough suppressant.    - Dextromethorphan (DM) is a cough suppressant over the counter (ie. mucinex DM, robitussin, delsym; dayquil/nyquil has DM as well.)    - Follow up with your PCP or specialty clinic as directed in the next 1-2 weeks if not improved or as needed.  You can call [phone] to schedule an appointment with the appropriate provider.      - Go to the ER if you develop new or worsening symptoms.     - You must understand that you have received an Urgent Care treatment only and that you may be released before all of your medical problems are known or treated.   - You, the patient, will arrange for follow up care as instructed.   - If your condition worsens or fails to improve we recommend that you receive another evaluation at the ER immediately or contact your PCP to discuss your concerns or return here.     Patient Instructions   - You must understand that you have received an Urgent Care treatment only and that you may be released before all of your medical problems are known or treated.   - You, the patient, will arrange for follow up care as instructed.   - If your condition worsens or fails to improve we recommend that you receive another evaluation at the ER immediately or contact your PCP to discuss your concerns or return here.     Advised on return/follow-up precautions. Advised on ER precautions. Answered all patient questions. Patient verbalized understanding and voiced agreement with current treatment plan.

## 2023-05-25 NOTE — PROGRESS NOTES
Subjective:      Chief Complaint: Establish Care and Medication Refill    HPI  Mr.Marco Oconnor is a 68-year-old man with chronic musculoskeletal pain (lumbar degenerative disc disease/neuroforaminal stenosis, diffuse arthritis, and facet arthropathy), hypertension/hyperlipidemia (bisoprolol 5/hydrochlorothiazide 6.25 and amlodipine/atorvastatin 10/10), prediabetes, BPH (Flomax 0.8) nonalcoholic fatty liver disease (in combination with formal diagnosis of alcohol use), seasonal allergies, and obstructive sleep apnea (dx in question as no sleep study was ever done; he didn't schedule) presenting as a new patient to establish primary care.     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    Hyperlipidemia     Hypertension     Leg length discrepancy 10/16/2021    R longer, Consider foot eval & may need custom insert in shoe    Lumbar spondylosis 12/13/2019     Past Surgical History:   Procedure Laterality Date    APPENDECTOMY      ARTHROPLASTY OF HIP BY ANTERIOR APPROACH Right 6/29/2022    Procedure: ARTHROPLASTY, HIP, TOTAL, ANTERIOR APPROACH: RIGHT: DEPUY-ACTIS+PINNACLE;  Surgeon: Talon Amaral III, MD;  Location: [facility] OR;  Service: Orthopedics;  Laterality: Right;    CERVICAL DISCECTOMY  2008    CERVICAL SPINE SURGERY      EPIDURAL STEROID INJECTION N/A 8/12/2022    Procedure: Lumbar WING L4-5;  Surgeon: Toño Moy DO;  Location: [facility] OR;  Service: Pain Management;  Laterality: N/A;    INJECTION OF ANESTHETIC  AGENT AROUND MEDIAL BRANCH NERVES INNERVATING LUMBAR FACET JOINT Bilateral 1/7/2020    Procedure: Block-nerve-medial branch-lumbar-- Bilateral (With Steroid);  Surgeon: Vamshi Anderson Jr., MD;  Location: [facility];  Service: Pain Management;  Laterality: Bilateral;  No Sedation    INJECTION OF ANESTHETIC AGENT AROUND MEDIAL BRANCH NERVES INNERVATING LUMBAR FACET JOINT Bilateral 5/14/2020    Procedure: Block-nerve-medial branch-lumbar--Bilateral L4-5, L5-S1 with steriods;  Surgeon: Vamshi Anderson Jr., MD;  Location: [facility];  Service: Pain Management;  Laterality: Bilateral;     Family History   Problem Relation Age of Onset    Diabetes Father      Social History     Socioeconomic History    Marital status:      Spouse name: Ese    Number of children: 1   Occupational History     Comment: teacher -   Tobacco Use    Smoking status: Never    Smokeless tobacco: Never   Substance and Sexual Activity    Alcohol use: Not Currently     Alcohol/week: 4.0 standard drinks     Types: 1 Cans of beer, 3 Shots of liquor per week     Comment: liquor or beer daily    Drug use: No    Sexual activity: Yes     Partners: Female   Social History Narrative    Stairs- 13     Social Determinants of Health     Financial Resource Strain: Low Risk     Difficulty of Paying Living Expenses: Not very hard   Food Insecurity: No Food Insecurity    Worried About Running Out of Food in the Last Year: Never true    Ran Out of Food in the Last Year: Never true   Transportation Needs: No Transportation Needs    Lack of Transportation (Medical): No    Lack of Transportation (Non-Medical): No   Physical Activity: Insufficiently Active    Days of Exercise per Week: 5 days    Minutes of Exercise per Session: 20 min   Stress: No Stress Concern Present    Feeling of Stress : Only a little   Social Connections: Unknown    Frequency of Communication with Friends and Family: More than three times a week    Frequency of Social Gatherings  with Friends and Family: Once a week    Active Member of Clubs or Organizations: No    Attends Club or Organization Meetings: Never    Marital Status:    Housing Stability: Low Risk     Unable to Pay for Housing in the Last Year: No    Number of Places Lived in the Last Year: 1    Unstable Housing in the Last Year: No     Review of patient's allergies indicates:  No Known Allergies  Babatunde Oconnor had no medications administered during this visit.      Review of Systems   Constitutional:  Negative for appetite change, chills and fever.   HENT: Negative.     Respiratory:  Negative for cough, chest tightness and shortness of breath.    Cardiovascular:  Negative for chest pain, palpitations and leg swelling.   Gastrointestinal:  Negative for abdominal distention, abdominal pain, blood in stool, constipation, diarrhea, nausea and vomiting.   Endocrine: Negative.    Genitourinary:  Positive for bladder incontinence. Negative for difficulty urinating, dysuria, frequency and hematuria.   Musculoskeletal: Negative.    Integumentary:  Positive for mole/lesion. Negative.   Neurological: Negative.    Psychiatric/Behavioral: Negative.         Objective:      Vitals:    05/26/23 0829   BP: 122/62   Pulse: 83   Resp: 16   Temp: 97.8 °F (36.6 °C)      Physical Exam  Vitals reviewed.   Constitutional:       General: He is not in acute distress.     Appearance: Normal appearance.   HENT:      Head: Normocephalic and atraumatic.      Comments: Facial features are symmetric      Nose: Nose normal. No congestion or rhinorrhea.      Mouth/Throat:      Mouth: Mucous membranes are moist.      Pharynx: Oropharynx is clear. No oropharyngeal exudate or posterior oropharyngeal erythema.   Eyes:      General: No scleral icterus.     Extraocular Movements: Extraocular movements intact.      Conjunctiva/sclera: Conjunctivae normal.   Cardiovascular:      Rate and Rhythm: Normal rate and regular rhythm.      Pulses: Normal pulses.       Heart sounds: Normal heart sounds.   Pulmonary:      Effort: Pulmonary effort is normal. No respiratory distress.      Breath sounds: Normal breath sounds.   Musculoskeletal:         General: No deformity or signs of injury. Normal range of motion.      Cervical back: Normal range of motion.      Comments: Gait normal    Skin:     General: Skin is warm and dry.      Findings: No rash.   Neurological:      General: No focal deficit present.      Mental Status: He is alert and oriented to person, place, and time. Mental status is at baseline.   Psychiatric:         Mood and Affect: Mood normal.         Behavior: Behavior normal.         Thought Content: Thought content normal.     Current Outpatient Medications on File Prior to Visit   Medication Sig Dispense Refill    amlodipine-atorvastatin (CADUET) 10-10 mg per tablet TAKE 1 TABLET BY MOUTH EVERY DAY 90 tablet 0    bisoprolol-hydrochlorothiazide 5-6.25 mg (ZIAC) 5-6.25 mg Tab Take 1 tablet by mouth once daily. 90 tablet 0    tamsulosin (FLOMAX) 0.4 mg Cap Take 2 capsules (0.8 mg total) by mouth once daily. 180 capsule 0    aspirin (ECOTRIN) 81 MG EC tablet Take 1 tablet (81 mg total) by mouth 2 (two) times daily. 60 tablet 0    azelastine (ASTELIN) 137 mcg (0.1 %) nasal spray 1 spray (137 mcg total) by Nasal route 2 (two) times daily. 30 mL 0     No current facility-administered medications on file prior to visit.         Assessment:       1. Physical exam, annual    2. Urinary incontinence, unspecified type    3. Skin lesions    4. Screening for colorectal cancer        Plan:       Physical exam, annual  -     CBC Auto Differential; Future; Expected date: 05/26/2023  -     Comprehensive Metabolic Panel; Future; Expected date: 05/26/2023  -     Hemoglobin A1C; Future; Expected date: 05/26/2023  -     Lipid Panel; Future; Expected date: 05/26/2023  -     PSA, Screening; Future; Expected date: 05/26/2023  -     T4; Future; Expected date: 05/26/2023  -     TSH;  Future; Expected date: 05/26/2023  -     Vitamin D; Future; Expected date: 05/26/2023   - annual screening labs ordered     Urinary incontinence, unspecified type  -     Ambulatory referral/consult to Urology; Future; Expected date: 06/02/2023   - referral generated for more formal evaluation/treatment of reported urinary incontinence episodes; recommendations and interventions appreciated       Skin lesions  -     Ambulatory referral/consult to Dermatology; Future; Expected date: 06/02/2023   - referral generated for more formal (head to toe) skin exam; recommendations and interventions appreciated     Screening for colorectal cancer  -     Cologuard Screening (Multitarget Stool DNA); Future; Expected date: 05/26/2023

## 2023-05-26 ENCOUNTER — OFFICE VISIT (OUTPATIENT)
Dept: INTERNAL MEDICINE | Facility: CLINIC | Age: 68
End: 2023-05-26
Payer: COMMERCIAL

## 2023-05-26 VITALS
BODY MASS INDEX: 29.34 KG/M2 | DIASTOLIC BLOOD PRESSURE: 62 MMHG | RESPIRATION RATE: 16 BRPM | HEART RATE: 83 BPM | WEIGHT: 182.56 LBS | HEIGHT: 66 IN | TEMPERATURE: 98 F | SYSTOLIC BLOOD PRESSURE: 122 MMHG | OXYGEN SATURATION: 99 %

## 2023-05-26 DIAGNOSIS — Z12.11 SCREENING FOR COLORECTAL CANCER: ICD-10-CM

## 2023-05-26 DIAGNOSIS — Z00.00 PHYSICAL EXAM, ANNUAL: Primary | ICD-10-CM

## 2023-05-26 DIAGNOSIS — L98.9 SKIN LESIONS: ICD-10-CM

## 2023-05-26 DIAGNOSIS — R32 URINARY INCONTINENCE, UNSPECIFIED TYPE: ICD-10-CM

## 2023-05-26 DIAGNOSIS — Z12.12 SCREENING FOR COLORECTAL CANCER: ICD-10-CM

## 2023-05-26 PROCEDURE — 3288F PR FALLS RISK ASSESSMENT DOCUMENTED: ICD-10-PCS | Mod: CPTII,S$GLB,, | Performed by: STUDENT IN AN ORGANIZED HEALTH CARE EDUCATION/TRAINING PROGRAM

## 2023-05-26 PROCEDURE — 3008F PR BODY MASS INDEX (BMI) DOCUMENTED: ICD-10-PCS | Mod: CPTII,S$GLB,, | Performed by: STUDENT IN AN ORGANIZED HEALTH CARE EDUCATION/TRAINING PROGRAM

## 2023-05-26 PROCEDURE — 99397 PR PREVENTIVE VISIT,EST,65 & OVER: ICD-10-PCS | Mod: S$GLB,,, | Performed by: STUDENT IN AN ORGANIZED HEALTH CARE EDUCATION/TRAINING PROGRAM

## 2023-05-26 PROCEDURE — 1159F MED LIST DOCD IN RCRD: CPT | Mod: CPTII,S$GLB,, | Performed by: STUDENT IN AN ORGANIZED HEALTH CARE EDUCATION/TRAINING PROGRAM

## 2023-05-26 PROCEDURE — 3074F SYST BP LT 130 MM HG: CPT | Mod: CPTII,S$GLB,, | Performed by: STUDENT IN AN ORGANIZED HEALTH CARE EDUCATION/TRAINING PROGRAM

## 2023-05-26 PROCEDURE — 99999 PR PBB SHADOW E&M-EST. PATIENT-LVL IV: CPT | Mod: PBBFAC,,, | Performed by: STUDENT IN AN ORGANIZED HEALTH CARE EDUCATION/TRAINING PROGRAM

## 2023-05-26 PROCEDURE — 3288F FALL RISK ASSESSMENT DOCD: CPT | Mod: CPTII,S$GLB,, | Performed by: STUDENT IN AN ORGANIZED HEALTH CARE EDUCATION/TRAINING PROGRAM

## 2023-05-26 PROCEDURE — 99999 PR PBB SHADOW E&M-EST. PATIENT-LVL IV: ICD-10-PCS | Mod: PBBFAC,,, | Performed by: STUDENT IN AN ORGANIZED HEALTH CARE EDUCATION/TRAINING PROGRAM

## 2023-05-26 PROCEDURE — 3008F BODY MASS INDEX DOCD: CPT | Mod: CPTII,S$GLB,, | Performed by: STUDENT IN AN ORGANIZED HEALTH CARE EDUCATION/TRAINING PROGRAM

## 2023-05-26 PROCEDURE — 1126F AMNT PAIN NOTED NONE PRSNT: CPT | Mod: CPTII,S$GLB,, | Performed by: STUDENT IN AN ORGANIZED HEALTH CARE EDUCATION/TRAINING PROGRAM

## 2023-05-26 PROCEDURE — 99397 PER PM REEVAL EST PAT 65+ YR: CPT | Mod: S$GLB,,, | Performed by: STUDENT IN AN ORGANIZED HEALTH CARE EDUCATION/TRAINING PROGRAM

## 2023-05-26 PROCEDURE — 3078F PR MOST RECENT DIASTOLIC BLOOD PRESSURE < 80 MM HG: ICD-10-PCS | Mod: CPTII,S$GLB,, | Performed by: STUDENT IN AN ORGANIZED HEALTH CARE EDUCATION/TRAINING PROGRAM

## 2023-05-26 PROCEDURE — 1159F PR MEDICATION LIST DOCUMENTED IN MEDICAL RECORD: ICD-10-PCS | Mod: CPTII,S$GLB,, | Performed by: STUDENT IN AN ORGANIZED HEALTH CARE EDUCATION/TRAINING PROGRAM

## 2023-05-26 PROCEDURE — 1101F PR PT FALLS ASSESS DOC 0-1 FALLS W/OUT INJ PAST YR: ICD-10-PCS | Mod: CPTII,S$GLB,, | Performed by: STUDENT IN AN ORGANIZED HEALTH CARE EDUCATION/TRAINING PROGRAM

## 2023-05-26 PROCEDURE — 3078F DIAST BP <80 MM HG: CPT | Mod: CPTII,S$GLB,, | Performed by: STUDENT IN AN ORGANIZED HEALTH CARE EDUCATION/TRAINING PROGRAM

## 2023-05-26 PROCEDURE — 1101F PT FALLS ASSESS-DOCD LE1/YR: CPT | Mod: CPTII,S$GLB,, | Performed by: STUDENT IN AN ORGANIZED HEALTH CARE EDUCATION/TRAINING PROGRAM

## 2023-05-26 PROCEDURE — 3074F PR MOST RECENT SYSTOLIC BLOOD PRESSURE < 130 MM HG: ICD-10-PCS | Mod: CPTII,S$GLB,, | Performed by: STUDENT IN AN ORGANIZED HEALTH CARE EDUCATION/TRAINING PROGRAM

## 2023-05-26 PROCEDURE — 1126F PR PAIN SEVERITY QUANTIFIED, NO PAIN PRESENT: ICD-10-PCS | Mod: CPTII,S$GLB,, | Performed by: STUDENT IN AN ORGANIZED HEALTH CARE EDUCATION/TRAINING PROGRAM

## 2023-05-29 ENCOUNTER — OFFICE VISIT (OUTPATIENT)
Dept: UROLOGY | Facility: CLINIC | Age: 68
End: 2023-05-29
Payer: COMMERCIAL

## 2023-05-29 VITALS
HEART RATE: 81 BPM | DIASTOLIC BLOOD PRESSURE: 86 MMHG | HEIGHT: 66 IN | SYSTOLIC BLOOD PRESSURE: 139 MMHG | BODY MASS INDEX: 29.37 KG/M2 | WEIGHT: 182.75 LBS

## 2023-05-29 DIAGNOSIS — N41.9 INFLAMMATORY DISEASE OF PROSTATE, UNSPECIFIED: Primary | ICD-10-CM

## 2023-05-29 DIAGNOSIS — R32 URINARY INCONTINENCE, UNSPECIFIED TYPE: ICD-10-CM

## 2023-05-29 DIAGNOSIS — R39.9 LOWER URINARY TRACT SYMPTOMS: ICD-10-CM

## 2023-05-29 PROCEDURE — 1159F MED LIST DOCD IN RCRD: CPT | Mod: CPTII,S$GLB,, | Performed by: UROLOGY

## 2023-05-29 PROCEDURE — 1101F PR PT FALLS ASSESS DOC 0-1 FALLS W/OUT INJ PAST YR: ICD-10-PCS | Mod: CPTII,S$GLB,, | Performed by: UROLOGY

## 2023-05-29 PROCEDURE — 3079F PR MOST RECENT DIASTOLIC BLOOD PRESSURE 80-89 MM HG: ICD-10-PCS | Mod: CPTII,S$GLB,, | Performed by: UROLOGY

## 2023-05-29 PROCEDURE — 3075F PR MOST RECENT SYSTOLIC BLOOD PRESS GE 130-139MM HG: ICD-10-PCS | Mod: CPTII,S$GLB,, | Performed by: UROLOGY

## 2023-05-29 PROCEDURE — 99999 PR PBB SHADOW E&M-EST. PATIENT-LVL III: ICD-10-PCS | Mod: PBBFAC,,, | Performed by: UROLOGY

## 2023-05-29 PROCEDURE — 1159F PR MEDICATION LIST DOCUMENTED IN MEDICAL RECORD: ICD-10-PCS | Mod: CPTII,S$GLB,, | Performed by: UROLOGY

## 2023-05-29 PROCEDURE — 3288F PR FALLS RISK ASSESSMENT DOCUMENTED: ICD-10-PCS | Mod: CPTII,S$GLB,, | Performed by: UROLOGY

## 2023-05-29 PROCEDURE — 3008F PR BODY MASS INDEX (BMI) DOCUMENTED: ICD-10-PCS | Mod: CPTII,S$GLB,, | Performed by: UROLOGY

## 2023-05-29 PROCEDURE — 1126F AMNT PAIN NOTED NONE PRSNT: CPT | Mod: CPTII,S$GLB,, | Performed by: UROLOGY

## 2023-05-29 PROCEDURE — 3079F DIAST BP 80-89 MM HG: CPT | Mod: CPTII,S$GLB,, | Performed by: UROLOGY

## 2023-05-29 PROCEDURE — 1126F PR PAIN SEVERITY QUANTIFIED, NO PAIN PRESENT: ICD-10-PCS | Mod: CPTII,S$GLB,, | Performed by: UROLOGY

## 2023-05-29 PROCEDURE — 99204 PR OFFICE/OUTPT VISIT, NEW, LEVL IV, 45-59 MIN: ICD-10-PCS | Mod: S$GLB,,, | Performed by: UROLOGY

## 2023-05-29 PROCEDURE — 3288F FALL RISK ASSESSMENT DOCD: CPT | Mod: CPTII,S$GLB,, | Performed by: UROLOGY

## 2023-05-29 PROCEDURE — 3008F BODY MASS INDEX DOCD: CPT | Mod: CPTII,S$GLB,, | Performed by: UROLOGY

## 2023-05-29 PROCEDURE — 3075F SYST BP GE 130 - 139MM HG: CPT | Mod: CPTII,S$GLB,, | Performed by: UROLOGY

## 2023-05-29 PROCEDURE — 99204 OFFICE O/P NEW MOD 45 MIN: CPT | Mod: S$GLB,,, | Performed by: UROLOGY

## 2023-05-29 PROCEDURE — 1160F RVW MEDS BY RX/DR IN RCRD: CPT | Mod: CPTII,S$GLB,, | Performed by: UROLOGY

## 2023-05-29 PROCEDURE — 1101F PT FALLS ASSESS-DOCD LE1/YR: CPT | Mod: CPTII,S$GLB,, | Performed by: UROLOGY

## 2023-05-29 PROCEDURE — 1160F PR REVIEW ALL MEDS BY PRESCRIBER/CLIN PHARMACIST DOCUMENTED: ICD-10-PCS | Mod: CPTII,S$GLB,, | Performed by: UROLOGY

## 2023-05-29 PROCEDURE — 99999 PR PBB SHADOW E&M-EST. PATIENT-LVL III: CPT | Mod: PBBFAC,,, | Performed by: UROLOGY

## 2023-05-29 NOTE — PROGRESS NOTES
[facility] - Urology   Clinic Note    SUBJECTIVE:     Chief Complaint   Patient presents with    Other     Urinary Incontinence        Referral from: Adeel Moreau MD.    History of Present Illness:  Babatunde Oconnor is a 68 y.o. male who presents to clinic for LUTS.    Patient here for evaluation of lower urinary tract symptoms.  He reports incomplete emptying, frequency, urgency, and nocturia twice per night.  He also reports bothersome incontinence.  Denies urgency with his incontinence and reports that he often has no sensation with his incontinence.  He did have lumbar spinal surgery in the past but reports this problem predates that.  He also takes tamsulosin and reports minimal relief.  Additionally he does endorse pelvic pain and discomfort but denies any UTIs.    Patient endorses no additional complaints at this time.    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    Hyperlipidemia     Hypertension     Leg length discrepancy 10/16/2021    R longer, Consider foot eval & may need custom insert in shoe    Lumbar spondylosis 12/13/2019       Past Surgical History:   Procedure Laterality Date    APPENDECTOMY      ARTHROPLASTY OF HIP BY ANTERIOR APPROACH Right 6/29/2022    Procedure: ARTHROPLASTY, HIP, TOTAL, ANTERIOR APPROACH: RIGHT: DEPUY-ACTIS+PINNACLE;  Surgeon: Talon Amaral III, MD;  Location: [facility] OR;  Service: Orthopedics;  Laterality: Right;    CERVICAL DISCECTOMY  2008    CERVICAL SPINE SURGERY      EPIDURAL STEROID INJECTION N/A 8/12/2022    Procedure: Lumbar WING L4-5;  Surgeon: Toño Moy DO;  Location: [facility] OR;  Service: Pain Management;  Laterality: N/A;    INJECTION OF ANESTHETIC AGENT AROUND MEDIAL BRANCH NERVES INNERVATING LUMBAR FACET JOINT Bilateral 1/7/2020    Procedure: Block-nerve-medial branch-lumbar-- Bilateral (With Steroid);  Surgeon: Vamshi OLSON  "Monica Weber MD;  Location: [facility];  Service: Pain Management;  Laterality: Bilateral;  No Sedation    INJECTION OF ANESTHETIC AGENT AROUND MEDIAL BRANCH NERVES INNERVATING LUMBAR FACET JOINT Bilateral 5/14/2020    Procedure: Block-nerve-medial branch-lumbar--Bilateral L4-5, L5-S1 with steriods;  Surgeon: Vamshi Anderson Jr., MD;  Location: [facility];  Service: Pain Management;  Laterality: Bilateral;       Family History   Problem Relation Age of Onset    Diabetes Father        Social History     Tobacco Use    Smoking status: Never    Smokeless tobacco: Never   Substance Use Topics    Alcohol use: Not Currently     Alcohol/week: 4.0 standard drinks     Types: 1 Cans of beer, 3 Shots of liquor per week     Comment: liquor or beer daily    Drug use: No       Current Outpatient Medications on File Prior to Visit   Medication Sig Dispense Refill    amlodipine-atorvastatin (CADUET) 10-10 mg per tablet TAKE 1 TABLET BY MOUTH EVERY DAY 90 tablet 0    bisoprolol-hydrochlorothiazide 5-6.25 mg (ZIAC) 5-6.25 mg Tab Take 1 tablet by mouth once daily. 90 tablet 0    tamsulosin (FLOMAX) 0.4 mg Cap Take 2 capsules (0.8 mg total) by mouth once daily. 180 capsule 0    aspirin (ECOTRIN) 81 MG EC tablet Take 1 tablet (81 mg total) by mouth 2 (two) times daily. 60 tablet 0    azelastine (ASTELIN) 137 mcg (0.1 %) nasal spray 1 spray (137 mcg total) by Nasal route 2 (two) times daily. 30 mL 0     No current facility-administered medications on file prior to visit.       Review of patient's allergies indicates:  No Known Allergies    Review of Systems:  A review of 10+ systems was conducted with pertinent positive and negative findings documented in HPI with all other systems reviewed and negative.    OBJECTIVE:     Estimated body mass index is 29.5 kg/m² as calculated from the following:    Height as of this encounter: 5' 6" (1.676 m).    Weight as of this encounter: 82.9 kg (182 lb 12.2 oz).    Vital Signs (Most " Recent)  Vitals:    05/29/23 1325   BP: 139/86   Pulse: 81       Physical Exam:  GENERAL: patient sitting comfortably  HEENT: normocephalic  NECK: supple, no JVD  PULM: normal chest rise, no increased WOB  HEART: non-diaphoretic  ABDO: soft, nondistended, nontender  BACK: no CVA tenderness bilaterally  SKIN: warm, dry, well perfused  EXT: no bruising or edema  NEURO: grossly normal with no focal deficits  PSYCH: appropriate mood and affect    Genitourinary Exam:  deferred    Lab Results   Component Value Date    BUN 11 11/19/2022    CREATININE 0.7 11/19/2022    WBC 11.35 11/19/2022    HGB 12.9 (L) 11/19/2022    HCT 38.8 (L) 11/19/2022     11/19/2022    AST 27 11/03/2022    ALT 28 11/03/2022    ALKPHOS 40 (L) 11/03/2022    ALBUMIN 4.5 11/03/2022    HGBA1C 5.5 06/16/2021    INR 1.0 11/03/2022        Imaging:  I have personally reviewed all relevant imaging studies.    No results found for this or any previous visit (from the past 2160 hour(s)).  No results found for this or any previous visit (from the past 2160 hour(s)).  X-Ray Lumbar Spine AP And Lateral  Narrative: EXAMINATION:  XR LUMBAR SPINE AP AND LATERAL    CLINICAL HISTORY:  Other intervertebral disc degeneration, lumbar region    TECHNIQUE:  AP, lateral and spot images were performed of the lumbar spine.    COMPARISON:  December 19, 2022    FINDINGS:  No acute fractures, preserved vertebral body heights.  Reconfirmed multilevel lower thoracic and lumbar vertebral body anterolateral end plate osteophytes.  Stable findings of bilateral hardware instrumented fusion L4 and L5 segments and inter disc hardware-spacer.  No findings hardware malfunction.  Stable grade 1 anterolisthesis L4 on L5.  Stable disc narrowing and vacuum phenomenon L3-L4.  Stable disc narrowing L5-S1.  Elsewhere, preserved disc spaces.  Some scattered facet arthropathic changes.  Lumbar spine findings do not appear significantly changed to earlier exam.  Stable partially visualized  right total hip arthroplasty procedure.  Stable left hip joint space osteoarthritic changes.  Impression: As above    Electronically signed by: Nazario Pascal  Date:    02/20/2023  Time:    15:24       PSA:  Lab Results   Component Value Date    PSA 0.42 12/10/2019    PSA 0.53 09/07/2018       Testosterone:  No results found for: TOTALTESTOST, TOTALTESTOST, TESTOSTERONE     ASSESSMENT     1. Inflammatory disease of prostate, unspecified    2. Urinary incontinence, unspecified type    3. Lower urinary tract symptoms        PLAN:     LUTS  Urinary incontinence  Prostatitis     - We discussed the diagnosis and typical etiologies of voiding dysfunction and lower urinary tract symptoms. We discussed the usual workup and next steps. We discussed behavioral and lifestyle modifications that could improve his symptoms. We dicussed the potential benefit of pelvic floor physical therapy. We discussed the utility of imaging studies, TRUS, cystoscopy, AUA symptom score, and urodynamic studies. We also discussed medications that could be targeted to treat both obstructive and irritative symptoms. Risks, benefits, and all alternative treatment options were discussed. Questions were answered.    - After discussion, we will plan for a flow PVR, cystoscopy, and CT scan to evaluate his prostatitis and prostate size.    Sid Samuel MD  Urology  [facility]  [facility]    Disclaimer: This note has been generated using voice-recognition software. There may be typographical errors that have been missed during proof-reading.

## 2023-05-30 ENCOUNTER — LAB VISIT (OUTPATIENT)
Dept: LAB | Facility: HOSPITAL | Age: 68
End: 2023-05-30
Attending: STUDENT IN AN ORGANIZED HEALTH CARE EDUCATION/TRAINING PROGRAM
Payer: COMMERCIAL

## 2023-05-30 DIAGNOSIS — Z00.00 PHYSICAL EXAM, ANNUAL: ICD-10-CM

## 2023-05-30 DIAGNOSIS — N41.9 INFLAMMATORY DISEASE OF PROSTATE, UNSPECIFIED: ICD-10-CM

## 2023-05-30 LAB
25(OH)D3+25(OH)D2 SERPL-MCNC: 14 NG/ML (ref 30–96)
ALBUMIN SERPL BCP-MCNC: 4.3 G/DL (ref 3.5–5.2)
ALP SERPL-CCNC: 58 U/L (ref 55–135)
ALT SERPL W/O P-5'-P-CCNC: 27 U/L (ref 10–44)
ANION GAP SERPL CALC-SCNC: 11 MMOL/L (ref 8–16)
AST SERPL-CCNC: 23 U/L (ref 10–40)
BASOPHILS # BLD AUTO: 0.04 K/UL (ref 0–0.2)
BASOPHILS NFR BLD: 0.7 % (ref 0–1.9)
BILIRUB SERPL-MCNC: 0.4 MG/DL (ref 0.1–1)
BILIRUB UR QL STRIP: NEGATIVE
BUN SERPL-MCNC: 9 MG/DL (ref 8–23)
CALCIUM SERPL-MCNC: 9.5 MG/DL (ref 8.7–10.5)
CHLORIDE SERPL-SCNC: 105 MMOL/L (ref 95–110)
CHOLEST SERPL-MCNC: 213 MG/DL (ref 120–199)
CHOLEST/HDLC SERPL: 2.8 {RATIO} (ref 2–5)
CLARITY UR REFRACT.AUTO: CLEAR
CO2 SERPL-SCNC: 25 MMOL/L (ref 23–29)
COLOR UR AUTO: YELLOW
COMPLEXED PSA SERPL-MCNC: 0.49 NG/ML (ref 0–4)
CREAT SERPL-MCNC: 0.8 MG/DL (ref 0.5–1.4)
DIFFERENTIAL METHOD: NORMAL
EOSINOPHIL # BLD AUTO: 0.1 K/UL (ref 0–0.5)
EOSINOPHIL NFR BLD: 1.7 % (ref 0–8)
ERYTHROCYTE [DISTWIDTH] IN BLOOD BY AUTOMATED COUNT: 13.7 % (ref 11.5–14.5)
EST. GFR  (NO RACE VARIABLE): >60 ML/MIN/1.73 M^2
ESTIMATED AVG GLUCOSE: 108 MG/DL (ref 68–131)
GLUCOSE SERPL-MCNC: 115 MG/DL (ref 70–110)
GLUCOSE UR QL STRIP: NEGATIVE
HBA1C MFR BLD: 5.4 % (ref 4–5.6)
HCT VFR BLD AUTO: 44.3 % (ref 40–54)
HDLC SERPL-MCNC: 77 MG/DL (ref 40–75)
HDLC SERPL: 36.2 % (ref 20–50)
HGB BLD-MCNC: 14.7 G/DL (ref 14–18)
HGB UR QL STRIP: NEGATIVE
IMM GRANULOCYTES # BLD AUTO: 0.01 K/UL (ref 0–0.04)
IMM GRANULOCYTES NFR BLD AUTO: 0.2 % (ref 0–0.5)
KETONES UR QL STRIP: NEGATIVE
LDLC SERPL CALC-MCNC: 110.8 MG/DL (ref 63–159)
LEUKOCYTE ESTERASE UR QL STRIP: NEGATIVE
LYMPHOCYTES # BLD AUTO: 1.5 K/UL (ref 1–4.8)
LYMPHOCYTES NFR BLD: 24.2 % (ref 18–48)
MCH RBC QN AUTO: 29.8 PG (ref 27–31)
MCHC RBC AUTO-ENTMCNC: 33.2 G/DL (ref 32–36)
MCV RBC AUTO: 90 FL (ref 82–98)
MICROSCOPIC COMMENT: NORMAL
MONOCYTES # BLD AUTO: 0.4 K/UL (ref 0.3–1)
MONOCYTES NFR BLD: 6.8 % (ref 4–15)
NEUTROPHILS # BLD AUTO: 4 K/UL (ref 1.8–7.7)
NEUTROPHILS NFR BLD: 66.4 % (ref 38–73)
NITRITE UR QL STRIP: NEGATIVE
NONHDLC SERPL-MCNC: 136 MG/DL
NRBC BLD-RTO: 0 /100 WBC
PH UR STRIP: 7 [PH] (ref 5–8)
PLATELET # BLD AUTO: 311 K/UL (ref 150–450)
PMV BLD AUTO: 10.5 FL (ref 9.2–12.9)
POTASSIUM SERPL-SCNC: 4.3 MMOL/L (ref 3.5–5.1)
PROT SERPL-MCNC: 7.5 G/DL (ref 6–8.4)
PROT UR QL STRIP: NEGATIVE
RBC # BLD AUTO: 4.94 M/UL (ref 4.6–6.2)
RBC #/AREA URNS AUTO: 0 /HPF (ref 0–4)
SODIUM SERPL-SCNC: 141 MMOL/L (ref 136–145)
SP GR UR STRIP: 1.01 (ref 1–1.03)
T4 SERPL-MCNC: 5.4 UG/DL (ref 4.5–11.5)
TRIGL SERPL-MCNC: 126 MG/DL (ref 30–150)
TSH SERPL DL<=0.005 MIU/L-ACNC: 1.72 UIU/ML (ref 0.4–4)
URN SPEC COLLECT METH UR: NORMAL
WBC # BLD AUTO: 6.04 K/UL (ref 3.9–12.7)
WBC #/AREA URNS AUTO: 0 /HPF (ref 0–5)

## 2023-05-30 PROCEDURE — 80053 COMPREHEN METABOLIC PANEL: CPT | Performed by: STUDENT IN AN ORGANIZED HEALTH CARE EDUCATION/TRAINING PROGRAM

## 2023-05-30 PROCEDURE — 81001 URINALYSIS AUTO W/SCOPE: CPT | Performed by: UROLOGY

## 2023-05-30 PROCEDURE — 84436 ASSAY OF TOTAL THYROXINE: CPT | Performed by: STUDENT IN AN ORGANIZED HEALTH CARE EDUCATION/TRAINING PROGRAM

## 2023-05-30 PROCEDURE — 82306 VITAMIN D 25 HYDROXY: CPT | Performed by: STUDENT IN AN ORGANIZED HEALTH CARE EDUCATION/TRAINING PROGRAM

## 2023-05-30 PROCEDURE — 80061 LIPID PANEL: CPT | Performed by: STUDENT IN AN ORGANIZED HEALTH CARE EDUCATION/TRAINING PROGRAM

## 2023-05-30 PROCEDURE — 85025 COMPLETE CBC W/AUTO DIFF WBC: CPT | Performed by: STUDENT IN AN ORGANIZED HEALTH CARE EDUCATION/TRAINING PROGRAM

## 2023-05-30 PROCEDURE — 84153 ASSAY OF PSA TOTAL: CPT | Performed by: STUDENT IN AN ORGANIZED HEALTH CARE EDUCATION/TRAINING PROGRAM

## 2023-05-30 PROCEDURE — 84443 ASSAY THYROID STIM HORMONE: CPT | Performed by: STUDENT IN AN ORGANIZED HEALTH CARE EDUCATION/TRAINING PROGRAM

## 2023-05-30 PROCEDURE — 83036 HEMOGLOBIN GLYCOSYLATED A1C: CPT | Performed by: STUDENT IN AN ORGANIZED HEALTH CARE EDUCATION/TRAINING PROGRAM

## 2023-05-30 PROCEDURE — 36415 COLL VENOUS BLD VENIPUNCTURE: CPT | Mod: PO | Performed by: STUDENT IN AN ORGANIZED HEALTH CARE EDUCATION/TRAINING PROGRAM

## 2023-06-01 ENCOUNTER — HOSPITAL ENCOUNTER (OUTPATIENT)
Dept: RADIOLOGY | Facility: HOSPITAL | Age: 68
Discharge: HOME | End: 2023-06-01
Attending: UROLOGY
Payer: COMMERCIAL

## 2023-06-01 DIAGNOSIS — N41.9 INFLAMMATORY DISEASE OF PROSTATE, UNSPECIFIED: ICD-10-CM

## 2023-06-01 PROCEDURE — 72192 CT PELVIS W/O DYE: CPT | Mod: 26,,, | Performed by: RADIOLOGY

## 2023-06-01 PROCEDURE — 72192 CT PELVIS W/O DYE: CPT | Mod: TC

## 2023-06-01 PROCEDURE — 72192 CT PELVIS WITHOUT CONTRAST: ICD-10-PCS | Mod: 26,,, | Performed by: RADIOLOGY

## 2023-06-15 ENCOUNTER — PATIENT MESSAGE (OUTPATIENT)
Dept: ADMINISTRATIVE | Facility: HOSPITAL | Age: 68
End: 2023-06-15
Payer: COMMERCIAL

## 2023-06-16 LAB — NONINV COLON CA DNA+OCC BLD SCRN STL QL: NEGATIVE

## 2023-06-19 ENCOUNTER — PATIENT MESSAGE (OUTPATIENT)
Dept: INTERNAL MEDICINE | Facility: CLINIC | Age: 68
End: 2023-06-19
Payer: COMMERCIAL

## 2023-06-19 DIAGNOSIS — I10 ESSENTIAL HYPERTENSION: ICD-10-CM

## 2023-06-19 DIAGNOSIS — E78.5 HYPERLIPIDEMIA, UNSPECIFIED HYPERLIPIDEMIA TYPE: ICD-10-CM

## 2023-06-19 DIAGNOSIS — N40.0 BENIGN PROSTATIC HYPERPLASIA, UNSPECIFIED WHETHER LOWER URINARY TRACT SYMPTOMS PRESENT: ICD-10-CM

## 2023-06-19 NOTE — TELEPHONE ENCOUNTER
No care due was identified.  Health [facility] Embedded Care Due Messages. Reference number: [id number].   6/19/2023 4:28:00 PM CDT

## 2023-06-20 RX ORDER — AMLODIPINE BESYLATE AND ATORVASTATIN CALCIUM 10; 10 MG/1; MG/1
1 TABLET, FILM COATED ORAL DAILY
Qty: 90 TABLET | Refills: 2 | Status: SHIPPED | OUTPATIENT
Start: 2023-06-20 | End: 2023-12-02 | Stop reason: SDUPTHER

## 2023-06-20 RX ORDER — TAMSULOSIN HYDROCHLORIDE 0.4 MG/1
2 CAPSULE ORAL DAILY
Qty: 180 CAPSULE | Refills: 2 | Status: SHIPPED | OUTPATIENT
Start: 2023-06-20 | End: 2023-12-02 | Stop reason: SDUPTHER

## 2023-06-20 RX ORDER — BISOPROLOL FUMARATE AND HYDROCHLOROTHIAZIDE 5; 6.25 MG/1; MG/1
1 TABLET ORAL DAILY
Qty: 90 TABLET | Refills: 2 | Status: SHIPPED | OUTPATIENT
Start: 2023-06-20 | End: 2023-12-02 | Stop reason: SDUPTHER

## 2023-06-29 ENCOUNTER — OFFICE VISIT (OUTPATIENT)
Dept: URGENT CARE | Facility: CLINIC | Age: 68
End: 2023-06-29
Payer: COMMERCIAL

## 2023-06-29 VITALS
HEART RATE: 84 BPM | WEIGHT: 182 LBS | TEMPERATURE: 99 F | SYSTOLIC BLOOD PRESSURE: 145 MMHG | DIASTOLIC BLOOD PRESSURE: 91 MMHG | OXYGEN SATURATION: 96 % | HEIGHT: 66 IN | RESPIRATION RATE: 18 BRPM | BODY MASS INDEX: 29.25 KG/M2

## 2023-06-29 DIAGNOSIS — H65.193 ACUTE EFFUSION OF BOTH MIDDLE EARS: ICD-10-CM

## 2023-06-29 DIAGNOSIS — J06.9 VIRAL URI WITH COUGH: Primary | ICD-10-CM

## 2023-06-29 PROBLEM — I10 ESSENTIAL HYPERTENSION: Status: ACTIVE | Noted: 2018-05-03

## 2023-06-29 LAB
CTP QC/QA: YES
SARS-COV-2 AG RESP QL IA.RAPID: NEGATIVE

## 2023-06-29 PROCEDURE — 99213 PR OFFICE/OUTPT VISIT, EST, LEVL III, 20-29 MIN: ICD-10-PCS | Mod: S$GLB,,, | Performed by: PHYSICIAN ASSISTANT

## 2023-06-29 PROCEDURE — 87811 SARS-COV-2 COVID19 W/OPTIC: CPT | Mod: QW,S$GLB,, | Performed by: PHYSICIAN ASSISTANT

## 2023-06-29 PROCEDURE — 87811 SARS CORONAVIRUS 2 ANTIGEN POCT, MANUAL READ: ICD-10-PCS | Mod: QW,S$GLB,, | Performed by: PHYSICIAN ASSISTANT

## 2023-06-29 PROCEDURE — 99213 OFFICE O/P EST LOW 20 MIN: CPT | Mod: S$GLB,,, | Performed by: PHYSICIAN ASSISTANT

## 2023-06-29 RX ORDER — METHYLPREDNISOLONE 4 MG/1
TABLET ORAL
Qty: 21 EACH | Refills: 0 | Status: SHIPPED | OUTPATIENT
Start: 2023-06-29 | End: 2023-07-20

## 2023-06-29 NOTE — PATIENT INSTRUCTIONS
Take steroids with food and as directed.  This can cause increased energy insomnia, increased hunger, increased blood pressure, increased blood sugars and increased anxiety.  Use Tylenol as needed for fevers and aches.  Take with food.  Use nasal sprays as needed.  This can linger for 7-10 days.  Rest her voice.  Use your home cough medicine as we discussed.  Follow-up as needed.

## 2023-06-29 NOTE — PROGRESS NOTES
"Subjective:      Patient ID: Babatunde Oconnor is a 68 y.o. male.    Vitals:  height is 5' 6" (1.676 m) and weight is 82.6 kg (182 lb). His oral temperature is 98.6 °F (37 °C). His blood pressure is 145/91 (abnormal) and his pulse is 84. His respiration is 18 and oxygen saturation is 96%.     Chief Complaint: Hoarse    67 y/o male presents for hoarseness of voice accompanied by a production cough, sore throat, nasal congestion, and post-nasal drip. Symptoms onset 6/25/23 while visiting [place]. He was seen at an Urgent Care in [place] and prescribed a course of amoxicillin and given a cortisone shot. Completed amoxicillin. Notes recurrent sinus problems over the past several months and improvement in the past to symptoms with flonase and azelastine.     Cough  This is a new problem. The current episode started 1 to 4 weeks ago. The problem has been gradually worsening. The problem occurs every few minutes. The cough is Productive of purulent sputum. Associated symptoms include myalgias, nasal congestion, postnasal drip, rhinorrhea and a sore throat. Pertinent negatives include no chest pain, chills, ear congestion, ear pain, fever, headaches, heartburn, hemoptysis, rash, shortness of breath, sweats, weight loss or wheezing. Associated symptoms comments: Positive for: hoarseness, sinus pressure, fatigue. Treatments tried: course of amoxicillin and cortisone injection. The treatment provided mild relief. There is no history of asthma, bronchiectasis, bronchitis, COPD, emphysema, environmental allergies or pneumonia.     Constitution: Negative for chills, sweating, fatigue and fever.   HENT:  Positive for postnasal drip and sore throat. Negative for ear pain, ear discharge, tinnitus, hearing loss, dental problem, drooling, mouth sores, tongue pain, tongue lesion, facial swelling, congestion, sinus pain, sinus pressure, trouble swallowing and voice change.    Neck: Negative for neck pain, neck stiffness and painful " lymph nodes.   Cardiovascular:  Negative for chest pain and sob on exertion.   Eyes:  Negative for eye discharge and eye itching.   Respiratory:  Positive for cough. Negative for chest tightness, sputum production, bloody sputum, shortness of breath and wheezing.    Gastrointestinal:  Negative for abdominal pain, nausea, vomiting, constipation, diarrhea and heartburn.   Musculoskeletal:  Positive for muscle ache. Negative for pain and muscle cramps.   Skin:  Negative for color change, pale and rash.   Allergic/Immunologic: Negative for environmental allergies.   Neurological:  Negative for dizziness, headaches, disorientation and altered mental status.   Hematologic/Lymphatic: Negative for swollen lymph nodes.   Psychiatric/Behavioral:  Negative for altered mental status and disorientation.    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    Hyperlipidemia     Hypertension     Leg length discrepancy 10/16/2021    R longer, Consider foot eval & may need custom insert in shoe    Lumbar spondylosis 12/13/2019       Past Surgical History:   Procedure Laterality Date    APPENDECTOMY      ARTHROPLASTY OF HIP BY ANTERIOR APPROACH Right 6/29/2022    Procedure: ARTHROPLASTY, HIP, TOTAL, ANTERIOR APPROACH: RIGHT: DEPUY-ACTIS+PINNACLE;  Surgeon: Talon Amaral III, MD;  Location: [facility] OR;  Service: Orthopedics;  Laterality: Right;    CERVICAL DISCECTOMY  2008    CERVICAL SPINE SURGERY      EPIDURAL STEROID INJECTION N/A 8/12/2022    Procedure: Lumbar WING L4-5;  Surgeon: Toño Moy DO;  Location: [facility] OR;  Service: Pain Management;  Laterality: N/A;    INJECTION OF ANESTHETIC AGENT AROUND MEDIAL BRANCH NERVES INNERVATING LUMBAR FACET JOINT Bilateral 1/7/2020    Procedure: Block-nerve-medial branch-lumbar-- Bilateral (With Steroid);  Surgeon: Vamshi Anderson Jr., MD;  [facility];   Service: Pain Management;  Laterality: Bilateral;  No Sedation    INJECTION OF ANESTHETIC AGENT AROUND MEDIAL BRANCH NERVES INNERVATING LUMBAR FACET JOINT Bilateral 5/14/2020    Procedure: Block-nerve-medial branch-lumbar--Bilateral L4-5, L5-S1 with steriods;  Surgeon: Vamshi Anderson Jr., MD;  Location: [facility];  Service: Pain Management;  Laterality: Bilateral;       Family History   Problem Relation Age of Onset    Diabetes Father        Social History     Socioeconomic History    Marital status:      Spouse name: Ese    Number of children: 1   Occupational History     Comment: teacher -   Tobacco Use    Smoking status: Never    Smokeless tobacco: Never   Substance and Sexual Activity    Alcohol use: Not Currently     Alcohol/week: 4.0 standard drinks     Types: 1 Cans of beer, 3 Shots of liquor per week     Comment: liquor or beer daily    Drug use: No    Sexual activity: Yes     Partners: Female   Social History Narrative    Stairs- 13     Social Determinants of Health     Financial Resource Strain: Low Risk     Difficulty of Paying Living Expenses: Not very hard   Food Insecurity: No Food Insecurity    Worried About Running Out of Food in the Last Year: Never true    Ran Out of Food in the Last Year: Never true   Transportation Needs: No Transportation Needs    Lack of Transportation (Medical): No    Lack of Transportation (Non-Medical): No   Physical Activity: Insufficiently Active    Days of Exercise per Week: 5 days    Minutes of Exercise per Session: 20 min   Stress: No Stress Concern Present    Feeling of Stress : Only a little   Social Connections: Unknown    Frequency of Communication with Friends and Family: More than three times a week    Frequency of Social Gatherings with Friends and Family: Once a week    Active Member of Clubs or Organizations: No    Attends Club or Organization Meetings: Never    Marital Status:    Housing Stability: Low Risk     Unable to Pay for Housing  in the Last Year: No    Number of Places Lived in the Last Year: 1    Unstable Housing in the Last Year: No       Current Outpatient Medications   Medication Sig Dispense Refill    amlodipine-atorvastatin (CADUET) 10-10 mg per tablet Take 1 tablet by mouth once daily. 90 tablet 2    bisoprolol-hydrochlorothiazide 5-6.25 mg (ZIAC) 5-6.25 mg Tab Take 1 tablet by mouth once daily. 90 tablet 2    tamsulosin (FLOMAX) 0.4 mg Cap Take 2 capsules (0.8 mg total) by mouth once daily. 180 capsule 2    aspirin (ECOTRIN) 81 MG EC tablet Take 1 tablet (81 mg total) by mouth 2 (two) times daily. 60 tablet 0    azelastine (ASTELIN) 137 mcg (0.1 %) nasal spray 1 spray (137 mcg total) by Nasal route 2 (two) times daily. 30 mL 0     No current facility-administered medications for this visit.       Review of patient's allergies indicates:  No Known Allergies     Objective:     Physical Exam   Constitutional: He is oriented to person, place, and time. He appears well-developed. He is cooperative.  Non-toxic appearance. He does not appear ill. No distress. normal  HENT:   Head: Normocephalic and atraumatic.   Ears:   Right Ear: Hearing, external ear and ear canal normal. Tympanic membrane is not injected, not erythematous, not retracted and not bulging. A middle ear effusion is present. impacted cerumen  Left Ear: Hearing, external ear and ear canal normal. Tympanic membrane is not injected, not erythematous, not retracted and not bulging. A middle ear effusion is present. impacted cerumen  Nose: Rhinorrhea present. No mucosal edema, nasal deformity or congestion. No epistaxis. Right sinus exhibits no maxillary sinus tenderness and no frontal sinus tenderness. Left sinus exhibits no maxillary sinus tenderness and no frontal sinus tenderness.   Mouth/Throat: Uvula is midline, oropharynx is clear and moist and mucous membranes are normal. No trismus in the jaw. Normal dentition. No uvula swelling. No oropharyngeal exudate, posterior  oropharyngeal edema or posterior oropharyngeal erythema.   Eyes: Conjunctivae and lids are normal. Right eye exhibits no discharge. Left eye exhibits no discharge. No scleral icterus.   Neck: Trachea normal and phonation normal. Neck supple. No edema present. No erythema present. No neck rigidity present.   Cardiovascular: Normal rate, regular rhythm, normal heart sounds and normal pulses.   No murmur heard.Exam reveals no gallop.   Pulmonary/Chest: Effort normal and breath sounds normal. No stridor. No respiratory distress. He has no decreased breath sounds. He has no wheezes. He has no rhonchi. He has no rales.   Abdominal: Normal appearance.   Musculoskeletal: Normal range of motion.         General: No deformity. Normal range of motion.      Cervical back: He exhibits no tenderness.   Lymphadenopathy:     He has no cervical adenopathy.   Neurological: no focal deficit. He is alert and oriented to person, place, and time. He displays no weakness. He exhibits normal muscle tone. Coordination normal.   Skin: Skin is warm, dry, intact, not diaphoretic, not pale and no rash.   Psychiatric: His speech is normal and behavior is normal. Mood, judgment and thought content normal.   Nursing note and vitals reviewed.  Results for orders placed or performed in visit on 06/29/23   SARS Coronavirus 2 Antigen, POCT Manual Read   Result Value Ref Range    SARS Coronavirus 2 Antigen Negative Negative     Acceptable Yes        Assessment:     1. Viral URI with cough    2. Acute effusion of both middle ears        Plan:       Viral URI with cough  -     SARS Coronavirus 2 Antigen, POCT Manual Read    Acute effusion of both middle ears    Results reviewed    Patient Instructions   Take steroids with food and as directed.  This can cause increased energy insomnia, increased hunger, increased blood pressure, increased blood sugars and increased anxiety.  Use Tylenol as needed for fevers and aches.  Take with food.  Use  nasal sprays as needed.  This can linger for 7-10 days.  Rest her voice.  Use your home cough medicine as we discussed.  Follow-up as needed.

## 2023-07-03 ENCOUNTER — PROCEDURE VISIT (OUTPATIENT)
Dept: UROLOGY | Facility: CLINIC | Age: 68
End: 2023-07-03
Payer: COMMERCIAL

## 2023-07-03 VITALS
WEIGHT: 184.06 LBS | HEIGHT: 66 IN | SYSTOLIC BLOOD PRESSURE: 129 MMHG | BODY MASS INDEX: 29.58 KG/M2 | HEART RATE: 90 BPM | DIASTOLIC BLOOD PRESSURE: 77 MMHG

## 2023-07-03 DIAGNOSIS — R39.9 LOWER URINARY TRACT SYMPTOMS: ICD-10-CM

## 2023-07-03 DIAGNOSIS — N39.43 POST-VOID DRIBBLING: Primary | ICD-10-CM

## 2023-07-03 LAB — POC RESIDUAL URINE VOLUME: 9 ML (ref 0–100)

## 2023-07-03 PROCEDURE — 99214 OFFICE O/P EST MOD 30 MIN: CPT | Mod: 25,S$GLB,, | Performed by: UROLOGY

## 2023-07-03 PROCEDURE — 51798 POCT BLADDER SCAN: ICD-10-PCS | Mod: S$GLB,,, | Performed by: UROLOGY

## 2023-07-03 PROCEDURE — 52000 CYSTOURETHROSCOPY: CPT | Mod: S$GLB,,, | Performed by: UROLOGY

## 2023-07-03 PROCEDURE — 51798 US URINE CAPACITY MEASURE: CPT | Mod: S$GLB,,, | Performed by: UROLOGY

## 2023-07-03 PROCEDURE — 99214 PR OFFICE/OUTPT VISIT, EST, LEVL IV, 30-39 MIN: ICD-10-PCS | Mod: 25,S$GLB,, | Performed by: UROLOGY

## 2023-07-03 PROCEDURE — 52000 PR CYSTOURETHROSCOPY: ICD-10-PCS | Mod: S$GLB,,, | Performed by: UROLOGY

## 2023-07-03 NOTE — PROCEDURES
Procedures    Cystoscopy Procedure Note    Procedure:   Flexible cysto-uretheroscopy    Pre Procedure Diagnosis:  Post void dribbling    Post Procedure Diagnosis:  same    Surgeon: Sid Samuel MD     Anesthesia: 2% uro-jet lidocaine jelly for local analgesia    Procedure note:  A flexible cysto-urethroscopy was performed after consent was obtained.  The risks and benefits were explained. 2% lidocaine urojet was used for local analgesia. The genitalia was prepped and draped in the sterile fashion.     The flexible scope was advanced into the urethra and into the bladder. There There were no urethral strictures noted throughout the course of the urethra. during scope passage.     The bladder was completely surveyed in a systematic fashion. The bilateral ureteral orifices were identified in their normal orthotopic locations with efflux noted bilaterally. The remained of the bladder was evaluated. No bladder tumors or lesions suspicious for malignancy were seen.     Upon retroflexion there was no significant median lobe enlargement. As the flexible cystoscope was being withdrawn, the prostate was evaluated carefully. The lateral lobes of the prostate were not significantly enlarged.     The patient tolerated the procedure well without complication.     Sid Samuel MD  Urology  [facility]

## 2023-07-03 NOTE — PROGRESS NOTES
[facility] - Urology   Clinic Note    SUBJECTIVE:     Chief Complaint   Patient presents with    Other     cysto       Referral from: Sid Samuel MD.    History of Present Illness:  Babatunde Oconnor is a 68 y.o. male who presents to clinic for LUTS.    Patient here for evaluation of lower urinary tract symptoms.  He reports incomplete emptying, frequency, urgency, and nocturia twice per night.  He also reports bothersome incontinence.  Denies urgency with his incontinence and reports that he often has no sensation with his incontinence.  He did have lumbar spinal surgery in the past but reports this problem predates that.  He also takes tamsulosin and reports minimal relief.  Additionally he does endorse pelvic pain and discomfort but denies any UTIs.    07/03/2023  Here for cystoscopy. Post void dribbling has improved.    Patient endorses no additional complaints at this time.    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    Hyperlipidemia     Hypertension     Leg length discrepancy 10/16/2021    R longer, Consider foot eval & may need custom insert in shoe    Lumbar spondylosis 12/13/2019       Past Surgical History:   Procedure Laterality Date    APPENDECTOMY      ARTHROPLASTY OF HIP BY ANTERIOR APPROACH Right 6/29/2022    Procedure: ARTHROPLASTY, HIP, TOTAL, ANTERIOR APPROACH: RIGHT: DEPUY-ACTIS+PINNACLE;  Surgeon: Talon Amaral III, MD;  Location: [facility] OR;  Service: Orthopedics;  Laterality: Right;    CERVICAL DISCECTOMY  2008    CERVICAL SPINE SURGERY      EPIDURAL STEROID INJECTION N/A 8/12/2022    Procedure: Lumbar WING L4-5;  Surgeon: Toño Moy DO;  Location: [facility] OR;  Service: Pain Management;  Laterality: N/A;    INJECTION OF ANESTHETIC AGENT AROUND MEDIAL BRANCH NERVES INNERVATING LUMBAR FACET JOINT Bilateral 1/7/2020    Procedure: Block-nerve-medial branch-lumbar--  Bilateral (With Steroid);  Surgeon: Vamshi Anderson Jr., MD;  Location: [facility] PAIN MGT;  Service: Pain Management;  Laterality: Bilateral;  No Sedation    INJECTION OF ANESTHETIC AGENT AROUND MEDIAL BRANCH NERVES INNERVATING LUMBAR FACET JOINT Bilateral 5/14/2020    Procedure: Block-nerve-medial branch-lumbar--Bilateral L4-5, L5-S1 with steriods;  Surgeon: Vamshi Anderson Jr., MD;  Location: [facility] PAIN [facility]GT;  Service: Pain Management;  Laterality: Bilateral;       Family History   Problem Relation Age of Onset    Diabetes Father        Social History     Tobacco Use    Smoking status: Never    Smokeless tobacco: Never   Substance Use Topics    Alcohol use: Not Currently     Alcohol/week: 4.0 standard drinks     Types: 1 Cans of beer, 3 Shots of liquor per week     Comment: liquor or beer daily    Drug use: No       Current Outpatient Medications on File Prior to Visit   Medication Sig Dispense Refill    amlodipine-atorvastatin (CADUET) 10-10 mg per tablet Take 1 tablet by mouth once daily. 90 tablet 2    bisoprolol-hydrochlorothiazide 5-6.25 mg (ZIAC) 5-6.25 mg Tab Take 1 tablet by mouth once daily. 90 tablet 2    methylPREDNISolone (MEDROL DOSEPACK) 4 mg tablet use as directed 21 each 0    tamsulosin (FLOMAX) 0.4 mg Cap Take 2 capsules (0.8 mg total) by mouth once daily. 180 capsule 2    aspirin (ECOTRIN) 81 MG EC tablet Take 1 tablet (81 mg total) by mouth 2 (two) times daily. 60 tablet 0    azelastine (ASTELIN) 137 mcg (0.1 %) nasal spray 1 spray (137 mcg total) by Nasal route 2 (two) times daily. 30 mL 0     No current facility-administered medications on file prior to visit.       Review of patient's allergies indicates:  No Known Allergies    Review of Systems:  A review of 10+ systems was conducted with pertinent positive and negative findings documented in HPI with all other systems reviewed and negative.    OBJECTIVE:     Estimated body mass index is 29.71 kg/m² as calculated from the following:    Height  "as of this encounter: 5' 6" (1.676 m).    Weight as of this encounter: 83.5 kg (184 lb 1.4 oz).    Vital Signs (Most Recent)  Vitals:    07/03/23 0838   BP: 129/77   Pulse: 90       Physical Exam:  GENERAL: patient sitting comfortably  HEENT: normocephalic  NECK: supple, no JVD  PULM: normal chest rise, no increased WOB  HEART: non-diaphoretic  ABDO: soft, nondistended, nontender  BACK: no CVA tenderness bilaterally  SKIN: warm, dry, well perfused  EXT: no bruising or edema  NEURO: grossly normal with no focal deficits  PSYCH: appropriate mood and affect    Genitourinary Exam:  deferred    Lab Results   Component Value Date    BUN 9 05/30/2023    CREATININE 0.8 05/30/2023    WBC 6.04 05/30/2023    HGB 14.7 05/30/2023    HCT 44.3 05/30/2023     05/30/2023    AST 23 05/30/2023    ALT 27 05/30/2023    ALKPHOS 58 05/30/2023    ALBUMIN 4.3 05/30/2023    HGBA1C 5.4 05/30/2023    INR 1.0 11/03/2022        Imaging:  I have personally reviewed all relevant imaging studies.    Results for orders placed or performed during the hospital encounter of 06/01/23 (from the past 2160 hour(s))   CT Pelvis Without Contrast    Narrative    EXAMINATION:  CT PELVIS WITHOUT CONTRAST    CLINICAL HISTORY:  Prostatitis;Prostatitis, evaluate size of prostate; Inflammatory disease of prostate, unspecified    TECHNIQUE:  Low dose axial images, sagittal and coronal reformations were obtained from the iliac crests to the pubic symphysis.  Oral and IV contrast were not administered.    COMPARISON:  None    FINDINGS:  Bowel/Mesentery: No evidence of bowel obstruction or inflammatory process. Colonic diverticulosis.    Reproductive: Prostate is partially obscured due to streak artifact from right hip arthroplasty.  Borderline enlargement of the prostate measuring 4.7 x 3.0 cm in axial dimensions.  Height of the prostate is difficult to adequately measure given streak artifact..  Dystrophic calcifications in the prostate.    Urinary: " Circumferential bladder wall thickening possibly due to nondistention.  Left renal cyst.    Vasculature: No abdominal aortic aneurysm.  Mild atherosclerotic calcification.    Bones: Right hip arthroplasty.  No acute fracture or bony destructive process.    Extraperitoneal soft tissues: Fat containing right inguinal hernia.    No pelvic free fluid.      Impression    Borderline enlargement of the prostate as above with limited assessment given streak artifact from right hip arthroplasty.    Mild circumferential bladder wall thickening of the bladder, possibly due to nondistention.    Additional findings as above.    Electronically signed by resident: Aston Ulloa  Date:    06/01/2023  Time:    16:10    Electronically signed by: Nicola Bruce Jr  Date:    06/01/2023  Time:    17:06     No results found for this or any previous visit (from the past 2160 hour(s)).  CT Pelvis Without Contrast  Narrative: EXAMINATION:  CT PELVIS WITHOUT CONTRAST    CLINICAL HISTORY:  Prostatitis;Prostatitis, evaluate size of prostate; Inflammatory disease of prostate, unspecified    TECHNIQUE:  Low dose axial images, sagittal and coronal reformations were obtained from the iliac crests to the pubic symphysis.  Oral and IV contrast were not administered.    COMPARISON:  None    FINDINGS:  Bowel/Mesentery: No evidence of bowel obstruction or inflammatory process. Colonic diverticulosis.    Reproductive: Prostate is partially obscured due to streak artifact from right hip arthroplasty.  Borderline enlargement of the prostate measuring 4.7 x 3.0 cm in axial dimensions.  Height of the prostate is difficult to adequately measure given streak artifact..  Dystrophic calcifications in the prostate.    Urinary: Circumferential bladder wall thickening possibly due to nondistention.  Left renal cyst.    Vasculature: No abdominal aortic aneurysm.  Mild atherosclerotic calcification.    Bones: Right hip arthroplasty.  No acute fracture or  bony destructive process.    Extraperitoneal soft tissues: Fat containing right inguinal hernia.    No pelvic free fluid.  Impression: Borderline enlargement of the prostate as above with limited assessment given streak artifact from right hip arthroplasty.    Mild circumferential bladder wall thickening of the bladder, possibly due to nondistention.    Additional findings as above.    Electronically signed by resident: Aston Ulloa  Date:    06/01/2023  Time:    16:10    Electronically signed by: Nicola Bruce Jr  Date:    06/01/2023  Time:    17:06       PSA:  Lab Results   Component Value Date    PSA 0.49 05/30/2023    PSA 0.42 12/10/2019    PSA 0.53 09/07/2018       Testosterone:  No results found for: TOTALTESTOST, TOTALTESTOST, TESTOSTERONE     ASSESSMENT     1. Lower urinary tract symptoms        PLAN:     LUTS  Post Void dribbling    - Prostate with no significant enlargement on cystoscopy. Patient denies any urgency or urge related incontinence. Patient with more of what sounds to be post void dribbling    - Post-micturition dribbling has typically been considered part of the aging process, and in the absence of other bothersome lower urinary tract symptoms, it does not require further workup.    - Patient encouraged to shake well after voiding.  Patient can also perform a bulbar urethral massage by compressing his perineum to assist with clearing his urethra.  He can also compressed the penile urethra.    - Will observe. Can consider UDS if symptoms worsen. Follow up PRN. This is a separate encounter from the procedure visit.    Sid Samuel MD  Urology  [facility]    Disclaimer: This note has been generated using voice-recognition software. There may be typographical errors that have been missed during proof-reading.

## 2023-07-08 ENCOUNTER — OFFICE VISIT (OUTPATIENT)
Dept: URGENT CARE | Facility: CLINIC | Age: 68
End: 2023-07-08
Payer: COMMERCIAL

## 2023-07-08 VITALS
BODY MASS INDEX: 29.57 KG/M2 | SYSTOLIC BLOOD PRESSURE: 135 MMHG | TEMPERATURE: 99 F | WEIGHT: 184 LBS | HEIGHT: 66 IN | DIASTOLIC BLOOD PRESSURE: 83 MMHG | HEART RATE: 87 BPM | RESPIRATION RATE: 16 BRPM

## 2023-07-08 DIAGNOSIS — T78.40XD ALLERGY, SUBSEQUENT ENCOUNTER: ICD-10-CM

## 2023-07-08 DIAGNOSIS — J30.1 SEASONAL ALLERGIC RHINITIS DUE TO POLLEN: Primary | ICD-10-CM

## 2023-07-08 PROCEDURE — 99213 PR OFFICE/OUTPT VISIT, EST, LEVL III, 20-29 MIN: ICD-10-PCS | Mod: S$GLB,,, | Performed by: FAMILY MEDICINE

## 2023-07-08 PROCEDURE — 99213 OFFICE O/P EST LOW 20 MIN: CPT | Mod: S$GLB,,, | Performed by: FAMILY MEDICINE

## 2023-07-08 RX ORDER — PREDNISONE 20 MG/1
20 TABLET ORAL DAILY
Qty: 5 TABLET | Refills: 0 | Status: SHIPPED | OUTPATIENT
Start: 2023-07-08 | End: 2023-07-13

## 2023-07-08 RX ORDER — AZELASTINE 1 MG/ML
1 SPRAY, METERED NASAL 2 TIMES DAILY
Qty: 30 ML | Refills: 0 | Status: SHIPPED | OUTPATIENT
Start: 2023-07-08

## 2023-07-08 NOTE — PROGRESS NOTES
"Subjective:      Patient ID: Babatunde Oconnor is a 68 y.o. male.    Vitals:  height is 5' 6" (1.676 m) and weight is 83.5 kg (184 lb). His oral temperature is 98.6 °F (37 °C). His blood pressure is 135/83 and his pulse is 87. His respiration is 16.     Chief Complaint: Sore Throat and Sinus Problem    Pt is coming in today for possible sinus infection, watery eyes and sore throat. Traveled out of town this past week Sunday he woke up with swollen glands. He was seen on 7/3 tested negative for COVID for the same symptoms. Has been taking tylenol cold/flu for relief.     Sore Throat   This is a recurrent problem. The current episode started yesterday. The problem has been gradually worsening. Neither side of throat is experiencing more pain than the other. There has been no fever. The pain is at a severity of 1/10. The pain is moderate. Associated symptoms include coughing, headaches, a hoarse voice, a plugged ear sensation, swollen glands and trouble swallowing. Treatments tried: tylenol cold/flu. The treatment provided no relief.   Sinus Problem  This is a recurrent problem. The current episode started yesterday. The problem has been gradually worsening since onset. There has been no fever. His pain is at a severity of 1/10. The pain is moderate. Associated symptoms include coughing, headaches, a hoarse voice, sinus pressure, a sore throat and swollen glands. Past treatments include saline nose sprays (tylenol cold/flu). The treatment provided no relief.     HENT:  Positive for sinus pressure, sore throat and trouble swallowing.    Respiratory:  Positive for cough.    Neurological:  Positive for headaches.    Objective:     Physical Exam   Constitutional: He is oriented to person, place, and time. He appears well-developed. He is cooperative.  Non-toxic appearance. He does not appear ill. No distress.   HENT:   Head: Normocephalic and atraumatic.   Ears:   Right Ear: Hearing, tympanic membrane and external ear " normal.   Left Ear: Hearing, tympanic membrane and external ear normal.   Nose: Nose normal. No mucosal edema, rhinorrhea or nasal deformity. No epistaxis. Right sinus exhibits no maxillary sinus tenderness and no frontal sinus tenderness. Left sinus exhibits no maxillary sinus tenderness and no frontal sinus tenderness.   Mouth/Throat: Uvula is midline and mucous membranes are normal. No trismus in the jaw. Normal dentition. No uvula swelling. Posterior oropharyngeal erythema present. No oropharyngeal exudate or posterior oropharyngeal edema.   Eyes: Conjunctivae and lids are normal. No scleral icterus.   Neck: Trachea normal and phonation normal. Neck supple. No edema present. No erythema present. No neck rigidity present.   Cardiovascular: Normal rate, regular rhythm, normal heart sounds and normal pulses.   Pulmonary/Chest: Effort normal and breath sounds normal. No respiratory distress. He has no decreased breath sounds. He has no rhonchi.   Abdominal: Normal appearance.   Musculoskeletal: Normal range of motion.         General: No deformity. Normal range of motion.   Neurological: He is alert and oriented to person, place, and time. He exhibits normal muscle tone. Coordination normal.   Skin: Skin is warm, dry, intact, not diaphoretic and not pale.   Psychiatric: His speech is normal and behavior is normal. Judgment and thought content normal.   Nursing note and vitals reviewed.    Assessment:     1. Seasonal allergic rhinitis due to pollen    2. Allergy, subsequent encounter        Plan:       Seasonal allergic rhinitis due to pollen  -     predniSONE (DELTASONE) 20 MG tablet; Take 1 tablet (20 mg total) by mouth once daily. for 5 days  Dispense: 5 tablet; Refill: 0    Allergy, subsequent encounter  -     azelastine (ASTELIN) 137 mcg (0.1 %) nasal spray; 1 spray (137 mcg total) by Nasal route 2 (two) times daily.  Dispense: 30 mL; Refill: 0

## 2023-07-31 NOTE — PROGRESS NOTES
"Subjective:       Patient ID: Babatunde Oconnor is a 68 y.o. male.    Chief Complaint: Sore Throat and Cough    HPI  Mr. Oconnor is a 68 year old male here today with c/o cough and dysphonia for 5 months. He reports this is a new problem this year. He reports recurrent episodes of cough/chest congestion associated with runny nose and sore throat. He denies any sneezing/sinus pressure/nasal obstruction. He reports previously having increased productive phelgm in his throat which describes "green in color." He reports in the past going to the urgent care and being treated with oral antibiotics, prednisone and Astelin with mild relief. He reports having dysphonia in March and was unable to speak. He is a teacher. He reports having to clear his throat often during the day. He reports using throat lozenges with menthol. He reports his cough occurs evenly during the day and night. He denies any difficulty swallowing, post nasal drip, SOB, fever, or heartburn. He reports has a couple of drinks weekly or daily?. He is a nonsmoker. He denies any allergy testing. He reports a cervical surgery 12 years ago and right hip replacement last year.  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    Hyperlipidemia     Hypertension     Leg length discrepancy 10/16/2021    R longer, Consider foot eval & may need custom insert in shoe    Lumbar spondylosis 12/13/2019     Past Surgical History:   Procedure Laterality Date    APPENDECTOMY      ARTHROPLASTY OF HIP BY ANTERIOR APPROACH Right 6/29/2022    Procedure: ARTHROPLASTY, HIP, TOTAL, ANTERIOR APPROACH: RIGHT: DEPUY-ACTIS+PINNACLE;  Surgeon: Talon Amaral III, MD;  Location: [facility];  Service: Orthopedics;  Laterality: Right;    CERVICAL DISCECTOMY  2008    CERVICAL SPINE SURGERY      EPIDURAL STEROID INJECTION N/A 8/12/2022    Procedure: Lumbar WING L4-5;  Surgeon: " Toño Moy DO;  Location: [facility] OR;  Service: Pain Management;  Laterality: N/A;    INJECTION OF ANESTHETIC AGENT AROUND MEDIAL BRANCH NERVES INNERVATING LUMBAR FACET JOINT Bilateral 1/7/2020    Procedure: Block-nerve-medial branch-lumbar-- Bilateral (With Steroid);  Surgeon: Vamshi Anderson Jr., MD;  Location: [facility] PAIN [facility]GT;  Service: Pain Management;  Laterality: Bilateral;  No Sedation    INJECTION OF ANESTHETIC AGENT AROUND MEDIAL BRANCH NERVES INNERVATING LUMBAR FACET JOINT Bilateral 5/14/2020    Procedure: Block-nerve-medial branch-lumbar--Bilateral L4-5, L5-S1 with steriods;  Surgeon: Vamshi Anderson Jr., MD;  Location: [facility] PAIN [facility]GT;  Service: Pain Management;  Laterality: Bilateral;     Family History   Problem Relation Age of Onset    Diabetes Father      Social History  Social History     Tobacco Use    Smoking status: Never    Smokeless tobacco: Never   Substance Use Topics    Alcohol use: Not Currently     Alcohol/week: 4.0 standard drinks of alcohol     Types: 1 Cans of beer, 3 Shots of liquor per week     Comment: liquor or beer daily    Drug use: No       Review of Systems     Constitutional: Negative for appetite change, chills, fatigue, fever and unexpected weight loss.      HENT: Positive for runny nose, sore throat and voice change.  Negative for ear discharge, ear infection, ear pain, hearing loss, postnasal drip, ringing in the ears, sinus infection, sinus pressure, stuffy nose and trouble swallowing.      Eyes:  Positive for eye itching.     Respiratory:  Positive for cough. Negative for shortness of breath.      Cardiovascular:  Negative for chest pain, foot swelling, irregular heartbeat and swollen veins.     Gastrointestinal:  Negative for abdominal pain, acid reflux, constipation, diarrhea, heartburn and vomiting.     Genitourinary: Negative for difficulty urinating, sexual problems and frequent urination.     Musc: Negative for aching joints, aching muscles, back pain and  neck pain.     Skin: Positive for rash.     Allergy: Negative for seasonal allergies.     Endocrine: Negative for cold intolerance and heat intolerance.      Neurological: Negative for dizziness, headaches, light-headedness, seizures and tremors.      Hematologic: Negative for bruises/bleeds easily and swollen glands.      Psychiatric: Negative for decreased concentration, depression, nervous/anxious and sleep disturbance.                Objective:      Physical Exam  Vitals reviewed.   Constitutional:       General: He is not in acute distress.     Appearance: Normal appearance. He is not ill-appearing.   HENT:      Head: Normocephalic and atraumatic.      Jaw: No trismus, tenderness, swelling, pain on movement or malocclusion.      Salivary Glands: Right salivary gland is not diffusely enlarged or tender. Left salivary gland is not diffusely enlarged or tender.      Right Ear: Hearing, tympanic membrane, ear canal and external ear normal. No decreased hearing noted. No laceration, drainage, swelling or tenderness. No middle ear effusion. There is no impacted cerumen. No foreign body. No mastoid tenderness. No PE tube. No hemotympanum. Tympanic membrane is not injected, scarred, perforated, erythematous, retracted or bulging. Tympanic membrane has normal mobility.      Left Ear: Hearing, tympanic membrane, ear canal and external ear normal. No decreased hearing noted. No laceration, drainage, swelling or tenderness.  No middle ear effusion. There is no impacted cerumen. No foreign body. No mastoid tenderness. No PE tube. No hemotympanum. Tympanic membrane is not injected, scarred, perforated, erythematous, retracted or bulging. Tympanic membrane has normal mobility.      Nose: No nasal deformity, signs of injury, nasal tenderness, mucosal edema, congestion or rhinorrhea.      Right Nostril: No epistaxis or occlusion.      Left Nostril: No epistaxis or occlusion.      Right Turbinates: Not enlarged, swollen or pale.       Left Turbinates: Not enlarged, swollen or pale.      Right Sinus: No maxillary sinus tenderness or frontal sinus tenderness.      Left Sinus: No maxillary sinus tenderness or frontal sinus tenderness.      Mouth/Throat:      Lips: Pink.      Mouth: Mucous membranes are moist.      Tongue: No lesions. Tongue does not deviate from midline.      Pharynx: No pharyngeal swelling, oropharyngeal exudate, posterior oropharyngeal erythema or uvula swelling.   Eyes:      General: Lids are normal.      Conjunctiva/sclera:      Right eye: Right conjunctiva is injected (mildli injected). No exudate.     Left eye: Left conjunctiva is injected (mildly injected). No exudate.     Comments: Bilateral watery eyes.    Neck:      Trachea: Trachea and phonation normal.   Pulmonary:      Effort: Pulmonary effort is normal. No respiratory distress.   Musculoskeletal:      Cervical back: Normal range of motion and neck supple. No edema or erythema.   Lymphadenopathy:      Cervical: No cervical adenopathy.   Neurological:      Mental Status: He is alert and oriented to person, place, and time.   Psychiatric:         Attention and Perception: Attention normal.         Mood and Affect: Mood normal.         Speech: Speech normal.         Behavior: Behavior normal. Behavior is cooperative.         Assessment:       1. Chronic cough    2. Dysphonia    3. Non-seasonal allergic rhinitis due to other allergic trigger        Plan:       Babatunde was seen today for sore throat and cough.    Diagnoses and all orders for this visit:    Chronic cough  -     X-Ray Chest PA And Lateral; Future  To r/o lung path. I will discuss results with Mr. Oconnor. Possible Pulmonary referral if needed.   Discusses using Mucinex to loosen throat secretion and hydration.  Dysphonia    Emphasized increased hydration.  Increase water intake and reduce caffeinated and carbonated beverages.  Suck on pastille lozenges or sugar free candies (not mentholated lozenges).  Can  also consider humidifier next to the bed or facial steamer. No throat clearing. Voice rest including, no loud talking, yelling, cheering, or screaming may be of benefit.   I discussed limiting alcohol for two weeks to see if symptoms improve. Vocal hygiene handout provided.         -     Recommended a speech therapy evaluation         -     Emphasized increased oral hydration to improve vocal fold function and health        -     Referral to Dr. Almonte for further evaluation .   Non-seasonal allergic rhinitis due to other allergic trigger  Continue Astelin nasal spray  Begin daily oral antihistamine (claritin, Xyzal, or Zyrtec )   I recommended the daily use of high-volume saline rinse to maintain sinonasal hygiene.    I discussed possible nasal endoscopy if sinus symptoms reoccur to evaluate sinuses.  RTC as needed or if symptoms persist.  Questions answered.

## 2023-08-01 ENCOUNTER — OFFICE VISIT (OUTPATIENT)
Dept: DERMATOLOGY | Facility: CLINIC | Age: 68
End: 2023-08-01
Payer: COMMERCIAL

## 2023-08-01 ENCOUNTER — OFFICE VISIT (OUTPATIENT)
Dept: OTOLARYNGOLOGY | Facility: CLINIC | Age: 68
End: 2023-08-01
Payer: COMMERCIAL

## 2023-08-01 VITALS — WEIGHT: 184 LBS | BODY MASS INDEX: 29.7 KG/M2

## 2023-08-01 DIAGNOSIS — L21.9 SEBORRHEIC DERMATITIS: Primary | ICD-10-CM

## 2023-08-01 DIAGNOSIS — L81.4 LENTIGINES: ICD-10-CM

## 2023-08-01 DIAGNOSIS — J30.89 NON-SEASONAL ALLERGIC RHINITIS DUE TO OTHER ALLERGIC TRIGGER: ICD-10-CM

## 2023-08-01 DIAGNOSIS — R05.3 CHRONIC COUGH: Primary | ICD-10-CM

## 2023-08-01 DIAGNOSIS — L98.9 SKIN LESIONS: ICD-10-CM

## 2023-08-01 DIAGNOSIS — D22.9 NEVUS OF MULTIPLE SITES: ICD-10-CM

## 2023-08-01 DIAGNOSIS — R49.0 DYSPHONIA: ICD-10-CM

## 2023-08-01 DIAGNOSIS — L82.1 SEBORRHEIC KERATOSES: ICD-10-CM

## 2023-08-01 PROCEDURE — 3288F PR FALLS RISK ASSESSMENT DOCUMENTED: ICD-10-PCS | Mod: CPTII,S$GLB,,

## 2023-08-01 PROCEDURE — 3044F HG A1C LEVEL LT 7.0%: CPT | Mod: CPTII,S$GLB,,

## 2023-08-01 PROCEDURE — 99214 OFFICE O/P EST MOD 30 MIN: CPT | Mod: S$GLB,,,

## 2023-08-01 PROCEDURE — 99214 OFFICE O/P EST MOD 30 MIN: CPT | Mod: S$GLB,,, | Performed by: DERMATOLOGY

## 2023-08-01 PROCEDURE — 3044F PR MOST RECENT HEMOGLOBIN A1C LEVEL <7.0%: ICD-10-PCS | Mod: CPTII,S$GLB,,

## 2023-08-01 PROCEDURE — 3008F PR BODY MASS INDEX (BMI) DOCUMENTED: ICD-10-PCS | Mod: CPTII,S$GLB,, | Performed by: DERMATOLOGY

## 2023-08-01 PROCEDURE — 1126F PR PAIN SEVERITY QUANTIFIED, NO PAIN PRESENT: ICD-10-PCS | Mod: CPTII,S$GLB,, | Performed by: DERMATOLOGY

## 2023-08-01 PROCEDURE — 99999 PR PBB SHADOW E&M-EST. PATIENT-LVL III: CPT | Mod: PBBFAC,,, | Performed by: DERMATOLOGY

## 2023-08-01 PROCEDURE — 1159F MED LIST DOCD IN RCRD: CPT | Mod: CPTII,S$GLB,, | Performed by: DERMATOLOGY

## 2023-08-01 PROCEDURE — 99999 PR PBB SHADOW E&M-EST. PATIENT-LVL III: CPT | Mod: PBBFAC,,,

## 2023-08-01 PROCEDURE — 1101F PR PT FALLS ASSESS DOC 0-1 FALLS W/OUT INJ PAST YR: ICD-10-PCS | Mod: CPTII,S$GLB,,

## 2023-08-01 PROCEDURE — 1126F PR PAIN SEVERITY QUANTIFIED, NO PAIN PRESENT: ICD-10-PCS | Mod: CPTII,S$GLB,,

## 2023-08-01 PROCEDURE — 99214 PR OFFICE/OUTPT VISIT, EST, LEVL IV, 30-39 MIN: ICD-10-PCS | Mod: S$GLB,,, | Performed by: DERMATOLOGY

## 2023-08-01 PROCEDURE — 99999 PR PBB SHADOW E&M-EST. PATIENT-LVL III: ICD-10-PCS | Mod: PBBFAC,,,

## 2023-08-01 PROCEDURE — 1126F AMNT PAIN NOTED NONE PRSNT: CPT | Mod: CPTII,S$GLB,,

## 2023-08-01 PROCEDURE — 1160F PR REVIEW ALL MEDS BY PRESCRIBER/CLIN PHARMACIST DOCUMENTED: ICD-10-PCS | Mod: CPTII,S$GLB,, | Performed by: DERMATOLOGY

## 2023-08-01 PROCEDURE — 1160F RVW MEDS BY RX/DR IN RCRD: CPT | Mod: CPTII,S$GLB,, | Performed by: DERMATOLOGY

## 2023-08-01 PROCEDURE — 1126F AMNT PAIN NOTED NONE PRSNT: CPT | Mod: CPTII,S$GLB,, | Performed by: DERMATOLOGY

## 2023-08-01 PROCEDURE — 3044F HG A1C LEVEL LT 7.0%: CPT | Mod: CPTII,S$GLB,, | Performed by: DERMATOLOGY

## 2023-08-01 PROCEDURE — 99214 PR OFFICE/OUTPT VISIT, EST, LEVL IV, 30-39 MIN: ICD-10-PCS | Mod: S$GLB,,,

## 2023-08-01 PROCEDURE — 3044F PR MOST RECENT HEMOGLOBIN A1C LEVEL <7.0%: ICD-10-PCS | Mod: CPTII,S$GLB,, | Performed by: DERMATOLOGY

## 2023-08-01 PROCEDURE — 1159F PR MEDICATION LIST DOCUMENTED IN MEDICAL RECORD: ICD-10-PCS | Mod: CPTII,S$GLB,, | Performed by: DERMATOLOGY

## 2023-08-01 PROCEDURE — 3288F FALL RISK ASSESSMENT DOCD: CPT | Mod: CPTII,S$GLB,,

## 2023-08-01 PROCEDURE — 99999 PR PBB SHADOW E&M-EST. PATIENT-LVL III: ICD-10-PCS | Mod: PBBFAC,,, | Performed by: DERMATOLOGY

## 2023-08-01 PROCEDURE — 3008F BODY MASS INDEX DOCD: CPT | Mod: CPTII,S$GLB,, | Performed by: DERMATOLOGY

## 2023-08-01 PROCEDURE — 1101F PT FALLS ASSESS-DOCD LE1/YR: CPT | Mod: CPTII,S$GLB,,

## 2023-08-01 RX ORDER — KETOCONAZOLE 20 MG/ML
SHAMPOO, SUSPENSION TOPICAL
Qty: 120 ML | Refills: 6 | Status: SHIPPED | OUTPATIENT
Start: 2023-08-01

## 2023-08-01 RX ORDER — ALCLOMETASONE DIPROPIONATE 0.5 MG/G
CREAM TOPICAL
Qty: 60 G | Refills: 3 | Status: SHIPPED | OUTPATIENT
Start: 2023-08-01

## 2023-08-01 RX ORDER — ALCLOMETASONE DIPROPIONATE 0.5 MG/G
CREAM TOPICAL
Qty: 60 G | Refills: 3 | Status: SHIPPED | OUTPATIENT
Start: 2023-08-01 | End: 2023-08-01 | Stop reason: SDUPTHER

## 2023-08-01 RX ORDER — FLUTICASONE PROPIONATE 50 MCG
SPRAY, SUSPENSION (ML) NASAL
COMMUNITY
Start: 2023-06-30 | End: 2023-10-25

## 2023-08-01 RX ORDER — KETOCONAZOLE 20 MG/ML
SHAMPOO, SUSPENSION TOPICAL
Qty: 120 ML | Refills: 6 | Status: SHIPPED | OUTPATIENT
Start: 2023-08-01 | End: 2023-08-01 | Stop reason: SDUPTHER

## 2023-08-01 RX ORDER — AMOXICILLIN 875 MG/1
875 TABLET, FILM COATED ORAL 2 TIMES DAILY
COMMUNITY
Start: 2023-06-25 | End: 2023-10-25

## 2023-08-01 NOTE — PROGRESS NOTES
Subjective:      Patient ID:  Babatunde Oconnor is a 68 y.o. male who presents for   Chief Complaint   Patient presents with    Skin Check     UBSE     Would like skin check.  Also new rash on face and has dry scalp.         Review of Systems   Constitutional:  Negative for fever, chills, weight loss, weight gain, fatigue, night sweats and malaise.   Skin:  Positive for wears hat. Negative for daily sunscreen use and activity-related sunscreen use.   Hematologic/Lymphatic: Does not bruise/bleed easily.       Objective:   Physical Exam   Constitutional: He appears well-developed and well-nourished. No distress.   Neurological: He is alert and oriented to person, place, and time. He is not disoriented.   Psychiatric: He has a normal mood and affect.   Skin:   Areas Examined (abnormalities noted in diagram):   Head / Face Inspection Performed  Neck Inspection Performed  Chest / Axilla Inspection Performed  Abdomen Inspection Performed  Back Inspection Performed  RUE Inspected  LUE Inspection Performed                 Diagram Legend     Erythematous scaling macule/papule c/w actinic keratosis       Vascular papule c/w angioma      Pigmented verrucoid papule/plaque c/w seborrheic keratosis      Yellow umbilicated papule c/w sebaceous hyperplasia      Irregularly shaped tan macule c/w lentigo     1-2 mm smooth white papules consistent with Milia      Movable subcutaneous cyst with punctum c/w epidermal inclusion cyst      Subcutaneous movable cyst c/w pilar cyst      Firm pink to brown papule c/w dermatofibroma      Pedunculated fleshy papule(s) c/w skin tag(s)      Evenly pigmented macule c/w junctional nevus     Mildly variegated pigmented, slightly irregular-bordered macule c/w mildly atypical nevus      Flesh colored to evenly pigmented papule c/w intradermal nevus       Pink pearly papule/plaque c/w basal cell carcinoma      Erythematous hyperkeratotic cursted plaque c/w SCC      Surgical scar with no sign of  "skin cancer recurrence      Open and closed comedones      Inflammatory papules and pustules      Verrucoid papule consistent consistent with wart     Erythematous eczematous patches and plaques     Dystrophic onycholytic nail with subungual debris c/w onychomycosis     Umbilicated papule    Erythematous-base heme-crusted tan verrucoid plaque consistent with inflamed seborrheic keratosis     Erythematous Silvery Scaling Plaque c/w Psoriasis     See annotation      Assessment / Plan:        Seborrheic dermatitis  -  alclometasone (ACLOVATE) 0.05 % cream; AAA face bid prn redness or scaling or itching  Dispense: 60 g; Refill: 3  -     ketoconazole (NIZORAL) 2 % shampoo; Apply topically every 7 days.  Dispense: 120 mL; Refill: 6    Skin lesions  -     Ambulatory referral/consult to Dermatology   No lesions suspicious for malignancy noted.    Recommend daily sun protection/avoidance and use of at least SPF 30, broad spectrum sunscreen (OTC drug).       Seborrheic keratoses  Seborrheic keratosis scattered, told benign no treatment needed.  Brochure provided.      Lentigines  The "ABCD" rules to observe pigmented lesions were reviewed.      Nevus of multiple sites  The "ABCD" rules to observe pigmented lesions were reviewed.  Brochure provided               Follow up in about 1 year (around 8/1/2024).  "

## 2023-08-03 ENCOUNTER — HOSPITAL ENCOUNTER (OUTPATIENT)
Dept: RADIOLOGY | Facility: HOSPITAL | Age: 68
Discharge: HOME | End: 2023-08-03
Payer: COMMERCIAL

## 2023-08-03 ENCOUNTER — PATIENT MESSAGE (OUTPATIENT)
Dept: OTOLARYNGOLOGY | Facility: CLINIC | Age: 68
End: 2023-08-03
Payer: COMMERCIAL

## 2023-08-03 DIAGNOSIS — R05.3 CHRONIC COUGH: ICD-10-CM

## 2023-08-03 PROCEDURE — 71046 XR CHEST PA AND LATERAL: ICD-10-PCS | Mod: 26,,, | Performed by: RADIOLOGY

## 2023-08-03 PROCEDURE — 71046 X-RAY EXAM CHEST 2 VIEWS: CPT | Mod: 26,,, | Performed by: RADIOLOGY

## 2023-08-03 PROCEDURE — 71046 X-RAY EXAM CHEST 2 VIEWS: CPT | Mod: TC

## 2023-08-05 ENCOUNTER — CLINICAL SUPPORT (OUTPATIENT)
Dept: URGENT CARE | Facility: CLINIC | Age: 68
End: 2023-08-05
Payer: COMMERCIAL

## 2023-08-05 VITALS
TEMPERATURE: 99 F | BODY MASS INDEX: 29.57 KG/M2 | DIASTOLIC BLOOD PRESSURE: 77 MMHG | SYSTOLIC BLOOD PRESSURE: 130 MMHG | OXYGEN SATURATION: 94 % | HEART RATE: 86 BPM | HEIGHT: 66 IN | WEIGHT: 184 LBS | RESPIRATION RATE: 20 BRPM

## 2023-08-05 DIAGNOSIS — U07.1 COVID-19: ICD-10-CM

## 2023-08-05 DIAGNOSIS — U07.1 COVID-19 VIRUS DETECTED: ICD-10-CM

## 2023-08-05 DIAGNOSIS — J34.9 SINUS PROBLEM: Primary | ICD-10-CM

## 2023-08-05 LAB
CTP QC/QA: YES
SARS-COV-2 AG RESP QL IA.RAPID: POSITIVE

## 2023-08-05 PROCEDURE — 87811 SARS-COV-2 COVID19 W/OPTIC: CPT | Mod: QW,S$GLB,, | Performed by: NURSE PRACTITIONER

## 2023-08-05 PROCEDURE — 99214 PR OFFICE/OUTPT VISIT, EST, LEVL IV, 30-39 MIN: ICD-10-PCS | Mod: S$GLB,,, | Performed by: NURSE PRACTITIONER

## 2023-08-05 PROCEDURE — 87811 SARS CORONAVIRUS 2 ANTIGEN POCT, MANUAL READ: ICD-10-PCS | Mod: QW,S$GLB,, | Performed by: NURSE PRACTITIONER

## 2023-08-05 PROCEDURE — 99214 OFFICE O/P EST MOD 30 MIN: CPT | Mod: S$GLB,,, | Performed by: NURSE PRACTITIONER

## 2023-08-05 RX ORDER — PROMETHAZINE HYDROCHLORIDE AND DEXTROMETHORPHAN HYDROBROMIDE 6.25; 15 MG/5ML; MG/5ML
5 SYRUP ORAL EVERY 4 HOURS PRN
Qty: 180 ML | Refills: 0 | Status: SHIPPED | OUTPATIENT
Start: 2023-08-05 | End: 2023-08-15

## 2023-08-05 NOTE — PATIENT INSTRUCTIONS
"Sinus problem  -     SARS Coronavirus 2 Antigen, POCT Manual Read    COVID-19    I spoke with the patient and reviewed results.  Covid 19 counseling and education reviewed.  No questions.      - Rest at home.     - Drink plenty of fluids so you won't get dehydrated.    - you can take plain Mucinex (guaifenesin) 1200 mg twice a day to help loosen mucous.     Avoid taking Decongestants such as Sudafed, pseudoephedrine, phenylephrine or meds that say "cold," "sinus" or "-D".        - Cough recommendations:      NIGHTTIME:  -     promethazine-dextromethorphan (PROMETHAZINE-DM) 6.25-15 mg/5 mL Syrp; Take 5 mLs by mouth every 4 (four) hours as needed (cough).  Dispense: 180 mL; Refill: 0      OR    DAYTIME:   -  Dextromethorphan (DM) is a cough suppressant over the counter (ie. mucinex DM, robitussin, delsym; dayquil/nyquil has DM as well.).      Warm tea with honey can help with cough. Honey is a natural cough suppressant.    - Fever/Pain recommendations:  Alternate Tylenol or Ibuprofen as directed for fever/pain. Avoid tylenol if you have a history of liver disease. Do not take ibuprofen if you have a history of GI bleeding, kidney disease, or if you take blood thinners.  Take ibuprofen 600-800 mg every 6-8 hours for pain and inflammation.  You can also take Tylenol/acetaminophen 650-1000 mg every 6-8 hours for added pain relief.     -Sore throat recommendations: Warm fluids, warm salt water gargles, throat lozenges, tea, honey, soup, or drinking something cold or frozen.  Throat lozenges or sprays help reduce pain. Gargling with warm saltwater (1/4 teaspoon of salt in 1/2 cup of warm water) or an OTC anesthetic gargle may be useful for irritation.    - Go to the ER if you develop new or worsening symptoms.      When to seek medical advice  Call your healthcare provider right away if any of these occur:  Fever that is poorly controlled with OTC fever reducing medication  New or worsening ear pain, sinus pain, or " headache  Stiff neck  You can't swallow liquids or you can't open your mouth wide because of throat pain  Signs of dehydration. These include very dark urine or no urine, sunken eyes, and dizziness.  Trouble breathing or noisy breathing  Muffled voice  Rash

## 2023-08-05 NOTE — PROGRESS NOTES
"Subjective:      Patient ID: Babatunde Oconnor is a 68 y.o. male.    Vitals:  height is 5' 6" (1.676 m) and weight is 83.5 kg (184 lb). His temperature is 98.8 °F (37.1 °C). His blood pressure is 130/77 and his pulse is 86. His respiration is 20 and oxygen saturation is 94% (abnormal).     Chief Complaint: Sinus Problem      Pt is a 67 yo male presenting with myalgia, chills, congestion, and cough.  Onset of symptoms was 2 days ago.  Pt reports using OTC nasal spray and cough meds.  Pt reports he was sick ~ 1 month ago and cough was still lingering.    Sinus Problem  This is a new problem. There has been no fever. His pain is at a severity of 5/10. The pain is mild. Associated symptoms include chills, congestion and coughing. Pertinent negatives include no diaphoresis, ear pain, headaches, hoarse voice, neck pain, shortness of breath, sinus pressure, sneezing, sore throat or swollen glands. Treatments tried: claritin, nasal saline and cold & sinus medicine. The treatment provided mild relief.       Constitution: Positive for chills, fatigue and fever. Negative for sweating.   HENT:  Positive for congestion. Negative for ear pain, sinus pain, sinus pressure and sore throat.    Neck: Negative for neck pain.   Cardiovascular:  Negative for chest pain.   Respiratory:  Positive for cough. Negative for sputum production and shortness of breath.    Gastrointestinal:  Negative for nausea, vomiting and diarrhea.   Musculoskeletal:  Positive for muscle ache.   Allergic/Immunologic: Negative for sneezing.   Neurological:  Negative for headaches.      Objective:     Physical Exam   Constitutional: He is oriented to person, place, and time.   HENT:   Head: Normocephalic.   Ears:   Right Ear: Hearing and external ear normal. No no drainage, swelling or tenderness. Tympanic membrane is not erythematous, not retracted and not bulging. No middle ear effusion.   Left Ear: Hearing and external ear normal. No no drainage, swelling " "or tenderness. Tympanic membrane is not erythematous, not retracted and not bulging.  No middle ear effusion.   Nose: Nose normal. No mucosal edema, rhinorrhea or purulent discharge. Right sinus exhibits no maxillary sinus tenderness and no frontal sinus tenderness. Left sinus exhibits no maxillary sinus tenderness and no frontal sinus tenderness.   Mouth/Throat: Uvula is midline, oropharynx is clear and moist and mucous membranes are normal. No uvula swelling. No oropharyngeal exudate, posterior oropharyngeal edema or posterior oropharyngeal erythema.   Eyes: EOM are normal.   Cardiovascular: Normal rate and regular rhythm.   Pulmonary/Chest: Effort normal and breath sounds normal. No respiratory distress.   Musculoskeletal: Normal range of motion.         General: Normal range of motion.   Neurological: He is alert and oriented to person, place, and time.   Skin: Skin is warm and dry.   Nursing note and vitals reviewed.      Assessment:     1. Sinus problem    2. COVID-19        Plan:       Sinus problem  -     SARS Coronavirus 2 Antigen, POCT Manual Read    COVID-19  -     promethazine-dextromethorphan (PROMETHAZINE-DM) 6.25-15 mg/5 mL Syrp; Take 5 mLs by mouth every 4 (four) hours as needed (cough).  Dispense: 180 mL; Refill: 0      Patient Instructions   Sinus problem  -     SARS Coronavirus 2 Antigen, POCT Manual Read    COVID-19    I spoke with the patient and reviewed results.  Covid 19 counseling and education reviewed.  No questions.      - Rest at home.     - Drink plenty of fluids so you won't get dehydrated.    - you can take plain Mucinex (guaifenesin) 1200 mg twice a day to help loosen mucous.     Avoid taking Decongestants such as Sudafed, pseudoephedrine, phenylephrine or meds that say "cold," "sinus" or "-D".      - Cough recommendations:      Dextromethorphan (DM) is a cough suppressant over the counter (ie. mucinex DM, robitussin, delsym; dayquil/nyquil has DM as well.).      Warm tea with honey " can help with cough. Honey is a natural cough suppressant.    - Fever/Pain recommendations:  Alternate Tylenol or Ibuprofen as directed for fever/pain. Avoid tylenol if you have a history of liver disease. Do not take ibuprofen if you have a history of GI bleeding, kidney disease, or if you take blood thinners.  Take ibuprofen 600-800 mg every 6-8 hours for pain and inflammation.  You can also take Tylenol/acetaminophen 650-1000 mg every 6-8 hours for added pain relief.     -Sore throat recommendations: Warm fluids, warm salt water gargles, throat lozenges, tea, honey, soup, or drinking something cold or frozen.  Throat lozenges or sprays help reduce pain. Gargling with warm saltwater (1/4 teaspoon of salt in 1/2 cup of warm water) or an OTC anesthetic gargle may be useful for irritation.    - Go to the ER if you develop new or worsening symptoms.      When to seek medical advice  Call your healthcare provider right away if any of these occur:  Fever that is poorly controlled with OTC fever reducing medication  New or worsening ear pain, sinus pain, or headache  Stiff neck  You can't swallow liquids or you can't open your mouth wide because of throat pain  Signs of dehydration. These include very dark urine or no urine, sunken eyes, and dizziness.  Trouble breathing or noisy breathing  Muffled voice  Rash

## 2023-08-07 RX ORDER — BENZONATATE 100 MG/1
100 CAPSULE ORAL 3 TIMES DAILY PRN
Qty: 30 CAPSULE | Refills: 0 | Status: SHIPPED | OUTPATIENT
Start: 2023-08-07 | End: 2023-08-17

## 2023-08-19 ENCOUNTER — OFFICE VISIT (OUTPATIENT)
Dept: URGENT CARE | Facility: CLINIC | Age: 68
End: 2023-08-19
Payer: COMMERCIAL

## 2023-08-19 VITALS
RESPIRATION RATE: 16 BRPM | TEMPERATURE: 98 F | SYSTOLIC BLOOD PRESSURE: 132 MMHG | HEIGHT: 66 IN | HEART RATE: 74 BPM | WEIGHT: 184 LBS | DIASTOLIC BLOOD PRESSURE: 77 MMHG | BODY MASS INDEX: 29.57 KG/M2 | OXYGEN SATURATION: 95 %

## 2023-08-19 DIAGNOSIS — H66.003 NON-RECURRENT ACUTE SUPPURATIVE OTITIS MEDIA OF BOTH EARS WITHOUT SPONTANEOUS RUPTURE OF TYMPANIC MEMBRANES: Primary | ICD-10-CM

## 2023-08-19 PROCEDURE — 99213 PR OFFICE/OUTPT VISIT, EST, LEVL III, 20-29 MIN: ICD-10-PCS | Mod: S$GLB,,, | Performed by: FAMILY MEDICINE

## 2023-08-19 PROCEDURE — 99213 OFFICE O/P EST LOW 20 MIN: CPT | Mod: S$GLB,,, | Performed by: FAMILY MEDICINE

## 2023-08-19 RX ORDER — AMOXICILLIN AND CLAVULANATE POTASSIUM 875; 125 MG/1; MG/1
1 TABLET, FILM COATED ORAL EVERY 12 HOURS
Qty: 14 TABLET | Refills: 0 | Status: SHIPPED | OUTPATIENT
Start: 2023-08-19 | End: 2023-08-26

## 2023-08-19 NOTE — PROGRESS NOTES
"Subjective:      Patient ID: Babatunde Oconnor is a 68 y.o. male.    Vitals:  height is 5' 6" (1.676 m) and weight is 83.5 kg (184 lb). His oral temperature is 97.8 °F (36.6 °C). His blood pressure is 132/77 and his pulse is 74. His respiration is 16 and oxygen saturation is 95%.     Chief Complaint: Otalgia (/)    This is a 68 y.o. male who presents today with a chief complaint of both ears feeling full of either wax or water that started 7 days ago. Pt stated that he was sick with covid 2 weeks ago.       Otalgia   There is pain in both ears. This is a new problem. The current episode started in the past 7 days. The problem occurs constantly. The problem has been unchanged. There has been no fever. The patient is experiencing no pain. Pertinent negatives include no abdominal pain, coughing, diarrhea, ear discharge, headaches, hearing loss, neck pain, rash, rhinorrhea, sore throat or vomiting. He has tried nothing for the symptoms. The treatment provided no relief. There is no history of a chronic ear infection, hearing loss or a tympanostomy tube.     HENT:  Positive for ear pain. Negative for ear discharge, hearing loss and sore throat.    Neck: Negative for neck pain.   Respiratory:  Negative for cough.    Gastrointestinal:  Negative for abdominal pain, vomiting and diarrhea.   Skin:  Negative for rash.   Neurological:  Negative for headaches.      Objective:     Physical Exam   Constitutional: He is oriented to person, place, and time. He appears well-developed. He is cooperative.  Non-toxic appearance. He does not appear ill. No distress.   HENT:   Head: Normocephalic and atraumatic.   Ears:   Right Ear: Hearing, tympanic membrane and external ear normal.   Left Ear: Hearing, tympanic membrane and external ear normal.   Nose: Nose normal. No mucosal edema, rhinorrhea or nasal deformity. No epistaxis. Right sinus exhibits no maxillary sinus tenderness and no frontal sinus tenderness. Left sinus exhibits no " maxillary sinus tenderness and no frontal sinus tenderness.   Mouth/Throat: Uvula is midline, oropharynx is clear and moist and mucous membranes are normal. No trismus in the jaw. Normal dentition. No uvula swelling. No oropharyngeal exudate, posterior oropharyngeal edema or posterior oropharyngeal erythema.   Eyes: Conjunctivae and lids are normal. No scleral icterus.   Neck: Trachea normal and phonation normal. Neck supple. No edema present. No erythema present. No neck rigidity present.   Cardiovascular: Normal rate, regular rhythm, normal heart sounds and normal pulses.   Pulmonary/Chest: Effort normal and breath sounds normal. No respiratory distress. He has no decreased breath sounds. He has no rhonchi.   Abdominal: Normal appearance.   Musculoskeletal: Normal range of motion.         General: No deformity. Normal range of motion.   Neurological: He is alert and oriented to person, place, and time. He exhibits normal muscle tone. Coordination normal.   Skin: Skin is warm, dry, intact, not diaphoretic and not pale.   Psychiatric: His speech is normal and behavior is normal. Judgment and thought content normal.   Nursing note and vitals reviewed.      Assessment:     1. Non-recurrent acute suppurative otitis media of both ears without spontaneous rupture of tympanic membranes        Plan:       Non-recurrent acute suppurative otitis media of both ears without spontaneous rupture of tympanic membranes  -     amoxicillin-clavulanate 875-125mg (AUGMENTIN) 875-125 mg per tablet; Take 1 tablet by mouth every 12 (twelve) hours. for 7 days  Dispense: 14 tablet; Refill: 0

## 2023-09-07 ENCOUNTER — TELEPHONE (OUTPATIENT)
Dept: OTOLARYNGOLOGY | Facility: CLINIC | Age: 68
End: 2023-09-07
Payer: COMMERCIAL

## 2023-09-20 DIAGNOSIS — N40.0 BENIGN PROSTATIC HYPERPLASIA, UNSPECIFIED WHETHER LOWER URINARY TRACT SYMPTOMS PRESENT: ICD-10-CM

## 2023-09-20 DIAGNOSIS — I10 ESSENTIAL HYPERTENSION: ICD-10-CM

## 2023-09-20 RX ORDER — TAMSULOSIN HYDROCHLORIDE 0.4 MG/1
2 CAPSULE ORAL DAILY
Qty: 180 CAPSULE | Refills: 2 | Status: CANCELLED | OUTPATIENT
Start: 2023-09-20

## 2023-09-20 RX ORDER — BISOPROLOL FUMARATE AND HYDROCHLOROTHIAZIDE 5; 6.25 MG/1; MG/1
1 TABLET ORAL DAILY
Qty: 90 TABLET | Refills: 2 | Status: CANCELLED | OUTPATIENT
Start: 2023-09-20

## 2023-09-20 NOTE — TELEPHONE ENCOUNTER
No care due was identified.  [facility] Embedded Care Due Messages. Reference number: [id number].   9/20/2023 5:13:17 PM CDT

## 2023-09-21 NOTE — TELEPHONE ENCOUNTER
I received Rx request from pt. He have refills available on both Rx's.  I tried calling pt, no answer. Left detailed message advising pt to call his pharmacy for refill request

## 2023-09-26 DIAGNOSIS — I10 ESSENTIAL HYPERTENSION: ICD-10-CM

## 2023-09-26 RX ORDER — BISOPROLOL FUMARATE AND HYDROCHLOROTHIAZIDE 5; 6.25 MG/1; MG/1
1 TABLET ORAL DAILY
Qty: 90 TABLET | Refills: 2 | Status: CANCELLED | OUTPATIENT
Start: 2023-09-26

## 2023-09-26 NOTE — TELEPHONE ENCOUNTER
No care due was identified.  [facility] Embedded Care Due Messages. Reference number: [id number].   9/26/2023 5:18:18 PM CDT

## 2023-10-25 ENCOUNTER — OFFICE VISIT (OUTPATIENT)
Dept: URGENT CARE | Facility: CLINIC | Age: 68
End: 2023-10-25
Payer: COMMERCIAL

## 2023-10-25 VITALS
TEMPERATURE: 98 F | SYSTOLIC BLOOD PRESSURE: 125 MMHG | HEIGHT: 66 IN | HEART RATE: 78 BPM | RESPIRATION RATE: 16 BRPM | DIASTOLIC BLOOD PRESSURE: 73 MMHG | WEIGHT: 184 LBS | OXYGEN SATURATION: 95 % | BODY MASS INDEX: 29.57 KG/M2

## 2023-10-25 DIAGNOSIS — J06.9 VIRAL URI WITH COUGH: Primary | ICD-10-CM

## 2023-10-25 DIAGNOSIS — R49.0 HOARSENESS: ICD-10-CM

## 2023-10-25 LAB
CTP QC/QA: YES
CTP QC/QA: YES
MOLECULAR STREP A: NEGATIVE
SARS-COV-2 AG RESP QL IA.RAPID: NEGATIVE

## 2023-10-25 PROCEDURE — 87811 SARS CORONAVIRUS 2 ANTIGEN POCT, MANUAL READ: ICD-10-PCS | Mod: QW,S$GLB,, | Performed by: PHYSICIAN ASSISTANT

## 2023-10-25 PROCEDURE — 99213 PR OFFICE/OUTPT VISIT, EST, LEVL III, 20-29 MIN: ICD-10-PCS | Mod: S$GLB,,, | Performed by: PHYSICIAN ASSISTANT

## 2023-10-25 PROCEDURE — 87651 POCT STREP A MOLECULAR: ICD-10-PCS | Mod: QW,S$GLB,, | Performed by: PHYSICIAN ASSISTANT

## 2023-10-25 PROCEDURE — 87811 SARS-COV-2 COVID19 W/OPTIC: CPT | Mod: QW,S$GLB,, | Performed by: PHYSICIAN ASSISTANT

## 2023-10-25 PROCEDURE — 99213 OFFICE O/P EST LOW 20 MIN: CPT | Mod: S$GLB,,, | Performed by: PHYSICIAN ASSISTANT

## 2023-10-25 PROCEDURE — 87651 STREP A DNA AMP PROBE: CPT | Mod: QW,S$GLB,, | Performed by: PHYSICIAN ASSISTANT

## 2023-10-25 RX ORDER — LORATADINE AND PSEUDOEPHEDRINE SULFATE 5; 120 MG/1; MG/1
1 TABLET, EXTENDED RELEASE ORAL 2 TIMES DAILY
Qty: 20 TABLET | Refills: 0 | COMMUNITY
Start: 2023-10-25 | End: 2023-11-04

## 2023-10-25 RX ORDER — BENZONATATE 200 MG/1
200 CAPSULE ORAL 3 TIMES DAILY PRN
Qty: 30 CAPSULE | Refills: 0 | Status: SHIPPED | OUTPATIENT
Start: 2023-10-25 | End: 2023-11-04

## 2023-10-25 RX ORDER — FLUTICASONE PROPIONATE 50 MCG
1 SPRAY, SUSPENSION (ML) NASAL DAILY
Qty: 16 G | Refills: 0 | Status: SHIPPED | OUTPATIENT
Start: 2023-10-25 | End: 2023-11-01

## 2023-10-25 NOTE — PROGRESS NOTES
"Subjective:      Patient ID: Babatunde Oconnor is a 68 y.o. male.    Vitals:  height is 5' 6" (1.676 m) and weight is 83.5 kg (184 lb). His oral temperature is 98.2 °F (36.8 °C). His blood pressure is 125/73 and his pulse is 78. His respiration is 16 and oxygen saturation is 95%.     Chief Complaint: Sore Throat (Pt is coming in today for sore throat, cough, watery eyes, nasal drip he has now loss his voice. Started w/ a cough/mucus last night. [facility] cold/flu tablets for relief. 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 Benzonatate for cough. )    68 y.o. male patient is coming in today for sore throat, cough, nasal drip and he has now loss his voice.  Patient states he started losing his voice yesterday, mid-day and began having sore throat last night. Denies difficulty swallowing, reflux, regurgitation or heartburn. Patient is a . States the his voice has seen a small improvement. Patient used Flonase last night, salt gargles, saline solution and Benzonatate with improvement. Denies nasal congestion. Patient states he has small mucus he's unable to cough up because it's very thick. [facility] cold/flu tablets 2x yesterday and today with relief. 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 Denies fever, chills, body aches, sinus pressure, otalgia or ear pressure/fullness.         Sore Throat   This is a new problem. The current episode started yesterday. The problem has been gradually worsening. Neither side of throat is experiencing more pain than the other. There has been no fever. The pain is at a severity of 8/10. The pain is moderate. Associated symptoms include coughing, a hoarse voice and swollen glands. Pertinent negatives include no abdominal pain, congestion, diarrhea, drooling, ear discharge, ear pain, headaches, neck pain, shortness of breath, trouble swallowing or vomiting. He has tried NSAIDs (nasal spray/ cold/flu/ benzonatate) for the symptoms. The treatment provided no relief. "       Constitution: Negative for chills, sweating, fatigue and fever.   HENT:  Positive for postnasal drip and sore throat. Negative for ear pain, ear discharge, tinnitus, hearing loss, dental problem, drooling, mouth sores, tongue pain, tongue lesion, congestion, sinus pain, sinus pressure, trouble swallowing and voice change.    Neck: Negative for neck pain, neck stiffness and painful lymph nodes.   Cardiovascular:  Negative for chest pain and sob on exertion.   Eyes:  Negative for eye discharge and eye itching.   Respiratory:  Positive for cough. Negative for chest tightness, sputum production and shortness of breath.    Gastrointestinal:  Negative for abdominal pain, nausea, vomiting, constipation and diarrhea.   Musculoskeletal:  Negative for muscle cramps and muscle ache.   Skin:  Negative for rash.   Neurological:  Negative for dizziness, headaches and altered mental status.   Hematologic/Lymphatic: Negative for swollen lymph nodes.   Psychiatric/Behavioral:  Negative for altered mental status.       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    Hyperlipidemia     Hypertension     Leg length discrepancy 10/16/2021    R longer, Consider foot eval & may need custom insert in shoe    Lumbar spondylosis 12/13/2019       Past Surgical History:   Procedure Laterality Date    APPENDECTOMY      ARTHROPLASTY OF HIP BY ANTERIOR APPROACH Right 6/29/2022    Procedure: ARTHROPLASTY, HIP, TOTAL, ANTERIOR APPROACH: RIGHT: DEPUY-ACTIS+PINNACLE;  Surgeon: Talon Amaral III, MD;  Location: [facility] OR;  Service: Orthopedics;  Laterality: Right;    CERVICAL DISCECTOMY  2008    CERVICAL SPINE SURGERY      EPIDURAL STEROID INJECTION N/A 8/12/2022    Procedure: Lumbar WING L4-5;  Surgeon: Toño Moy DO;  Location: [facility] OR;  Service: Pain Management;  Laterality: N/A;    INJECTION OF ANESTHETIC AGENT  AROUND MEDIAL BRANCH NERVES INNERVATING LUMBAR FACET JOINT Bilateral 1/7/2020    Procedure: Block-nerve-medial branch-lumbar-- Bilateral (With Steroid);  Surgeon: Vamshi Anderson Jr., MD;  Location: [facility] PAIN MGT;  Service: Pain Management;  Laterality: Bilateral;  No Sedation    INJECTION OF ANESTHETIC AGENT AROUND MEDIAL BRANCH NERVES INNERVATING LUMBAR FACET JOINT Bilateral 5/14/2020    Procedure: Block-nerve-medial branch-lumbar--Bilateral L4-5, L5-S1 with steriods;  Surgeon: Vamshi Anderson Jr., MD;  Location: [facility] PAIN [facility]GT;  Service: Pain Management;  Laterality: Bilateral;       Family History   Problem Relation Age of Onset    Diabetes Father        Social History     Socioeconomic History    Marital status:      Spouse name: Ese    Number of children: 1   Occupational History     Comment: teacher -   Tobacco Use    Smoking status: Never    Smokeless tobacco: Never   Substance and Sexual Activity    Alcohol use: Not Currently     Alcohol/week: 4.0 standard drinks of alcohol     Types: 1 Cans of beer, 3 Shots of liquor per week     Comment: liquor or beer daily    Drug use: No    Sexual activity: Yes     Partners: Female   Social History Narrative    Stairs- 13     Social Determinants of Health     Financial Resource Strain: Low Risk  (7/26/2023)    Overall Financial Resource Strain (CARDIA)     Difficulty of Paying Living Expenses: Not hard at all   Food Insecurity: No Food Insecurity (7/26/2023)    Hunger Vital Sign     Worried About Running Out of Food in the Last Year: Never true     Ran Out of Food in the Last Year: Never true   Transportation Needs: No Transportation Needs (7/26/2023)    PRAPARE - Transportation     Lack of Transportation (Medical): No     Lack of Transportation (Non-Medical): No   Physical Activity: Sufficiently Active (7/26/2023)    Exercise Vital Sign     Days of Exercise per Week: 5 days     Minutes of Exercise per Session: 40 min   Recent Concern: Physical Activity -  Insufficiently Active (5/23/2023)    Exercise Vital Sign     Days of Exercise per Week: 5 days     Minutes of Exercise per Session: 20 min   Stress: No Stress Concern Present (7/26/2023)    [facility] - Occupational Stress Questionnaire     Feeling of Stress : Only a little   Social Connections: Unknown (7/26/2023)    Social Connection and Isolation Panel [NHANES]     Frequency of Communication with Friends and Family: Once a week     Frequency of Social Gatherings with Friends and Family: Once a week     Active Member of Clubs or Organizations: No     Attends Club or Organization Meetings: Never     Marital Status:    Housing Stability: Low Risk  (7/26/2023)    Housing Stability Vital Sign     Unable to Pay for Housing in the Last Year: No     Number of Places Lived in the Last Year: 1     Unstable Housing in the Last Year: No       Current Outpatient Medications   Medication Sig Dispense Refill    alclometasone (ACLOVATE) 0.05 % cream AAA face bid prn redness or scaling or itching 60 g 3    amlodipine-atorvastatin (CADUET) 10-10 mg per tablet Take 1 tablet by mouth once daily. 90 tablet 2    azelastine (ASTELIN) 137 mcg (0.1 %) nasal spray 1 spray (137 mcg total) by Nasal route 2 (two) times daily. 30 mL 0    bisoprolol-hydrochlorothiazide 5-6.25 mg (ZIAC) 5-6.25 mg Tab Take 1 tablet by mouth once daily. 90 tablet 2    ketoconazole (NIZORAL) 2 % shampoo Apply topically every 7 days. 120 mL 6    tamsulosin (FLOMAX) 0.4 mg Cap Take 2 capsules (0.8 mg total) by mouth once daily. 180 capsule 2    aspirin (ECOTRIN) 81 MG EC tablet Take 1 tablet (81 mg total) by mouth 2 (two) times daily. 60 tablet 0    benzonatate (TESSALON) 200 MG capsule Take 1 capsule (200 mg total) by mouth 3 (three) times daily as needed for Cough. 30 capsule 0    fluticasone propionate (FLONASE) 50 mcg/actuation nasal spray 1 spray (50 mcg total) by Each Nostril route once daily. for 7 days 16 g 0     loratadine-pseudoephedrine 5-120 mg (CLARITIN-D 12 HOUR) 5-120 mg per tablet Take 1 tablet by mouth 2 (two) times daily. for 10 days 20 tablet 0     No current facility-administered medications for this visit.       Review of patient's allergies indicates:  No Known Allergies    Objective:     Physical Exam   Constitutional: He is oriented to person, place, and time. He appears well-developed. He is cooperative.  Non-toxic appearance. He does not appear ill. No distress.   HENT:   Head: Normocephalic and atraumatic.   Ears:   Right Ear: Hearing, tympanic membrane, external ear and ear canal normal. impacted cerumen  Left Ear: Hearing, tympanic membrane, external ear and ear canal normal. impacted cerumen  Nose: Rhinorrhea present. No mucosal edema, nasal deformity or congestion. No epistaxis. Right sinus exhibits no maxillary sinus tenderness and no frontal sinus tenderness. Left sinus exhibits no maxillary sinus tenderness and no frontal sinus tenderness.   Mouth/Throat: Uvula is midline and mucous membranes are normal. Mucous membranes are moist. No trismus in the jaw. Normal dentition. No uvula swelling. Posterior oropharyngeal erythema present. No oropharyngeal exudate or posterior oropharyngeal edema.   Eyes: Conjunctivae and lids are normal. Right eye exhibits no discharge. Left eye exhibits no discharge. No scleral icterus.   Neck: Trachea normal and phonation normal. Neck supple. No edema present. No erythema present. No neck rigidity present.   Cardiovascular: Normal rate, regular rhythm, normal heart sounds and normal pulses.   No murmur heard.Exam reveals no gallop and no friction rub.   Pulmonary/Chest: Effort normal and breath sounds normal. No stridor. No respiratory distress. He has no decreased breath sounds. He has no wheezes. He has no rhonchi. He has no rales.   Abdominal: Normal appearance.   Musculoskeletal:      Cervical back: He exhibits no tenderness.   Lymphadenopathy:     He has no cervical  adenopathy.   Neurological: He is alert and oriented to person, place, and time. He displays no weakness. He exhibits normal muscle tone. Coordination normal.   Skin: Skin is warm, dry, intact, not diaphoretic, not pale and no rash.   Psychiatric: His speech is normal and behavior is normal. Mood, judgment and thought content normal.   Nursing note and vitals reviewed.    Results for orders placed or performed in visit on 10/25/23   POCT Strep A, Molecular   Result Value Ref Range    Molecular Strep A, POC Negative Negative     Acceptable Yes    SARS Coronavirus 2 Antigen, POCT Manual Read   Result Value Ref Range    SARS Coronavirus 2 Antigen Negative Negative     Acceptable Yes          Assessment:     1. Viral URI with cough    2. Hoarseness        Plan:       Viral URI with cough  -     POCT Strep A, Molecular  -     SARS Coronavirus 2 Antigen, POCT Manual Read  -     fluticasone propionate (FLONASE) 50 mcg/actuation nasal spray; 1 spray (50 mcg total) by Each Nostril route once daily. for 7 days  Dispense: 16 g; Refill: 0  -     loratadine-pseudoephedrine 5-120 mg (CLARITIN-D 12 HOUR) 5-120 mg per tablet; Take 1 tablet by mouth 2 (two) times daily. for 10 days  Dispense: 20 tablet; Refill: 0  -     benzonatate (TESSALON) 200 MG capsule; Take 1 capsule (200 mg total) by mouth 3 (three) times daily as needed for Cough.  Dispense: 30 capsule; Refill: 0    Hoarseness    I have reviewed the patient chart and pertinent past imaging/labs.  Results reviewed    Patient Instructions                                                            URI   If your condition worsens or fails to improve we recommend that you receive another evaluation at the urgent care/ER immediately or contact your PCP to discuss your concerns. You must understand that you've received an urgent care treatment only and that you may be released before all your medical problems are known or treated. You the patient will  "arrange for follouwp care as instructed.   If we discussed that I think your illness is viral, it will not respond to antibiotics and will last 10-14 days.     Use Tessalon as needed for cough.  Voice rest when allowed.  Warm saltwater gargles, warm tea with honey.  Flonase (fluticasone) is a nasal spray which is available over the counter and may help with your symptoms.   Zyrtec D, Claritin D or Allegra D can also help with symptoms of congestion and drainage.   If you have hypertension avoid using the "D" which is the decongestant   If you just have drainage you can take plain zyrtec, claritin or allegra   If you just have a congested feeling you can take pseudoephedrine (unless you have high blood pressure) which you have to sign for behind the counter.   Rest and fluids are also important.     Salt water gargles, warm tea with honey and chloraseptic spray as needed for sore throat.   Tylenol or ibuprofen can also be used as directed for pain unless you have an allergy to them or medical condition such as stomach ulcers, kidney or liver disease or blood thinners etc for which you should not be taking these type of medications.                       "

## 2023-10-25 NOTE — PATIENT INSTRUCTIONS
"                                                         URI   If your condition worsens or fails to improve we recommend that you receive another evaluation at the urgent care/ER immediately or contact your PCP to discuss your concerns. You must understand that you've received an urgent care treatment only and that you may be released before all your medical problems are known or treated. You the patient will arrange for follouwp care as instructed.   If we discussed that I think your illness is viral, it will not respond to antibiotics and will last 10-14 days.     Use Tessalon as needed for cough.  Voice rest when allowed.  Warm saltwater gargles, warm tea with honey.  Flonase (fluticasone) is a nasal spray which is available over the counter and may help with your symptoms.   Zyrtec D, Claritin D or Allegra D can also help with symptoms of congestion and drainage.   If you have hypertension avoid using the "D" which is the decongestant   If you just have drainage you can take plain zyrtec, claritin or allegra   If you just have a congested feeling you can take pseudoephedrine (unless you have high blood pressure) which you have to sign for behind the counter.   Rest and fluids are also important.     Salt water gargles, warm tea with honey and chloraseptic spray as needed for sore throat.   Tylenol or ibuprofen can also be used as directed for pain unless you have an allergy to them or medical condition such as stomach ulcers, kidney or liver disease or blood thinners etc for which you should not be taking these type of medications.         "

## 2023-10-31 ENCOUNTER — TELEPHONE (OUTPATIENT)
Dept: OTOLARYNGOLOGY | Facility: CLINIC | Age: 68
End: 2023-10-31
Payer: COMMERCIAL

## 2023-12-02 DIAGNOSIS — E78.5 HYPERLIPIDEMIA, UNSPECIFIED HYPERLIPIDEMIA TYPE: ICD-10-CM

## 2023-12-02 DIAGNOSIS — I10 ESSENTIAL HYPERTENSION: ICD-10-CM

## 2023-12-02 DIAGNOSIS — N40.0 BENIGN PROSTATIC HYPERPLASIA, UNSPECIFIED WHETHER LOWER URINARY TRACT SYMPTOMS PRESENT: ICD-10-CM

## 2023-12-03 NOTE — TELEPHONE ENCOUNTER
No care due was identified.  [facility] Embedded Care Due Messages. Reference number: [id number].   12/02/2023 9:42:11 PM CST

## 2023-12-04 RX ORDER — AMLODIPINE BESYLATE AND ATORVASTATIN CALCIUM 10; 10 MG/1; MG/1
1 TABLET, FILM COATED ORAL DAILY
Qty: 90 TABLET | Refills: 2 | Status: SHIPPED | OUTPATIENT
Start: 2023-12-04

## 2023-12-04 RX ORDER — BISOPROLOL FUMARATE AND HYDROCHLOROTHIAZIDE 5; 6.25 MG/1; MG/1
1 TABLET ORAL DAILY
Qty: 90 TABLET | Refills: 2 | Status: SHIPPED | OUTPATIENT
Start: 2023-12-04

## 2023-12-04 RX ORDER — TAMSULOSIN HYDROCHLORIDE 0.4 MG/1
2 CAPSULE ORAL DAILY
Qty: 180 CAPSULE | Refills: 2 | Status: SHIPPED | OUTPATIENT
Start: 2023-12-04

## 2024-01-05 ENCOUNTER — PATIENT MESSAGE (OUTPATIENT)
Dept: OTOLARYNGOLOGY | Facility: CLINIC | Age: 69
End: 2024-01-05
Payer: COMMERCIAL

## 2024-03-13 ENCOUNTER — OFFICE VISIT (OUTPATIENT)
Dept: URGENT CARE | Facility: CLINIC | Age: 69
End: 2024-03-13
Payer: COMMERCIAL

## 2024-03-13 VITALS
HEART RATE: 78 BPM | OXYGEN SATURATION: 95 % | SYSTOLIC BLOOD PRESSURE: 128 MMHG | TEMPERATURE: 98 F | WEIGHT: 184 LBS | BODY MASS INDEX: 29.7 KG/M2 | RESPIRATION RATE: 16 BRPM | DIASTOLIC BLOOD PRESSURE: 71 MMHG

## 2024-03-13 DIAGNOSIS — J02.9 SORE THROAT: Primary | ICD-10-CM

## 2024-03-13 DIAGNOSIS — J02.0 STREP THROAT: ICD-10-CM

## 2024-03-13 LAB
CTP QC/QA: YES
MOLECULAR STREP A: POSITIVE
POC MOLECULAR INFLUENZA A AGN: NEGATIVE
POC MOLECULAR INFLUENZA B AGN: NEGATIVE
SARS-COV-2 AG RESP QL IA.RAPID: NEGATIVE

## 2024-03-13 PROCEDURE — 87502 INFLUENZA DNA AMP PROBE: CPT | Mod: QW,S$GLB,, | Performed by: FAMILY MEDICINE

## 2024-03-13 PROCEDURE — 87651 STREP A DNA AMP PROBE: CPT | Mod: QW,S$GLB,, | Performed by: FAMILY MEDICINE

## 2024-03-13 PROCEDURE — 87811 SARS-COV-2 COVID19 W/OPTIC: CPT | Mod: QW,S$GLB,, | Performed by: FAMILY MEDICINE

## 2024-03-13 PROCEDURE — 99214 OFFICE O/P EST MOD 30 MIN: CPT | Mod: S$GLB,,, | Performed by: FAMILY MEDICINE

## 2024-03-13 RX ORDER — AMOXICILLIN 875 MG/1
875 TABLET, FILM COATED ORAL EVERY 12 HOURS
Qty: 20 TABLET | Refills: 0 | Status: SHIPPED | OUTPATIENT
Start: 2024-03-13

## 2024-03-13 NOTE — PROGRESS NOTES
Subjective:      Patient ID: Babatunde Oconnor is a 68 y.o. male.    Vitals:  vitals were not taken for this visit.     Chief Complaint: Sore Throat (/)    This is a 68 y.o. male who presents today with a chief complaint of right side of throat hurt and pain moving up to his right ear, slight nasal congestion, hurts to swallow x 2 days     Home Tx: Tylenol Sinus     PMH: Hypertension       Sore Throat   The current episode started in the past 7 days. The problem has been unchanged. The pain is worse on the right side. There has been no fever. The pain is at a severity of 3/10. Associated symptoms include congestion, ear pain and a plugged ear sensation. Pertinent negatives include no abdominal pain, coughing, shortness of breath or trouble swallowing. He has had no exposure to strep or mono.       HENT:  Positive for ear pain, congestion and sore throat. Negative for trouble swallowing.    Respiratory:  Negative for cough and shortness of breath.    Gastrointestinal:  Negative for abdominal pain.    Objective:     Physical Exam   Constitutional: No distress.   HENT:   Head: Normocephalic and atraumatic.   Ears:   Right Ear: Tympanic membrane, external ear and ear canal normal.   Left Ear: Tympanic membrane, external ear and ear canal normal.   Nose: No rhinorrhea or congestion.   Mouth/Throat: Mucous membranes are moist. Posterior oropharyngeal erythema present. Oropharynx is clear.   Eyes: Conjunctivae are normal. Pupils are equal, round, and reactive to light. Extraocular movement intact   Neck: Neck supple. No neck rigidity present.   Cardiovascular: Normal rate, regular rhythm, normal heart sounds and normal pulses.   No murmur heard.  Pulmonary/Chest: Effort normal and breath sounds normal. No respiratory distress.   Abdominal: Normal appearance and bowel sounds are normal. Soft. flat abdomen   Musculoskeletal: Normal range of motion.         General: Normal range of motion.   Neurological: He is alert.    Psychiatric: His behavior is normal. Mood, judgment and thought content normal.   Nursing note and vitals reviewed.      Assessment:     Plan:   1. Sore throat  - SARS Coronavirus 2 Antigen, POCT Manual Read  - POCT Influenza A/B MOLECULAR  - POCT Strep A, Molecular    2. Strep throat  - amoxicillin (AMOXIL) 875 MG tablet; Take 1 tablet (875 mg total) by mouth every 12 (twelve) hours.  Dispense: 20 tablet; Refill: 0   All results discussed with pt prior to discharge from clinic

## 2024-03-20 DIAGNOSIS — I10 ESSENTIAL HYPERTENSION: ICD-10-CM

## 2024-03-20 DIAGNOSIS — E78.5 HYPERLIPIDEMIA, UNSPECIFIED HYPERLIPIDEMIA TYPE: ICD-10-CM

## 2024-03-20 RX ORDER — BISOPROLOL FUMARATE AND HYDROCHLOROTHIAZIDE 5; 6.25 MG/1; MG/1
1 TABLET ORAL DAILY
Qty: 90 TABLET | Refills: 2 | Status: CANCELLED | OUTPATIENT
Start: 2024-03-20

## 2024-03-20 RX ORDER — AMLODIPINE BESYLATE AND ATORVASTATIN CALCIUM 10; 10 MG/1; MG/1
1 TABLET, FILM COATED ORAL DAILY
Qty: 90 TABLET | Refills: 2 | Status: CANCELLED | OUTPATIENT
Start: 2024-03-20

## 2024-03-20 NOTE — TELEPHONE ENCOUNTER
Care Due:                  Date            Visit Type   Department     Provider  --------------------------------------------------------------------------------                                EP -                              PRIMARY      [facility] INTERNAL  Last Visit: 05-      CARE ([facility])   [facility] BÁRBARA Moreau                              EP -                              PRIMARY      [facility] INTERNAL  Next Visit: 07-      CARE ([facility])   [facility]  Prieto                                                            Last  Test          Frequency    Reason                     Performed    Due Date  --------------------------------------------------------------------------------    CMP.........  12 months..  amlodipine-atorvastatin,   05- 05-                             bisoprolol-hydrochlorothi                             azide....................    Lipid Panel.  12 months..  amlodipine-atorvastatin..  05- 05-    [facility] Embedded Care Due Messages. Reference number: [id number].   3/20/2024 4:48:53 PM CDT

## 2024-06-14 DIAGNOSIS — I10 ESSENTIAL HYPERTENSION: ICD-10-CM

## 2024-06-15 NOTE — TELEPHONE ENCOUNTER
Care Due:                  Date            Visit Type   Department     Provider  --------------------------------------------------------------------------------                                EP -                              PRIMARY      [facility] INTERNAL  Last Visit: 05-      CARE ([facility])   [facility] BÁRBARA Moreau                              EP -                              PRIMARY      [facility] INTERNAL  Next Visit: 07-      CARE ([facility])   [facility]  P[place]                                                            Last  Test          Frequency    Reason                     Performed    Due Date  --------------------------------------------------------------------------------    CMP.........  12 months..  amlodipine-atorvastatin,   05- 05-                             bisoprolol-hydrochlorothi                             azide....................    Lipid Panel.  12 months..  amlodipine-atorvastatin..  05- 05-    [facility] Embedded Care Due Messages. Reference number: [id number].   6/14/2024 7:35:33 PM CDT

## 2024-06-17 RX ORDER — BISOPROLOL FUMARATE AND HYDROCHLOROTHIAZIDE 5; 6.25 MG/1; MG/1
1 TABLET ORAL DAILY
Qty: 90 TABLET | Refills: 0 | Status: SHIPPED | OUTPATIENT
Start: 2024-06-17

## 2024-07-08 PROBLEM — I70.0 AORTIC ATHEROSCLEROSIS: Status: ACTIVE | Noted: 2024-07-08

## 2024-07-08 NOTE — PROGRESS NOTES
Subjective:         Chief Complaint: Annual Exam    HPI  Mr.Marco Oconnor is a 69-year-old man with chronic musculoskeletal pain (lumbar degenerative disc disease/neuroforaminal stenosis, diffuse arthritis, and facet arthropathy), hypertension/hyperlipidemia (bisoprolol 5/hydrochlorothiazide 6.25 and amlodipine/atorvastatin 10/10), prediabetes, BPH (Flomax 0.8) nonalcoholic fatty liver disease (in combination with formal diagnosis of alcohol use), seasonal allergies, and obstructive sleep apnea (dx in question as no sleep study was ever done; he didn't schedule) presenting for annual physical:    Skin tags:  - numerous  - due for dermatology follow-up        Family, social, surgical Hx reviewed     Health Maintenance:  Due for annual screening labs and routine vaccinations    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    Hyperlipidemia     Hypertension     Leg length discrepancy 10/16/2021    R longer, Consider foot eval & may need custom insert in shoe    Lumbar spondylosis 12/13/2019     Past Surgical History:   Procedure Laterality Date    APPENDECTOMY      ARTHROPLASTY OF HIP BY ANTERIOR APPROACH Right 6/29/2022    Procedure: ARTHROPLASTY, HIP, TOTAL, ANTERIOR APPROACH: RIGHT: DEPUY-ACTIS+PINNACLE;  Surgeon: Talon Amaral III, MD;  Location: [facility] OR;  Service: Orthopedics;  Laterality: Right;    CERVICAL DISCECTOMY  2008    CERVICAL SPINE SURGERY      EPIDURAL STEROID INJECTION N/A 8/12/2022    Procedure: Lumbar WING L4-5;  Surgeon: Toño Moy DO;  Location: [facility] OR;  Service: Pain Management;  Laterality: N/A;    INJECTION OF ANESTHETIC AGENT AROUND MEDIAL BRANCH NERVES INNERVATING LUMBAR FACET JOINT Bilateral 1/7/2020    Procedure: Block-nerve-medial branch-lumbar-- Bilateral (With Steroid);  Surgeon: Vamshi Anderson Jr., MD;  Location: [facility] PAIN [facility];  Service: Pain Management;   Laterality: Bilateral;  No Sedation    INJECTION OF ANESTHETIC AGENT AROUND MEDIAL BRANCH NERVES INNERVATING LUMBAR FACET JOINT Bilateral 5/14/2020    Procedure: Block-nerve-medial branch-lumbar--Bilateral L4-5, L5-S1 with steriods;  Surgeon: Vamshi Anderson Jr., MD;  Location: [facility] PAIN T;  Service: Pain Management;  Laterality: Bilateral;     Family History   Problem Relation Name Age of Onset    Diabetes Father       Social History     Socioeconomic History    Marital status:      Spouse name: Ese    Number of children: 1   Occupational History     Comment: teacher -   Tobacco Use    Smoking status: Never    Smokeless tobacco: Never   Substance and Sexual Activity    Alcohol use: Not Currently     Alcohol/week: 4.0 standard drinks of alcohol     Types: 1 Cans of beer, 3 Shots of liquor per week     Comment: liquor or beer daily    Drug use: No    Sexual activity: Yes     Partners: Female   Social History Narrative    Stairs- 13     Social Determinants of Health     Financial Resource Strain: Low Risk  (7/6/2024)    Overall Financial Resource Strain (CARDIA)     Difficulty of Paying Living Expenses: Not very hard   Food Insecurity: No Food Insecurity (7/6/2024)    Hunger Vital Sign     Worried About Running Out of Food in the Last Year: Never true     Ran Out of Food in the Last Year: Never true   Transportation Needs: No Transportation Needs (7/26/2023)    PRAPARE - Transportation     Lack of Transportation (Medical): No     Lack of Transportation (Non-Medical): No   Physical Activity: Sufficiently Active (7/6/2024)    Exercise Vital Sign     Days of Exercise per Week: 5 days     Minutes of Exercise per Session: 40 min   Stress: No Stress Concern Present (7/6/2024)    [employer] - Occupational Stress Questionnaire     Feeling of Stress : Not at all   Housing Stability: Low Risk  (7/26/2023)    Housing Stability Vital Sign     Unable to Pay for Housing in the Last Year: No      Number of Places Lived in the Last Year: 1     Unstable Housing in the Last Year: No     Review of patient's allergies indicates:  No Known Allergies  Babatunde Oconnor had no medications administered during this visit.   Review of Systems   Constitutional:  Negative for appetite change, chills and fever.   HENT: Negative.     Respiratory:  Negative for cough, chest tightness and shortness of breath.    Cardiovascular:  Negative for chest pain, palpitations and leg swelling.   Gastrointestinal:  Negative for abdominal distention, abdominal pain, blood in stool, constipation, diarrhea, nausea and vomiting.   Endocrine: Negative.    Genitourinary:  Negative for difficulty urinating, dysuria, frequency and hematuria.   Musculoskeletal: Negative.    Integumentary:  Negative.   Neurological: Negative.    Psychiatric/Behavioral: Negative.           Objective:      Vitals:    07/09/24 0949   BP: 106/64   Pulse: 70   Resp: (!) 6   Temp: 97.5 °F (36.4 °C)      Physical Exam  Vitals reviewed.   Constitutional:       General: He is not in acute distress.     Appearance: Normal appearance.   HENT:      Head: Normocephalic and atraumatic.      Comments: Facial features are symmetric      Nose: Nose normal. No congestion or rhinorrhea.      Mouth/Throat:      Mouth: Mucous membranes are moist.      Pharynx: Oropharynx is clear. No oropharyngeal exudate or posterior oropharyngeal erythema.   Eyes:      General: No scleral icterus.     Extraocular Movements: Extraocular movements intact.      Conjunctiva/sclera: Conjunctivae normal.   Cardiovascular:      Rate and Rhythm: Normal rate and regular rhythm.      Pulses: Normal pulses.      Heart sounds: Normal heart sounds.   Pulmonary:      Effort: Pulmonary effort is normal. No respiratory distress.      Breath sounds: Normal breath sounds.   Musculoskeletal:         General: No deformity or signs of injury. Normal range of motion.      Cervical back: Normal range of motion.       Comments: Gait normal    Skin:     General: Skin is warm and dry.      Findings: No rash.   Neurological:      General: No focal deficit present.      Mental Status: He is alert and oriented to person, place, and time. Mental status is at baseline.   Psychiatric:         Mood and Affect: Mood normal.         Behavior: Behavior normal.         Thought Content: Thought content normal.       Current Outpatient Medications on File Prior to Visit   Medication Sig Dispense Refill    alclometasone (ACLOVATE) 0.05 % cream AAA face bid prn redness or scaling or itching 60 g 3    amlodipine-atorvastatin (CADUET) 10-10 mg per tablet Take 1 tablet by mouth once daily. 90 tablet 2    bisoprolol-hydrochlorothiazide 5-6.25 mg (ZIAC) 5-6.25 mg Tab Take 1 tablet by mouth once daily. 90 tablet 0    tamsulosin (FLOMAX) 0.4 mg Cap Take 2 capsules (0.8 mg total) by mouth once daily. 180 capsule 2    amoxicillin (AMOXIL) 875 MG tablet Take 1 tablet (875 mg total) by mouth every 12 (twelve) hours. 20 tablet 0    aspirin (ECOTRIN) 81 MG EC tablet Take 1 tablet (81 mg total) by mouth 2 (two) times daily. 60 tablet 0    [DISCONTINUED] azelastine (ASTELIN) 137 mcg (0.1 %) nasal spray 1 spray (137 mcg total) by Nasal route 2 (two) times daily. 30 mL 0    [DISCONTINUED] ketoconazole (NIZORAL) 2 % shampoo Apply topically every 7 days. 120 mL 6     No current facility-administered medications on file prior to visit.         Assessment:       1. Physical exam, annual    2. Aortic atherosclerosis        Plan:       Physical exam, annual  -     CBC Auto Differential; Future; Expected date: 07/09/2024  -     Comprehensive Metabolic Panel; Future; Expected date: 07/09/2024  -     Hemoglobin A1C; Future; Expected date: 07/09/2024  -     Lipid Panel; Future; Expected date: 07/09/2024  -     T4; Future; Expected date: 07/09/2024  -     TSH; Future; Expected date: 07/09/2024  -     Vitamin D; Future; Expected date: 07/09/2024   - annual screening labs  ordered.      Aortic atherosclerosis   - will continue to intervened upon all modifiable risk factors      Return to clinic in one year for annual exam or sooner if dictated by labs or illness.

## 2024-07-09 ENCOUNTER — OFFICE VISIT (OUTPATIENT)
Dept: INTERNAL MEDICINE | Facility: CLINIC | Age: 69
End: 2024-07-09
Payer: COMMERCIAL

## 2024-07-09 ENCOUNTER — LAB VISIT (OUTPATIENT)
Dept: LAB | Facility: HOSPITAL | Age: 69
End: 2024-07-09
Attending: STUDENT IN AN ORGANIZED HEALTH CARE EDUCATION/TRAINING PROGRAM
Payer: COMMERCIAL

## 2024-07-09 VITALS
HEART RATE: 70 BPM | TEMPERATURE: 98 F | HEIGHT: 66 IN | BODY MASS INDEX: 29.94 KG/M2 | DIASTOLIC BLOOD PRESSURE: 64 MMHG | WEIGHT: 186.31 LBS | SYSTOLIC BLOOD PRESSURE: 106 MMHG | OXYGEN SATURATION: 95 % | RESPIRATION RATE: 6 BRPM

## 2024-07-09 DIAGNOSIS — I70.0 AORTIC ATHEROSCLEROSIS: ICD-10-CM

## 2024-07-09 DIAGNOSIS — Z00.00 PHYSICAL EXAM, ANNUAL: Primary | ICD-10-CM

## 2024-07-09 DIAGNOSIS — Z00.00 PHYSICAL EXAM, ANNUAL: ICD-10-CM

## 2024-07-09 LAB
25(OH)D3+25(OH)D2 SERPL-MCNC: 21 NG/ML (ref 30–96)
ALBUMIN SERPL BCP-MCNC: 4.1 G/DL (ref 3.5–5.2)
ALP SERPL-CCNC: 54 U/L (ref 55–135)
ALT SERPL W/O P-5'-P-CCNC: 29 U/L (ref 10–44)
ANION GAP SERPL CALC-SCNC: 9 MMOL/L (ref 8–16)
AST SERPL-CCNC: 24 U/L (ref 10–40)
BASOPHILS # BLD AUTO: 0.04 K/UL (ref 0–0.2)
BASOPHILS NFR BLD: 0.6 % (ref 0–1.9)
BILIRUB SERPL-MCNC: 0.4 MG/DL (ref 0.1–1)
BUN SERPL-MCNC: 16 MG/DL (ref 8–23)
CALCIUM SERPL-MCNC: 9.5 MG/DL (ref 8.7–10.5)
CHLORIDE SERPL-SCNC: 106 MMOL/L (ref 95–110)
CHOLEST SERPL-MCNC: 209 MG/DL (ref 120–199)
CHOLEST/HDLC SERPL: 3.3 {RATIO} (ref 2–5)
CO2 SERPL-SCNC: 24 MMOL/L (ref 23–29)
CREAT SERPL-MCNC: 0.8 MG/DL (ref 0.5–1.4)
DIFFERENTIAL METHOD BLD: NORMAL
EOSINOPHIL # BLD AUTO: 0.1 K/UL (ref 0–0.5)
EOSINOPHIL NFR BLD: 1.5 % (ref 0–8)
ERYTHROCYTE [DISTWIDTH] IN BLOOD BY AUTOMATED COUNT: 13.6 % (ref 11.5–14.5)
EST. GFR  (NO RACE VARIABLE): >60 ML/MIN/1.73 M^2
ESTIMATED AVG GLUCOSE: 117 MG/DL (ref 68–131)
GLUCOSE SERPL-MCNC: 104 MG/DL (ref 70–110)
HBA1C MFR BLD: 5.7 % (ref 4–5.6)
HCT VFR BLD AUTO: 46.2 % (ref 40–54)
HDLC SERPL-MCNC: 63 MG/DL (ref 40–75)
HDLC SERPL: 30.1 % (ref 20–50)
HGB BLD-MCNC: 14.9 G/DL (ref 14–18)
IMM GRANULOCYTES # BLD AUTO: 0.02 K/UL (ref 0–0.04)
IMM GRANULOCYTES NFR BLD AUTO: 0.3 % (ref 0–0.5)
LDLC SERPL CALC-MCNC: 128.2 MG/DL (ref 63–159)
LYMPHOCYTES # BLD AUTO: 1.6 K/UL (ref 1–4.8)
LYMPHOCYTES NFR BLD: 24.3 % (ref 18–48)
MCH RBC QN AUTO: 30 PG (ref 27–31)
MCHC RBC AUTO-ENTMCNC: 32.3 G/DL (ref 32–36)
MCV RBC AUTO: 93 FL (ref 82–98)
MONOCYTES # BLD AUTO: 0.5 K/UL (ref 0.3–1)
MONOCYTES NFR BLD: 8 % (ref 4–15)
NEUTROPHILS # BLD AUTO: 4.4 K/UL (ref 1.8–7.7)
NEUTROPHILS NFR BLD: 65.3 % (ref 38–73)
NONHDLC SERPL-MCNC: 146 MG/DL
NRBC BLD-RTO: 0 /100 WBC
PLATELET # BLD AUTO: 261 K/UL (ref 150–450)
PMV BLD AUTO: 11.3 FL (ref 9.2–12.9)
POTASSIUM SERPL-SCNC: 4.7 MMOL/L (ref 3.5–5.1)
PROT SERPL-MCNC: 7.1 G/DL (ref 6–8.4)
RBC # BLD AUTO: 4.97 M/UL (ref 4.6–6.2)
SODIUM SERPL-SCNC: 139 MMOL/L (ref 136–145)
T4 SERPL-MCNC: 5.8 UG/DL (ref 4.5–11.5)
TRIGL SERPL-MCNC: 89 MG/DL (ref 30–150)
TSH SERPL DL<=0.005 MIU/L-ACNC: 1.99 UIU/ML (ref 0.4–4)
WBC # BLD AUTO: 6.71 K/UL (ref 3.9–12.7)

## 2024-07-09 PROCEDURE — 99999 PR PBB SHADOW E&M-EST. PATIENT-LVL III: CPT | Mod: PBBFAC,,, | Performed by: STUDENT IN AN ORGANIZED HEALTH CARE EDUCATION/TRAINING PROGRAM

## 2024-07-09 PROCEDURE — 85025 COMPLETE CBC W/AUTO DIFF WBC: CPT | Performed by: STUDENT IN AN ORGANIZED HEALTH CARE EDUCATION/TRAINING PROGRAM

## 2024-07-09 PROCEDURE — 1159F MED LIST DOCD IN RCRD: CPT | Mod: CPTII,S$GLB,, | Performed by: STUDENT IN AN ORGANIZED HEALTH CARE EDUCATION/TRAINING PROGRAM

## 2024-07-09 PROCEDURE — 80061 LIPID PANEL: CPT | Performed by: STUDENT IN AN ORGANIZED HEALTH CARE EDUCATION/TRAINING PROGRAM

## 2024-07-09 PROCEDURE — 83036 HEMOGLOBIN GLYCOSYLATED A1C: CPT | Performed by: STUDENT IN AN ORGANIZED HEALTH CARE EDUCATION/TRAINING PROGRAM

## 2024-07-09 PROCEDURE — 99397 PER PM REEVAL EST PAT 65+ YR: CPT | Mod: S$GLB,,, | Performed by: STUDENT IN AN ORGANIZED HEALTH CARE EDUCATION/TRAINING PROGRAM

## 2024-07-09 PROCEDURE — 3008F BODY MASS INDEX DOCD: CPT | Mod: CPTII,S$GLB,, | Performed by: STUDENT IN AN ORGANIZED HEALTH CARE EDUCATION/TRAINING PROGRAM

## 2024-07-09 PROCEDURE — 82306 VITAMIN D 25 HYDROXY: CPT | Performed by: STUDENT IN AN ORGANIZED HEALTH CARE EDUCATION/TRAINING PROGRAM

## 2024-07-09 PROCEDURE — 1126F AMNT PAIN NOTED NONE PRSNT: CPT | Mod: CPTII,S$GLB,, | Performed by: STUDENT IN AN ORGANIZED HEALTH CARE EDUCATION/TRAINING PROGRAM

## 2024-07-09 PROCEDURE — 1101F PT FALLS ASSESS-DOCD LE1/YR: CPT | Mod: CPTII,S$GLB,, | Performed by: STUDENT IN AN ORGANIZED HEALTH CARE EDUCATION/TRAINING PROGRAM

## 2024-07-09 PROCEDURE — 84436 ASSAY OF TOTAL THYROXINE: CPT | Performed by: STUDENT IN AN ORGANIZED HEALTH CARE EDUCATION/TRAINING PROGRAM

## 2024-07-09 PROCEDURE — 3074F SYST BP LT 130 MM HG: CPT | Mod: CPTII,S$GLB,, | Performed by: STUDENT IN AN ORGANIZED HEALTH CARE EDUCATION/TRAINING PROGRAM

## 2024-07-09 PROCEDURE — 80053 COMPREHEN METABOLIC PANEL: CPT | Performed by: STUDENT IN AN ORGANIZED HEALTH CARE EDUCATION/TRAINING PROGRAM

## 2024-07-09 PROCEDURE — 3288F FALL RISK ASSESSMENT DOCD: CPT | Mod: CPTII,S$GLB,, | Performed by: STUDENT IN AN ORGANIZED HEALTH CARE EDUCATION/TRAINING PROGRAM

## 2024-07-09 PROCEDURE — 36415 COLL VENOUS BLD VENIPUNCTURE: CPT | Mod: PO | Performed by: STUDENT IN AN ORGANIZED HEALTH CARE EDUCATION/TRAINING PROGRAM

## 2024-07-09 PROCEDURE — 84443 ASSAY THYROID STIM HORMONE: CPT | Performed by: STUDENT IN AN ORGANIZED HEALTH CARE EDUCATION/TRAINING PROGRAM

## 2024-07-09 PROCEDURE — 3078F DIAST BP <80 MM HG: CPT | Mod: CPTII,S$GLB,, | Performed by: STUDENT IN AN ORGANIZED HEALTH CARE EDUCATION/TRAINING PROGRAM

## 2024-08-28 DIAGNOSIS — N40.0 BENIGN PROSTATIC HYPERPLASIA, UNSPECIFIED WHETHER LOWER URINARY TRACT SYMPTOMS PRESENT: ICD-10-CM

## 2024-08-28 RX ORDER — TAMSULOSIN HYDROCHLORIDE 0.4 MG/1
2 CAPSULE ORAL DAILY
Qty: 180 CAPSULE | Refills: 3 | Status: SHIPPED | OUTPATIENT
Start: 2024-08-28

## 2024-08-28 NOTE — TELEPHONE ENCOUNTER
No care due was identified.  [facility] Embedded Care Due Messages. Reference number: [id number].   8/28/2024 12:34:23 AM CDT

## 2024-08-28 NOTE — TELEPHONE ENCOUNTER
Refill Decision Note   Babatunde Elvin  is requesting a refill authorization.  Brief Assessment and Rationale for Refill:  Approve     Medication Therapy Plan:         Comments:     Note composed:10:15 AM 08/28/2024

## 2024-09-13 DIAGNOSIS — I10 ESSENTIAL HYPERTENSION: ICD-10-CM

## 2024-09-13 RX ORDER — BISOPROLOL FUMARATE AND HYDROCHLOROTHIAZIDE 5; 6.25 MG/1; MG/1
1 TABLET ORAL
Qty: 90 TABLET | Refills: 3 | Status: SHIPPED | OUTPATIENT
Start: 2024-09-13

## 2024-09-13 NOTE — TELEPHONE ENCOUNTER
No care due was identified.  [facility] Embedded Care Due Messages. Reference number: [id number].   9/13/2024 12:49:19 AM CDT

## 2024-09-13 NOTE — TELEPHONE ENCOUNTER
Refill Decision Note   Babatunde Elvin  is requesting a refill authorization.  Brief Assessment and Rationale for Refill:  Approve     Medication Therapy Plan:        Comments:     Note composed:6:32 AM 09/13/2024

## 2024-09-15 ENCOUNTER — OFFICE VISIT (OUTPATIENT)
Dept: URGENT CARE | Facility: CLINIC | Age: 69
End: 2024-09-15
Payer: MEDICARE

## 2024-09-15 VITALS
DIASTOLIC BLOOD PRESSURE: 72 MMHG | OXYGEN SATURATION: 99 % | BODY MASS INDEX: 29.94 KG/M2 | HEART RATE: 79 BPM | HEIGHT: 66 IN | RESPIRATION RATE: 17 BRPM | SYSTOLIC BLOOD PRESSURE: 115 MMHG | WEIGHT: 186.31 LBS | TEMPERATURE: 99 F

## 2024-09-15 DIAGNOSIS — R05.9 COUGH, UNSPECIFIED TYPE: Primary | ICD-10-CM

## 2024-09-15 DIAGNOSIS — J00 ACUTE NASOPHARYNGITIS: ICD-10-CM

## 2024-09-15 LAB
CTP QC/QA: YES
SARS-COV-2 AG RESP QL IA.RAPID: NEGATIVE

## 2024-09-15 PROCEDURE — 99213 OFFICE O/P EST LOW 20 MIN: CPT | Mod: S$GLB,,, | Performed by: NURSE PRACTITIONER

## 2024-09-15 PROCEDURE — 87811 SARS-COV-2 COVID19 W/OPTIC: CPT | Mod: QW,S$GLB,, | Performed by: NURSE PRACTITIONER

## 2024-09-15 RX ORDER — METHYLPREDNISOLONE 4 MG/1
4 TABLET ORAL DAILY
Qty: 21 TABLET | Refills: 0 | Status: SHIPPED | OUTPATIENT
Start: 2024-09-15 | End: 2024-10-06

## 2024-09-15 RX ORDER — AZELASTINE 1 MG/ML
1 SPRAY, METERED NASAL 2 TIMES DAILY
Qty: 30 ML | Refills: 0 | Status: SHIPPED | OUTPATIENT
Start: 2024-09-15 | End: 2025-09-15

## 2024-09-15 RX ORDER — BENZONATATE 100 MG/1
100 CAPSULE ORAL EVERY 6 HOURS PRN
Qty: 30 CAPSULE | Refills: 1 | Status: SHIPPED | OUTPATIENT
Start: 2024-09-15 | End: 2025-09-15

## 2024-09-15 RX ORDER — LEVOCETIRIZINE DIHYDROCHLORIDE 5 MG/1
5 TABLET, FILM COATED ORAL NIGHTLY
Qty: 30 TABLET | Refills: 11 | Status: SHIPPED | OUTPATIENT
Start: 2024-09-15 | End: 2025-09-15

## 2024-09-15 RX ORDER — GUAIFENESIN AND DEXTROMETHORPHAN HYDROBROMIDE 10; 100 MG/5ML; MG/5ML
5 SYRUP ORAL EVERY 6 HOURS
Qty: 75 ML | Refills: 0 | Status: SHIPPED | OUTPATIENT
Start: 2024-09-15 | End: 2024-09-25

## 2024-09-15 NOTE — PATIENT INSTRUCTIONS
Follow up with your Primary Care Provider in 2-3 days if no improvement.  If you do not have one, please see the list provided and become established with one.  If your condition worsens we recommend that you receive another evaluation at the emergency room immediately or contact your primary medical clinics after hours call service to discuss your concerns.    Flonase nasal spray as directed. Breathe right strips at night to help you breathe.  A cool mist humidifier in bedroom may help with cough and relieve stuffy nose.  Sore throat:  Lozenge, hard candy or honey.You may use Afrin at night to aid in breathing.  Use only for 3 days, any longer and you may develop a dependency.  Sinus rinses DO NOT USE TAP WATER, if you must, water must be a rolling boil for 1 minute, let it cool, then use.  May use distilled water.  Vics vapor rub in shower to help open nasal passages.  May use nasal gel to keep passages moisturized.  May use Nasal saline sprays during the day for added relief of congestion.       For those who go to the gym, please do not use the sauna or steam room now to clear sinuses.Take Mucinex D in the morning and Mucinex DM at night. Mucinex D has Pseudoephedrine in it and can cause palpitations and high blood pressure. If you experience this stop the Mucinex D and only take Mucinex DM (twice a day).    During pollen season, change shirt if you are outside for a while when you go in.  Also wash your face.  Do not touch your face with your hands.  Wash your hands often in general while ill, avoid face contact with hands. Good nutrition. Lots of rest You may or may not have received a steroid injection, if you have, you may experience some jitters or develop nervousness.  You may also not rest well this night- these symptoms will resolve.  If you were given a prescription for steroids, do not medications such as Motrin, Advil, Ibuprofen, Aleve, Mobic, or Toradol while taking the steroid. these are  non-steroidal anti-inflammatory medications which you do not need while taking steroids.  Steroids can elevate your blood pressure, elevate your blood sugar,give you water weight gain, nervous energy, redness to the face and dimpling of the skin where the shot goes in.    If you were given an antibiotic to take at home, please take it until it is completed even if you begin feeling better prior to the course of therapy being completed.  To attempt to minimize abdominal discomfort while taking antibiotics, you may try to take probiotics.  SEPARATE the antibiotics and probiotics by TWO hours, if not neither medication will work.     You must understand that you've received an Urgent Care treatment only and that you may be released before all your medical problems are known or treated. You, the patient, will arrange for follow up care as instructed

## 2024-09-15 NOTE — PROGRESS NOTES
"Subjective:      Patient ID: Babatunde Oconnor is a 69 y.o. male.    Vitals:  height is 5' 6" (1.676 m) and weight is 84.5 kg (186 lb 4.6 oz). His temporal temperature is 98.8 °F (37.1 °C). His blood pressure is 115/72 and his pulse is 79. His respiration is 17 and oxygen saturation is 99%.     Chief Complaint: Sore Throat    Patient rteports with a cough that presented about 4 days ago, he reports with taking otc cold and sinus medication to help combat his symptoms and reports with no relief.   Provider Note:    No fever. No chills. Thsi time last year this started and went to Urgent care 8 times.     Sore Throat   This is a new problem. The current episode started in the past 7 days. The problem has been gradually worsening. There has been no fever. Associated symptoms include congestion, coughing, headaches and a hoarse voice. Pertinent negatives include no abdominal pain, diarrhea, drooling, ear discharge, ear pain, plugged ear sensation, neck pain, shortness of breath, stridor, swollen glands, trouble swallowing or vomiting. He has had no exposure to strep or mono. Treatments tried: cold and sinus medication. The treatment provided no relief.       HENT:  Positive for congestion and sore throat. Negative for ear pain, ear discharge, drooling and trouble swallowing.    Neck: Negative for neck pain.   Respiratory:  Positive for cough. Negative for shortness of breath and stridor.    Gastrointestinal:  Negative for abdominal pain, vomiting and diarrhea.   Neurological:  Positive for headaches.      Objective:     Physical Exam   Constitutional: He is oriented to person, place, and time. He appears well-developed. He is cooperative.   HENT:   Head: Normocephalic and atraumatic.   Ears:   Right Ear: Hearing, tympanic membrane, external ear and ear canal normal.   Left Ear: Hearing, tympanic membrane, external ear and ear canal normal.   Nose: Rhinorrhea present. No mucosal edema or nasal deformity. No epistaxis. " Right sinus exhibits no maxillary sinus tenderness and no frontal sinus tenderness. Left sinus exhibits no maxillary sinus tenderness and no frontal sinus tenderness.   Mouth/Throat: Uvula is midline and mucous membranes are normal. No trismus in the jaw. Normal dentition. No uvula swelling. Posterior oropharyngeal erythema present.   Eyes: Conjunctivae and lids are normal.   Neck: Trachea normal and phonation normal. Neck supple.   Cardiovascular: Normal rate, regular rhythm, normal heart sounds and normal pulses.   Pulmonary/Chest: Effort normal and breath sounds normal.   Abdominal: Normal appearance.   Musculoskeletal: Normal range of motion.         General: Normal range of motion.   Neurological: He is alert and oriented to person, place, and time. He exhibits normal muscle tone.   Skin: Skin is warm, dry and intact.   Psychiatric: His speech is normal and behavior is normal. Judgment and thought content normal.   Nursing note and vitals reviewed.      Assessment:     1. Cough, unspecified type    2. Acute nasopharyngitis      Results for orders placed or performed in visit on 09/15/24   SARS Coronavirus 2 Antigen, POCT Manual Read   Result Value Ref Range    SARS Coronavirus 2 Antigen Negative Negative     Acceptable Yes       Plan:       Cough, unspecified type  -     SARS Coronavirus 2 Antigen, POCT Manual Read    Acute nasopharyngitis  -     methylPREDNISolone (MEDROL) 4 MG Tab; Take 1 tablet (4 mg total) by mouth once daily. 24 mg (6 pills) PO on day 1, then decrease by 4mg/day x 5 days per dose pack instructions. for 21 doses  Dispense: 21 tablet; Refill: 0  -     levocetirizine (XYZAL) 5 MG tablet; Take 1 tablet (5 mg total) by mouth every evening.  Dispense: 30 tablet; Refill: 11  -     azelastine (ASTELIN) 137 mcg (0.1 %) nasal spray; 1 spray (137 mcg total) by Nasal route 2 (two) times daily.  Dispense: 30 mL; Refill: 0  -     benzonatate (TESSALON PERLES) 100 MG capsule; Take 1  capsule (100 mg total) by mouth every 6 (six) hours as needed for Cough.  Dispense: 30 capsule; Refill: 1  -     dextromethorphan-guaiFENesin  mg/5 ml (ROBITUSSIN-DM)  mg/5 mL liquid; Take 5 mLs by mouth every 6 (six) hours. for 10 days  Dispense: 75 mL; Refill: 0      Follow up with your Primary Care Provider in 2-3 days if no improvement.  If you do not have one, please see the list provided and become established with one.  If your condition worsens we recommend that you receive another evaluation at the emergency room immediately or contact your primary medical clinics after hours call service to discuss your concerns.    Flonase nasal spray as directed. Breathe right strips at night to help you breathe.  A cool mist humidifier in bedroom may help with cough and relieve stuffy nose.  Sore throat:  Lozenge, hard candy or honey.You may use Afrin at night to aid in breathing.  Use only for 3 days, any longer and you may develop a dependency.  Sinus rinses DO NOT USE TAP WATER, if you must, water must be a rolling boil for 1 minute, let it cool, then use.  May use distilled water.  Vics vapor rub in shower to help open nasal passages.  May use nasal gel to keep passages moisturized.  May use Nasal saline sprays during the day for added relief of congestion.       For those who go to the gym, please do not use the sauna or steam room now to clear sinuses.Take Mucinex D in the morning and Mucinex DM at night. Mucinex D has Pseudoephedrine in it and can cause palpitations and high blood pressure. If you experience this stop the Mucinex D and only take Mucinex DM (twice a day).    During pollen season, change shirt if you are outside for a while when you go in.  Also wash your face.  Do not touch your face with your hands.  Wash your hands often in general while ill, avoid face contact with hands. Good nutrition. Lots of rest You may or may not have received a steroid injection, if you have, you may  experience some jitters or develop nervousness.  You may also not rest well this night- these symptoms will resolve.  If you were given a prescription for steroids, do not medications such as Motrin, Advil, Ibuprofen, Aleve, Mobic, or Toradol while taking the steroid. these are non-steroidal anti-inflammatory medications which you do not need while taking steroids.  Steroids can elevate your blood pressure, elevate your blood sugar,give you water weight gain, nervous energy, redness to the face and dimpling of the skin where the shot goes in.    If you were given an antibiotic to take at home, please take it until it is completed even if you begin feeling better prior to the course of therapy being completed.  To attempt to minimize abdominal discomfort while taking antibiotics, you may try to take probiotics.  SEPARATE the antibiotics and probiotics by TWO hours, if not neither medication will work.     You must understand that you've received an Urgent Care treatment only and that you may be released before all your medical problems are known or treated. You, the patient, will arrange for follow up care as instructed

## 2024-09-24 DIAGNOSIS — N40.0 BENIGN PROSTATIC HYPERPLASIA, UNSPECIFIED WHETHER LOWER URINARY TRACT SYMPTOMS PRESENT: ICD-10-CM

## 2024-09-24 NOTE — TELEPHONE ENCOUNTER
No care due was identified.  [facility] Embedded Care Due Messages. Reference number: [id number].   9/24/2024 6:52:07 PM CDT

## 2024-09-25 RX ORDER — TAMSULOSIN HYDROCHLORIDE 0.4 MG/1
2 CAPSULE ORAL DAILY
Qty: 180 CAPSULE | Refills: 3 | OUTPATIENT
Start: 2024-09-25

## 2024-11-25 ENCOUNTER — OFFICE VISIT (OUTPATIENT)
Dept: OPTOMETRY | Facility: CLINIC | Age: 69
End: 2024-11-25
Payer: MEDICARE

## 2024-11-25 DIAGNOSIS — H52.4 PRESBYOPIA OF BOTH EYES: Primary | ICD-10-CM

## 2024-11-25 DIAGNOSIS — H04.123 DRY EYE SYNDROME OF BILATERAL LACRIMAL GLANDS: ICD-10-CM

## 2024-11-25 PROCEDURE — 1101F PT FALLS ASSESS-DOCD LE1/YR: CPT | Mod: CPTII,S$GLB,, | Performed by: OPTOMETRIST

## 2024-11-25 PROCEDURE — 92004 COMPRE OPH EXAM NEW PT 1/>: CPT | Mod: S$GLB,,, | Performed by: OPTOMETRIST

## 2024-11-25 PROCEDURE — 1126F AMNT PAIN NOTED NONE PRSNT: CPT | Mod: CPTII,S$GLB,, | Performed by: OPTOMETRIST

## 2024-11-25 PROCEDURE — 92015 DETERMINE REFRACTIVE STATE: CPT | Mod: S$GLB,,, | Performed by: OPTOMETRIST

## 2024-11-25 PROCEDURE — 99999 PR PBB SHADOW E&M-EST. PATIENT-LVL II: CPT | Mod: PBBFAC,,, | Performed by: OPTOMETRIST

## 2024-11-25 PROCEDURE — 1159F MED LIST DOCD IN RCRD: CPT | Mod: CPTII,S$GLB,, | Performed by: OPTOMETRIST

## 2024-11-25 PROCEDURE — 3288F FALL RISK ASSESSMENT DOCD: CPT | Mod: CPTII,S$GLB,, | Performed by: OPTOMETRIST

## 2024-11-25 PROCEDURE — 3044F HG A1C LEVEL LT 7.0%: CPT | Mod: CPTII,S$GLB,, | Performed by: OPTOMETRIST

## 2024-12-10 DIAGNOSIS — I10 ESSENTIAL HYPERTENSION: ICD-10-CM

## 2024-12-10 DIAGNOSIS — E78.5 HYPERLIPIDEMIA, UNSPECIFIED HYPERLIPIDEMIA TYPE: ICD-10-CM

## 2024-12-10 RX ORDER — AMLODIPINE BESYLATE AND ATORVASTATIN CALCIUM 10; 10 MG/1; MG/1
1 TABLET, FILM COATED ORAL
Qty: 90 TABLET | Refills: 1 | Status: SHIPPED | OUTPATIENT
Start: 2024-12-10

## 2024-12-10 NOTE — TELEPHONE ENCOUNTER
Refill Decision Note   Babatunde Elvin  is requesting a refill authorization.  Brief Assessment and Rationale for Refill:  Approve     Medication Therapy Plan:         Comments:     Note composed:11:50 AM 12/10/2024

## 2024-12-10 NOTE — TELEPHONE ENCOUNTER
No care due was identified.  [facility] Embedded Care Due Messages. Reference number: [id number].   12/10/2024 12:41:20 AM CST

## 2024-12-17 DIAGNOSIS — N40.0 BENIGN PROSTATIC HYPERPLASIA, UNSPECIFIED WHETHER LOWER URINARY TRACT SYMPTOMS PRESENT: ICD-10-CM

## 2024-12-18 RX ORDER — TAMSULOSIN HYDROCHLORIDE 0.4 MG/1
2 CAPSULE ORAL DAILY
Qty: 180 CAPSULE | Refills: 3 | Status: SHIPPED | OUTPATIENT
Start: 2024-12-18

## 2024-12-18 NOTE — TELEPHONE ENCOUNTER
No care due was identified.  [facility] Embedded Care Due Messages. Reference number: [id number].   12/17/2024 6:29:42 PM CST

## 2024-12-22 ENCOUNTER — OFFICE VISIT (OUTPATIENT)
Dept: URGENT CARE | Facility: CLINIC | Age: 69
End: 2024-12-22
Payer: MEDICARE

## 2024-12-22 VITALS
TEMPERATURE: 98 F | RESPIRATION RATE: 16 BRPM | OXYGEN SATURATION: 95 % | WEIGHT: 186 LBS | DIASTOLIC BLOOD PRESSURE: 62 MMHG | HEIGHT: 66 IN | BODY MASS INDEX: 29.89 KG/M2 | SYSTOLIC BLOOD PRESSURE: 111 MMHG | HEART RATE: 80 BPM

## 2024-12-22 DIAGNOSIS — J06.9 VIRAL URI WITH COUGH: Primary | ICD-10-CM

## 2024-12-22 LAB
CTP QC/QA: YES
CTP QC/QA: YES
POC MOLECULAR INFLUENZA A AGN: NEGATIVE
POC MOLECULAR INFLUENZA B AGN: NEGATIVE
SARS-COV-2 AG RESP QL IA.RAPID: NEGATIVE

## 2024-12-22 PROCEDURE — 99214 OFFICE O/P EST MOD 30 MIN: CPT | Mod: S$GLB,,, | Performed by: FAMILY MEDICINE

## 2024-12-22 PROCEDURE — 87811 SARS-COV-2 COVID19 W/OPTIC: CPT | Mod: QW,S$GLB,, | Performed by: FAMILY MEDICINE

## 2024-12-22 PROCEDURE — 87502 INFLUENZA DNA AMP PROBE: CPT | Mod: QW,S$GLB,, | Performed by: FAMILY MEDICINE

## 2024-12-22 RX ORDER — BENZONATATE 200 MG/1
200 CAPSULE ORAL 3 TIMES DAILY PRN
Qty: 30 CAPSULE | Refills: 0 | Status: SHIPPED | OUTPATIENT
Start: 2024-12-22 | End: 2025-01-01

## 2024-12-22 RX ORDER — PROMETHAZINE HYDROCHLORIDE AND DEXTROMETHORPHAN HYDROBROMIDE 6.25; 15 MG/5ML; MG/5ML
5 SYRUP ORAL EVERY 4 HOURS PRN
Qty: 118 ML | Refills: 0 | Status: SHIPPED | OUTPATIENT
Start: 2024-12-22

## 2024-12-22 NOTE — PROGRESS NOTES
"Subjective:      Patient ID: Babatunde Oconnor is a 69 y.o. male.    Vitals:  height is 5' 6" (1.676 m) and weight is 84.4 kg (186 lb). His oral temperature is 98.1 °F (36.7 °C). His blood pressure is 111/62 and his pulse is 80. His respiration is 16 and oxygen saturation is 95%.     Chief Complaint: Cough    Patient presents today cough, sinus congestion for approx 2 days. Itchy and scratchy throat.    Cough  This is a new problem. The current episode started in the past 7 days. The problem has been gradually worsening. The problem occurs hourly. The cough is Productive of sputum. Associated symptoms include nasal congestion, postnasal drip and a sore throat. Nothing aggravates the symptoms. He has tried OTC cough suppressant for the symptoms. The treatment provided mild relief.       HENT:  Positive for postnasal drip and sore throat.    Respiratory:  Positive for cough.       Objective:     Vitals:    12/22/24 0901   BP: 111/62   BP Location: Left arm   Patient Position: Sitting   Pulse: 80   Resp: 16   Temp: 98.1 °F (36.7 °C)   TempSrc: Oral   SpO2: 95%   Weight: 84.4 kg (186 lb)   Height: 5' 6" (1.676 m)      Physical Exam   Constitutional: He is oriented to person, place, and time. He appears well-developed. He is cooperative.  Non-toxic appearance. He does not appear ill. No distress.   HENT:   Head: Normocephalic and atraumatic.   Ears:   Right Ear: Hearing, tympanic membrane, external ear and ear canal normal.   Left Ear: Hearing, tympanic membrane, external ear and ear canal normal.   Nose: Rhinorrhea and congestion present. No mucosal edema or nasal deformity. No epistaxis. Right sinus exhibits no maxillary sinus tenderness and no frontal sinus tenderness. Left sinus exhibits no maxillary sinus tenderness and no frontal sinus tenderness.   Mouth/Throat: Uvula is midline and mucous membranes are normal. No trismus in the jaw. Normal dentition. No uvula swelling. Posterior oropharyngeal erythema present. " No oropharyngeal exudate or posterior oropharyngeal edema.   Eyes: Conjunctivae and lids are normal. No scleral icterus.   Neck: Trachea normal and phonation normal. Neck supple. No edema present. No erythema present. No neck rigidity present.   Cardiovascular: Normal rate, regular rhythm, normal heart sounds and normal pulses.   Pulmonary/Chest: Effort normal and breath sounds normal. No respiratory distress. He has no decreased breath sounds. He has no rhonchi.   Abdominal: Normal appearance.   Lymphadenopathy:     He has no cervical adenopathy.   Neurological: He is alert and oriented to person, place, and time. He exhibits normal muscle tone.   Skin: Skin is warm, intact and not diaphoretic. Capillary refill takes less than 2 seconds.   Psychiatric: His speech is normal and behavior is normal. Judgment and thought content normal.   Nursing note and vitals reviewed.    Results for orders placed or performed in visit on 12/22/24   SARS Coronavirus 2 Antigen, POCT Manual Read    Collection Time: 12/22/24  9:29 AM   Result Value Ref Range    SARS Coronavirus 2 Antigen Negative Negative     Acceptable Yes    POCT Influenza A/B Molecular    Collection Time: 12/22/24  9:55 AM   Result Value Ref Range    POC Molecular Influenza A Ag Negative Negative    POC Molecular Influenza B Ag Negative Negative     Acceptable Yes         Assessment:     1. Viral URI with cough        Plan:       Viral URI with cough  -     SARS Coronavirus 2 Antigen, POCT Manual Read  -     POCT Influenza A/B Molecular  -     promethazine-dextromethorphan (PROMETHAZINE-DM) 6.25-15 mg/5 mL Syrp; Take 5 mLs by mouth every 4 (four) hours as needed (cough). This medication can make you feel drowsy  Dispense: 118 mL; Refill: 0  -     benzonatate (TESSALON) 200 MG capsule; Take 1 capsule (200 mg total) by mouth 3 (three) times daily as needed for Cough.  Dispense: 30 capsule; Refill: 0    I have reviewed labs within last 12  months.      Patient Instructions   Below are suggestions for symptomatic relief of your upper respiratory symptoms:              -Salt water gargles to soothe throat pain.              -Chloroseptic spray and Cepacol lozenges also help to numb throat pain.              -Warm herbal teas with honey/lemon/risa can help soothe sore throat and hoarseness              -Nasal saline spray reduces inflammation and dryness.              -Warm face compresses to help with facial sinus pain/pressure.              -Humidifiers and steam can help with nasal dryness and congestion              -Vicks vapor rub at night for chest congestion.              -Flonase OTC or Nasacort OTC for nasal congestion and post-nasal drip. Ok to use twice daily for the first week, then reduce to once daily after symptoms have begun to improve.              -Afrin is a nasal spray that can give immediate relief of nasal congestion but you cannot use this medication for more than 3 days              -Simple foods like chicken noodle soup.              - Mucinex for congestion or Mucinex DM for cough during the day time. Delsym helps with coughing at night. Mucinex-D if you have sinus pressure/sinus pain or chest congestion. (caution if history of high blood pressure or palpitations). You must increase your water intake when using expectorants (Mucinex).             -Zyrtec/Claritin/Allegra/Xyzal should help with allergies.  -If you DO NOT have Hypertension or any history of palpitations, it is ok to take over the counter Sudafed or Mucinex D or Allegra-D or Claritin-D or Zyrtec-D.  -If you do take one of the above, it is ok to combine that with plain over the counter Mucinex or Allegra or Claritin or Zyrtec. If, for example, you are taking Zyrtec -D, you can combine that with Mucinex, but not Mucinex-D.  If you are taking Mucinex-D, you can combine that with plain Allegra or Claritin or Zyrtec.   -If you DO have Hypertension or palpitations, it  is safe to take Coricidin HBP for relief of sinus symptoms.     Seek immediate care in the emergency room in the event of severe abdominal pain, chest pain, respiratory distress, fever unresponsive to antipyretic, dehydration, loss of consciousness, seizure.

## 2024-12-22 NOTE — PATIENT INSTRUCTIONS
Below are suggestions for symptomatic relief of your upper respiratory symptoms:              -Salt water gargles to soothe throat pain.              -Chloroseptic spray and Cepacol lozenges also help to numb throat pain.              -Warm herbal teas with honey/lemon/risa can help soothe sore throat and hoarseness              -Nasal saline spray reduces inflammation and dryness.              -Warm face compresses to help with facial sinus pain/pressure.              -Humidifiers and steam can help with nasal dryness and congestion              -Vicks vapor rub at night for chest congestion.              -Flonase OTC or Nasacort OTC for nasal congestion and post-nasal drip. Ok to use twice daily for the first week, then reduce to once daily after symptoms have begun to improve.              -Afrin is a nasal spray that can give immediate relief of nasal congestion but you cannot use this medication for more than 3 days              -Simple foods like chicken noodle soup.              - Mucinex for congestion or Mucinex DM for cough during the day time. Delsym helps with coughing at night. Mucinex-D if you have sinus pressure/sinus pain or chest congestion. (caution if history of high blood pressure or palpitations). You must increase your water intake when using expectorants (Mucinex).             -Zyrtec/Claritin/Allegra/Xyzal should help with allergies.  -If you DO NOT have Hypertension or any history of palpitations, it is ok to take over the counter Sudafed or Mucinex D or Allegra-D or Claritin-D or Zyrtec-D.  -If you do take one of the above, it is ok to combine that with plain over the counter Mucinex or Allegra or Claritin or Zyrtec. If, for example, you are taking Zyrtec -D, you can combine that with Mucinex, but not Mucinex-D.  If you are taking Mucinex-D, you can combine that with plain Allegra or Claritin or Zyrtec.   -If you DO have Hypertension or palpitations, it is safe to take Coricidin HBP for  relief of sinus symptoms.     Seek immediate care in the emergency room in the event of severe abdominal pain, chest pain, respiratory distress, fever unresponsive to antipyretic, dehydration, loss of consciousness, seizure.

## 2024-12-27 ENCOUNTER — PATIENT MESSAGE (OUTPATIENT)
Dept: OPTOMETRY | Facility: CLINIC | Age: 69
End: 2024-12-27
Payer: MEDICARE

## 2024-12-27 ENCOUNTER — OFFICE VISIT (OUTPATIENT)
Dept: URGENT CARE | Facility: CLINIC | Age: 69
End: 2024-12-27
Payer: MEDICARE

## 2024-12-27 VITALS
TEMPERATURE: 98 F | HEART RATE: 73 BPM | WEIGHT: 183 LBS | RESPIRATION RATE: 16 BRPM | DIASTOLIC BLOOD PRESSURE: 75 MMHG | HEIGHT: 66 IN | SYSTOLIC BLOOD PRESSURE: 129 MMHG | BODY MASS INDEX: 29.41 KG/M2 | OXYGEN SATURATION: 95 %

## 2024-12-27 DIAGNOSIS — R05.1 ACUTE COUGH: ICD-10-CM

## 2024-12-27 DIAGNOSIS — J01.90 ACUTE BACTERIAL SINUSITIS: Primary | ICD-10-CM

## 2024-12-27 DIAGNOSIS — B96.89 ACUTE BACTERIAL SINUSITIS: Primary | ICD-10-CM

## 2024-12-27 RX ORDER — PROMETHAZINE HYDROCHLORIDE AND DEXTROMETHORPHAN HYDROBROMIDE 6.25; 15 MG/5ML; MG/5ML
5 SYRUP ORAL EVERY 4 HOURS PRN
Qty: 180 ML | Refills: 0 | Status: SHIPPED | OUTPATIENT
Start: 2024-12-27 | End: 2025-01-03

## 2024-12-27 RX ORDER — AMOXICILLIN AND CLAVULANATE POTASSIUM 875; 125 MG/1; MG/1
1 TABLET, FILM COATED ORAL EVERY 12 HOURS
Qty: 14 TABLET | Refills: 0 | Status: SHIPPED | OUTPATIENT
Start: 2024-12-27 | End: 2025-01-03

## 2024-12-27 NOTE — PATIENT INSTRUCTIONS
- You have been given an antibiotic to treat your condition today.    - Make sure to take antibiotic with food.   - Please complete the antibiotic as directed on the bottle.   - you can take over-the-counter probiotics during and after antibiotic use to help preserve gut rachael and reduce gastrointestinal symptoms. Make sure to take probiotic 1-2 hours before or after antibiotic to get greatest benefits.     - Rest.    - Drink plenty of fluids. Increasing your fluid intake will help loosen up mucous.     CONGESTION:  - You can take over-the-counter claritin, zyrtec, allegra, OR xyzal as directed. These are antihistamines that can help with runny nose, nasal congestion, sneezing, and helps to dry up post-nasal drip, which usually causes sore throat and cough.  - If you do NOT have high blood pressure, you may use a decongestant form (D)  of this medication (ie. Claritin- D, zyrtec-D, allegra-D) or if you do not take the D form, you can take sudafed (pseudoephedrine) over the counter, which is a decongestant. Do NOT take two decongestant (D) medications at the same time (such as mucinex-D and claritin-D or plain sudafed and claritin D). Dextromethorphan (DM) is a cough suppressant over the counter (ie. mucinex DM, robitussin, delsym; dayquil/nyquil has DM as well.)   - You can use Flonase (fluticasone) nasal spray as directed for sinus congestion and postnasal drip. This is a steroid nasal spray that works locally over time to decrease the inflammation in your nose/sinuses and help with allergic symptoms. This is not an quick- relief spray like afrin, but it works well if used daily.  Discontinue if you develop nose bleed  - Use nasal saline prior to Flonase.  - Use Ocean Spray Nasal Saline 1-3 puffs each nostril every 2-3 hours then blow out onto tissue. This is to irrigate the nasal passage way to clear the sinus openings. Use until sinus problem resolved.  - You can also try NasalCrom nasal spray, which has been  shown to help reduce duration of symptoms when started within 24 hours of symptom onset. Okay to use with Flonase and other allergy medications.   - A Neti Pot with sterile saline can help break up nasal congestion and give relief.     COUGH:  - You can take Delsym to help with cough.     SORE THROAT:   - Chloraseptic throat spray can help numb the throat.   - Warm salt water gargles can help with sore throat.  - Warm tea with honey can help with sore throat and cough. Honey is a natural cough suppressant.     - Rest.    - Drink plenty of fluids.    - Acetaminophen (tylenol) or Ibuprofen (advil,motrin) as directed as needed for fever/pain. Avoid tylenol if you have a history of liver disease. Do not take ibuprofen if you have a history of GI bleeding, kidney disease, or if you take blood thinners.   - Ibuprofen dosing for adults: 400 mg by mouth every 4-6 hours as needed. Max: 2400 mg/day; Info: use lowest effective dose, shortest effective treatment duration; give w/ food if GI upset occurs.  - Tylenol dosing for adults: [By mouth route, immediate-release form] Dose: 325-1000 mg by mouth every 4-6h as needed; Max: 1 g/4h and 4 g/day from all sources. [By mouth route, extended-release form] Dose: 650-1300 mg Extended Release by mouth every 8h as needed; Max: 4 g/day from all sources.     - You must understand that you have received an Urgent Care treatment only and that you may be released before all of your medical problems are known or treated.   - You, the patient, will arrange for follow up care as instructed.   - If your condition worsens or fails to improve we recommend that you receive another evaluation at the ER immediately or contact your PCP to discuss your concerns or return here.   - Follow up with your PCP or specialty clinic as directed in the next 1-2 weeks if not improved or as needed.  You can call [phone] to schedule an appointment with the appropriate provider.    If your symptoms do not  improve or worsen, go to the emergency room immediately.

## 2024-12-27 NOTE — PROGRESS NOTES
"Subjective:      Patient ID: Babatunde Oconnor is a 69 y.o. male.    Vitals:  height is 5' 6" (1.676 m) and weight is 83 kg (183 lb). His oral temperature is 98 °F (36.7 °C). His blood pressure is 129/75 and his pulse is 73. His respiration is 16 and oxygen saturation is 95%.     Chief Complaint: Sore Throat    Pt reports that he was just here about 5 days ago. Symptoms started over 1 week ago. Pt states that his sx have not gotten better since being seen. He has been taking cough medicine with minimal relief. Denies any fevers or body aches. States he has not been able to rest much with the holidays going on.     Sore Throat   This is a new problem. The current episode started 1 to 4 weeks ago (about 10 days ago). The problem has been gradually worsening. Neither side of throat is experiencing more pain than the other. There has been no fever. The pain is at a severity of 4/10. The pain is mild. Associated symptoms include congestion, coughing, a hoarse voice, shortness of breath, swollen glands and trouble swallowing. Pertinent negatives include no abdominal pain, diarrhea, drooling, ear discharge, ear pain, headaches, plugged ear sensation, neck pain, stridor or vomiting. He has had no exposure to strep or mono. Treatments tried: Tessalon Pearles ,Tylenol Sinus. The treatment provided mild relief.       HENT:  Positive for congestion, sore throat and trouble swallowing. Negative for ear pain, ear discharge and drooling.    Neck: Negative for neck pain.   Respiratory:  Positive for cough and shortness of breath. Negative for stridor.    Gastrointestinal:  Negative for abdominal pain, vomiting and diarrhea.   Neurological:  Negative for headaches.      Objective:     Physical Exam   Constitutional: He is oriented to person, place, and time. He appears well-developed. He is cooperative.  Non-toxic appearance. He does not appear ill. No distress.      Comments:Patient sits comfortably in exam chair. Answers " questions in complete sentences. Does not show any signs of distress or discoloration.        HENT:   Head: Normocephalic and atraumatic.   Ears:   Right Ear: Hearing, tympanic membrane, external ear and ear canal normal. no impacted cerumen  Left Ear: Hearing, tympanic membrane, external ear and ear canal normal. no impacted cerumen  Nose: Rhinorrhea and congestion present. No mucosal edema or nasal deformity. No epistaxis. Right sinus exhibits no maxillary sinus tenderness and no frontal sinus tenderness. Left sinus exhibits no maxillary sinus tenderness and no frontal sinus tenderness.   Mouth/Throat: Uvula is midline and mucous membranes are normal. No trismus in the jaw. Normal dentition. No uvula swelling. Posterior oropharyngeal erythema present. No oropharyngeal exudate or posterior oropharyngeal edema.   Eyes: Conjunctivae and lids are normal. No scleral icterus.   Neck: Trachea normal and phonation normal. Neck supple. No edema present. No erythema present. No neck rigidity present.   Cardiovascular: Normal rate, regular rhythm, normal heart sounds and normal pulses.   Pulmonary/Chest: Effort normal and breath sounds normal. No stridor. No respiratory distress. He has no decreased breath sounds. He has no wheezes. He has no rhonchi. He has no rales. He exhibits no tenderness.   Abdominal: Normal appearance.   Musculoskeletal: Normal range of motion.         General: No deformity. Normal range of motion.   Lymphadenopathy:     He has no cervical adenopathy.        Right cervical: No superficial cervical, no deep cervical and no posterior cervical adenopathy present.       Left cervical: No superficial cervical, no deep cervical and no posterior cervical adenopathy present.   Neurological: He is alert and oriented to person, place, and time. He exhibits normal muscle tone. Coordination normal.   Skin: Skin is warm, dry, intact, not diaphoretic and not pale.   Psychiatric: His speech is normal and behavior is  normal. Judgment and thought content normal.   Nursing note and vitals reviewed.      Assessment:     1. Acute bacterial sinusitis    2. Acute cough        Plan:       Acute bacterial sinusitis  -     amoxicillin-clavulanate 875-125mg (AUGMENTIN) 875-125 mg per tablet; Take 1 tablet by mouth every 12 (twelve) hours. for 7 days  Dispense: 14 tablet; Refill: 0    Acute cough  -     promethazine-dextromethorphan (PROMETHAZINE-DM) 6.25-15 mg/5 mL Syrp; Take 5 mLs by mouth every 4 (four) hours as needed (cough).  Dispense: 180 mL; Refill: 0                Patient Instructions   - You have been given an antibiotic to treat your condition today.    - Make sure to take antibiotic with food.   - Please complete the antibiotic as directed on the bottle.   - you can take over-the-counter probiotics during and after antibiotic use to help preserve gut rachael and reduce gastrointestinal symptoms. Make sure to take probiotic 1-2 hours before or after antibiotic to get greatest benefits.     - Rest.    - Drink plenty of fluids. Increasing your fluid intake will help loosen up mucous.     CONGESTION:  - You can take over-the-counter claritin, zyrtec, allegra, OR xyzal as directed. These are antihistamines that can help with runny nose, nasal congestion, sneezing, and helps to dry up post-nasal drip, which usually causes sore throat and cough.  - If you do NOT have high blood pressure, you may use a decongestant form (D)  of this medication (ie. Claritin- D, zyrtec-D, allegra-D) or if you do not take the D form, you can take sudafed (pseudoephedrine) over the counter, which is a decongestant. Do NOT take two decongestant (D) medications at the same time (such as mucinex-D and claritin-D or plain sudafed and claritin D). Dextromethorphan (DM) is a cough suppressant over the counter (ie. mucinex DM, robitussin, delsym; dayquil/nyquil has DM as well.)   - You can use Flonase (fluticasone) nasal spray as directed for sinus congestion and  postnasal drip. This is a steroid nasal spray that works locally over time to decrease the inflammation in your nose/sinuses and help with allergic symptoms. This is not an quick- relief spray like afrin, but it works well if used daily.  Discontinue if you develop nose bleed  - Use nasal saline prior to Flonase.  - Use Ocean Spray Nasal Saline 1-3 puffs each nostril every 2-3 hours then blow out onto tissue. This is to irrigate the nasal passage way to clear the sinus openings. Use until sinus problem resolved.  - You can also try NasalCrom nasal spray, which has been shown to help reduce duration of symptoms when started within 24 hours of symptom onset. Okay to use with Flonase and other allergy medications.   - A Neti Pot with sterile saline can help break up nasal congestion and give relief.     COUGH:  - You can take Delsym to help with cough.     SORE THROAT:   - Chloraseptic throat spray can help numb the throat.   - Warm salt water gargles can help with sore throat.  - Warm tea with honey can help with sore throat and cough. Honey is a natural cough suppressant.     - Rest.    - Drink plenty of fluids.    - Acetaminophen (tylenol) or Ibuprofen (advil,motrin) as directed as needed for fever/pain. Avoid tylenol if you have a history of liver disease. Do not take ibuprofen if you have a history of GI bleeding, kidney disease, or if you take blood thinners.   - Ibuprofen dosing for adults: 400 mg by mouth every 4-6 hours as needed. Max: 2400 mg/day; Info: use lowest effective dose, shortest effective treatment duration; give w/ food if GI upset occurs.  - Tylenol dosing for adults: [By mouth route, immediate-release form] Dose: 325-1000 mg by mouth every 4-6h as needed; Max: 1 g/4h and 4 g/day from all sources. [By mouth route, extended-release form] Dose: 650-1300 mg Extended Release by mouth every 8h as needed; Max: 4 g/day from all sources.     - You must understand that you have received an Urgent Care  treatment only and that you may be released before all of your medical problems are known or treated.   - You, the patient, will arrange for follow up care as instructed.   - If your condition worsens or fails to improve we recommend that you receive another evaluation at the ER immediately or contact your PCP to discuss your concerns or return here.   - Follow up with your PCP or specialty clinic as directed in the next 1-2 weeks if not improved or as needed.  You can call [phone] to schedule an appointment with the appropriate provider.    If your symptoms do not improve or worsen, go to the emergency room immediately.

## 2025-02-01 ENCOUNTER — OFFICE VISIT (OUTPATIENT)
Dept: URGENT CARE | Facility: CLINIC | Age: 70
End: 2025-02-01
Payer: MEDICARE

## 2025-02-01 VITALS
TEMPERATURE: 99 F | BODY MASS INDEX: 29.41 KG/M2 | SYSTOLIC BLOOD PRESSURE: 122 MMHG | WEIGHT: 183 LBS | OXYGEN SATURATION: 98 % | HEART RATE: 87 BPM | HEIGHT: 66 IN | RESPIRATION RATE: 16 BRPM | DIASTOLIC BLOOD PRESSURE: 65 MMHG

## 2025-02-01 DIAGNOSIS — J06.9 VIRAL URI WITH COUGH: Primary | ICD-10-CM

## 2025-02-01 DIAGNOSIS — J11.1 INFLUENZA: ICD-10-CM

## 2025-02-01 LAB
CTP QC/QA: YES
CTP QC/QA: YES
POC MOLECULAR INFLUENZA A AGN: POSITIVE
POC MOLECULAR INFLUENZA B AGN: NEGATIVE
SARS-COV-2 AG RESP QL IA.RAPID: NEGATIVE

## 2025-02-01 PROCEDURE — 99214 OFFICE O/P EST MOD 30 MIN: CPT | Mod: S$GLB,,, | Performed by: FAMILY MEDICINE

## 2025-02-01 PROCEDURE — 87811 SARS-COV-2 COVID19 W/OPTIC: CPT | Mod: QW,S$GLB,, | Performed by: FAMILY MEDICINE

## 2025-02-01 PROCEDURE — 87502 INFLUENZA DNA AMP PROBE: CPT | Mod: QW,S$GLB,, | Performed by: FAMILY MEDICINE

## 2025-02-01 RX ORDER — OSELTAMIVIR PHOSPHATE 75 MG/1
75 CAPSULE ORAL 2 TIMES DAILY
Qty: 10 CAPSULE | Refills: 0 | Status: SHIPPED | OUTPATIENT
Start: 2025-02-01

## 2025-02-01 RX ORDER — PROMETHAZINE HYDROCHLORIDE AND DEXTROMETHORPHAN HYDROBROMIDE 6.25; 15 MG/5ML; MG/5ML
5 SYRUP ORAL NIGHTLY PRN
Qty: 35 ML | Refills: 0 | Status: SHIPPED | OUTPATIENT
Start: 2025-02-01 | End: 2025-02-08

## 2025-02-01 RX ORDER — BENZONATATE 200 MG/1
200 CAPSULE ORAL 3 TIMES DAILY PRN
Qty: 30 CAPSULE | Refills: 0 | Status: SHIPPED | OUTPATIENT
Start: 2025-02-01 | End: 2025-02-11

## 2025-02-01 NOTE — PROGRESS NOTES
"Subjective:      Patient ID: Babatunde Oconnor is a 69 y.o. male.    Chief Complaint: Sore Throat (With associated. )    Patient is a  with the compliant of cough, headache and sore throat that started on Friday morning. Taking tylenol cold and flu severe to help combat his symptoms with no relief.     Sore Throat   This is a new problem. The current episode started yesterday. The problem has been gradually worsening. Neither side of throat is experiencing more pain than the other. There has been no fever. The pain is at a severity of 3/10. The pain is mild. Associated symptoms include congestion, coughing, headaches and a hoarse voice. Pertinent negatives include no abdominal pain, diarrhea, drooling, ear discharge, ear pain, plugged ear sensation, neck pain, shortness of breath, stridor, swollen glands, trouble swallowing or vomiting. He has had no exposure to strep or mono. He has tried acetaminophen for the symptoms. The treatment provided no relief.       HENT:  Positive for congestion and sore throat. Negative for ear pain, ear discharge, drooling and trouble swallowing.    Neck: Negative for neck pain.   Respiratory:  Positive for cough. Negative for shortness of breath and stridor.    Gastrointestinal:  Negative for abdominal pain, vomiting and diarrhea.   Neurological:  Positive for headaches.      Objective:     Vitals:    02/01/25 0924   BP: 122/65   BP Location: Left arm   Patient Position: Sitting   Pulse: 87   Resp: 16   Temp: 98.7 °F (37.1 °C)   TempSrc: Temporal   SpO2: 98%   Weight: 83 kg (183 lb)   Height: 5' 6" (1.676 m)      Physical Exam   Constitutional: He is oriented to person, place, and time.  Non-toxic appearance. He does not appear ill. No distress.   HENT:   Head: Atraumatic.   Eyes: Conjunctivae are normal.   Neck: Neck supple.   Cardiovascular: Normal rate, regular rhythm, normal heart sounds and normal pulses.   Pulmonary/Chest: Effort normal and breath sounds " normal.   Neurological: He is alert and oriented to person, place, and time.   Skin: Skin is not diaphoretic.   Psychiatric: Judgment and thought content normal.     Results for orders placed or performed in visit on 02/01/25   POCT Influenza A/B MOLECULAR    Collection Time: 02/01/25  9:41 AM   Result Value Ref Range    POC Molecular Influenza A Ag Positive (A) Negative    POC Molecular Influenza B Ag Negative Negative     Acceptable Yes    SARS Coronavirus 2 Antigen, POCT Manual Read    Collection Time: 02/01/25  9:42 AM   Result Value Ref Range    SARS Coronavirus 2 Antigen Negative Negative     Acceptable Yes       Assessment:     1. Viral URI with cough    2. Influenza        Plan:       Viral URI with cough  -     POCT Influenza A/B MOLECULAR  -     SARS Coronavirus 2 Antigen, POCT Manual Read  -     promethazine-dextromethorphan (PROMETHAZINE-DM) 6.25-15 mg/5 mL Syrp; Take 5 mLs by mouth nightly as needed (cough). This medication can make you feel drowsy  Dispense: 35 mL; Refill: 0  -     benzonatate (TESSALON) 200 MG capsule; Take 1 capsule (200 mg total) by mouth 3 (three) times daily as needed for Cough.  Dispense: 30 capsule; Refill: 0    2. Influenza  -     oseltamivir (TAMIFLU) 75 MG capsule; Take 1 capsule (75 mg total) by mouth 2 (two) times daily.  Dispense: 10 capsule; Refill: 0  Side effects discussed. Patient would like to start medication.    I have reviewed labs within last 24 months.    Patient Education        Flu Discharge Instructions, Adult   About this topic   Influenza, or flu, is an infection caused by the influenza virus. It affects your throat, breathing tube, and lungs (the respiratory system). It spreads from a person who is sick to some other person from close contact. Flu may cause:  Fever over 100.4°F (38°C)  Chills  Body aches  Headache  Cough  Runny or stuffy nose  Sore throat  Tiredness  Throwing up  What care is needed at home?   Ask your doctor  what you need to do when you go home. Make sure you ask questions if you do not understand what the doctor says. This way you will know what you need to do.  Drink lots of fluids, such as water, broth, sports drinks, and tea. This will keep your fluid levels up.  You need to rest while you are getting better.  Get enough sleep.  Use a machine that makes steam like a vaporizer or humidifier. It may help open up a clogged nose so you can breathe easier.  What follow-up care is needed?   Your doctor may ask you to make visits to the office to check on your progress. Be sure to keep these visits.  What drugs may be needed?   The doctor may order drugs to:  Treat the flu  Lower fever  Help with pain  Relieve body aches  Control coughing  Will physical activity be limited?   You need to rest while you are getting better. This means you may need to limit your activity until you feel well.  What changes to diet are needed?   Eat food that will not upset your stomach such as:  Chicken soup  Bananas  Rice  Apples  Toast  What problems could happen?   Pneumonia  Too much fluid loss. This is called dehydration.  Infection  Worsening of heart and lung problems  What can be done to prevent this health problem?   Keep your hands away from your nose, eyes, and mouth. The virus most often enters the body through these parts.  Wash your hands often with soap and water for at least 20 seconds, especially after you cough or sneeze. Use alcohol-based hand sanitizers if soap and water are not available.       Do not get close to, hug, or kiss people who are sick.  Avoid sharing your towels, tissues, food, or drink with anyone who is sick.  Avoid going to crowded places.  Get a flu shot each year.  To keep from spreading germs in the house or other places:  If you are sick, stay at home. Stay in a separate room if possible. You may spread the flu from the day before you have signs and up to 7 days after you get sick. You may return to  work after the fever is gone for 24 hours without the use of drugs to lower your fever.  Cover your mouth and nose with tissue when you cough or sneeze. You can also cough into your elbow. Throw away tissues in the trash and wash your hands after touching used tissues.  Keep your house clean by wiping down counters, sinks, faucets, doorknobs, telephones, remotes, and light switches with a  with bleach. Wash dishes in the  or with hot soapy water. The flu virus can live on solid surfaces for 24 hours.     When do I need to call the doctor?   Fever or cough returns or gets worse  Chest pain with deep breathing  Confusion or sudden dizziness  Very bad throwing up or throwing up that does not stop  Trouble breathing  Passing less urine        You are not feeling better in 2 to 3 days or you are feeling worse  Teach Back: Helping You Understand   The Teach Back Method helps you understand the information we are giving you. The idea is simple. After talking with the staff, tell them in your own words what you were just told. This helps to make sure the staff has covered each thing clearly. It also helps to explain things that may have been a bit confusing. Before going home, make sure you are able to do these:  I can tell you about my condition.  I can tell you what I can do to help keep my fluid levels up.  I can tell you what I will do to keep others from getting sick.  I can tell you what I will do if I have chest pain with deep breathing, trouble breathing, passing less urine, or very bad throwing up.  Where can I learn more?   [place]  https://www.lung.ca/lung-health/lung-disease/influenza   Centers for Disease Control and Prevention  https://www.cdc.gov/flu/highrisk/65over.htm   Centers for Disease Control and Prevention  http://www.cdc.gov/flu/   Last Reviewed Date   2020-02-28  Consumer Information Use and Disclaimer   This information is not specific medical advice and does not  replace information you receive from your health care provider. This is only a brief summary of general information. It does NOT include all information about conditions, illnesses, injuries, tests, procedures, treatments, therapies, discharge instructions or life-style choices that may apply to you. You must talk with your health care provider for complete information about your health and treatment options. This information should not be used to decide whether or not to accept your health care providers advice, instructions or recommendations. Only your health care provider has the knowledge and training to provide advice that is right for you.  Copyright   Copyright © 2021 [employer] and its affiliates and/or licensors. All rights reserved.

## 2025-03-24 DIAGNOSIS — Z00.00 ENCOUNTER FOR MEDICARE ANNUAL WELLNESS EXAM: ICD-10-CM

## 2025-05-08 ENCOUNTER — OFFICE VISIT (OUTPATIENT)
Dept: URGENT CARE | Facility: CLINIC | Age: 70
End: 2025-05-08
Payer: MEDICARE

## 2025-05-08 VITALS
RESPIRATION RATE: 16 BRPM | DIASTOLIC BLOOD PRESSURE: 70 MMHG | HEIGHT: 66 IN | WEIGHT: 183 LBS | TEMPERATURE: 99 F | BODY MASS INDEX: 29.41 KG/M2 | OXYGEN SATURATION: 96 % | SYSTOLIC BLOOD PRESSURE: 117 MMHG | HEART RATE: 81 BPM

## 2025-05-08 DIAGNOSIS — U07.1 COVID-19 VIRUS DETECTED: ICD-10-CM

## 2025-05-08 DIAGNOSIS — U07.1 COVID-19 VIRUS INFECTION: Primary | ICD-10-CM

## 2025-05-08 DIAGNOSIS — R05.9 COUGH, UNSPECIFIED TYPE: ICD-10-CM

## 2025-05-08 LAB
CTP QC/QA: YES
SARS CORONAVIRUS 2 ANTIGEN: POSITIVE

## 2025-05-08 PROCEDURE — 87811 SARS-COV-2 COVID19 W/OPTIC: CPT | Mod: QW,S$GLB,, | Performed by: NURSE PRACTITIONER

## 2025-05-08 PROCEDURE — 99214 OFFICE O/P EST MOD 30 MIN: CPT | Mod: S$GLB,,, | Performed by: NURSE PRACTITIONER

## 2025-05-08 RX ORDER — PROMETHAZINE HYDROCHLORIDE AND DEXTROMETHORPHAN HYDROBROMIDE 6.25; 15 MG/5ML; MG/5ML
5 SYRUP ORAL NIGHTLY PRN
Qty: 120 ML | Refills: 0 | Status: SHIPPED | OUTPATIENT
Start: 2025-05-08 | End: 2025-05-18

## 2025-05-08 RX ORDER — BENZONATATE 200 MG/1
200 CAPSULE ORAL 3 TIMES DAILY PRN
Qty: 30 CAPSULE | Refills: 0 | Status: SHIPPED | OUTPATIENT
Start: 2025-05-08 | End: 2025-05-18

## 2025-05-08 NOTE — PROGRESS NOTES
"Subjective:      Patient ID: Babatunde Oconnor is a 70 y.o. male.    Vitals:  height is 5' 6" (1.676 m) and weight is 83 kg (183 lb). His oral temperature is 98.6 °F (37 °C). His blood pressure is 117/70 and his pulse is 81. His respiration is 16 and oxygen saturation is 96%.     Chief Complaint: Cough    This pt complains of cough x 2 days. S/S: congestion, sore throat, and headache. No nausea, vomiting, diarrhea, or fever.     Cough  This is a new problem. The current episode started yesterday. The problem has been unchanged. The problem occurs every few minutes. Associated symptoms include headaches, nasal congestion, postnasal drip and a sore throat. Pertinent negatives include no chest pain, chills, ear congestion, ear pain, fever, heartburn, hemoptysis, myalgias, rash, rhinorrhea, shortness of breath, sweats, weight loss or wheezing.       Constitution: Positive for fatigue. Negative for chills and fever.   HENT:  Positive for congestion, postnasal drip, sinus pressure and sore throat. Negative for ear pain, sinus pain, trouble swallowing and voice change.    Cardiovascular:  Negative for chest pain.   Respiratory:  Positive for cough and sputum production. Negative for chest tightness, bloody sputum, shortness of breath and wheezing.    Gastrointestinal:  Negative for nausea, vomiting, diarrhea and heartburn.   Musculoskeletal:  Negative for muscle ache.   Skin:  Negative for rash.   Neurological:  Positive for headaches.      Objective:     Physical Exam   Constitutional: He is oriented to person, place, and time. He appears well-developed. He is cooperative.  Non-toxic appearance. He does not appear ill. No distress.   HENT:   Head: Normocephalic and atraumatic.   Ears:   Right Ear: Hearing, tympanic membrane, external ear and ear canal normal.   Left Ear: Hearing, tympanic membrane, external ear and ear canal normal.   Nose: Nose normal. No mucosal edema, rhinorrhea or nasal deformity. No epistaxis. " Right sinus exhibits no maxillary sinus tenderness and no frontal sinus tenderness. Left sinus exhibits no maxillary sinus tenderness and no frontal sinus tenderness.   Mouth/Throat: Uvula is midline and mucous membranes are normal. No trismus in the jaw. Normal dentition. No uvula swelling. Posterior oropharyngeal erythema present. No oropharyngeal exudate or posterior oropharyngeal edema.   Eyes: Conjunctivae and lids are normal. No scleral icterus.   Neck: Trachea normal and phonation normal. Neck supple. No edema present. No erythema present. No neck rigidity present.   Cardiovascular: Normal rate, regular rhythm, normal heart sounds and normal pulses.   Pulmonary/Chest: Effort normal and breath sounds normal. No respiratory distress. He has no decreased breath sounds. He has no wheezes. He has no rhonchi.   Abdominal: Normal appearance.   Musculoskeletal: Normal range of motion.         General: No deformity. Normal range of motion.   Neurological: He is alert and oriented to person, place, and time. He exhibits normal muscle tone. Coordination normal.   Skin: Skin is warm, dry, intact, not diaphoretic and not pale.   Psychiatric: His speech is normal and behavior is normal. Judgment and thought content normal.   Nursing note and vitals reviewed.    Results for orders placed or performed in visit on 05/08/25   SARS Coronavirus 2 Antigen, POCT Manual Read    Collection Time: 05/08/25  8:50 AM   Result Value Ref Range    SARS Coronavirus 2 Antigen Positive (A) Negative, Presumptive Negative     Acceptable Yes          Assessment:     1. COVID-19 virus infection    2. Cough, unspecified type        Plan:   Covid 19 risk score of 5  Reviewed medications w/ patient.  Last GFR >60  Advised patient to hold his statin for 10 days.  COVID-19 virus infection  -     nirmatrelvir-ritonavir 300 mg (150 mg x 2)-100 mg copackaged tablets (EUA); Take 3 tablets by mouth 2 (two) times daily for 5 days. Each dose  contains 2 nirmatrelvir (pink tablets) and 1 ritonavir (white tablet). Take all 3 tablets together  Dispense: 30 tablet; Refill: 0    Cough, unspecified type  -     SARS Coronavirus 2 Antigen, POCT Manual Read  -     benzonatate (TESSALON) 200 MG capsule; Take 1 capsule (200 mg total) by mouth 3 (three) times daily as needed for Cough.  Dispense: 30 capsule; Refill: 0  -     promethazine-dextromethorphan (PROMETHAZINE-DM) 6.25-15 mg/5 mL Syrp; Take 5 mLs by mouth nightly as needed (cough).  Dispense: 120 mL; Refill: 0

## 2025-05-08 NOTE — LETTER
May 8, 2025      [facility] [place]  [address] [place] [address]  Phone: [phone]  Fax: [phone]       Patient: Babatunde Oconnor   YOB: 1955  Date of Visit: 05/08/2025    To Whom It May Concern:    Gustavo Oconnor  was at [facility] on 05/08/2025. The patient may return to work/school on 5/12/25 with no restrictions. If you have any questions or concerns, or if I can be of further assistance, please do not hesitate to contact me.    Sincerely,            Juliana Holt, NP

## 2025-05-08 NOTE — PATIENT INSTRUCTIONS
Hold your Statin drug for 10 days then restart.  Paxlovid as directed.  Please drink plenty of fluids.  Please get plenty of rest.  Please return here or go to the Emergency Department for any concerns or worsening of condition.  If you do not have Hypertension or any history of palpitations, it is ok to take over the counter Sudafed or Mucinex D or Allegra-D or Claritin-D or Zyrtec-D.  If you do take one of the above, it is ok to combine that with plain over the counter Mucinex or Allegra or Claritin or Zyrtec.  If for example you are taking Zyrtec -D, you can combine that with Mucinex, but not Mucinex-D.  If you are taking Mucinex-D, you can combine that with plain Allegra or Claritin or Zyrtec.   If you do have Hypertension or palpitations, it is safe to take Coricidin HBP for relief of sinus symptoms.  If not allergic, please take over the counter Tylenol (Acetaminophen) and/or Motrin (Ibuprofen) as directed for control of pain and/or fever.  Please follow up with your primary care doctor or specialist as needed.    If you  smoke, please stop smoking.

## 2025-05-28 ENCOUNTER — OFFICE VISIT (OUTPATIENT)
Dept: DERMATOLOGY | Facility: CLINIC | Age: 70
End: 2025-05-28
Payer: MEDICARE

## 2025-05-28 DIAGNOSIS — L82.1 SEBORRHEIC KERATOSES: ICD-10-CM

## 2025-05-28 DIAGNOSIS — L21.9 SEBORRHEIC DERMATITIS: Primary | ICD-10-CM

## 2025-05-28 PROCEDURE — 1126F AMNT PAIN NOTED NONE PRSNT: CPT | Mod: CPTII,S$GLB,, | Performed by: DERMATOLOGY

## 2025-05-28 PROCEDURE — 1159F MED LIST DOCD IN RCRD: CPT | Mod: CPTII,S$GLB,, | Performed by: DERMATOLOGY

## 2025-05-28 PROCEDURE — 1160F RVW MEDS BY RX/DR IN RCRD: CPT | Mod: CPTII,S$GLB,, | Performed by: DERMATOLOGY

## 2025-05-28 PROCEDURE — 1101F PT FALLS ASSESS-DOCD LE1/YR: CPT | Mod: CPTII,S$GLB,, | Performed by: DERMATOLOGY

## 2025-05-28 PROCEDURE — 3288F FALL RISK ASSESSMENT DOCD: CPT | Mod: CPTII,S$GLB,, | Performed by: DERMATOLOGY

## 2025-05-28 PROCEDURE — 99214 OFFICE O/P EST MOD 30 MIN: CPT | Mod: S$GLB,,, | Performed by: DERMATOLOGY

## 2025-05-28 RX ORDER — KETOCONAZOLE 20 MG/G
CREAM TOPICAL
Qty: 60 G | Refills: 5 | Status: SHIPPED | OUTPATIENT
Start: 2025-05-28

## 2025-05-28 NOTE — PROGRESS NOTES
Patient Information  Name: Babatunde Oconnor  : 1955  MRN: [id number]     Referring Physician:  No ref. provider found   Primary Care Physician:  Adeel Moreau MD   Date of Visit: 25      Subjective:     History of Present lllness:    Babatunde Oconnor is a 70 y.o. male who presents with a chief complaint of growths and seb derm.    Location: face, arms, abdomen and back  Duration: many years  Signs/Symptoms: crusted, dark, hanging, raised  Relieving factors/Prior treatments: none    Seb derm  Location: face and scalp  Duration: many years  Signs/Symptoms: crusting, redness  Relieving factors/Prior treatments: hydrocortisone, aclometasone    Pt states he uses ketoconazole shampoo and aclovate for flaking of scalp and is well controlled     Clinical documentation obtained by nursing staff reviewed.    Review of Systems    Objective:   Physical Exam   Constitutional: He appears well-developed and well-nourished. No distress.   Neurological: He is alert and oriented to person, place, and time. He is not disoriented.   Psychiatric: He has a normal mood and affect.   Skin:   Areas Examined (abnormalities noted in diagram):   Head / Face Inspection Performed  Neck Inspection Performed  Chest / Axilla Inspection Performed  Abdomen Inspection Performed  Back Inspection Performed  RUE Inspected  LUE Inspection Performed                 Diagram Legend     Erythematous scaling macule/papule c/w actinic keratosis       Vascular papule c/w angioma      Pigmented verrucoid papule/plaque c/w seborrheic keratosis      Yellow umbilicated papule c/w sebaceous hyperplasia      Irregularly shaped tan macule c/w lentigo     1-2 mm smooth white papules consistent with Milia      Movable subcutaneous cyst with punctum c/w epidermal inclusion cyst      Subcutaneous movable cyst c/w pilar cyst      Firm pink to brown papule c/w dermatofibroma      Pedunculated fleshy papule(s) c/w skin tag(s)      Evenly pigmented  macule c/w junctional nevus     Mildly variegated pigmented, slightly irregular-bordered macule c/w mildly atypical nevus      Flesh colored to evenly pigmented papule c/w intradermal nevus       Pink pearly papule/plaque c/w basal cell carcinoma      Erythematous hyperkeratotic cursted plaque c/w SCC      Surgical scar with no sign of skin cancer recurrence      Open and closed comedones      Inflammatory papules and pustules      Verrucoid papule consistent consistent with wart     Erythematous eczematous patches and plaques     Dystrophic onycholytic nail with subungual debris c/w onychomycosis     Umbilicated papule    Erythematous-base heme-crusted tan verrucoid plaque consistent with inflamed seborrheic keratosis     Erythematous Silvery Scaling Plaque c/w Psoriasis     See annotation    No images are attached to the encounter or orders placed in the encounter.      [] Data reviewed  [] Prior external notes reviewed  [] Independent review of test  [] Management discussed with another provider  [] Independent historian    Assessment / Plan:        Seborrheic dermatitis  - chronic problem, with exacerbation/progression  Seborrheic dermatitis is a common skin condition that usually lasts for years. It may be caused by multiple factors, including the yeast that normally lives on our skin, our genes, a cold and dry climate, stress, and a persons overall health.  -     ketoconazole (NIZORAL) 2 % cream; Apply to affected areas of face and chest BID prn scaling. Safe to use everyday.  Dispense: 60 g; Refill: 5  Use Rx aclometasone cream as needed for flares. Side effects from the overuse of topical steroids include thinning of skin, easy tearing/bruising of skin, stretch marks, spider veins, etc. Use the topical steroid no more than 2 days per week if used long-term and/or take breaks from the topical steroid, especially if any of the above side effects are noticed.  Recommended a sulfur soap (such as Sulfo Lo) or a  pyrithione zinc soap (such as Vanicream Z-Bar).    Seborrheic keratoses  These are benign, inherited growths without a malignant potential. Reassurance given to patient. No treatment is necessary.  Discussed with the patient that treatment of these benign lesions is not covered by insurance as it is considered cosmetic. Warned about risk of scarring and hypo- or hyperpigmentation with treatment, as well as risk of recurrence and/or development of more lesions.       Follow up for cosmetic removal of benign lesions.      Lian Wallace MD, FAAD  [facility]

## 2025-05-30 DIAGNOSIS — I10 ESSENTIAL HYPERTENSION: ICD-10-CM

## 2025-06-01 ENCOUNTER — PATIENT MESSAGE (OUTPATIENT)
Dept: INTERNAL MEDICINE | Facility: CLINIC | Age: 70
End: 2025-06-01
Payer: MEDICARE

## 2025-06-01 RX ORDER — BISOPROLOL FUMARATE AND HYDROCHLOROTHIAZIDE 5; 6.25 MG/1; MG/1
1 TABLET ORAL DAILY
Qty: 90 TABLET | Refills: 0 | Status: SHIPPED | OUTPATIENT
Start: 2025-06-01

## 2025-06-02 ENCOUNTER — OFFICE VISIT (OUTPATIENT)
Dept: OTOLARYNGOLOGY | Facility: CLINIC | Age: 70
End: 2025-06-02
Payer: MEDICARE

## 2025-06-02 ENCOUNTER — CLINICAL SUPPORT (OUTPATIENT)
Dept: OTOLARYNGOLOGY | Facility: CLINIC | Age: 70
End: 2025-06-02
Payer: MEDICARE

## 2025-06-02 DIAGNOSIS — H90.3 SENSORINEURAL HEARING LOSS (SNHL) OF BOTH EARS: Primary | ICD-10-CM

## 2025-06-02 DIAGNOSIS — H90.3 SENSORINEURAL HEARING LOSS OF BOTH EARS: Primary | ICD-10-CM

## 2025-06-02 PROCEDURE — 1126F AMNT PAIN NOTED NONE PRSNT: CPT | Mod: CPTII,S$GLB,, | Performed by: PHYSICIAN ASSISTANT

## 2025-06-02 PROCEDURE — 1159F MED LIST DOCD IN RCRD: CPT | Mod: CPTII,S$GLB,, | Performed by: PHYSICIAN ASSISTANT

## 2025-06-02 PROCEDURE — 99213 OFFICE O/P EST LOW 20 MIN: CPT | Mod: S$GLB,,, | Performed by: PHYSICIAN ASSISTANT

## 2025-06-02 PROCEDURE — 99999 PR PBB SHADOW E&M-EST. PATIENT-LVL III: CPT | Mod: PBBFAC,,, | Performed by: PHYSICIAN ASSISTANT

## 2025-06-02 PROCEDURE — 92557 COMPREHENSIVE HEARING TEST: CPT | Mod: S$GLB,,, | Performed by: AUDIOLOGIST

## 2025-06-02 PROCEDURE — 92567 TYMPANOMETRY: CPT | Mod: S$GLB,,, | Performed by: AUDIOLOGIST

## 2025-06-02 PROCEDURE — 1160F RVW MEDS BY RX/DR IN RCRD: CPT | Mod: CPTII,S$GLB,, | Performed by: PHYSICIAN ASSISTANT

## 2025-06-02 PROCEDURE — 1101F PT FALLS ASSESS-DOCD LE1/YR: CPT | Mod: CPTII,S$GLB,, | Performed by: PHYSICIAN ASSISTANT

## 2025-06-02 PROCEDURE — 3288F FALL RISK ASSESSMENT DOCD: CPT | Mod: CPTII,S$GLB,, | Performed by: PHYSICIAN ASSISTANT

## 2025-06-07 DIAGNOSIS — E78.5 HYPERLIPIDEMIA, UNSPECIFIED HYPERLIPIDEMIA TYPE: ICD-10-CM

## 2025-06-07 DIAGNOSIS — I10 ESSENTIAL HYPERTENSION: ICD-10-CM

## 2025-06-07 NOTE — TELEPHONE ENCOUNTER
No care due was identified.  [facility] Embedded Care Due Messages. Reference number: [id number].   6/07/2025 7:12:03 AM CDT

## 2025-06-08 RX ORDER — AMLODIPINE BESYLATE AND ATORVASTATIN CALCIUM 10; 10 MG/1; MG/1
1 TABLET, FILM COATED ORAL
Qty: 90 TABLET | Refills: 0 | Status: SHIPPED | OUTPATIENT
Start: 2025-06-08

## 2025-06-08 NOTE — TELEPHONE ENCOUNTER
Refill Decision Note   Babatundevinh Oconnor  is requesting a refill authorization.  Brief Assessment and Rationale for Refill:  Approve     Medication Therapy Plan:         Comments:     Note composed:9:06 AM 06/08/2025

## 2025-06-16 ENCOUNTER — OFFICE VISIT (OUTPATIENT)
Dept: INTERNAL MEDICINE | Facility: CLINIC | Age: 70
End: 2025-06-16
Payer: MEDICARE

## 2025-06-16 VITALS
SYSTOLIC BLOOD PRESSURE: 126 MMHG | HEIGHT: 66 IN | TEMPERATURE: 99 F | OXYGEN SATURATION: 98 % | HEART RATE: 75 BPM | DIASTOLIC BLOOD PRESSURE: 64 MMHG | WEIGHT: 186.5 LBS | RESPIRATION RATE: 14 BRPM | BODY MASS INDEX: 29.97 KG/M2

## 2025-06-16 DIAGNOSIS — N40.0 BENIGN PROSTATIC HYPERPLASIA, UNSPECIFIED WHETHER LOWER URINARY TRACT SYMPTOMS PRESENT: ICD-10-CM

## 2025-06-16 DIAGNOSIS — M25.512 ACUTE PAIN OF LEFT SHOULDER: ICD-10-CM

## 2025-06-16 DIAGNOSIS — M75.52 BURSITIS OF LEFT SHOULDER: Primary | ICD-10-CM

## 2025-06-16 PROCEDURE — 3074F SYST BP LT 130 MM HG: CPT | Mod: CPTII,S$GLB,,

## 2025-06-16 PROCEDURE — 3008F BODY MASS INDEX DOCD: CPT | Mod: CPTII,S$GLB,,

## 2025-06-16 PROCEDURE — 1160F RVW MEDS BY RX/DR IN RCRD: CPT | Mod: CPTII,S$GLB,,

## 2025-06-16 PROCEDURE — 99214 OFFICE O/P EST MOD 30 MIN: CPT | Mod: 25,S$GLB,,

## 2025-06-16 PROCEDURE — 1159F MED LIST DOCD IN RCRD: CPT | Mod: CPTII,S$GLB,,

## 2025-06-16 PROCEDURE — 3078F DIAST BP <80 MM HG: CPT | Mod: CPTII,S$GLB,,

## 2025-06-16 PROCEDURE — 99999 PR PBB SHADOW E&M-EST. PATIENT-LVL IV: CPT | Mod: PBBFAC,,,

## 2025-06-16 PROCEDURE — 96372 THER/PROPH/DIAG INJ SC/IM: CPT | Mod: S$GLB,,,

## 2025-06-16 PROCEDURE — 1125F AMNT PAIN NOTED PAIN PRSNT: CPT | Mod: CPTII,S$GLB,,

## 2025-06-16 RX ORDER — TAMSULOSIN HYDROCHLORIDE 0.4 MG/1
2 CAPSULE ORAL DAILY
Qty: 180 CAPSULE | Refills: 3 | Status: CANCELLED | OUTPATIENT
Start: 2025-06-16

## 2025-06-16 RX ORDER — TRIAMCINOLONE ACETONIDE 40 MG/ML
40 INJECTION, SUSPENSION INTRA-ARTICULAR; INTRAMUSCULAR
Status: COMPLETED | OUTPATIENT
Start: 2025-06-16 | End: 2025-06-16

## 2025-06-16 RX ORDER — CELECOXIB 200 MG/1
200 CAPSULE ORAL 2 TIMES DAILY
Qty: 10 CAPSULE | Refills: 0 | Status: SHIPPED | OUTPATIENT
Start: 2025-06-16 | End: 2025-06-21

## 2025-06-16 RX ADMIN — TRIAMCINOLONE ACETONIDE 40 MG: 40 INJECTION, SUSPENSION INTRA-ARTICULAR; INTRAMUSCULAR at 01:06

## 2025-06-16 NOTE — PROGRESS NOTES
[facility] Note    Subjective:       Patient ID: Babatunde Oconnor is a 70 y.o. male.    Chief Complaint: Shoulder Pain (Left rotator cuff pain)    Shoulder Pain       History of Present Illness    CHIEF COMPLAINT:  Mr. Oconnor presents today for left shoulder pain    HISTORY OF PRESENT ILLNESS:  He reports left shoulder pain that started 4-5 months ago with significant worsening in the last 4-5 days. The pain is described as feeling like needles throughout the shoulder. Pain intensity generally ranges from 3-4/10, increasing to 7-8/10 at its worst. The pain is constant and position-independent. Initially, he attributed the pain to sleep position. He takes ibuprofen 4 times daily without adequate pain relief.    SURGICAL HISTORY:  Right rotator cuff repair 9-10 years ago for three broken tendons.    MEDICAL HISTORY:  History of calcium deposits in shoulder 11-12 years ago.      ROS:  Musculoskeletal: +joint pain, +joint swelling, +limb pain, +pain with movement, +nerve pain, +upper extremity swelling  Neurological: denies headache       Started 4-5 months ago.  Pain in left shoulder, similar to when he broke other rotator cuff.  Describese pain as needles in his shoulder.  Got worse in last week.  Just hurts when not doing anything.  Changing positions didn't help  Tx: ibuprofen 800mg, didn't getr rid of pain    Normal pain 3-4/10, worst 7-8/10; now pt is in pain all the time, day and night    Review of Systems          Objective:      Physical Exam  Vitals and nursing note reviewed.   Constitutional:       Appearance: Normal appearance.   HENT:      Head: Normocephalic and atraumatic.   Eyes:      Pupils: Pupils are equal, round, and reactive to light.   Cardiovascular:      Rate and Rhythm: Normal rate and regular rhythm.      Pulses: Normal pulses.      Heart sounds: Normal heart sounds.   Pulmonary:      Effort: Pulmonary effort is normal.      Breath sounds: Normal breath sounds.    Musculoskeletal:      Right shoulder: Normal.      Left shoulder: Swelling, bony tenderness and crepitus present. Decreased range of motion. Normal strength. Normal pulse.        Arms:    Skin:     General: Skin is warm.      Capillary Refill: Capillary refill takes less than 2 seconds.   Neurological:      General: No focal deficit present.      Mental Status: He is alert and oriented to person, place, and time.   Psychiatric:         Mood and Affect: Mood normal.         Behavior: Behavior normal.           Physical Exam            Assessment:       1. Bursitis of left shoulder  - triamcinolone acetonide injection 40 mg  - Ambulatory Referral/Consult to Physical Therapy  - celecoxib (CELEBREX) 200 MG capsule; Take 1 capsule (200 mg total) by mouth 2 (two) times daily. for 5 days  Dispense: 10 capsule; Refill: 0    2. Acute pain of left shoulder  - triamcinolone acetonide injection 40 mg  - Ambulatory Referral/Consult to Physical Therapy  - celecoxib (CELEBREX) 200 MG capsule; Take 1 capsule (200 mg total) by mouth 2 (two) times daily. for 5 days  Dispense: 10 capsule; Refill: 0      Plan:       Assessment & Plan    IMPRESSION:  1. Assessed left shoulder pain, suspecting bursitis of the sub-acromial bursa based on description and exam.  2. Determined steroid injection appropriate for immediate pain relief.    PLAN SUMMARY:   Diagnosed with bursitis in the sub-acromial bursa   Referred to physical therapy for left shoulder pain management   Physical therapy department to contact patient for appointment scheduling   Prescribed Celebrex for inflammation and pain management   Provided patient education on bursa function and sleeping position using visual aids    LEFT SHOULDER PAIN AND BURSITIS:   Mr. Oconnor reports left shoulder pain described as feeling like needles sticking all over, worsening over the past few days despite taking ibuprofen.   Pain is rated 3-4 generally, increasing to 7-8 with certain positions  and movements.   Examination revealed swelling, pain upon palpation, and pain during resistance tests.   Diagnosed with bursitis in the sub-acromial bursa.   Explained the location and function of the bursa using visual aids and discussed how sleeping position may contribute to shoulder irritation over time.    CALCIFIC TENDINITIS OF LEFT SHOULDER:   Mr. Oconnor has a history of calcium deposits in the shoulder, similar to previous rotator cuff issues.   Current pain presentation is similar to previous rotator cuff injury, possibly related to calcific tendinitis.   Celebrex prescribed will also help manage inflammation and pain associated with this condition.    FOLLOW-UP AND REFERRALS:   Referred to physical therapy for left shoulder pain management.   Physical therapy department will contact the patient directly to schedule appointments.         -Continue current medications and maintain follow up with specialists.  Return to clinic in Follow up in about 4 weeks (around 7/14/2025), or if symptoms worsen or fail to improve.     Gypsy Pantoja, MSN, APRN, FNP-C  [facility]    This note was generated with the assistance of ambient listening technology. Verbal consent was obtained by the patient and accompanying visitor(s) for the recording of patient appointment to facilitate this note. I attest to having reviewed and edited the generated note for accuracy, though some syntax or spelling errors may persist. Please contact the author of this note for any clarification.      I spent 32 minutes on the day of this encounter for preparing for, evaluating, treating, and managing this patient.    This includes face-to-face time and non face-to-face time and includes the following:  --preparing to see the patient (eg, obtaining and/or reviewing old records such as, when applicable, primary care notes, specialist notes, hospital notes, review of laboratory tests, and/or radiographic and/or  cardiology or other studies)  --performing a medically appropriate review of systems and examination and/or evaluation  --reviewing and independently interpreting results (not separately reported; eg, lab results) and communicating results to the patient and/or family/caregiver  --placing orders and/or reviewing other physician's orders which can both include medications, laboratory studies, radiographic studies, procedures, referrals etcetera and   --counseling and educating the patient and/or family member/caregiver regarding the treatment plan  --documentation of the visit in the electronic health record  --communicating with other health care providers regarding referrals, studies, follow-up, etc

## 2025-06-16 NOTE — TELEPHONE ENCOUNTER
No care due was identified.  [facility] Embedded Care Due Messages. Reference number: [id number].   6/16/2025 9:26:43 AM CDT

## 2025-06-16 NOTE — PATIENT INSTRUCTIONS
.Rest: Avoid activities that cause pain, and switch to activities that don't stress the left shoulder.   Ice:  Apply ice or a cold pack to the left shoulder for 10-20 minutes at a time, with a thin cloth between the ice and your skin   Heat: After 3 days of icing, apply heat to the left shoulder with a heating pad, hot water bottle, or warm, moist towel.   Compression:  Wrap the injured area with an elastic bandage to reduce swelling and provide support. Don't wrap it too tightly, as this can cause more swelling.   Elevation:  Keep the injured area raised above the level of your heart to reduce swelling and help drain fluid away from the injury   ROM exercises:  Gently move the left shoulder joint through its full range of motion to prevent stiffness   Physical Therapy: as indicated to strengthen weak muscle and improve ROM

## 2025-06-24 DIAGNOSIS — M75.52 BURSITIS OF LEFT SHOULDER: ICD-10-CM

## 2025-06-24 DIAGNOSIS — M25.512 ACUTE PAIN OF LEFT SHOULDER: ICD-10-CM

## 2025-06-24 NOTE — TELEPHONE ENCOUNTER
Please see the attached refill request. It's signed by you but there is no prescription for this in the chart

## 2025-06-25 RX ORDER — CELECOXIB 200 MG/1
200 CAPSULE ORAL 2 TIMES DAILY
Qty: 10 CAPSULE | Refills: 0 | Status: SHIPPED | OUTPATIENT
Start: 2025-06-25 | End: 2025-06-30

## 2025-07-09 ENCOUNTER — CLINICAL SUPPORT (OUTPATIENT)
Dept: REHABILITATION | Facility: HOSPITAL | Age: 70
End: 2025-07-09
Payer: MEDICARE

## 2025-07-09 DIAGNOSIS — R68.89 IMPAIRED FUNCTION OF UPPER EXTREMITY: ICD-10-CM

## 2025-07-09 DIAGNOSIS — R29.898 IMPAIRED STRENGTH OF UPPER EXTREMITY: ICD-10-CM

## 2025-07-09 DIAGNOSIS — M75.52 BURSITIS OF LEFT SHOULDER: Primary | ICD-10-CM

## 2025-07-09 DIAGNOSIS — M25.612 DECREASED RANGE OF MOTION OF LEFT SHOULDER: ICD-10-CM

## 2025-07-09 DIAGNOSIS — G47.9 DISTURBANCE OF SLEEP PATTERN ASSOCIATED WITH PAIN: ICD-10-CM

## 2025-07-09 DIAGNOSIS — M25.512 PAIN OF LEFT SHOULDER JOINT ON MOVEMENT: ICD-10-CM

## 2025-07-09 DIAGNOSIS — R52 DISTURBANCE OF SLEEP PATTERN ASSOCIATED WITH PAIN: ICD-10-CM

## 2025-07-09 DIAGNOSIS — M25.512 ACUTE PAIN OF LEFT SHOULDER: ICD-10-CM

## 2025-07-09 PROCEDURE — 97161 PT EVAL LOW COMPLEX 20 MIN: CPT | Mod: PO | Performed by: PHYSICAL THERAPIST

## 2025-07-09 NOTE — PROGRESS NOTES
Outpatient Rehab    Physical Therapy Evaluation (only)    Patient Name: Babatunde Oconnor  MRN: [id number]  YOB: 1955  Encounter Date: 7/9/2025    Therapy Diagnosis:   Encounter Diagnoses   Name Primary?    Bursitis of left shoulder Yes    Acute pain of left shoulder     Disturbance of sleep pattern associated with pain     Impaired strength of upper extremity     Decreased range of motion of left shoulder     Pain of left shoulder joint on movement     Impaired function of upper extremity      Physician: Gypsy Pantoja NP    Physician Orders: Eval and Treat  Medical Diagnosis: Bursitis of left shoulder  Acute pain of left shoulder  Surgical Diagnosis: Not applicable for this Episode  Surgical Date: Not applicable for this Episode  Days Since Last Surgery: Not applicable for this Episode    Visit # / Visits Authorized:  1 / 1  Insurance Authorization Period: 6/16/2025 to 6/16/2026  Date of Evaluation: 7/9/2025  Plan of Care Certification: 7/9/2025 to 10/9/2025     Time In: 0908   Time Out: 1010  Total Time (in minutes): 62   Total Billable Time (in minutes): 62    Intake Outcome Measure for FOTO Survey    Therapist reviewed FOTO scores for Babatunde Oconnor on 7/9/2025.   FOTO report - see Media section or FOTO account episode details.     Intake Score: 50%    Precautions:       Subjective   History of Present Illness  Babatunde is a 70 y.o. male who reports to physical therapy with a chief concern of Intermittent pain left shoulder.     The patient reports a medical diagnosis of Bursitis of left shoulder (M75.52), Acute pain of left shoulder (M25.512).    Diagnostic tests related to this condition: None.        History of Present Condition/Illness: Date Of Onset: Six to seven months ago. Gradual onset of pain. Began to experience pain at night while sleeping. Presently unchanged from onset. Unremarkable for left shoulder pan in the past. Surgery for a torn RTC right shoulder following an  injury about 10 years ago. Also surgery for a right IVAN and back fusion about four years and a cervical fusion 15 years ago.     Activities of Daily Living  Social history was obtained from Patient.               Previously independent with activities of daily living? Yes     Currently independent with activities of daily living? Yes          Previously independent with instrumental activities of daily living? Yes     Currently independent with instrumental activities of daily living? Yes              Pain     Patient reports a current pain level of 0/10. Pain at best is reported as 0/10. Pain at worst is reported as 6/10.   Location: superior aspect of the left shoulder over the AC joint.  Clinical Progression (since onset): Unchanged  Pain Qualities: Sharp, Needle-like  Pain-Aggravating Factors: Movement, Other (Comment)  Other Pain-Aggravating Factors: movement of the arm out to the side upon reaching         Review of Systems  Patient reports: Night Pain, Sleep Disturbance, and Osteoarthritis        Treatment History  Treatments  Previously Received Treatments: No  Currently Receiving Treatments: No    Living Arrangements  Living Situation  Housing: Home independently  Living Arrangements: Spouse/significant other        Employment  Patient does not report that: Does the patient's condition impact their ability to work?  Employment Status: Employed full-time   Teacher at [employer]      Past Medical History/Physical Systems Review:   Babatunde Oconnor  has a past medical history of BPH (benign prostatic hyperplasia), Elevated liver enzymes, Herpes simplex infection of penis, Hyperlipidemia, Hypertension, Leg length discrepancy, and Lumbar spondylosis.    Babatunde Oconnor  has a past surgical history that includes Cervical spine surgery; Cervical discectomy (2008); Appendectomy; Injection of anesthetic agent around medial branch nerves innervating lumbar facet joint (Bilateral, 1/7/2020);  Injection of anesthetic agent around medial branch nerves innervating lumbar facet joint (Bilateral, 5/14/2020); Arthroplasty of hip by anterior approach (Right, 6/29/2022); and Epidural steroid injection (N/A, 8/12/2022).    Babatunde has a current medication list which includes the following prescription(s): amlodipine-atorvastatin, aspirin, bisoprolol-hydrochlorothiazide 5-6.25 mg, ketoconazole, and tamsulosin.    Review of patient's allergies indicates:  No Known Allergies     Objective   Posture  Patient presents with a Tilted right head position.     Shoulders are Elevated and Asymmetric.             Shoulder Observations  Left Shoulder Observations  Not Present: Atrophy, Deformity, Edema, Effusion, and Trophic Changes              Upper Extremity Sensation       Left Upper Extremity Sensation  Intact: Light Touch                Shoulder Palpation          Left Shoulder Palpation  Abnormal: Tendon/Ligament  Unremarkable: Muscle and Bony Prominence/Bursa  Left Shoulder Tendon/Ligament Palpation Observations: Pain elicited over the left biceps tendon in the bicipital groove.           Shoulder Range of Motion  Left Shoulder   Active (deg) Passive (deg) Pain   Flexion 130 165 Yes (PAIN A/P)   Extension 40 50     Scaption         ABduction 125 145 Yes (PAIN A/P)   ADduction         Horizontal ABduction         Horizontal ADduction         External Rotation (Shoulder ABducted 0 degrees) 70 75     External Rotation (Shoulder ABducted 45 degrees)         External Rotation (Shoulder ABducted 90 degrees) 65 70 Yes (PAIN A/P. MORE PAIN W/ER THAN IR.)   Internal Rotation (Shoulder ABducted 0 degrees)         Internal Rotation (Shoulder ABducted 45 degrees)         Internal Rotation (Shoulder ABducted 90 degrees) 55 70 Yes (PAIN A/P)            Elbow/Forearm Range of Motion   Left Elbow/Forearm   Active (deg) Passive (deg) Pain   Flexion 140 145     Extension 0 0     Forearm Pronation         Forearm Supination                        Shoulder Strength - Planes of Motion   Right Strength Right Pain Left Strength Left  Pain   Flexion     4-     Extension     4     ABduction     3- Yes   ADduction           Horizontal ABduction           Horizontal ADduction           Internal Rotation 0°           Internal Rotation 90°     4     External Rotation 0°     4     External Rotation 90°     4-         Shoulder Strength - Rotator Cuff Muscles   Right Strength Right Pain Left Strength Left  Pain   Supraspinatus     4 Yes   Infraspinatus     4     Teres Minor     4-     Subscapularis     4                 Shoulder Special Tests  Negative: Left Benton's and Left Resisted Supine External Rotation  Rotator Cuff Tests  Positive: Left Drop Arm  Negative: Left Empty Can  Positive: Left Lift Off  Negative: Left Full Can and Left Internal Rotation Resistance Strength  Impingement Tests  Positive: Left Mendez-Stu  Negative: Left Cross Body ADduction  Acromioclavicular Tests  Negative: Left Acromioclavicular Resisted Extension, Left Acromioclavicular Shear, Left Passive Horizontal ADduction, and Left Sternoclavicular Joint Stress  Other Shoulder Tests  Positive: Left Speed's                 Selective Functional Movement Assessment:  FN: functional, non-painful  FP: functional, painful  DP: dysfunctional, painful  DN: dysfunctional, non-painful    Active Cervical Flexion: DN  Active Cervical Extension: DN  Cervical Rotation:  Right:DN  Left: DN  Upper extremity pattern 1:  Right: DN  Left:DP  Upper extremity pattern 2:  Right:DN  Left:DP      SHOULDER BREAKOUTS PATTERN 1  Lumbar locked rotation   -active  -passive  Pattern 1  -active - DP  -passive - DP  Internal Rotation Test  -active -DP  -passive - DP  Prone Extension   -active - FN  -passive- FN  Prone Elbow Flexion Test  -active - FN  -passive - FN    SHOULDER BREAKOUTS PATTERN 2  Lumbar locked rotation   -active -   -passive  Pattern 2  -active - DP  -passive - DP  External Rotation Test  -active  -DP  -passive - FP   Prone Flexion Abduction Test  -active - DP  -passive - DP   Prone Elbow Flexion Test  -active - FN    Time Entry(in minutes):       Assessment & Plan   Assessment  Babatunde presents with a condition of Low complexity.   Presentation of Symptoms: Stable  Will Comorbidities Impact Care: No       Functional Limitations: Activity tolerance, Range of motion, Pain with ADLs/IADLs, Gross motor coordination, Disrupted sleep pattern, Reaching  Impairments: Pain with functional activity, Impaired physical strength  Personal Factors Affecting Prognosis: Pain    Prognosis: Good  Assessment Details: Clinical findings demonstrate a shoulder internal rotation pain dysfunction, external rotation pain dysfunction and a flexion abduction pain dysfunction. They would benefit from skilled PT services to normalize kinetic chain mobility, strength, and function to safely return to their prior level of activity.    Plan  From a physical therapy perspective, the patient would benefit from: Skilled Rehab Services    Planned therapy interventions include: Therapeutic exercise, Therapeutic activities, Neuromuscular re-education, and Manual therapy.            Visit Frequency: 2 times Per Week for 8 Weeks.       This plan was discussed with Patient.   Discussion participants: Agreed Upon Plan of Care  Plan details: Frequency and duration of treatment to be adjusted as needed          The patient's spiritual, cultural, and educational needs were considered, and the patient is agreeable to the plan of care and goals.           Goals:   Active       Long Term Goals                Pt to demonstrate functional reach without pain to reach up to high shelving        Start:  07/09/25    Expected End:  09/03/25            Pt to achieve functional internal rotation and external rotation to restore functional overhead mobility pattern and shoulder movement behind the back.        Start:  07/09/25    Expected End:  09/03/25            Pt  to achieve increased thoracic extension / rotation mobility to enhance functional shoulder mobility        Start:  07/09/25    Expected End:  09/03/25             Pt to demonstrate independence with HEP for self management       Start:  07/09/25    Expected End:  09/03/25            Pt to increase strength by 1 grade in all glenohumeral and periscapular muscles for improved functional mobility.       Start:  07/09/25    Expected End:  09/03/25               Short Term Goals        Pts pain intensity reduced 20 - 40% for improved quality of life         Start:  07/09/25    Expected End:  08/06/25            Pt to demonstrate functional shoulder pattern 1 (extension, IR, elbow flexion) and 2 (flex / abd / ER) w/o pain for improved functional mobility        Start:  07/09/25    Expected End:  08/06/25            Pt to increase strength by 1/2 grade in all glenohumeral and periscapular muscles for improved functional mobility.        Start:  07/09/25    Expected End:  08/06/25            Pt to to exhibit correct exercise patterns of movement for return demonstration of HEP        Start:  07/09/25    Expected End:  08/06/25             Pt to experience 50% or more reduced interruption of sleep due to reduced pain for improved quality of life       Start:  07/09/25    Expected End:  08/06/25                Alonzo Gould, PT, DPT

## 2025-07-14 ENCOUNTER — CLINICAL SUPPORT (OUTPATIENT)
Dept: REHABILITATION | Facility: HOSPITAL | Age: 70
End: 2025-07-14
Payer: MEDICARE

## 2025-07-14 DIAGNOSIS — R68.89 IMPAIRED FUNCTION OF UPPER EXTREMITY: ICD-10-CM

## 2025-07-14 DIAGNOSIS — G47.9 DISTURBANCE OF SLEEP PATTERN ASSOCIATED WITH PAIN: Primary | ICD-10-CM

## 2025-07-14 DIAGNOSIS — R52 DISTURBANCE OF SLEEP PATTERN ASSOCIATED WITH PAIN: Primary | ICD-10-CM

## 2025-07-14 DIAGNOSIS — M25.612 DECREASED RANGE OF MOTION OF LEFT SHOULDER: ICD-10-CM

## 2025-07-14 DIAGNOSIS — R29.898 IMPAIRED STRENGTH OF UPPER EXTREMITY: ICD-10-CM

## 2025-07-14 DIAGNOSIS — M25.512 PAIN OF LEFT SHOULDER JOINT ON MOVEMENT: ICD-10-CM

## 2025-07-14 PROCEDURE — 97140 MANUAL THERAPY 1/> REGIONS: CPT | Mod: PO | Performed by: PHYSICAL THERAPIST

## 2025-07-14 PROCEDURE — 97110 THERAPEUTIC EXERCISES: CPT | Mod: PO | Performed by: PHYSICAL THERAPIST

## 2025-07-14 PROCEDURE — 97112 NEUROMUSCULAR REEDUCATION: CPT | Mod: PO | Performed by: PHYSICAL THERAPIST

## 2025-07-14 NOTE — PROGRESS NOTES
Outpatient Rehab    Physical Therapy Visit    Patient Name: Babatunde Oconnor  MRN: [id number]  YOB: 1955  Encounter Date: 7/14/2025    Therapy Diagnosis:   Encounter Diagnoses   Name Primary?    Disturbance of sleep pattern associated with pain Yes    Impaired strength of upper extremity     Decreased range of motion of left shoulder     Pain of left shoulder joint on movement     Impaired function of upper extremity      Physician: Gypsy Pantoja NP    Physician Orders: Eval and Treat  Medical Diagnosis: Bursitis of left shoulder  Acute pain of left shoulder  Surgical Diagnosis: Not applicable for this Episode   Surgical Date: Not applicable for this Episode  Days Since Last Surgery: Not applicable for this Episode    Visit # / Visits Authorized:  1 / 20  Insurance Authorization Period: 7/14/2025 to 12/31/2025  Date of Evaluation: 7/9/2025  Plan of Care Certification: 7/9/2025 to 10/9/2025      PT/PTA:     Number of PTA visits since last PT visit:   Time In: 1210   Time Out: 1312  Total Time (in minutes): 62   Total Billable Time (in minutes): 62    FOTO:  Intake Score: 50%  Survey Score 2: Not applicable for this Episode%  Survey Score 3: Not applicable for this Episode%    Precautions:         Subjective   Babatunde isnot reporting any significant left shoulder pain today. He sayshe felt good after the last session.. Feeling comfortable..         Objective            Treatment:  Therapeutic Exercise  TE 1: OH flexion in scaption position x40 2# dowel  TE 2: scapula protraction / retraction supint 2# dowel  Manual Therapy  MT 1: GH inferior and posterior mobs  MT 2: Passive ER in scaption with posterior GH mobs  Balance/Neuromuscular Re-Education  NMR 1: sidelying ER at 0 with towel x20 = pain  NMR 2: Side lying hor abd  x20  NMR 3: standing protraction / retraction, rowing, hor abd, lift off Y x25 ea    Time Entry(in minutes):  Manual Therapy Time Entry: 12  Neuromuscular Re-Education Time  Entry: 30  Therapeutic Exercise Time Entry: 18    Assessment & Plan   Assessment: Babatunde completed the routine noted without exacerbation of pain. He was challenged with the routine. There was difficulty with coordination of movements involving scapula protraction and retraction as well as ER at 0 in sidelying. Focus on scapula and RTC strengthening.       The patient will continue to benefit from skilled outpatient physical therapy in order to address the deficits listed in the problem list on the initial evaluation, provide patient and family education, and maximize the patients level of independence in the home and community environments.     The patient's spiritual, cultural, and educational needs were considered, and the patient is agreeable to the plan of care and goals.           Plan: continue as per POC    Goals:   Active       Long Term Goals                Pt to demonstrate functional reach without pain to reach up to high shelving        Start:  07/09/25    Expected End:  09/03/25            Pt to achieve functional internal rotation and external rotation to restore functional overhead mobility pattern and shoulder movement behind the back.        Start:  07/09/25    Expected End:  09/03/25            Pt to achieve increased thoracic extension / rotation mobility to enhance functional shoulder mobility        Start:  07/09/25    Expected End:  09/03/25             Pt to demonstrate independence with HEP for self management       Start:  07/09/25    Expected End:  09/03/25            Pt to increase strength by 1 grade in all glenohumeral and periscapular muscles for improved functional mobility.       Start:  07/09/25    Expected End:  09/03/25               Short Term Goals        Pts pain intensity reduced 20 - 40% for improved quality of life         Start:  07/09/25    Expected End:  08/06/25            Pt to demonstrate functional shoulder pattern 1 (extension, IR, elbow flexion) and 2 (flex / abd /  ER) w/o pain for improved functional mobility        Start:  07/09/25    Expected End:  08/06/25            Pt to increase strength by 1/2 grade in all glenohumeral and periscapular muscles for improved functional mobility.        Start:  07/09/25    Expected End:  08/06/25            Pt to to exhibit correct exercise patterns of movement for return demonstration of HEP        Start:  07/09/25    Expected End:  08/06/25             Pt to experience 50% or more reduced interruption of sleep due to reduced pain for improved quality of life       Start:  07/09/25    Expected End:  08/06/25                Alonzo Gould, PT, DPT

## 2025-07-30 ENCOUNTER — CLINICAL SUPPORT (OUTPATIENT)
Dept: REHABILITATION | Facility: HOSPITAL | Age: 70
End: 2025-07-30
Payer: MEDICARE

## 2025-07-30 ENCOUNTER — OFFICE VISIT (OUTPATIENT)
Dept: INTERNAL MEDICINE | Facility: CLINIC | Age: 70
End: 2025-07-30
Payer: MEDICARE

## 2025-07-30 VITALS
HEIGHT: 66 IN | RESPIRATION RATE: 18 BRPM | WEIGHT: 178.38 LBS | DIASTOLIC BLOOD PRESSURE: 72 MMHG | SYSTOLIC BLOOD PRESSURE: 114 MMHG | HEART RATE: 75 BPM | OXYGEN SATURATION: 97 % | BODY MASS INDEX: 28.67 KG/M2 | TEMPERATURE: 98 F

## 2025-07-30 DIAGNOSIS — I70.0 AORTIC ATHEROSCLEROSIS: ICD-10-CM

## 2025-07-30 DIAGNOSIS — R73.03 PREDIABETES: ICD-10-CM

## 2025-07-30 DIAGNOSIS — R52 DISTURBANCE OF SLEEP PATTERN ASSOCIATED WITH PAIN: Primary | ICD-10-CM

## 2025-07-30 DIAGNOSIS — I10 ESSENTIAL HYPERTENSION: ICD-10-CM

## 2025-07-30 DIAGNOSIS — G47.9 DISTURBANCE OF SLEEP PATTERN ASSOCIATED WITH PAIN: Primary | ICD-10-CM

## 2025-07-30 DIAGNOSIS — E78.5 HYPERLIPIDEMIA, UNSPECIFIED HYPERLIPIDEMIA TYPE: ICD-10-CM

## 2025-07-30 DIAGNOSIS — R68.89 IMPAIRED FUNCTION OF UPPER EXTREMITY: ICD-10-CM

## 2025-07-30 DIAGNOSIS — Z00.00 PREVENTATIVE HEALTH CARE: Primary | ICD-10-CM

## 2025-07-30 DIAGNOSIS — R29.898 IMPAIRED STRENGTH OF UPPER EXTREMITY: ICD-10-CM

## 2025-07-30 DIAGNOSIS — M25.512 PAIN OF LEFT SHOULDER JOINT ON MOVEMENT: ICD-10-CM

## 2025-07-30 DIAGNOSIS — M25.612 DECREASED RANGE OF MOTION OF LEFT SHOULDER: ICD-10-CM

## 2025-07-30 DIAGNOSIS — N40.0 BENIGN PROSTATIC HYPERPLASIA, UNSPECIFIED WHETHER LOWER URINARY TRACT SYMPTOMS PRESENT: ICD-10-CM

## 2025-07-30 PROCEDURE — 3078F DIAST BP <80 MM HG: CPT | Mod: CPTII,S$GLB,, | Performed by: FAMILY MEDICINE

## 2025-07-30 PROCEDURE — 1160F RVW MEDS BY RX/DR IN RCRD: CPT | Mod: CPTII,S$GLB,, | Performed by: FAMILY MEDICINE

## 2025-07-30 PROCEDURE — 1126F AMNT PAIN NOTED NONE PRSNT: CPT | Mod: CPTII,S$GLB,, | Performed by: FAMILY MEDICINE

## 2025-07-30 PROCEDURE — 97112 NEUROMUSCULAR REEDUCATION: CPT | Mod: PO | Performed by: PHYSICAL THERAPIST

## 2025-07-30 PROCEDURE — 99397 PER PM REEVAL EST PAT 65+ YR: CPT | Mod: S$GLB,,, | Performed by: FAMILY MEDICINE

## 2025-07-30 PROCEDURE — 3288F FALL RISK ASSESSMENT DOCD: CPT | Mod: CPTII,S$GLB,, | Performed by: FAMILY MEDICINE

## 2025-07-30 PROCEDURE — 3074F SYST BP LT 130 MM HG: CPT | Mod: CPTII,S$GLB,, | Performed by: FAMILY MEDICINE

## 2025-07-30 PROCEDURE — 99999 PR PBB SHADOW E&M-EST. PATIENT-LVL IV: CPT | Mod: PBBFAC,,, | Performed by: FAMILY MEDICINE

## 2025-07-30 PROCEDURE — 3008F BODY MASS INDEX DOCD: CPT | Mod: CPTII,S$GLB,, | Performed by: FAMILY MEDICINE

## 2025-07-30 PROCEDURE — 1101F PT FALLS ASSESS-DOCD LE1/YR: CPT | Mod: CPTII,S$GLB,, | Performed by: FAMILY MEDICINE

## 2025-07-30 PROCEDURE — 1159F MED LIST DOCD IN RCRD: CPT | Mod: CPTII,S$GLB,, | Performed by: FAMILY MEDICINE

## 2025-07-30 RX ORDER — TAMSULOSIN HYDROCHLORIDE 0.4 MG/1
2 CAPSULE ORAL DAILY
Qty: 180 CAPSULE | Refills: 3 | Status: SHIPPED | OUTPATIENT
Start: 2025-07-30

## 2025-07-30 RX ORDER — AMLODIPINE BESYLATE AND ATORVASTATIN CALCIUM 10; 10 MG/1; MG/1
1 TABLET, FILM COATED ORAL DAILY
Qty: 90 TABLET | Refills: 3 | Status: SHIPPED | OUTPATIENT
Start: 2025-07-30

## 2025-07-30 RX ORDER — BISOPROLOL FUMARATE AND HYDROCHLOROTHIAZIDE 5; 6.25 MG/1; MG/1
1 TABLET ORAL DAILY
Qty: 90 TABLET | Refills: 3 | Status: SHIPPED | OUTPATIENT
Start: 2025-07-30

## 2025-07-30 NOTE — PROGRESS NOTES
Outpatient Rehab    Physical Therapy Visit    Patient Name: Babatunde Oconnor  MRN: [id number]  YOB: 1955  Encounter Date: 7/30/2025    Therapy Diagnosis:   Encounter Diagnoses   Name Primary?    Disturbance of sleep pattern associated with pain Yes    Impaired strength of upper extremity     Decreased range of motion of left shoulder     Pain of left shoulder joint on movement     Impaired function of upper extremity      Physician: Gypsy Pantoja NP    Physician Orders: Eval and Treat  Medical Diagnosis: Bursitis of left shoulder  Acute pain of left shoulder  Surgical Diagnosis: Not applicable for this Episode   Surgical Date: Not applicable for this Episode  Days Since Last Surgery: Not applicable for this Episode    Visit # / Visits Authorized:  2 / 20  Insurance Authorization Period: 7/14/2025 to 12/31/2025  Date of Evaluation: 7/9/2025  Plan of Care Certification: 7/9/2025 to 10/9/2025      PT/PTA: PT   Number of PTA visits since last PT visit:   Time In: 1340   Time Out: 1436  Total Time (in minutes): 56   Total Billable Time (in minutes): 40    FOTO:  Intake Score (%): 50  Survey Score 2 (%): Not applicable for this Episode  Survey Score 3 (%): Not applicable for this Episode    Precautions:         Subjective   The shoulder is not painful at the moment. There is not any pain presently but there is some pain if Santana positioned wrong but I just change position..  Pain reported as 0/10.      Objective            Treatment:  Therapeutic Exercise  TE 1: OH flexion in scaption / ER  x40 3# dowel  TE 2: scapula protraction / retraction supint 3# dowel  Balance/Neuromuscular Re-Education  NMR 1: sidelying ER at 0 with towel x20 = pain  NMR 2: Side lying hor abd  x20  NMR 3: standing protraction / retraction, rowing, hor abd, lift off Y,  NMR 4: wall slides with G oval band x20  NMR 5: Standing Tband lo rows, hor abd,ER at 0 pos x25 ea    Time Entry(in minutes):  Neuromuscular Re-Education Time  Entry: 35    Assessment & Plan   Assessment: Babatunde did not experience any significant limitation in mobility due to pain. He was able to complete all the activities noted.  Will progress with RTC and ST strengthening progressing to prime movers (deltoid).        The patient will continue to benefit from skilled outpatient physical therapy in order to address the deficits listed in the problem list on the initial evaluation, provide patient and family education, and maximize the patients level of independence in the home and community environments.     The patient's spiritual, cultural, and educational needs were considered, and the patient is agreeable to the plan of care and goals.           Plan:      Goals:   Active       Long Term Goals                Pt to demonstrate functional reach without pain to reach up to high shelving        Start:  07/09/25    Expected End:  09/03/25            Pt to achieve functional internal rotation and external rotation to restore functional overhead mobility pattern and shoulder movement behind the back.        Start:  07/09/25    Expected End:  09/03/25            Pt to achieve increased thoracic extension / rotation mobility to enhance functional shoulder mobility        Start:  07/09/25    Expected End:  09/03/25             Pt to demonstrate independence with HEP for self management       Start:  07/09/25    Expected End:  09/03/25            Pt to increase strength by 1 grade in all glenohumeral and periscapular muscles for improved functional mobility.       Start:  07/09/25    Expected End:  09/03/25               Short Term Goals        Pts pain intensity reduced 20 - 40% for improved quality of life         Start:  07/09/25    Expected End:  08/06/25            Pt to demonstrate functional shoulder pattern 1 (extension, IR, elbow flexion) and 2 (flex / abd / ER) w/o pain for improved functional mobility        Start:  07/09/25    Expected End:  08/06/25            Pt to  increase strength by 1/2 grade in all glenohumeral and periscapular muscles for improved functional mobility.        Start:  07/09/25    Expected End:  08/06/25            Pt to to exhibit correct exercise patterns of movement for return demonstration of HEP        Start:  07/09/25    Expected End:  08/06/25             Pt to experience 50% or more reduced interruption of sleep due to reduced pain for improved quality of life       Start:  07/09/25    Expected End:  08/06/25                Alonzo Gould, PT, DPT

## 2025-07-30 NOTE — PROGRESS NOTES
Subjective     Patient ID: Babatunde Oconnor is a 70 y.o. male.    Chief Complaint: Annual Exam    HPI 70-year-old male patient of Dr. Moreau with hypertension, hyperlipidemia, aortic atherosclerosis, prediabetes, and BPH presents to clinic today for annual physical exam.  Hypertension and hyperlipidemia remains stable on amlodipine-atorvastatin 10/10 mg daily and bisoprolol-hydrochlorothiazide 5/6.25 mg daily.  Prediabetes remained stable with diet and exercise.  BPH is stable with use of Flomax 0.4 mg 2 tablets daily.  He reports a past surgical history of appendectomy, right hip replacement, cervical diskectomy, and WING.  He reports a family history of diabetes.  He is up-to-date with all screening exams and vaccinations.  Review of Systems   Constitutional:  Negative for appetite change, chills, fatigue and fever.   HENT:  Negative for nasal congestion, ear pain, hearing loss, postnasal drip, rhinorrhea, sinus pressure/congestion, sore throat and tinnitus.    Eyes:  Negative for redness, itching and visual disturbance.   Respiratory:  Negative for cough, chest tightness and shortness of breath.    Cardiovascular:  Negative for chest pain and palpitations.   Gastrointestinal:  Negative for abdominal pain, constipation, diarrhea, nausea and vomiting.   Genitourinary:  Negative for decreased urine volume, difficulty urinating, dysuria, frequency, hematuria and urgency.   Musculoskeletal:  Negative for back pain, myalgias, neck pain and neck stiffness.   Integumentary:  Negative for rash.   Neurological:  Negative for dizziness, light-headedness and headaches.   Psychiatric/Behavioral: Negative.            Objective     Physical Exam  Vitals and nursing note reviewed.   Constitutional:       General: He is not in acute distress.     Appearance: Normal appearance. He is well-developed. He is not diaphoretic.   HENT:      Head: Normocephalic and atraumatic.      Right Ear: External ear normal.      Left Ear:  External ear normal.      Nose: Nose normal.      Mouth/Throat:      Pharynx: No oropharyngeal exudate.   Eyes:      General: No scleral icterus.        Right eye: No discharge.         Left eye: No discharge.      Conjunctiva/sclera: Conjunctivae normal.      Pupils: Pupils are equal, round, and reactive to light.   Neck:      Thyroid: No thyromegaly.      Vascular: No JVD.      Trachea: No tracheal deviation.   Cardiovascular:      Rate and Rhythm: Normal rate and regular rhythm.      Heart sounds: Normal heart sounds. No murmur heard.     No friction rub. No gallop.   Pulmonary:      Effort: Pulmonary effort is normal. No respiratory distress.      Breath sounds: Normal breath sounds. No stridor. No wheezing, rhonchi or rales.   Chest:      Chest wall: No tenderness.   Abdominal:      General: Bowel sounds are normal. There is no distension.      Palpations: Abdomen is soft. There is no mass.      Tenderness: There is no abdominal tenderness. There is no guarding or rebound.   Musculoskeletal:         General: No tenderness. Normal range of motion.      Cervical back: Normal range of motion and neck supple.   Lymphadenopathy:      Cervical: No cervical adenopathy.   Skin:     General: Skin is warm and dry.      Coloration: Skin is not pale.      Findings: No erythema or rash.   Neurological:      Mental Status: He is alert and oriented to person, place, and time.   Psychiatric:         Mood and Affect: Mood normal.         Behavior: Behavior normal.         Thought Content: Thought content normal.         Judgment: Judgment normal.            Assessment and Plan     1. Preventative health care  -     CBC Auto Differential; Future; Expected date: 07/30/2025  -     Comprehensive Metabolic Panel; Future; Expected date: 07/30/2025  -     Lipid Panel; Future; Expected date: 07/30/2025  -     T4, Free; Future; Expected date: 07/30/2025  -     TSH; Future; Expected date: 07/30/2025  -     Hemoglobin A1C; Future;  Expected date: 07/30/2025  -     Urinalysis, Reflex to Urine Culture Urine, Clean Catch; Future; Expected date: 07/30/2025  -     Prostate Specific Antigen, Diagnostic; Future; Expected date: 07/30/2025    2. Essential hypertension  -     CBC Auto Differential; Future; Expected date: 07/30/2025  -     Comprehensive Metabolic Panel; Future; Expected date: 07/30/2025  -     Lipid Panel; Future; Expected date: 07/30/2025  -     T4, Free; Future; Expected date: 07/30/2025  -     TSH; Future; Expected date: 07/30/2025  -     Hemoglobin A1C; Future; Expected date: 07/30/2025  -     Urinalysis, Reflex to Urine Culture Urine, Clean Catch; Future; Expected date: 07/30/2025  -     Prostate Specific Antigen, Diagnostic; Future; Expected date: 07/30/2025  -     amlodipine-atorvastatin (CADUET) 10-10 mg per tablet; Take 1 tablet by mouth once daily.  Dispense: 90 tablet; Refill: 3  -     bisoprolol-hydrochlorothiazide 5-6.25 mg (ZIAC) 5-6.25 mg Tab; Take 1 tablet by mouth once daily.  Dispense: 90 tablet; Refill: 3    3. Hyperlipidemia, unspecified hyperlipidemia type  -     CBC Auto Differential; Future; Expected date: 07/30/2025  -     Comprehensive Metabolic Panel; Future; Expected date: 07/30/2025  -     Lipid Panel; Future; Expected date: 07/30/2025  -     T4, Free; Future; Expected date: 07/30/2025  -     TSH; Future; Expected date: 07/30/2025  -     Hemoglobin A1C; Future; Expected date: 07/30/2025  -     Urinalysis, Reflex to Urine Culture Urine, Clean Catch; Future; Expected date: 07/30/2025  -     Prostate Specific Antigen, Diagnostic; Future; Expected date: 07/30/2025  -     amlodipine-atorvastatin (CADUET) 10-10 mg per tablet; Take 1 tablet by mouth once daily.  Dispense: 90 tablet; Refill: 3    4. Aortic atherosclerosis  -     CBC Auto Differential; Future; Expected date: 07/30/2025  -     Comprehensive Metabolic Panel; Future; Expected date: 07/30/2025  -     Lipid Panel; Future; Expected date: 07/30/2025  -      T4, Free; Future; Expected date: 07/30/2025  -     TSH; Future; Expected date: 07/30/2025  -     Hemoglobin A1C; Future; Expected date: 07/30/2025  -     Urinalysis, Reflex to Urine Culture Urine, Clean Catch; Future; Expected date: 07/30/2025  -     Prostate Specific Antigen, Diagnostic; Future; Expected date: 07/30/2025    5. Prediabetes  -     CBC Auto Differential; Future; Expected date: 07/30/2025  -     Comprehensive Metabolic Panel; Future; Expected date: 07/30/2025  -     Lipid Panel; Future; Expected date: 07/30/2025  -     T4, Free; Future; Expected date: 07/30/2025  -     TSH; Future; Expected date: 07/30/2025  -     Hemoglobin A1C; Future; Expected date: 07/30/2025  -     Urinalysis, Reflex to Urine Culture Urine, Clean Catch; Future; Expected date: 07/30/2025  -     Prostate Specific Antigen, Diagnostic; Future; Expected date: 07/30/2025    6. Benign prostatic hyperplasia, unspecified whether lower urinary tract symptoms present  -     CBC Auto Differential; Future; Expected date: 07/30/2025  -     Comprehensive Metabolic Panel; Future; Expected date: 07/30/2025  -     Lipid Panel; Future; Expected date: 07/30/2025  -     T4, Free; Future; Expected date: 07/30/2025  -     TSH; Future; Expected date: 07/30/2025  -     Hemoglobin A1C; Future; Expected date: 07/30/2025  -     Urinalysis, Reflex to Urine Culture Urine, Clean Catch; Future; Expected date: 07/30/2025  -     Prostate Specific Antigen, Diagnostic; Future; Expected date: 07/30/2025  -     tamsulosin (FLOMAX) 0.4 mg Cap; Take 2 capsules (0.8 mg total) by mouth once daily.  Dispense: 180 capsule; Refill: 3        1. CBC, CMP, UA, TSH, free T4, fasting lipids, hemoglobin A1c, and PSA.  2, 3, 4.  Continue amlodipine-atorvastatin 10/10 mg daily and bisoprolol-hydrochlorothiazide 5/6.25 mg daily.  Hypertension, hyperlipidemia, and aortic atherosclerosis are stable.    5. Continue diet and exercise.  Prediabetes is stable.    6. Continue Flomax 0.4 mg  2 tablets daily.  BPH is stable.    7. Return to clinic as needed or in 1 year for annual physical exam.               Follow up in about 1 year (around 7/30/2026), or if symptoms worsen or fail to improve, for Annual exam.

## 2025-07-31 ENCOUNTER — LAB VISIT (OUTPATIENT)
Dept: LAB | Facility: HOSPITAL | Age: 70
End: 2025-07-31
Attending: FAMILY MEDICINE
Payer: MEDICARE

## 2025-07-31 DIAGNOSIS — I70.0 AORTIC ATHEROSCLEROSIS: ICD-10-CM

## 2025-07-31 DIAGNOSIS — E78.5 HYPERLIPIDEMIA, UNSPECIFIED HYPERLIPIDEMIA TYPE: ICD-10-CM

## 2025-07-31 DIAGNOSIS — N40.0 BENIGN PROSTATIC HYPERPLASIA, UNSPECIFIED WHETHER LOWER URINARY TRACT SYMPTOMS PRESENT: ICD-10-CM

## 2025-07-31 DIAGNOSIS — I10 ESSENTIAL HYPERTENSION: ICD-10-CM

## 2025-07-31 DIAGNOSIS — R73.03 PREDIABETES: ICD-10-CM

## 2025-07-31 DIAGNOSIS — Z00.00 PREVENTATIVE HEALTH CARE: ICD-10-CM

## 2025-07-31 LAB
ABSOLUTE EOSINOPHIL (OHS): 0.12 K/UL
ABSOLUTE MONOCYTE (OHS): 0.6 K/UL (ref 0.3–1)
ABSOLUTE NEUTROPHIL COUNT (OHS): 4.39 K/UL (ref 1.8–7.7)
ALBUMIN SERPL BCP-MCNC: 4.3 G/DL (ref 3.5–5.2)
ALP SERPL-CCNC: 47 UNIT/L (ref 40–150)
ALT SERPL W/O P-5'-P-CCNC: 33 UNIT/L (ref 0–55)
ANION GAP (OHS): 10 MMOL/L (ref 8–16)
AST SERPL-CCNC: 35 UNIT/L (ref 0–50)
BASOPHILS # BLD AUTO: 0.03 K/UL
BASOPHILS NFR BLD AUTO: 0.4 %
BILIRUB SERPL-MCNC: 0.4 MG/DL (ref 0.1–1)
BILIRUB UR QL STRIP.AUTO: NEGATIVE
BUN SERPL-MCNC: 14 MG/DL (ref 8–23)
CALCIUM SERPL-MCNC: 9.1 MG/DL (ref 8.7–10.5)
CHLORIDE SERPL-SCNC: 107 MMOL/L (ref 95–110)
CHOLEST SERPL-MCNC: 237 MG/DL (ref 120–199)
CHOLEST/HDLC SERPL: 2.9 {RATIO} (ref 2–5)
CLARITY UR: CLEAR
CO2 SERPL-SCNC: 22 MMOL/L (ref 23–29)
COLOR UR AUTO: COLORLESS
CREAT SERPL-MCNC: 0.8 MG/DL (ref 0.5–1.4)
EAG (OHS): 114 MG/DL (ref 68–131)
ERYTHROCYTE [DISTWIDTH] IN BLOOD BY AUTOMATED COUNT: 14 % (ref 11.5–14.5)
GFR SERPLBLD CREATININE-BSD FMLA CKD-EPI: >60 ML/MIN/1.73/M2
GLUCOSE SERPL-MCNC: 103 MG/DL (ref 70–110)
GLUCOSE UR QL STRIP: NEGATIVE
HBA1C MFR BLD: 5.6 % (ref 4–5.6)
HCT VFR BLD AUTO: 42.5 % (ref 40–54)
HDLC SERPL-MCNC: 81 MG/DL (ref 40–75)
HDLC SERPL: 34.2 % (ref 20–50)
HGB BLD-MCNC: 14.2 GM/DL (ref 14–18)
HGB UR QL STRIP: NEGATIVE
IMM GRANULOCYTES # BLD AUTO: 0.02 K/UL (ref 0–0.04)
IMM GRANULOCYTES NFR BLD AUTO: 0.3 % (ref 0–0.5)
KETONES UR QL STRIP: NEGATIVE
LDLC SERPL CALC-MCNC: 135 MG/DL (ref 63–159)
LEUKOCYTE ESTERASE UR QL STRIP: NEGATIVE
LYMPHOCYTES # BLD AUTO: 2.01 K/UL (ref 1–4.8)
MCH RBC QN AUTO: 30.1 PG (ref 27–31)
MCHC RBC AUTO-ENTMCNC: 33.4 G/DL (ref 32–36)
MCV RBC AUTO: 90 FL (ref 82–98)
NITRITE UR QL STRIP: NEGATIVE
NONHDLC SERPL-MCNC: 156 MG/DL
NUCLEATED RBC (/100WBC) (OHS): 0 /100 WBC
PH UR STRIP: 6 [PH]
PLATELET # BLD AUTO: 256 K/UL (ref 150–450)
PMV BLD AUTO: 10.8 FL (ref 9.2–12.9)
POTASSIUM SERPL-SCNC: 3.8 MMOL/L (ref 3.5–5.1)
PROT SERPL-MCNC: 7.2 GM/DL (ref 6–8.4)
PROT UR QL STRIP: NEGATIVE
PSA SERPL-MCNC: 0.36 NG/ML
RBC # BLD AUTO: 4.71 M/UL (ref 4.6–6.2)
RELATIVE EOSINOPHIL (OHS): 1.7 %
RELATIVE LYMPHOCYTE (OHS): 28 % (ref 18–48)
RELATIVE MONOCYTE (OHS): 8.4 % (ref 4–15)
RELATIVE NEUTROPHIL (OHS): 61.2 % (ref 38–73)
SODIUM SERPL-SCNC: 139 MMOL/L (ref 136–145)
SP GR UR STRIP: 1.01
T4 FREE SERPL-MCNC: 0.91 NG/DL (ref 0.71–1.51)
TRIGL SERPL-MCNC: 105 MG/DL (ref 30–150)
TSH SERPL-ACNC: 2.24 UIU/ML (ref 0.4–4)
UROBILINOGEN UR STRIP-ACNC: NEGATIVE EU/DL
WBC # BLD AUTO: 7.17 K/UL (ref 3.9–12.7)

## 2025-07-31 PROCEDURE — 83036 HEMOGLOBIN GLYCOSYLATED A1C: CPT

## 2025-07-31 PROCEDURE — 81003 URINALYSIS AUTO W/O SCOPE: CPT

## 2025-07-31 PROCEDURE — 84443 ASSAY THYROID STIM HORMONE: CPT

## 2025-07-31 PROCEDURE — 82040 ASSAY OF SERUM ALBUMIN: CPT

## 2025-07-31 PROCEDURE — 80061 LIPID PANEL: CPT

## 2025-07-31 PROCEDURE — 84439 ASSAY OF FREE THYROXINE: CPT

## 2025-07-31 PROCEDURE — 84153 ASSAY OF PSA TOTAL: CPT

## 2025-07-31 PROCEDURE — 85025 COMPLETE CBC W/AUTO DIFF WBC: CPT

## 2025-07-31 PROCEDURE — 36415 COLL VENOUS BLD VENIPUNCTURE: CPT | Mod: PO

## 2025-08-01 LAB — HOLD SPECIMEN: NORMAL

## (undated) DEVICE — SYS CLSR DERMABOND PRINEO 22CM

## (undated) DEVICE — SPHERE NDI PASSIVE

## (undated) DEVICE — ALCOHOL 70% ISOP RUBBING 4OZ

## (undated) DEVICE — TAPE SILK 3IN

## (undated) DEVICE — DRESSING LEUKOPLAST FLEX 1X3IN

## (undated) DEVICE — GAUZE SPONGE 4X4 12PLY

## (undated) DEVICE — TIP YANKAUERS BULB NO VENT

## (undated) DEVICE — GOWN SMARTGOWN 3XL XLONG

## (undated) DEVICE — DRAPE IOBAN 2 STERI

## (undated) DEVICE — DRESSING AQUACEL FOAM 3 X 3

## (undated) DEVICE — SEALER AQUAMANTYS 2.3 BIPOLAR

## (undated) DEVICE — SUT 2-0 SILK 30IN BLK BRAID

## (undated) DEVICE — COVER BACK TBL HD 2-TIER 72IN

## (undated) DEVICE — UNDERGLOVES BIOGEL PI SIZE 8.5

## (undated) DEVICE — DRAPE EXCELSIUS GPS MONITOR

## (undated) DEVICE — MARKER SKIN STND TIP BLUE BARR

## (undated) DEVICE — UNDERGLOVES BIOGEL PI SZ 7 LF

## (undated) DEVICE — SPIKE LOW PROFILE QUATTRO SH

## (undated) DEVICE — KIT PT CARE HANA PROFX SSXT

## (undated) DEVICE — PACK DRAPE UNIVERSAL CONVERTOR

## (undated) DEVICE — NDL SPINAL 18GX3.5 SPINOCAN

## (undated) DEVICE — ELECTRODE REM PLYHSV RETURN 9

## (undated) DEVICE — TRAY CATH FOL SIL URIMTR 16FR

## (undated) DEVICE — APPLICATOR CHLORAPREP ORN 26ML

## (undated) DEVICE — BIT DRILL REAM GPS 3.5MM

## (undated) DEVICE — GOWN POLY REINF BRTH SLV LG

## (undated) DEVICE — KIT SPINAL PATIENT CARE JACK

## (undated) DEVICE — SOL BETADINE 5%

## (undated) DEVICE — SPONGE GAUZE 16PLY 4X4

## (undated) DEVICE — DRESSING AQUACEL FOAM 5 X 5

## (undated) DEVICE — SYR 3CC LUER LOC

## (undated) DEVICE — DRESSING AQUACEL SACRAL 9 X 9

## (undated) DEVICE — SUT ETHILON 3/0 18IN PS-1

## (undated) DEVICE — SUT VICRYL+ 1 CT1 18IN

## (undated) DEVICE — DRESSING TELFA N ADH 3X8

## (undated) DEVICE — SYR DISP LL 5CC

## (undated) DEVICE — CLOSURE SKIN 1X5 STERI-STRIP

## (undated) DEVICE — GLOVE BIOGEL SKINSENSE PI 7.0

## (undated) DEVICE — SUT MONOCYRL 4-0 PS2 UND

## (undated) DEVICE — KIT SURGIFLO HEMOSTATIC MATRIX

## (undated) DEVICE — GLOVE BIOGEL SKINSENSE PI 8.0

## (undated) DEVICE — GUIDE POST LP QUATTRO SHORT

## (undated) DEVICE — DRAPE CORETEMP FLD WRM 56X62IN

## (undated) DEVICE — DRESSING AQUACEL AG RBBN 2X45

## (undated) DEVICE — DRAPE ABDOMINAL TIBURON 14X11

## (undated) DEVICE — SYR IRRIGATION BULB STER 60ML

## (undated) DEVICE — BLADE SAGITTAL 18 X 1.27 X 90M

## (undated) DEVICE — KIT NERVE BLOCK PREP BAPTIST

## (undated) DEVICE — CORD BIPOLAR 12 FOOT

## (undated) DEVICE — DRAPE STERI-DRAPE 1000 17X11IN

## (undated) DEVICE — MASK FLYTE HOOD PEEL AWAY

## (undated) DEVICE — DRAPE TOP 53X102IN

## (undated) DEVICE — NDL ECLIPSE SAFETY 18GX1-1/2IN

## (undated) DEVICE — SUT 2/0 36IN COATED VICRYL

## (undated) DEVICE — BUR BONE CUT MICRO TPS 3X3.8MM

## (undated) DEVICE — TOWEL OR DISP STRL BLUE 4/PK

## (undated) DEVICE — BRUSH SCRUB HIBICLENS 4%

## (undated) DEVICE — DRAPE C-ARMOR EQUIPMENT COVER

## (undated) DEVICE — KIT EVACUATOR 3-SPRING 1/8 DRN

## (undated) DEVICE — SEE L#156916

## (undated) DEVICE — DRAPE EXCELSIUS GPS C-ARM

## (undated) DEVICE — NDL 18GA X1 1/2 REG BEVEL

## (undated) DEVICE — GLOVE BIOGEL PI MICRO SZ 7

## (undated) DEVICE — DRILL BIT SURG GPS HI SPD 4MM

## (undated) DEVICE — SYS PRINEO SKIN CLOSURE

## (undated) DEVICE — SEE MEDLINE ITEM 156905

## (undated) DEVICE — SUT 1 36IN COATED VICRYL UN

## (undated) DEVICE — CHLORAPREP 10.5 ML APPLICATOR

## (undated) DEVICE — SYR 10CC LUER LOCK

## (undated) DEVICE — SUT MONOCRYL 3-0 PS-2 UND

## (undated) DEVICE — DRESSING TRANS 4X4 TEGADERM

## (undated) DEVICE — SEE MEDLINE ITEM 157131

## (undated) DEVICE — TUBE FRAZIER 5MM 2FT SOFT TIP

## (undated) DEVICE — BNDG COFLEX FOAM LF2 ST 4X5YD

## (undated) DEVICE — DRAPE C-ARM ELAS CLIP 42X120IN

## (undated) DEVICE — SPONGE LAP 4X18 PREWASHED

## (undated) DEVICE — BLADE MILL BONE MEDIUM

## (undated) DEVICE — UNDERGLOVES BIOGEL PI SIZE 7.5

## (undated) DEVICE — GOWN SMARTGOWN LVL4 X-LONG XL

## (undated) DEVICE — KIT IRR SUCTION HND PIECE

## (undated) DEVICE — TOWEL OR XRAY WHITE 17X26IN

## (undated) DEVICE — SUT VICRYL PLUS 2-0 CT1 18

## (undated) DEVICE — DRESSING MEPILEX BORDER 4 X 4